# Patient Record
Sex: FEMALE | Race: WHITE | Employment: OTHER | URBAN - METROPOLITAN AREA
[De-identification: names, ages, dates, MRNs, and addresses within clinical notes are randomized per-mention and may not be internally consistent; named-entity substitution may affect disease eponyms.]

---

## 2017-02-26 ENCOUNTER — HOSPITAL ENCOUNTER (EMERGENCY)
Age: 67
Discharge: HOME | End: 2017-02-26
Attending: EMERGENCY MEDICINE
Payer: MEDICARE

## 2017-02-26 VITALS
SYSTOLIC BLOOD PRESSURE: 123 MMHG | HEIGHT: 63 IN | RESPIRATION RATE: 18 BRPM | DIASTOLIC BLOOD PRESSURE: 58 MMHG | BODY MASS INDEX: 33.31 KG/M2 | TEMPERATURE: 98 F | WEIGHT: 188 LBS | HEART RATE: 63 BPM | OXYGEN SATURATION: 94 %

## 2017-02-26 DIAGNOSIS — E11.649 HYPOGLYCEMIA DUE TO TYPE 2 DIABETES MELLITUS (HCC): Primary | ICD-10-CM

## 2017-02-26 LAB
ALBUMIN SERPL BCP-MCNC: 3.6 G/DL (ref 3.2–4.6)
ALBUMIN/GLOB SERPL: 1.1 {RATIO} (ref 1.2–3.5)
ALP SERPL-CCNC: 83 U/L (ref 50–136)
ALT SERPL-CCNC: 15 U/L (ref 12–65)
ANION GAP BLD CALC-SCNC: 13 MMOL/L (ref 7–16)
AST SERPL W P-5'-P-CCNC: 8 U/L (ref 15–37)
BASOPHILS # BLD AUTO: 0.1 K/UL (ref 0–0.2)
BASOPHILS # BLD: 0 % (ref 0–2)
BILIRUB SERPL-MCNC: 0.2 MG/DL (ref 0.2–1.1)
BUN SERPL-MCNC: 18 MG/DL (ref 8–23)
CALCIUM SERPL-MCNC: 8.1 MG/DL (ref 8.3–10.4)
CHLORIDE SERPL-SCNC: 105 MMOL/L (ref 98–107)
CO2 SERPL-SCNC: 26 MMOL/L (ref 21–32)
CREAT SERPL-MCNC: 1.13 MG/DL (ref 0.6–1)
DIFFERENTIAL METHOD BLD: ABNORMAL
EOSINOPHIL # BLD: 0.6 K/UL (ref 0–0.8)
EOSINOPHIL NFR BLD: 5 % (ref 0.5–7.8)
ERYTHROCYTE [DISTWIDTH] IN BLOOD BY AUTOMATED COUNT: 14.7 % (ref 11.9–14.6)
GLOBULIN SER CALC-MCNC: 3.4 G/DL (ref 2.3–3.5)
GLUCOSE SERPL-MCNC: 88 MG/DL (ref 65–100)
HCT VFR BLD AUTO: 36.2 % (ref 35.8–46.3)
HGB BLD-MCNC: 11 G/DL (ref 11.7–15.4)
IMM GRANULOCYTES # BLD: 0 K/UL (ref 0–0.5)
IMM GRANULOCYTES NFR BLD AUTO: 0.3 % (ref 0–5)
LYMPHOCYTES # BLD AUTO: 17 % (ref 13–44)
LYMPHOCYTES # BLD: 2.4 K/UL (ref 0.5–4.6)
MCH RBC QN AUTO: 26.3 PG (ref 26.1–32.9)
MCHC RBC AUTO-ENTMCNC: 30.4 G/DL (ref 31.4–35)
MCV RBC AUTO: 86.6 FL (ref 79.6–97.8)
MONOCYTES # BLD: 0.7 K/UL (ref 0.1–1.3)
MONOCYTES NFR BLD AUTO: 5 % (ref 4–12)
NEUTS SEG # BLD: 10.1 K/UL (ref 1.7–8.2)
NEUTS SEG NFR BLD AUTO: 73 % (ref 43–78)
PLATELET # BLD AUTO: 281 K/UL (ref 150–450)
PMV BLD AUTO: 9.9 FL (ref 10.8–14.1)
POTASSIUM SERPL-SCNC: 3.6 MMOL/L (ref 3.5–5.1)
PROT SERPL-MCNC: 7 G/DL (ref 6.3–8.2)
RBC # BLD AUTO: 4.18 M/UL (ref 4.05–5.25)
SODIUM SERPL-SCNC: 144 MMOL/L (ref 136–145)
WBC # BLD AUTO: 14 K/UL (ref 4.3–11.1)

## 2017-02-26 PROCEDURE — 85025 COMPLETE CBC W/AUTO DIFF WBC: CPT | Performed by: EMERGENCY MEDICINE

## 2017-02-26 PROCEDURE — 99284 EMERGENCY DEPT VISIT MOD MDM: CPT | Performed by: EMERGENCY MEDICINE

## 2017-02-26 PROCEDURE — 80053 COMPREHEN METABOLIC PANEL: CPT | Performed by: EMERGENCY MEDICINE

## 2017-02-26 PROCEDURE — 81003 URINALYSIS AUTO W/O SCOPE: CPT | Performed by: EMERGENCY MEDICINE

## 2017-02-26 NOTE — ED TRIAGE NOTES
Per ems called out for hypoglycemia patient found to have bgl 68 gave 15g oral glucose, repeat bgl not repeated, will repeat in triage.  Patient also has additional complaints concentrated urine and lower back pain \"I feel like I have an UTI\"

## 2017-02-26 NOTE — ED NOTES
I have reviewed medications, follow up provider options, and discharge instructions with the patient. The patient verbalized understanding. Copy of discharge information given to patient upon discharge. Patient discharged in no distress. Patient ambulatory to waiting area with cane.  No questions at this time

## 2017-02-26 NOTE — ED PROVIDER NOTES
Patient is a 75 y.o. female presenting with hypoglycemia. The history is provided by the patient and the spouse. Low Blood Sugar    This is a recurrent problem. The current episode started more than 1 week ago. The problem has been gradually worsening. Associated symptoms include confusion and weakness. Pertinent negatives include no unresponsiveness, no agitation, no self-injury and no violence. Mental status baseline is normal.  Her past medical history is significant for diabetes and hypertension. Her past medical history does not include seizures, CVA or dementia.         Past Medical History:   Diagnosis Date    Allergic rhinitis 12/10/2013    Anxiety and depression     Arthritis     Asthma     Atony of bladder     Chronic kidney disease     STONES    Chronic pain     HX FIBROMYALGIA    Diabetes ([facility] [address]     BORDRLINE DIET CONTROLLED    GERD (gastroesophageal reflux disease)     Heart disease     Hypercholesteremia     Hypertension     Hypothyroidism 11/14/2012    Hypoxemia     IBS (irritable bowel syndrome) 4/8/2013    Ill-defined condition     MRSA    Incomplete bladder emptying     Liver disease     elevated liver enzymes    Morbid obesity (HCC)     Myalgia and myositis, unspecified     Neurogenic bladder, NOS 4/3/2014    Organic hypersomnia, unspecified     JESSICA (obstructive sleep apnea)     Other chronic cystitis     Peripheral neuropathy ([facility] [address] 4/8/2013    Symptomatic menopausal or female climacteric states     Thyroid disease     Unspecified sleep apnea     Unspecified urinary incontinence     Vitamin D deficiency 4/8/2013       Past Surgical History:   Procedure Laterality Date    COLONOSCOPY      HX BUNIONECTOMY      HX CHOLECYSTECTOMY      HX HYSTERECTOMY      HX ORTHOPAEDIC      right 5th digit x 2    HX OTHER SURGICAL      bladder lift x 2; rectocele    HX OTHER SURGICAL      pelvis floor    HX OTHER SURGICAL      SCS-Dr. Yazmin Sage    HX THYROIDECTOMY      HX TONSILLECTOMY      HX UROLOGICAL      Bladder sx; rectocele         Family History:   Problem Relation Age of Onset    Heart Failure Mother 54     CHF    Breast Cancer Mother 76    Heart Disease Mother     Hypertension Mother     Asthma Mother     Cancer Mother     Diabetes Mother     Thyroid Disease Mother     Psychiatric Disorder Mother      Depression    Heart Disease Father     Heart Attack Father 47     mi    Hypertension Father     Cancer Father     Stroke Father     Hypertension Brother     Diabetes Brother     Depression Sister     Anxiety Sister     OCD Sister     Hypertension Brother     Diabetes Brother     Depression Brother     No Known Problems Brother     Kidney Disease Other      gen fam hx       Social History     Social History    Marital status:      Spouse name: N/A    Number of children: N/A    Years of education: N/A     Occupational History    Not on file. Social History Main Topics    Smoking status: Never Smoker    Smokeless tobacco: Never Used    Alcohol use No      Comment: 1 DRINK EVERY 2 MONTHS    Drug use: No    Sexual activity: Not on file     Other Topics Concern    Not on file     Social History Narrative         ALLERGIES: Ciprofloxacin; Codeine; Melon flavor; Morphine; Nut flavor; and Other medication    Review of Systems   Constitutional: Negative for chills and fever. HENT: Negative for congestion, ear pain and rhinorrhea. Eyes: Negative for photophobia and discharge. Respiratory: Negative for cough and shortness of breath. Cardiovascular: Negative for chest pain and palpitations. Gastrointestinal: Negative for abdominal pain, constipation, diarrhea and vomiting. Endocrine: Negative for cold intolerance and heat intolerance. Genitourinary: Negative for dysuria and flank pain. Musculoskeletal: Negative for arthralgias, myalgias and neck pain. Skin: Negative for rash and wound.    Allergic/Immunologic: Negative for environmental allergies and food allergies. Neurological: Positive for weakness. Negative for syncope and headaches. Hematological: Negative for adenopathy. Does not bruise/bleed easily. Psychiatric/Behavioral: Positive for confusion. Negative for agitation, dysphoric mood and self-injury. The patient is not nervous/anxious. All other systems reviewed and are negative. Vitals:    02/26/17 1702 02/26/17 1840 02/26/17 1841 02/26/17 1841   BP: 96/41 123/58     Pulse: 72      Resp: 18      Temp: 97.7 °F (36.5 °C)      SpO2: 94%  92% 94%   Weight: 85.3 kg (188 lb)      Height: 5' 3\" (1.6 m)               Physical Exam   Constitutional: She is oriented to person, place, and time. She appears well-developed and well-nourished. She appears distressed. HENT:   Head: Normocephalic and atraumatic. Mouth/Throat: Oropharynx is clear and moist.   Eyes: Conjunctivae and EOM are normal. Pupils are equal, round, and reactive to light. Right eye exhibits no discharge. Left eye exhibits no discharge. No scleral icterus. Neck: Normal range of motion. Neck supple. No thyromegaly present. Cardiovascular: Normal rate, regular rhythm, normal heart sounds and intact distal pulses. Exam reveals no gallop and no friction rub. No murmur heard. Pulmonary/Chest: Effort normal and breath sounds normal. No respiratory distress. She has no wheezes. She exhibits no tenderness. Abdominal: Soft. Bowel sounds are normal. She exhibits no distension and no mass. There is no hepatosplenomegaly. There is no tenderness. There is no rebound and no guarding. No hernia. Musculoskeletal: Normal range of motion. She exhibits no edema or tenderness. Neurological: She is alert and oriented to person, place, and time. No cranial nerve deficit. She exhibits normal muscle tone. Skin: Skin is warm and dry. No rash noted. She is not diaphoretic. No erythema. Psychiatric: She has a normal mood and affect.  Her behavior is normal. Judgment and thought content normal.   Nursing note and vitals reviewed. MDM  Number of Diagnoses or Management Options  Hypoglycemia due to type 2 diabetes mellitus [facility]): new and requires workup  Diagnosis management comments: workup today reveals a mild leukocytosis without other source of infection. Patient has no upper respiratory symptoms. Lungs are clear. Urinalysis was negative. Remainder of the blood work was unremarkable as well. Blood sugar 88. Patient is awake, alert and oriented with out acute symptoms at this time. Discussed management of hypoglycemic event. I also emphasized need for the patient to follow up with her primary care doctor in the next 1-2 days for reevaluation. Amount and/or Complexity of Data Reviewed  Clinical lab tests: ordered and reviewed  Decide to obtain previous medical records or to obtain history from someone other than the patient: yes  Obtain history from someone other than the patient: yes  Review and summarize past medical records: yes    Risk of Complications, Morbidity, and/or Mortality  Presenting problems: moderate  Diagnostic procedures: moderate  Management options: low  General comments: Elements of this note have been dictated via voice recognition software. Text and phrases may be limited by the accuracy of the software. The chart has been reviewed, but errors may still be present.       Patient Progress  Patient progress: improved    ED Course       Procedures

## 2017-02-26 NOTE — DISCHARGE INSTRUCTIONS
Diabetes Blood Sugar Emergencies: Your Action Plan  How can you prevent a blood sugar emergency? An important part of living with diabetes is keeping your blood sugar in your target range. You'll need to know what to do if it's too high or too low. Managing your blood sugar levels helps you avoid emergencies. This care sheet will teach you about the signs of high and low blood sugar. It will help you make an action plan with your doctor for when these signs occur. Low blood sugar is more likely to happen if you take certain medicines for diabetes. It can also happen if you skip a meal, drink alcohol, or exercise more than usual.  You may get high blood sugar if you eat differently than you normally do. One example is eating more carbohydrate than usual. Having a cold, the flu, or other sudden illness can also cause high blood sugar levels. Levels can also rise if you miss a dose of medicine. Any change in how you take your medicine may affect your blood sugar level. So it's important to work with your doctor before you make any changes. Check your blood sugar  Work with your doctor to fill in the blank spaces below that apply to you. Track your levels, know your target range, and write down ways you can get your blood sugar back in your target range. A log book can help you track your levels. Take the book to all of your medical appointments. · Check your blood sugar _____ times a day, at these times:________________________________________________. (For example: Before meals, at bedtime, before exercise, during exercise, other.)  · Your blood sugar target range before a meal is ___________________. Your blood sugar target range after a meal is _______________________. · Do this--___________________________________________________--to get your blood sugar back within your safe range if your blood sugar results are _________________________________________.  (For example: Less than 70 or above 250 mg/dL.)  Call your doctor when your blood sugar results are ___________________________________. (For example: Less than 70 or above 250 mg/dL.)  What are the symptoms of low and high blood sugar? Common symptoms of low blood sugar are sweating and feeling shaky, weak, hungry, or confused. Symptoms can start quickly. Common symptoms of high blood sugar are feeling very thirsty or very hungry. You may also pass urine more often than usual. You may have blurry vision and may lose weight without trying. But some people may have high or low blood sugar without having any symptoms. That's a good reason to check your blood sugar on a regular schedule. What should you do if you have symptoms? Work with your doctor to fill in the blank spaces below that apply to you. Low blood sugar  If you have symptoms of low blood sugar, check your blood sugar. If it's below _____ (for example, below 70), eat or drink a quick-sugar food that has about 15 grams of carbohydrate. Your goal is to get your level back to your safe range. Check your blood sugar again 15 minutes later. If it's still not in your target range, take another 15 grams of carbohydrate and check your blood sugar again in 15 minutes. Repeat this until you reach your target. Then go back to your regular testing schedule. When you have low blood sugar, it's best to stop or reduce any physical activity until your blood sugar is back in your target range and is stable. If you must stay active, eat or drink 30 grams of carbohydrate. Then check your blood sugar again in 15 minutes. If it's not in your target range, take another 30 grams of carbohydrates. Check your blood sugar again in 15 minutes. Keep doing this until you reach your target. You can then go back to your regular testing schedule. If your symptoms or blood sugar levels are getting worse or have not improved after 15 minutes, seek medical care right away.   Here are some examples of quick-sugar foods with 15 grams of carbohydrate:  · 3 or 4 glucose tablets  · 1 tube of glucose gel  · Hard candy (such as 3 Jolly Ranchers or 5 to 7 Life Savers)  · ½ cup to ¾ cup (4 to 6 ounces) of fruit juice or regular (not diet) soda  High blood sugar  If you have symptoms of high blood sugar, check your blood sugar. Your goal is to get your level back to your target range. If it's above ______ (for example, above 250), follow these steps:  · If you missed a dose of your diabetes medicine, take it now. Take only the amount of medicine that you have been prescribed. Do not take more or less medicine. · Give yourself insulin if your doctor has prescribed it for high blood sugar. · Test for ketones, if the doctor told you to do so. If the results of the ketone test show a moderate-to-large amount of ketones, call the doctor for advice. · Wait 30 minutes after you take the extra insulin or the missed medicine. Check your blood sugar again. If your symptoms or blood sugar levels are getting worse or have not improved after taking these steps, seek medical care right away. Follow-up care is a key part of your treatment and safety. Be sure to make and go to all appointments, and call your doctor if you are having problems. It's also a good idea to know your test results and keep a list of the medicines you take. Where can you learn more? Go to http://susana-theo.info/. Enter L458 in the search box to learn more about \"Diabetes Blood Sugar Emergencies: Your Action Plan. \"  Current as of: May 23, 2016  Content Version: 11.1  © 8639-6835 [employer]. Care instructions adapted under license by [employer] (which disclaims liability or warranty for this information). If you have questions about a medical condition or this instruction, always ask your healthcare professional. [address] any warranty or liability for your use of this information.

## 2017-02-27 ENCOUNTER — PATIENT OUTREACH (OUTPATIENT)
Dept: CASE MANAGEMENT | Age: 67
End: 2017-02-27

## 2017-02-27 NOTE — PROGRESS NOTES
Date/Time of Call:   02/27/2017 2:36 PM   What was the patient seen in the ED for? Patient was seen in ED for diagnosis of: Chest pain   Does the patient understand his/her diagnosis and/or treatment and what happened during the ED visit? Spoke with patient who stated understanding of treatment and diagnosis. Did the patient receive discharge instructions from the ED? Yes discharge instructions received from the ED per patient. Review any discharge instructions (see notes in [facility]). Ask patient if they understand these. Do they have any questions? Patient and Care Coordinator reviewed DC instructions. Patient stated understanding and no questions asked. Were home services ordered (nursing, PT, OT, ST, etc.)? No HH services ordered at d/c. If so, has the first visit occurred? If not, why? (Assist with coordination of services if necessary.) N/A. Was any DME ordered? No DME ordered at d/c. Patient states uses cane as needed. If so, has it been received? If not, why?  (Assist with coordination of arranging DME orders if necessary.) N/A   Complete a review of all medications (new, continued and discontinued meds per the D/C instructions and medication tab in [address] [place] [address]). Review of medications was completed. Were all new prescriptions filled? If not, why?  (Assist with obtainment of medications if necessary.) N/A   Does the patient understand the purpose and dosing instructions for all medications? (If patient has questions, provide explanation and education.) Patient stated understanding and purpose of all medications. Does the patient have any problems in performing ADLs? (If patient is unable to perform ADLs  what is the limiting factor(s)? Do they have a support system that can assist? If no support system is present, discuss possible assistance that they may be able to obtain.) No.  Patient states she is independent with all ADLs.    Does the patient have all follow-up appointments scheduled? Has transportation been arranged? [facility] follow-up should be within 7 days of discharge; all others should have PCP follow-up within 7 days of discharge; follow-ups with other specialists as appropriate or ordered.) Patient states she will call and schedule f/u appointment according to when her  is off of work. Any other questions or concerns expressed by the patient? No further questions asked or needs identified at this time. Patient stated gratitude for care and call. IZZY Call Completed By: Coco Gale LPN   Care Coordinator  [facility]        I                          This note will not be viewable in [address]

## 2017-03-21 ENCOUNTER — HOSPITAL ENCOUNTER (OUTPATIENT)
Dept: MRI IMAGING | Age: 67
Discharge: HOME | End: 2017-03-21
Attending: INTERNAL MEDICINE
Payer: MEDICARE

## 2017-03-21 DIAGNOSIS — R27.0 ATAXIA: ICD-10-CM

## 2017-03-21 PROCEDURE — A9577 INJ MULTIHANCE: HCPCS

## 2017-03-21 PROCEDURE — 74011250636 HC RX REV CODE- 250/636

## 2017-03-21 PROCEDURE — 70553 MRI BRAIN STEM W/O & W/DYE: CPT

## 2017-03-21 RX ORDER — SODIUM CHLORIDE 0.9 % (FLUSH) 0.9 %
10 SYRINGE (ML) INJECTION
Status: COMPLETED | OUTPATIENT
Start: 2017-03-21 | End: 2017-03-21

## 2017-03-21 RX ADMIN — GADOBENATE DIMEGLUMINE 19 ML: 529 INJECTION, SOLUTION INTRAVENOUS at 10:25

## 2017-03-21 RX ADMIN — Medication 10 ML: at 10:25

## 2017-04-17 ENCOUNTER — HOSPITAL ENCOUNTER (OUTPATIENT)
Dept: MAMMOGRAPHY | Age: 67
Discharge: HOME | End: 2017-04-17
Attending: INTERNAL MEDICINE
Payer: MEDICARE

## 2017-04-17 DIAGNOSIS — Z12.31 VISIT FOR SCREENING MAMMOGRAM: ICD-10-CM

## 2017-04-17 PROCEDURE — 77067 SCR MAMMO BI INCL CAD: CPT

## 2017-04-20 ENCOUNTER — HOSPITAL ENCOUNTER (EMERGENCY)
Age: 67
Discharge: HOME | End: 2017-04-20
Attending: EMERGENCY MEDICINE
Payer: MEDICARE

## 2017-04-20 ENCOUNTER — APPOINTMENT (OUTPATIENT)
Dept: GENERAL RADIOLOGY | Age: 67
End: 2017-04-20
Attending: EMERGENCY MEDICINE
Payer: MEDICARE

## 2017-04-20 VITALS
WEIGHT: 190 LBS | RESPIRATION RATE: 15 BRPM | TEMPERATURE: 98 F | DIASTOLIC BLOOD PRESSURE: 57 MMHG | SYSTOLIC BLOOD PRESSURE: 121 MMHG | BODY MASS INDEX: 33.66 KG/M2 | HEART RATE: 60 BPM | HEIGHT: 63 IN | OXYGEN SATURATION: 96 %

## 2017-04-20 DIAGNOSIS — S39.012A LUMBAR STRAIN, INITIAL ENCOUNTER: ICD-10-CM
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
## 2017-04-20 DIAGNOSIS — S29.019A THORACIC MYOFASCIAL STRAIN, INITIAL ENCOUNTER: ICD-10-CM

## 2017-04-20 DIAGNOSIS — S16.1XXA CERVICAL STRAIN, INITIAL ENCOUNTER: Primary | ICD-10-CM

## 2017-04-20 PROCEDURE — 72070 X-RAY EXAM THORAC SPINE 2VWS: CPT

## 2017-04-20 PROCEDURE — 74011250637 HC RX REV CODE- 250/637: Performed by: EMERGENCY MEDICINE

## 2017-04-20 PROCEDURE — 99284 EMERGENCY DEPT VISIT MOD MDM: CPT | Performed by: EMERGENCY MEDICINE

## 2017-04-20 PROCEDURE — 72100 X-RAY EXAM L-S SPINE 2/3 VWS: CPT

## 2017-04-20 PROCEDURE — 72040 X-RAY EXAM NECK SPINE 2-3 VW: CPT

## 2017-04-20 RX ORDER — METAXALONE 800 MG/1
800 TABLET ORAL ONCE
Status: COMPLETED | OUTPATIENT
Start: 2017-04-20 | End: 2017-04-20

## 2017-04-20 RX ORDER — HYDROCODONE BITARTRATE AND ACETAMINOPHEN 10; 325 MG/1; MG/1
1 TABLET ORAL
Qty: 11 TAB | Refills: 0 | Status: SHIPPED | OUTPATIENT
Start: 2017-04-20 | End: 2017-09-23

## 2017-04-20 RX ORDER — METAXALONE 800 MG/1
800 TABLET ORAL 4 TIMES DAILY
Qty: 40 TAB | Refills: 0 | Status: SHIPPED | OUTPATIENT
Start: 2017-04-20 | End: 2017-04-30

## 2017-04-20 RX ORDER — HYDROCODONE BITARTRATE AND ACETAMINOPHEN 10; 325 MG/1; MG/1
1 TABLET ORAL
Status: COMPLETED | OUTPATIENT
Start: 2017-04-20 | End: 2017-04-20

## 2017-04-20 RX ADMIN — HYDROCODONE BITARTRATE AND ACETAMINOPHEN 1 TABLET: 10; 325 TABLET ORAL at 19:42

## 2017-04-20 RX ADMIN — METAXALONE 800 MG: 800 TABLET ORAL at 19:42
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
## 2017-04-21 ENCOUNTER — PATIENT OUTREACH (OUTPATIENT)
Dept: CASE MANAGEMENT | Age: 67
End: 2017-04-21

## 2017-04-21 NOTE — PROGRESS NOTES
Transition of Care Discharge Follow-up Questionnaire   Date/Time of Call:   4/21/17 3:08p   What was the patient hospitalized for? Does the patient understand his/her diagnosis and/or treatment and what happened during the hospitalization? Patient understands her diagnosis and treatment during hospitalization. Did the patient receive discharge instructions? Yes   Review any discharge instructions (see notes in [facility]). Ask patient if they understand these. Do they have any questions? Patient does not have any questions in regards to instructions. Were home services ordered (nursing, PT, OT, ST, etc.)? No    If so, has the first visit occurred? If not, why? (Assist with coordination of services if necessary.) No   Was any DME ordered? No   If so, has it been received? If not, why?  (Assist with coordination of arranging DME orders if necessary.) n/a   Complete a review of all medications (new, continued and discontinued meds per the D/C instructions and medication tab in [facility]). Patient is compliant with current medications. Were all new prescriptions filled? If not, why?  (Assist with obtainment of medications if necessary.) Yes   Does the patient understand the purpose and dosing instructions for all medications? (If patient has questions, provide explanation and education.) Yes   Does the patient have any problems in performing ADLs? (If patient is unable to perform ADLs  what is the limiting factor(s)? Do they have a support system that can assist? If no support system is present, discuss possible assistance that they may be able to obtain.) Patient is independent with ADLs. Patient is currently taking care of her spouse who is recovering from surgery. Patient does not use assistive devices and has resumed driving. Patient verbalizes understanding of instructions and will not take pain medication prior to operating heavy machinery or driving.     Does the patient have all follow-up appointments scheduled? Has transportation been arranged? [facility] follow-up should be within 7 days of discharge; all others should have PCP follow-up within 7 days of discharge; follow-ups with other specialists as appropriate or ordered.) Patient does not have a 7-day follow-up appointment scheduled with PCP. Patient states she will follow-up in the next week with her physician. Care coordinator educated patient on the importance of follow-up calls and the continuity of care. Patient declined care coordinator scheduling appointment and will schedule her own appointment. Any other questions or concerns expressed by the patient? No other questions or concerns were expressed by patient to care coordinator. She was appreciative for call. IZZY Call Completed By: Julia Viveros LPN  [facility] [address]  Coordinator          This note will not be viewable in [address]

## 2017-04-21 NOTE — ED NOTES
I have reviewed discharge instructions with the patient. The patient verbalized understanding regarding discharge instructions and prescription education x2. The patient exited the facility via a wheelchair with discharge instructions in hand. Patient was in no acute distress upon exiting this facility.

## 2017-04-21 NOTE — DISCHARGE INSTRUCTIONS

## 2017-05-19 ENCOUNTER — PATIENT OUTREACH (OUTPATIENT)
Dept: CASE MANAGEMENT | Age: 67
End: 2017-05-19

## 2017-05-19 NOTE — PROGRESS NOTES
IZZY f/u call:    Patient is \"doing well. \" She has PT twice/week. Patient continues to use cane for ambulation due to \"wobbliness. \" Patient does not verbalize any needs or concerns. She was thankful for call. This note will not be viewable in [address]

## 2017-09-11 ENCOUNTER — APPOINTMENT (OUTPATIENT)
Dept: CT IMAGING | Age: 67
DRG: 330 | End: 2017-09-11
Attending: EMERGENCY MEDICINE
Payer: MEDICARE

## 2017-09-11 ENCOUNTER — HOSPITAL ENCOUNTER (INPATIENT)
Age: 67
LOS: 11 days | Discharge: HOME | DRG: 330 | End: 2017-09-23
Attending: EMERGENCY MEDICINE | Admitting: SURGERY
Payer: MEDICARE

## 2017-09-11 DIAGNOSIS — K58.9 IRRITABLE BOWEL SYNDROME WITHOUT DIARRHEA: ICD-10-CM

## 2017-09-11 DIAGNOSIS — I10 ESSENTIAL HYPERTENSION WITH GOAL BLOOD PRESSURE LESS THAN 140/90: ICD-10-CM

## 2017-09-11 DIAGNOSIS — E55.9 VITAMIN D DEFICIENCY: ICD-10-CM

## 2017-09-11 DIAGNOSIS — K56.609 SMALL BOWEL OBSTRUCTION (HCC): Primary | ICD-10-CM

## 2017-09-11 DIAGNOSIS — J45.20 MILD INTERMITTENT ASTHMA WITHOUT COMPLICATION: ICD-10-CM

## 2017-09-11 DIAGNOSIS — G47.33 OSA (OBSTRUCTIVE SLEEP APNEA): ICD-10-CM

## 2017-09-11 LAB
ALBUMIN SERPL-MCNC: 3.6 G/DL (ref 3.2–4.6)
ALBUMIN/GLOB SERPL: 0.8 {RATIO} (ref 1.2–3.5)
ALP SERPL-CCNC: 99 U/L (ref 50–136)
ALT SERPL-CCNC: 26 U/L (ref 12–65)
ANION GAP SERPL CALC-SCNC: 8 MMOL/L (ref 7–16)
AST SERPL-CCNC: 52 U/L (ref 15–37)
BACTERIA URNS QL MICRO: 0 /HPF
BASOPHILS # BLD: 0 K/UL (ref 0–0.2)
BASOPHILS NFR BLD: 0 % (ref 0–2)
BILIRUB SERPL-MCNC: 0.4 MG/DL (ref 0.2–1.1)
BUN SERPL-MCNC: 15 MG/DL (ref 8–23)
CALCIUM SERPL-MCNC: 8.9 MG/DL (ref 8.3–10.4)
CASTS URNS QL MICRO: NORMAL /LPF
CHLORIDE SERPL-SCNC: 106 MMOL/L (ref 98–107)
CO2 SERPL-SCNC: 27 MMOL/L (ref 21–32)
CREAT SERPL-MCNC: 0.96 MG/DL (ref 0.6–1)
DIFFERENTIAL METHOD BLD: ABNORMAL
EOSINOPHIL # BLD: 0.1 K/UL (ref 0–0.8)
EOSINOPHIL NFR BLD: 1 % (ref 0.5–7.8)
EPI CELLS #/AREA URNS HPF: NORMAL /HPF
ERYTHROCYTE [DISTWIDTH] IN BLOOD BY AUTOMATED COUNT: 15.4 % (ref 11.9–14.6)
GLOBULIN SER CALC-MCNC: 4.5 G/DL (ref 2.3–3.5)
GLUCOSE SERPL-MCNC: 143 MG/DL (ref 65–100)
HCT VFR BLD AUTO: 38.7 % (ref 35.8–46.3)
HGB BLD-MCNC: 12.3 G/DL (ref 11.7–15.4)
IMM GRANULOCYTES # BLD: 0.1 K/UL (ref 0–0.5)
IMM GRANULOCYTES NFR BLD: 0.5 % (ref 0–5)
LIPASE SERPL-CCNC: 42 U/L (ref 73–393)
LYMPHOCYTES # BLD: 2.7 K/UL (ref 0.5–4.6)
LYMPHOCYTES NFR BLD: 25 % (ref 13–44)
MCH RBC QN AUTO: 26.1 PG (ref 26.1–32.9)
MCHC RBC AUTO-ENTMCNC: 31.8 G/DL (ref 31.4–35)
MCV RBC AUTO: 82 FL (ref 79.6–97.8)
MONOCYTES # BLD: 0.6 K/UL (ref 0.1–1.3)
MONOCYTES NFR BLD: 6 % (ref 4–12)
NEUTS SEG # BLD: 7.6 K/UL (ref 1.7–8.2)
NEUTS SEG NFR BLD: 68 % (ref 43–78)
PLATELET # BLD AUTO: 325 K/UL (ref 150–450)
PMV BLD AUTO: 9.5 FL (ref 10.8–14.1)
POTASSIUM SERPL-SCNC: 5.9 MMOL/L (ref 3.5–5.1)
PROT SERPL-MCNC: 8.1 G/DL (ref 6.3–8.2)
RBC # BLD AUTO: 4.72 M/UL (ref 4.05–5.25)
RBC #/AREA URNS HPF: NORMAL /HPF
SODIUM SERPL-SCNC: 141 MMOL/L (ref 136–145)
WBC # BLD AUTO: 11.1 K/UL (ref 4.3–11.1)
WBC URNS QL MICRO: NORMAL /HPF

## 2017-09-11 PROCEDURE — 77030011943

## 2017-09-11 PROCEDURE — 81003 URINALYSIS AUTO W/O SCOPE: CPT | Performed by: EMERGENCY MEDICINE

## 2017-09-11 PROCEDURE — 99285 EMERGENCY DEPT VISIT HI MDM: CPT | Performed by: EMERGENCY MEDICINE

## 2017-09-11 PROCEDURE — 96374 THER/PROPH/DIAG INJ IV PUSH: CPT | Performed by: EMERGENCY MEDICINE

## 2017-09-11 PROCEDURE — 96375 TX/PRO/DX INJ NEW DRUG ADDON: CPT | Performed by: EMERGENCY MEDICINE

## 2017-09-11 PROCEDURE — 85025 COMPLETE CBC W/AUTO DIFF WBC: CPT | Performed by: EMERGENCY MEDICINE

## 2017-09-11 PROCEDURE — 80053 COMPREHEN METABOLIC PANEL: CPT | Performed by: EMERGENCY MEDICINE

## 2017-09-11 PROCEDURE — 74177 CT ABD & PELVIS W/CONTRAST: CPT

## 2017-09-11 PROCEDURE — 83690 ASSAY OF LIPASE: CPT | Performed by: EMERGENCY MEDICINE

## 2017-09-11 PROCEDURE — 74011250636 HC RX REV CODE- 250/636: Performed by: EMERGENCY MEDICINE

## 2017-09-11 PROCEDURE — 74011636320 HC RX REV CODE- 636/320: Performed by: EMERGENCY MEDICINE

## 2017-09-11 PROCEDURE — 51701 INSERT BLADDER CATHETER: CPT | Performed by: EMERGENCY MEDICINE

## 2017-09-11 PROCEDURE — 81015 MICROSCOPIC EXAM OF URINE: CPT | Performed by: EMERGENCY MEDICINE

## 2017-09-11 PROCEDURE — 74011000258 HC RX REV CODE- 258: Performed by: EMERGENCY MEDICINE

## 2017-09-11 PROCEDURE — 96361 HYDRATE IV INFUSION ADD-ON: CPT | Performed by: EMERGENCY MEDICINE

## 2017-09-11 RX ORDER — HYDROMORPHONE HYDROCHLORIDE 1 MG/ML
0.5 INJECTION, SOLUTION INTRAMUSCULAR; INTRAVENOUS; SUBCUTANEOUS
Status: COMPLETED | OUTPATIENT
Start: 2017-09-11 | End: 2017-09-11

## 2017-09-11 RX ORDER — ONDANSETRON 2 MG/ML
4 INJECTION INTRAMUSCULAR; INTRAVENOUS
Status: COMPLETED | OUTPATIENT
Start: 2017-09-11 | End: 2017-09-11

## 2017-09-11 RX ORDER — SODIUM CHLORIDE 0.9 % (FLUSH) 0.9 %
10 SYRINGE (ML) INJECTION
Status: COMPLETED | OUTPATIENT
Start: 2017-09-11 | End: 2017-09-11

## 2017-09-11 RX ADMIN — HYDROMORPHONE HYDROCHLORIDE 0.5 MG: 1 INJECTION, SOLUTION INTRAMUSCULAR; INTRAVENOUS; SUBCUTANEOUS at 22:44

## 2017-09-11 RX ADMIN — Medication 10 ML: at 23:24

## 2017-09-11 RX ADMIN — ONDANSETRON 4 MG: 2 INJECTION INTRAMUSCULAR; INTRAVENOUS at 22:38

## 2017-09-11 RX ADMIN — SODIUM CHLORIDE 1000 ML: 900 INJECTION, SOLUTION INTRAVENOUS at 22:37

## 2017-09-11 RX ADMIN — IOPAMIDOL 100 ML: 755 INJECTION, SOLUTION INTRAVENOUS at 23:24

## 2017-09-11 RX ADMIN — SODIUM CHLORIDE 100 ML: 900 INJECTION, SOLUTION INTRAVENOUS at 23:24

## 2017-09-11 NOTE — IP AVS SNAPSHOT
[address] 
[phone] Patient: Bharti Mesa MRN: [id number] Current Discharge Medication List  
  
START taking these medications Dose & Instructions Dispensing Information Comments Morning Noon Evening Bedtime  
 oxyCODONE-acetaminophen 5-325 mg per tablet Commonly known as:  PERCOCET Your last dose was: Your next dose is:    
   
   
 Dose:  2 Tab Take 2 Tabs by mouth every four (4) hours as needed. Max Daily Amount: 12 Tabs. Quantity:  40 Tab Refills:  0 CONTINUE these medications which have NOT CHANGED Dose & Instructions Dispensing Information Comments Morning Noon Evening Bedtime  
 albuterol 90 mcg/actuation inhaler Commonly known as:  PROVENTIL HFA, VENTOLIN HFA, PROAIR HFA Your last dose was: Your next dose is:    
   
   
 Dose:  1 Puff Take 1 Puff by inhalation every six (6) hours as needed for Wheezing. Quantity:  1 Inhaler Refills:  11 Ventolin ALIGN PO Your last dose was: Your next dose is: Take  by mouth. Refills:  0  
     
   
   
   
  
 amLODIPine 10 mg tablet Commonly known as:  Arlyss Ionia Your last dose was: Your next dose is: TAKE ONE TABLET BY MOUTH ONCE DAILY Quantity:  90 Tab Refills:  1  
     
   
   
   
  
 atorvastatin 20 mg tablet Commonly known as:  LIPITOR Your last dose was: Your next dose is: TAKE ONE TABLET BY MOUTH ONCE DAILY Quantity:  90 Tab Refills:  1 Azelastine 0.15 % (205.5 mcg) nasal spray Commonly known as:  ASTEPRO Your last dose was: Your next dose is:    
   
   
 Dose:  2 Spray 2 Sprays by Both Nostrils route two (2) times a day. Quantity:  1 Bottle Refills:  11  
     
   
   
   
  
 baclofen 10 mg tablet Commonly known as:  LIORESAL Your last dose was: Your next dose is:    
   
   
 Dose:  1 Tab Take 1 Tab by mouth three (3) times daily. Refills:  0 BARD CLEAN-CATH Misc Generic drug:  Catheter Your last dose was: Your next dose is:    
   
   
 by Does Not Apply route. Refills:  0  
     
   
   
   
  
 BENADRYL 25 mg capsule Generic drug:  diphenhydrAMINE Your last dose was: Your next dose is:    
   
   
 Dose:  25 mg Take 25 mg by mouth every six (6) hours as needed. Refills:  0  
     
   
   
   
  
 budesonide-formoterol 160-4.5 mcg/actuation HFA inhaler Commonly known as:  SYMBICORT Your last dose was: Your next dose is:    
   
   
 Dose:  2 Puff Take 2 Puffs by inhalation two (2) times a day. Quantity:  3 Inhaler Refills:  3  
     
   
   
   
  
 busPIRone 7.5 mg tablet Commonly known as:  BUSPAR Your last dose was: Your next dose is:    
   
   
 Dose:  7.5 mg Take 1 Tab by mouth two (2) times a day. Quantity:  60 Tab Refills:  0  
     
   
   
   
  
 cephALEXin 250 mg capsule Commonly known as:  Elsytiny Mendosamer Your last dose was: Your next dose is:    
   
   
 Dose:  250 mg Take 1 Cap by mouth daily. Quantity:  90 Cap Refills:  3  
     
   
   
   
  
 citalopram 40 mg tablet Commonly known as:  Raleigh Dame Your last dose was: Your next dose is:    
   
   
 Dose:  40 mg Take 1 Tab by mouth daily. Quantity:  90 Tab Refills:  1  
     
   
   
   
  
 clonazePAM 0.5 mg tablet Commonly known as:  Elta Halsted Your last dose was: Your next dose is: Take 1/2 PO QDay and 1 at bed time. Quantity:  45 Tab Refills:  2  
     
   
   
   
  
 cpap machine kit Your last dose was: Your next dose is:    
   
   
 by Does Not Apply route. Refills:  0 Diabetic Shoes Your last dose was: Your next dose is:    
   
   
 by Does Not Apply route. Refills:  0  
     
   
   
   
  
 dicyclomine 20 mg tablet Commonly known as:  BENTYL Your last dose was: Your next dose is: TAKE 1 TABLET BY MOUTH BEFORE EACH MEAL AND 1 TABLET BEFORE BEDTIME Quantity:  120 Tab Refills:  3 EPINEPHRINE IN Your last dose was: Your next dose is: Take  by inhalation. Refills:  0  
     
   
   
   
  
 estradiol 0.01 % (0.1 mg/gram) vaginal cream  
Commonly known as:  ESTRACE Your last dose was: Your next dose is:    
   
   
 1 gram vaginally 1-2x/week Quantity:  42.5 g Refills:  6  
     
   
   
   
  
 fentaNYL 25 mcg/hr PATCH Commonly known as:  Adrien Arm Your last dose was: Your next dose is:    
   
   
 Dose:  1 Patch 1 Patch by TransDERmal route every seventy-two (72) hours. Refills:  0  
     
   
   
   
  
 fluticasone 50 mcg/actuation nasal spray Commonly known as:  Lella Solum Your last dose was: Your next dose is:    
   
   
 Dose:  2 Spray 2 Sprays by Both Nostrils route daily. Refills:  0  
     
   
   
   
  
 gabapentin 400 mg capsule Commonly known as:  NEURONTIN Your last dose was: Your next dose is:    
   
   
 Dose:  1 Cap Take 1 Cap by mouth three (3) times daily. Refills:  0  
     
   
   
   
  
 glucose blood VI test strips strip Commonly known as:  ASCENSIA AUTODISC VI, ONE TOUCH ULTRA TEST VI Your last dose was: Your next dose is:    
   
   
 Test 4 times daily Quantity:  100 Strip Refills:  11 Dx: 250.00 non insulin based  
    
   
   
   
  
 levothyroxine 137 mcg tablet Commonly known as:  SYNTHROID Your last dose was: Your next dose is:    
   
   
 Dose:  137 mcg Take 137 mcg by mouth Daily (before breakfast). Quantity:  90 Tab Refills:  0 D/C Levothyroxine 150 mcg  
    
   
   
   
  
 losartan-hydroCHLOROthiazide 100-12.5 mg per tablet Commonly known as:  HYZAAR Your last dose was: Your next dose is:    
   
   
 Dose:  1 Tab Take 1 Tab by mouth daily. Quantity:  90 Tab Refills:  1  
     
   
   
   
  
 metFORMIN 1,000 mg tablet Commonly known as:  GLUCOPHAGE Your last dose was: Your next dose is:    
   
   
 Dose:  1000 mg Take 1 Tab by mouth two (2) times daily (with meals). Quantity:  60 Tab Refills:  5 METRONIDAZOLE EX Your last dose was: Your next dose is:    
   
   
 by Apply Externally route. Refills:  0  
     
   
   
   
  
 montelukast 10 mg tablet Commonly known as:  SINGULAIR Your last dose was: Your next dose is: TAKE ONE TABLET BY MOUTH ONCE DAILY Quantity:  90 Tab Refills:  0  
     
   
   
   
  
 omeprazole 40 mg capsule Commonly known as:  PRILOSEC Your last dose was: Your next dose is:    
   
   
 Dose:  40 mg Take 40 mg by mouth two (2) times a day. Refills:  0  
     
   
   
   
  
 oxybutynin chloride XL 10 mg CR tablet Commonly known as:  DITROPAN XL Your last dose was: Your next dose is:    
   
   
 Dose:  10 mg Take 1 Tab by mouth daily. Quantity:  90 Tab Refills:  3  
     
   
   
   
  
 promethazine 25 mg tablet Commonly known as:  PHENERGAN Your last dose was: Your next dose is:    
   
   
 Dose:  25 mg Take 25 mg by mouth every six (6) hours as needed for Nausea. Refills:  0  
     
   
   
   
  
 RESTASIS 0.05 % ophthalmic emulsion Generic drug:  cycloSPORINE Your last dose was: Your next dose is:    
   
   
 Dose:  1 Drop Administer 1 Drop to both eyes every twelve (12) hours. Refills:  0 SITagliptin 50 mg tablet Commonly known as:  Rosa Maria Tato Your last dose was: Your next dose is:    
   
   
 Dose:  50 mg Take 1 Tab by mouth daily. Quantity:  90 Tab Refills:  1  
     
   
   
   
  
 traZODone 100 mg tablet Commonly known as:  Viral Garza Your last dose was: Your next dose is: TAKE TWO TO THREE TABLETS BY MOUTH AT BEDTIME Quantity:  270 Tab Refills:  1 ZyrTEC 10 mg tablet Generic drug:  cetirizine Your last dose was: Your next dose is: Take  by mouth. Refills:  0 STOP taking these medications   
 fluconazole 150 mg tablet Commonly known as:  DIFLUCAN  
   
  
 HYDROcodone-acetaminophen  mg tablet Commonly known as:  NORCO  
   
  
 hydrocortisone 1 % topical cream  
Commonly known as:  CORTAID Where to Get Your Medications Information on where to get these meds will be given to you by the nurse or doctor. ! Ask your nurse or doctor about these medications  
  oxyCODONE-acetaminophen 5-325 mg per tablet

## 2017-09-11 NOTE — IP AVS SNAPSHOT
Ashley Mcdaniel 
 
 
 [address] 
[phone] Patient: Esdras Turcios MRN: [id number] ADW:[id number] You are allergic to the following Allergen Reactions Ciprofloxacin Unknown (comments) Codeine Other (comments) Unknown (comments) Pt reports stomach pain Melon Flavor Itching MELON CAUSE MOUTH ITCHING Morphine Anaphylaxis Shortness of Breath Itching Nut Flavor Other (comments) GAS/ BLOATING Other Medication Other (comments) BEANS CAUSES GAS/BLOATING AND ACHES/PAINS ALL OVER Immunizations Administered for This Admission Name Date Influenza Vaccine (Quad) PF 9/23/2017 Recent Documentation Height Weight Breastfeeding? BMI OB Status Smoking Status 1.6 m 100.3 kg No 39.17 kg/m2 Hysterectomy Never Smoker Emergency Contacts Name Discharge Info Relation Home Work Mobile Nikolas Carmona  Spouse [3] [phone] SamJose Angel carmichael  Child [2] [phone] Elia Phelps  Daughter [21] [phone] About your hospitalization You were admitted on:  September 12, 2017 You last received care in the:  [facility] 8 MED SURG You were discharged on:  September 23, 2017 Unit phone number:  [phone] Why you were hospitalized Your primary diagnosis was:  Small Bowel Obstruction (Hcc) Your diagnoses also included:  Anxiety And Depression, Chronic Pain, Diabetes Type 2, Uncontrolled (Hcc), Essential Hypertension With Goal Blood Pressure Less Than 140/90, Gastroesophageal Reflux Disease Without Esophagitis, Hypertension, Hypothyroidism, Oab (Overactive Bladder), Matthew (Obstructive Sleep Apnea), Irritable Bowel Syndrome Without Diarrhea, 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) Providers Seen During Your Hospitalizations Provider Role Specialty Primary office phone Deyanira Weinstein MD Attending Provider Emergency Medicine [phone] Clorinda Rinne, MD Attending Provider General Surgery [phone] Your Primary Care Physician (PCP) Primary Care Physician Office Phone Office Fax Angela Yancey [phone] Follow-up Information Follow up With Details Comments Contact Info Davida Ahumada, MD Call call on Monday for follow upappointment post hospital admission [address] [id number] [phone] Clorinda Rinne, MD In 1 week call on Monday for follow up appopintment in 7-10 days [address] [facility] [address] 
[phone] Your Appointments Thursday October 12, 2017  8:30 AM EDT  
(Arrive by 8:15 AM) Office Visit with Davida Ahumada, MD  
[address]) [address] [address] [phone] Current Discharge Medication List  
  
START taking these medications Dose & Instructions Dispensing Information Comments Morning Noon Evening Bedtime  
 oxyCODONE-acetaminophen 5-325 mg per tablet Commonly known as:  PERCOCET Your last dose was: Your next dose is:    
   
   
 Dose:  2 Tab Take 2 Tabs by mouth every four (4) hours as needed. Max Daily Amount: 12 Tabs. Quantity:  40 Tab Refills:  0 CONTINUE these medications which have NOT CHANGED Dose & Instructions Dispensing Information Comments Morning Noon Evening Bedtime  
 albuterol 90 mcg/actuation inhaler Commonly known as:  PROVENTIL HFA, VENTOLIN HFA, PROAIR HFA Your last dose was: Your next dose is:    
   
   
 Dose:  1 Puff Take 1 Puff by inhalation every six (6) hours as needed for Wheezing. Quantity:  1 Inhaler Refills:  11 Ventolin ALIGN PO Your last dose was: Your next dose is: Take  by mouth. Refills:  0 amLODIPine 10 mg tablet Commonly known as:  Eduardo Parra Your last dose was: Your next dose is: TAKE ONE TABLET BY MOUTH ONCE DAILY Quantity:  90 Tab Refills:  1  
     
   
   
   
  
 atorvastatin 20 mg tablet Commonly known as:  LIPITOR Your last dose was: Your next dose is: TAKE ONE TABLET BY MOUTH ONCE DAILY Quantity:  90 Tab Refills:  1 Azelastine 0.15 % (205.5 mcg) nasal spray Commonly known as:  ASTEPRO Your last dose was: Your next dose is:    
   
   
 Dose:  2 Spray 2 Sprays by Both Nostrils route two (2) times a day. Quantity:  1 Bottle Refills:  11  
     
   
   
   
  
 baclofen 10 mg tablet Commonly known as:  LIORESAL Your last dose was: Your next dose is:    
   
   
 Dose:  1 Tab Take 1 Tab by mouth three (3) times daily. Refills:  0 BARD CLEAN-CATH Misc Generic drug:  Catheter Your last dose was: Your next dose is:    
   
   
 by Does Not Apply route. Refills:  0  
     
   
   
   
  
 BENADRYL 25 mg capsule Generic drug:  diphenhydrAMINE Your last dose was: Your next dose is:    
   
   
 Dose:  25 mg Take 25 mg by mouth every six (6) hours as needed. Refills:  0  
     
   
   
   
  
 budesonide-formoterol 160-4.5 mcg/actuation HFA inhaler Commonly known as:  SYMBICORT Your last dose was: Your next dose is:    
   
   
 Dose:  2 Puff Take 2 Puffs by inhalation two (2) times a day. Quantity:  3 Inhaler Refills:  3  
     
   
   
   
  
 busPIRone 7.5 mg tablet Commonly known as:  BUSPAR Your last dose was: Your next dose is:    
   
   
 Dose:  7.5 mg Take 1 Tab by mouth two (2) times a day. Quantity:  60 Tab Refills:  0  
     
   
   
   
  
 cephALEXin 250 mg capsule Commonly known as:  Rod Lewis Your last dose was: Your next dose is:    
   
   
 Dose:  250 mg Take 1 Cap by mouth daily. Quantity:  90 Cap Refills:  3  
     
   
   
   
  
 citalopram 40 mg tablet Commonly known as:  Awa Honey Your last dose was: Your next dose is:    
   
   
 Dose:  40 mg Take 1 Tab by mouth daily. Quantity:  90 Tab Refills:  1  
     
   
   
   
  
 clonazePAM 0.5 mg tablet Commonly known as:  Vanna Brooks Your last dose was: Your next dose is: Take 1/2 PO QDay and 1 at bed time. Quantity:  45 Tab Refills:  2  
     
   
   
   
  
 cpap machine kit Your last dose was: Your next dose is:    
   
   
 by Does Not Apply route. Refills:  0 Diabetic Shoes Your last dose was: Your next dose is:    
   
   
 by Does Not Apply route. Refills:  0  
     
   
   
   
  
 dicyclomine 20 mg tablet Commonly known as:  BENTYL Your last dose was: Your next dose is: TAKE 1 TABLET BY MOUTH BEFORE EACH MEAL AND 1 TABLET BEFORE BEDTIME Quantity:  120 Tab Refills:  3 EPINEPHRINE IN Your last dose was: Your next dose is: Take  by inhalation. Refills:  0  
     
   
   
   
  
 estradiol 0.01 % (0.1 mg/gram) vaginal cream  
Commonly known as:  ESTRACE Your last dose was: Your next dose is:    
   
   
 1 gram vaginally 1-2x/week Quantity:  42.5 g Refills:  6  
     
   
   
   
  
 fentaNYL 25 mcg/hr PATCH Commonly known as:  Romario Quesada Your last dose was: Your next dose is:    
   
   
 Dose:  1 Patch 1 Patch by TransDERmal route every seventy-two (72) hours. Refills:  0  
     
   
   
   
  
 fluticasone 50 mcg/actuation nasal spray Commonly known as:  Mark Han Your last dose was: Your next dose is:    
   
   
 Dose:  2 Spray 2 Sprays by Both Nostrils route daily. Refills:  0 gabapentin 400 mg capsule Commonly known as:  NEURONTIN Your last dose was: Your next dose is:    
   
   
 Dose:  1 Cap Take 1 Cap by mouth three (3) times daily. Refills:  0  
     
   
   
   
  
 glucose blood VI test strips strip Commonly known as:  ASCENSIA AUTODISC VI, ONE TOUCH ULTRA TEST VI Your last dose was: Your next dose is:    
   
   
 Test 4 times daily Quantity:  100 Strip Refills:  11 Dx: 250.00 non insulin based  
    
   
   
   
  
 levothyroxine 137 mcg tablet Commonly known as:  SYNTHROID Your last dose was: Your next dose is:    
   
   
 Dose:  137 mcg Take 137 mcg by mouth Daily (before breakfast). Quantity:  90 Tab Refills:  0  
 D/C Levothyroxine 150 mcg  
    
   
   
   
  
 losartan-hydroCHLOROthiazide 100-12.5 mg per tablet Commonly known as:  HYZAAR Your last dose was: Your next dose is:    
   
   
 Dose:  1 Tab Take 1 Tab by mouth daily. Quantity:  90 Tab Refills:  1  
     
   
   
   
  
 metFORMIN 1,000 mg tablet Commonly known as:  GLUCOPHAGE Your last dose was: Your next dose is:    
   
   
 Dose:  1000 mg Take 1 Tab by mouth two (2) times daily (with meals). Quantity:  60 Tab Refills:  5 METRONIDAZOLE EX Your last dose was: Your next dose is:    
   
   
 by Apply Externally route. Refills:  0  
     
   
   
   
  
 montelukast 10 mg tablet Commonly known as:  SINGULAIR Your last dose was: Your next dose is: TAKE ONE TABLET BY MOUTH ONCE DAILY Quantity:  90 Tab Refills:  0  
     
   
   
   
  
 omeprazole 40 mg capsule Commonly known as:  PRILOSEC Your last dose was: Your next dose is:    
   
   
 Dose:  40 mg Take 40 mg by mouth two (2) times a day. Refills:  0  
     
   
   
   
  
 oxybutynin chloride XL 10 mg CR tablet Commonly known as:  DITROPAN XL  
   
 Your last dose was: Your next dose is:    
   
   
 Dose:  10 mg Take 1 Tab by mouth daily. Quantity:  90 Tab Refills:  3  
     
   
   
   
  
 promethazine 25 mg tablet Commonly known as:  PHENERGAN Your last dose was: Your next dose is:    
   
   
 Dose:  25 mg Take 25 mg by mouth every six (6) hours as needed for Nausea. Refills:  0  
     
   
   
   
  
 RESTASIS 0.05 % ophthalmic emulsion Generic drug:  cycloSPORINE Your last dose was: Your next dose is:    
   
   
 Dose:  1 Drop Administer 1 Drop to both eyes every twelve (12) hours. Refills:  0 SITagliptin 50 mg tablet Commonly known as:  Syl Zepedasen Your last dose was: Your next dose is:    
   
   
 Dose:  50 mg Take 1 Tab by mouth daily. Quantity:  90 Tab Refills:  1  
     
   
   
   
  
 traZODone 100 mg tablet Commonly known as:  Loly Daniel Your last dose was: Your next dose is: TAKE TWO TO THREE TABLETS BY MOUTH AT BEDTIME Quantity:  270 Tab Refills:  1 ZyrTEC 10 mg tablet Generic drug:  cetirizine Your last dose was: Your next dose is: Take  by mouth. Refills:  0 STOP taking these medications   
 fluconazole 150 mg tablet Commonly known as:  DIFLUCAN  
   
  
 HYDROcodone-acetaminophen  mg tablet Commonly known as:  NORCO  
   
  
 hydrocortisone 1 % topical cream  
Commonly known as:  CORTAID Where to Get Your Medications Information on where to get these meds will be given to you by the nurse or doctor. ! Ask your nurse or doctor about these medications  
  oxyCODONE-acetaminophen 5-325 mg per tablet Discharge Instructions DISCHARGE SUMMARY from Nurse The following personal items are in your possession at time of discharge: 
 
Dental Appliances: None Visual Aid: Glasses Home Medications: None Jewelry: None Clothing: None Other Valuables: [place] PATIENT INSTRUCTIONS: 
 
 
F-face looks uneven A-arms unable to move or move unevenly S-speech slurred or non-existent T-time-call 911 as soon as signs and symptoms begin-DO NOT go Back to bed or wait to see if you get better-TIME IS BRAIN. Warning Signs of HEART ATTACK Call 911 if you have these symptoms: 
? Chest discomfort. Most heart attacks involve discomfort in the center of the chest that lasts more than a few minutes, or that goes away and comes back. It can feel like uncomfortable pressure, squeezing, fullness, or pain. ? Discomfort in other areas of the upper body. Symptoms can include pain or discomfort in one or both arms, the back, neck, jaw, or stomach. ? Shortness of breath with or without chest discomfort. ? Other signs may include breaking out in a cold sweat, nausea, or lightheadedness. Don't wait more than five minutes to call [address]! Fast action can save your life. Calling 911 is almost always the fastest way to get lifesaving treatment. Emergency Medical Services staff can begin treatment when they arrive  up to an hour sooner than if someone gets to the hospital by car. The discharge information has been reviewed with the patient and spouse. The patient and spouse verbalized understanding. Discharge medications reviewed with the patient and spouse and appropriate educational materials and side effects teaching were provided. Discharge Orders None ACO Transitions of Care Introducing [employer] [address] Pantoja offers a voluntary care coordination program to provide high quality service and care to [facility] fee-for-service beneficiaries. Odette Talavera was designed to help you enhance your health and well-being through the following services: ? Transitions of Care  support for individuals who are transitioning from one care setting to another (example: Hospital to home). ? Chronic and Complex Care Coordination  support for individuals and caregivers of those with serious or chronic illnesses or with more than one chronic (ongoing) condition and those who take a number of different medications. If you meet specific medical criteria, a [address] [place] [address] Rd may call you directly to coordinate your care with your primary care physician and your other care providers. For questions about the [facility] programs, please, contact your physicians office. For general questions or additional information about Accountable Care Organizations: 
Please visit www.medicare.gov/acos. html or call 1-800-MEDICARE (1-754.185.7400) TTY users should call 7-707.429.5280. Introducing [facility]! Dear Olivia Cabrera: Thank you for requesting a [employer] account. Our records indicate that you already have an active [employer] account. You can access your account anytime at [email or web address] Did you know that you can access your hospital and ER discharge instructions at any time in [employer]? You can also review all of your test results from your hospital stay or ER visit. Additional Information If you have questions, please visit the Frequently Asked Questions section of the [employer] website at [email or web address] Remember, [employer] is NOT to be used for urgent needs. For medical emergencies, dial 911. Now available from your iPhone and Android! General Information Please provide this summary of care documentation to your next provider. Patient Signature:  ____________________________________________________________ Date:  ____________________________________________________________  
  
Janyth Mins Provider Signature:  ____________________________________________________________ Date:  ____________________________________________________________

## 2017-09-12 PROBLEM — K56.609 SMALL BOWEL OBSTRUCTION (HCC): Status: ACTIVE | Noted: 2017-09-12

## 2017-09-12 LAB
ANION GAP SERPL CALC-SCNC: 10 MMOL/L (ref 7–16)
BUN SERPL-MCNC: 14 MG/DL (ref 8–23)
CALCIUM SERPL-MCNC: 7.9 MG/DL (ref 8.3–10.4)
CHLORIDE SERPL-SCNC: 110 MMOL/L (ref 98–107)
CO2 SERPL-SCNC: 26 MMOL/L (ref 21–32)
CREAT SERPL-MCNC: 0.86 MG/DL (ref 0.6–1)
GLUCOSE BLD STRIP.AUTO-MCNC: 126 MG/DL (ref 65–100)
GLUCOSE BLD STRIP.AUTO-MCNC: 140 MG/DL (ref 65–100)
GLUCOSE BLD STRIP.AUTO-MCNC: 146 MG/DL (ref 65–100)
GLUCOSE BLD STRIP.AUTO-MCNC: 162 MG/DL (ref 65–100)
GLUCOSE SERPL-MCNC: 98 MG/DL (ref 65–100)
POTASSIUM SERPL-SCNC: 3.4 MMOL/L (ref 3.5–5.1)
SODIUM SERPL-SCNC: 146 MMOL/L (ref 136–145)

## 2017-09-12 PROCEDURE — 74011636637 HC RX REV CODE- 636/637: Performed by: SURGERY

## 2017-09-12 PROCEDURE — 80048 BASIC METABOLIC PNL TOTAL CA: CPT | Performed by: SURGERY

## 2017-09-12 PROCEDURE — 65270000029 HC RM PRIVATE

## 2017-09-12 PROCEDURE — 94640 AIRWAY INHALATION TREATMENT: CPT

## 2017-09-12 PROCEDURE — 94760 N-INVAS EAR/PLS OXIMETRY 1: CPT

## 2017-09-12 PROCEDURE — 36415 COLL VENOUS BLD VENIPUNCTURE: CPT | Performed by: SURGERY

## 2017-09-12 PROCEDURE — C9113 INJ PANTOPRAZOLE SODIUM, VIA: HCPCS | Performed by: SURGERY

## 2017-09-12 PROCEDURE — 82962 GLUCOSE BLOOD TEST: CPT

## 2017-09-12 PROCEDURE — 74011250636 HC RX REV CODE- 250/636: Performed by: SURGERY

## 2017-09-12 PROCEDURE — 77030011943

## 2017-09-12 PROCEDURE — 77030018846 HC SOL IRR STRL H20 ICUM -A

## 2017-09-12 PROCEDURE — 74011250637 HC RX REV CODE- 250/637: Performed by: SURGERY

## 2017-09-12 PROCEDURE — 74011000250 HC RX REV CODE- 250: Performed by: SURGERY

## 2017-09-12 RX ORDER — HYDROMORPHONE HYDROCHLORIDE 1 MG/ML
.5-2 INJECTION, SOLUTION INTRAMUSCULAR; INTRAVENOUS; SUBCUTANEOUS
Status: DISCONTINUED | OUTPATIENT
Start: 2017-09-12 | End: 2017-09-14

## 2017-09-12 RX ORDER — CYCLOSPORINE 0.5 MG/ML
1 EMULSION OPHTHALMIC 2 TIMES DAILY
Status: DISCONTINUED | OUTPATIENT
Start: 2017-09-12 | End: 2017-09-23 | Stop reason: HOSPADM

## 2017-09-12 RX ORDER — ENOXAPARIN SODIUM 100 MG/ML
40 INJECTION SUBCUTANEOUS EVERY 24 HOURS
Status: DISCONTINUED | OUTPATIENT
Start: 2017-09-12 | End: 2017-09-23 | Stop reason: HOSPADM

## 2017-09-12 RX ORDER — FENTANYL 25 UG/1
1 PATCH TRANSDERMAL
Status: DISCONTINUED | OUTPATIENT
Start: 2017-09-12 | End: 2017-09-23 | Stop reason: HOSPADM

## 2017-09-12 RX ORDER — ALBUTEROL SULFATE 90 UG/1
1 AEROSOL, METERED RESPIRATORY (INHALATION)
Status: DISCONTINUED | OUTPATIENT
Start: 2017-09-12 | End: 2017-09-23 | Stop reason: HOSPADM

## 2017-09-12 RX ORDER — SODIUM CHLORIDE 0.9 % (FLUSH) 0.9 %
5-10 SYRINGE (ML) INJECTION EVERY 8 HOURS
Status: DISCONTINUED | OUTPATIENT
Start: 2017-09-12 | End: 2017-09-23 | Stop reason: HOSPADM

## 2017-09-12 RX ORDER — TRAZODONE HYDROCHLORIDE 50 MG/1
200 TABLET ORAL
Status: DISCONTINUED | OUTPATIENT
Start: 2017-09-12 | End: 2017-09-23 | Stop reason: HOSPADM

## 2017-09-12 RX ORDER — PANTOPRAZOLE SODIUM 40 MG/10ML
40 INJECTION, POWDER, LYOPHILIZED, FOR SOLUTION INTRAVENOUS EVERY 12 HOURS
Status: DISCONTINUED | OUTPATIENT
Start: 2017-09-12 | End: 2017-09-15 | Stop reason: SDUPTHER

## 2017-09-12 RX ORDER — ONDANSETRON 2 MG/ML
4 INJECTION INTRAMUSCULAR; INTRAVENOUS
Status: DISCONTINUED | OUTPATIENT
Start: 2017-09-12 | End: 2017-09-22

## 2017-09-12 RX ORDER — CLONAZEPAM 0.5 MG/1
0.5 TABLET ORAL 3 TIMES DAILY
Status: DISCONTINUED | OUTPATIENT
Start: 2017-09-12 | End: 2017-09-23 | Stop reason: HOSPADM

## 2017-09-12 RX ORDER — BUSPIRONE HYDROCHLORIDE 5 MG/1
7.5 TABLET ORAL 2 TIMES DAILY
Status: DISCONTINUED | OUTPATIENT
Start: 2017-09-12 | End: 2017-09-23 | Stop reason: HOSPADM

## 2017-09-12 RX ORDER — CITALOPRAM 20 MG/1
40 TABLET, FILM COATED ORAL DAILY
Status: DISCONTINUED | OUTPATIENT
Start: 2017-09-12 | End: 2017-09-23 | Stop reason: HOSPADM

## 2017-09-12 RX ORDER — HYOSCYAMINE SULFATE 0.12 MG/1
0.12 TABLET SUBLINGUAL
Status: DISCONTINUED | OUTPATIENT
Start: 2017-09-12 | End: 2017-09-23 | Stop reason: HOSPADM

## 2017-09-12 RX ORDER — SODIUM CHLORIDE 9 MG/ML
125 INJECTION, SOLUTION INTRAVENOUS CONTINUOUS
Status: DISCONTINUED | OUTPATIENT
Start: 2017-09-12 | End: 2017-09-12

## 2017-09-12 RX ORDER — AZELASTINE 1 MG/ML
2 SPRAY, METERED NASAL 2 TIMES DAILY
Status: DISCONTINUED | OUTPATIENT
Start: 2017-09-12 | End: 2017-09-23 | Stop reason: HOSPADM

## 2017-09-12 RX ORDER — BUDESONIDE 0.5 MG/2ML
500 INHALANT ORAL
Status: DISCONTINUED | OUTPATIENT
Start: 2017-09-12 | End: 2017-09-23 | Stop reason: HOSPADM

## 2017-09-12 RX ORDER — DIPHENHYDRAMINE HYDROCHLORIDE 50 MG/ML
12.5 INJECTION, SOLUTION INTRAMUSCULAR; INTRAVENOUS
Status: DISCONTINUED | OUTPATIENT
Start: 2017-09-12 | End: 2017-09-23 | Stop reason: HOSPADM

## 2017-09-12 RX ORDER — SODIUM CHLORIDE 0.9 % (FLUSH) 0.9 %
5-10 SYRINGE (ML) INJECTION AS NEEDED
Status: DISCONTINUED | OUTPATIENT
Start: 2017-09-12 | End: 2017-09-23 | Stop reason: HOSPADM

## 2017-09-12 RX ORDER — PANTOPRAZOLE SODIUM 40 MG/10ML
40 INJECTION, POWDER, LYOPHILIZED, FOR SOLUTION INTRAVENOUS
Status: DISCONTINUED | OUTPATIENT
Start: 2017-09-12 | End: 2017-09-12

## 2017-09-12 RX ORDER — ALBUTEROL SULFATE 2.5 MG/.5ML
2.5 SOLUTION RESPIRATORY (INHALATION)
Status: DISCONTINUED | OUTPATIENT
Start: 2017-09-12 | End: 2017-09-23 | Stop reason: HOSPADM

## 2017-09-12 RX ORDER — FLUTICASONE PROPIONATE 50 MCG
2 SPRAY, SUSPENSION (ML) NASAL DAILY
Status: DISCONTINUED | OUTPATIENT
Start: 2017-09-12 | End: 2017-09-23 | Stop reason: HOSPADM

## 2017-09-12 RX ADMIN — CLONAZEPAM 0.5 MG: 0.5 TABLET ORAL at 22:35

## 2017-09-12 RX ADMIN — HYDROMORPHONE HYDROCHLORIDE 2 MG: 1 INJECTION, SOLUTION INTRAMUSCULAR; INTRAVENOUS; SUBCUTANEOUS at 18:17

## 2017-09-12 RX ADMIN — GABAPENTIN 400 MG: 300 CAPSULE ORAL at 08:38

## 2017-09-12 RX ADMIN — HYDROMORPHONE HYDROCHLORIDE 1 MG: 1 INJECTION, SOLUTION INTRAMUSCULAR; INTRAVENOUS; SUBCUTANEOUS at 08:38

## 2017-09-12 RX ADMIN — CITALOPRAM HYDROBROMIDE 40 MG: 20 TABLET ORAL at 08:38

## 2017-09-12 RX ADMIN — HYDROMORPHONE HYDROCHLORIDE 2 MG: 1 INJECTION, SOLUTION INTRAMUSCULAR; INTRAVENOUS; SUBCUTANEOUS at 21:35

## 2017-09-12 RX ADMIN — FLUTICASONE PROPIONATE 2 SPRAY: 50 SPRAY, METERED NASAL at 08:36

## 2017-09-12 RX ADMIN — BUSPIRONE HYDROCHLORIDE 7.5 MG: 5 TABLET ORAL at 08:37

## 2017-09-12 RX ADMIN — ALBUTEROL SULFATE 2.5 MG: 2.5 SOLUTION RESPIRATORY (INHALATION) at 03:01

## 2017-09-12 RX ADMIN — CLONAZEPAM 0.5 MG: 0.5 TABLET ORAL at 18:02

## 2017-09-12 RX ADMIN — Medication 5 ML: at 14:46

## 2017-09-12 RX ADMIN — ONDANSETRON 4 MG: 2 INJECTION INTRAMUSCULAR; INTRAVENOUS at 08:37

## 2017-09-12 RX ADMIN — BUDESONIDE 500 MCG: 0.5 INHALANT RESPIRATORY (INHALATION) at 21:18

## 2017-09-12 RX ADMIN — ENOXAPARIN SODIUM 40 MG: 40 INJECTION SUBCUTANEOUS at 02:35

## 2017-09-12 RX ADMIN — GABAPENTIN 400 MG: 300 CAPSULE ORAL at 21:32

## 2017-09-12 RX ADMIN — ONDANSETRON 4 MG: 2 INJECTION INTRAMUSCULAR; INTRAVENOUS at 15:07

## 2017-09-12 RX ADMIN — Medication 10 ML: at 21:33

## 2017-09-12 RX ADMIN — ONDANSETRON 4 MG: 2 INJECTION INTRAMUSCULAR; INTRAVENOUS at 20:58

## 2017-09-12 RX ADMIN — CLONAZEPAM 0.5 MG: 0.5 TABLET ORAL at 08:38

## 2017-09-12 RX ADMIN — Medication 5 ML: at 06:25

## 2017-09-12 RX ADMIN — ALBUTEROL SULFATE 2.5 MG: 2.5 SOLUTION RESPIRATORY (INHALATION) at 14:29

## 2017-09-12 RX ADMIN — PANTOPRAZOLE SODIUM 40 MG: 40 INJECTION, POWDER, FOR SOLUTION INTRAVENOUS at 08:37

## 2017-09-12 RX ADMIN — ALBUTEROL SULFATE 2.5 MG: 2.5 SOLUTION RESPIRATORY (INHALATION) at 21:18

## 2017-09-12 RX ADMIN — HYDROMORPHONE HYDROCHLORIDE 1 MG: 1 INJECTION, SOLUTION INTRAMUSCULAR; INTRAVENOUS; SUBCUTANEOUS at 15:06

## 2017-09-12 RX ADMIN — BUDESONIDE 500 MCG: 0.5 INHALANT RESPIRATORY (INHALATION) at 08:28

## 2017-09-12 RX ADMIN — POTASSIUM CHLORIDE: 2 INJECTION, SOLUTION, CONCENTRATE INTRAVENOUS at 14:46

## 2017-09-12 RX ADMIN — BUSPIRONE HYDROCHLORIDE 7.5 MG: 5 TABLET ORAL at 18:02

## 2017-09-12 RX ADMIN — PANTOPRAZOLE SODIUM 40 MG: 40 INJECTION, POWDER, FOR SOLUTION INTRAVENOUS at 02:36

## 2017-09-12 RX ADMIN — INSULIN HUMAN 2 UNITS: 100 INJECTION, SOLUTION PARENTERAL at 06:25

## 2017-09-12 RX ADMIN — GABAPENTIN 400 MG: 300 CAPSULE ORAL at 18:02

## 2017-09-12 RX ADMIN — PANTOPRAZOLE SODIUM 40 MG: 40 INJECTION, POWDER, FOR SOLUTION INTRAVENOUS at 20:58

## 2017-09-12 RX ADMIN — ALBUTEROL SULFATE 2.5 MG: 2.5 SOLUTION RESPIRATORY (INHALATION) at 08:28

## 2017-09-12 RX ADMIN — SODIUM CHLORIDE 125 ML/HR: 900 INJECTION, SOLUTION INTRAVENOUS at 02:35

## 2017-09-12 RX ADMIN — HYOSCYAMINE SULFATE 0.12 MG: 0.12 TABLET ORAL at 15:06

## 2017-09-12 RX ADMIN — HYDROMORPHONE HYDROCHLORIDE 1 MG: 1 INJECTION, SOLUTION INTRAMUSCULAR; INTRAVENOUS; SUBCUTANEOUS at 02:36

## 2017-09-12 NOTE — ED PROVIDER NOTES
HPI Comments: Patient presents to the ER complaining of abdominal pain for the past 3 days. Patient states she began experiencing some vomiting and diarrhea after eating a sandwich 3 days ago. States since then diarrhea has resolved however vomiting and abdominal pain of continued. Describes abdominal pain is diffuse in crampy. She denies any fevers, hematemesis or melena. Denies any chest pain associated with symptoms    Patient is a 75 y.o. female presenting with abdominal pain. The history is provided by the patient. Abdominal Pain    This is a new problem. The current episode started more than 2 days ago. The problem occurs constantly. The problem has not changed since onset. The pain is located in the generalized abdominal region. The quality of the pain is aching and cramping. The pain is at a severity of 5/10. The pain is moderate. Associated symptoms include nausea and vomiting. Pertinent negatives include no fever, no melena, no constipation, no headaches and no back pain. Nothing worsens the pain. The pain is relieved by nothing. The patient's surgical history includes cholecystectomy.        Past Medical History:   Diagnosis Date    Acute lumbar radiculopathy     Allergic rhinitis 12/10/2013    Anxiety and depression     Arthritis     Asthma     Atony of bladder     Chronic kidney disease     STONES    Chronic pain     HX FIBROMYALGIA    Claustrophobia     Diabetes ([address]     BORDRLINE DIET CONTROLLED    GERD (gastroesophageal reflux disease)     Heart disease     Hypercholesteremia     Hypertension     Hypothyroidism 11/14/2012    Hypoxemia     IBS (irritable bowel syndrome) 4/8/2013    Ill-defined condition     MRSA    Incomplete bladder emptying     Liver disease     elevated liver enzymes    Migraine headache     Morbid obesity (HCC)     Myalgia and myositis, unspecified     Neurogenic bladder, NOS 4/3/2014    Organic hypersomnia, unspecified     JESSICA (obstructive sleep apnea)     Other chronic cystitis     Peripheral neuropathy ([facility] [address] 4/8/2013    Sciatica     Symptomatic menopausal or female climacteric states     Thyroid disease     Unspecified sleep apnea     CPAP machine    Unspecified urinary incontinence     Vitamin D deficiency 4/8/2013       Past Surgical History:   Procedure Laterality Date    COLONOSCOPY      HX BUNIONECTOMY      HX CATARACT REMOVAL      HX CHOLECYSTECTOMY      HX HYSTERECTOMY      HX ORTHOPAEDIC      right 5th digit x 2    HX OTHER SURGICAL      bladder lift x 2; rectocele    HX OTHER SURGICAL      pelvis floor    HX OTHER SURGICAL  09/2016    Aki Webb    HX PACEMAKER      HX THYROIDECTOMY      HX TONSILLECTOMY      HX UROLOGICAL      Bladder sx; rectocele         Family History:   Problem Relation Age of Onset    Heart Failure Mother 54     CHF    Breast Cancer Mother 76    Heart Disease Mother     Hypertension Mother    Vishal Barak Asthma Mother     Cancer Mother     Diabetes Mother     Thyroid Disease Mother     Psychiatric Disorder Mother      Depression    Heart Disease Father     Heart Attack Father 47     mi    Hypertension Father     Cancer Father     Stroke Father     Hypertension Brother     Diabetes Brother     Depression Sister     Anxiety Sister     OCD Sister     Hypertension Brother     Diabetes Brother     Depression Brother     No Known Problems Brother     Kidney Disease Other      gen fam hx    Osteoporosis Other     Arthritis-osteo Other     Lung Disease Other        Social History     Social History    Marital status:      Spouse name: N/A    Number of children: N/A    Years of education: N/A     Occupational History    Not on file.      Social History Main Topics    Smoking status: Never Smoker    Smokeless tobacco: Never Used    Alcohol use No      Comment: rarely    Drug use: No    Sexual activity: Not on file     Other Topics Concern    Not on file     Social History Narrative         ALLERGIES: Ciprofloxacin; Codeine; Melon flavor; Morphine; Nut flavor; and Other medication    Review of Systems   Constitutional: Negative for fatigue and fever. HENT: Negative for congestion and dental problem. Eyes: Negative for photophobia and redness. Respiratory: Negative for chest tightness and shortness of breath. Cardiovascular: Negative for leg swelling. Gastrointestinal: Positive for abdominal pain, nausea and vomiting. Negative for constipation and melena. Endocrine: Negative for polydipsia, polyphagia and polyuria. Genitourinary: Negative for flank pain and urgency. Musculoskeletal: Negative for back pain and joint swelling. Skin: Negative for pallor and rash. Allergic/Immunologic: Negative for food allergies and immunocompromised state. Neurological: Negative for seizures, numbness and headaches. Psychiatric/Behavioral: Negative for behavioral problems and confusion. All other systems reviewed and are negative. Vitals:    09/11/17 2120 09/11/17 2142   BP: 160/49 (!) 202/75   Pulse: 66 (!) 50   Resp: 17 16   Temp: 98.3 °F (36.8 °C)    SpO2: 94% 93%   Weight: 89.8 kg (198 lb)    Height: 5' 3\" (1.6 m)             Physical Exam   Constitutional: She is oriented to person, place, and time. She appears well-developed and well-nourished. HENT:   Head: Normocephalic and atraumatic. Mouth/Throat: Oropharynx is clear and moist.   Eyes: Conjunctivae and EOM are normal. Pupils are equal, round, and reactive to light. No scleral icterus. Neck: Normal range of motion. Neck supple. No thyromegaly present. Cardiovascular: Normal rate, regular rhythm and normal heart sounds. Exam reveals no friction rub. No murmur heard. Pulmonary/Chest: Effort normal and breath sounds normal. No respiratory distress. She has no wheezes. Abdominal: Soft. Bowel sounds are normal. There is generalized tenderness. There is no rebound.    Musculoskeletal: Normal range of motion. She exhibits no edema. Neurological: She is alert and oriented to person, place, and time. She has normal reflexes. No cranial nerve deficit. Nursing note and vitals reviewed.        MDM  Number of Diagnoses or Management Options  Diagnosis management comments: Differential diagnoses includes gastroenteritis, obstruction, colitis  Will obtain basic labs as well as urinalysis    12:12 AM  Normal labs other than mildly elevated potassium of 5.9  CT scan of abdomen and pelvis is consistent with small bowel obstruction  Will discuss case with general surgery       Amount and/or Complexity of Data Reviewed  Clinical lab tests: ordered and reviewed  Tests in the radiology section of CPT®: ordered and reviewed    Risk of Complications, Morbidity, and/or Mortality  Presenting problems: moderate  Diagnostic procedures: moderate  Management options: moderate    Patient Progress  Patient progress: stable    ED Course       Procedures

## 2017-09-12 NOTE — PROGRESS NOTES
Per patient request for 6/10 abdominal pain, Dilaudid 1 mg IV slow push given and Zofran 4 mg IV push given. Has 2L N/C on. Rails up x2 and instructed to call us before getting up.

## 2017-09-12 NOTE — ED TRIAGE NOTES
PT arrived to ED c/o abdominal pain for the past 3 days after eating breakfast sandwiches she bought at [employer]. PT describes pain as cramping.

## 2017-09-12 NOTE — PROGRESS NOTES
2 mg Dilaudid, IV slow push given for 10/10 abdominal pain. Cautioned her about not getting up by herself. Bed posey turned on and explained for her safety.

## 2017-09-12 NOTE — PROGRESS NOTES
Patient given dilaudid 1 mg IV at [phone] for c/o abdominal pain. Patient has voiced no other c/o during the night except not sleeping well. VS stale.  Respirations currently even and unlabored

## 2017-09-12 NOTE — PROGRESS NOTES
Patient transferred to room 832 from ER via wheelchair. Patient alert and oriented x 4 walking around room. Admission assessment completed. Respirations even and unlabored. No acute distress noted. Bed low, locked, call bell within reach. Side rails up x 2.

## 2017-09-12 NOTE — PROGRESS NOTES
Dual skin assessment completed with Mala Bunn RN. Scar along center of spine from past surgery. Scar on right buttocks from implantable spinal stimulator. No breakdown or redness noted on sacrum, lower back or heels.

## 2017-09-12 NOTE — ED NOTES
TRANSFER - OUT REPORT:    Verbal report given to Shahab Posadas RN (name) on Blaire Pereira  being transferred to surgical (unit) for routine progression of care       Report consisted of patients Situation, Background, Assessment and   Recommendations(SBAR). Information from the following report(s) SBAR, Kardex, ED Summary and MAR was reviewed with the receiving nurse. Lines:   Peripheral IV 09/11/17 Right Wrist (Active)   Site Assessment Clean, dry, & intact 9/12/2017 12:32 AM   Phlebitis Assessment 0 9/12/2017 12:32 AM   Infiltration Assessment 0 9/12/2017 12:32 AM   Dressing Status Clean, dry, & intact 9/12/2017 12:32 AM   Dressing Type Tape;Transparent 9/12/2017 12:32 AM   Hub Color/Line Status Pink 9/12/2017 12:32 AM        Opportunity for questions and clarification was provided.       Patient transported with:   sliceX

## 2017-09-12 NOTE — H&P
History and Physical-Abdominal Pain     9/12/2017    Patient: Elnor Blizzard 75 y.o. female     Primary Physician: Domenica Luke MD  Referring Physician:  No ref. provider found    Chief Complaint: SBO    History of Present Illness: Elnor Blizzard is a 75 y.o. female who was admitted through the ER overnight with SBO. She reports being in her baseline state of health until 2 days ago when she developed rather rapid onset nausea, mid-abdominal distention, and severe \"scrathing\" colicky abdominal pain. Pain has been ongoing ever since. She was unable to have a BM, pass gas, or throw up. She has not had fever, chills. She does recall two episodes of diarrhea in the past several days, however she ascribes both of these to eating a breakfast sandwich she purchased from [facility] on both occasions. She does carry a diagnosis of IBS which she reports is occasionally manifest as diarrhea but usually as constipation.         History:    Past Medical History:   Diagnosis Date    Acute lumbar radiculopathy     Allergic rhinitis 12/10/2013    Anxiety and depression     Arthritis     Asthma     Atony of bladder     Chronic kidney disease     STONES    Chronic pain     HX FIBROMYALGIA    Claustrophobia     Diabetes ([address]     BORDRLINE DIET CONTROLLED    GERD (gastroesophageal reflux disease)     Heart disease     Hypercholesteremia     Hypertension     Hypothyroidism 11/14/2012    Hypoxemia     IBS (irritable bowel syndrome) 4/8/2013    Ill-defined condition     MRSA    Incomplete bladder emptying     Liver disease     elevated liver enzymes    Migraine headache     Morbid obesity (HCC)     Myalgia and myositis, unspecified     Neurogenic bladder, NOS 4/3/2014    Organic hypersomnia, unspecified     JESSICA (obstructive sleep apnea)     Other chronic cystitis     Peripheral neuropathy ([address] 4/8/2013    Sciatica     Symptomatic menopausal or female climacteric states     Thyroid disease     Unspecified sleep apnea     CPAP machine    Unspecified urinary incontinence     Vitamin D deficiency 4/8/2013     Past Surgical History:   Procedure Laterality Date    COLONOSCOPY      HX BUNIONECTOMY      HX CATARACT REMOVAL      HX CHOLECYSTECTOMY      HX HYSTERECTOMY      HX ORTHOPAEDIC      right 5th digit x 2    HX OTHER SURGICAL      bladder lift x 2; rectocele    HX OTHER SURGICAL      pelvis floor    HX OTHER SURGICAL  09/2016    [facility]-Dr. Shelly Rollins    HX PACEMAKER      HX THYROIDECTOMY      HX TONSILLECTOMY      HX UROLOGICAL      Bladder sx; rectocele     Family History   Problem Relation Age of Onset    Heart Failure Mother 54     CHF    Breast Cancer Mother 76    Heart Disease Mother     Hypertension Mother    [facility] Asthma Mother     Cancer Mother     Diabetes Mother     Thyroid Disease Mother     Psychiatric Disorder Mother      Depression    Heart Disease Father     Heart Attack Father 47     mi    Hypertension Father     Cancer Father     Stroke Father     Hypertension Brother     Diabetes Brother     Depression Sister     Anxiety Sister     OCD Sister     Hypertension Brother     Diabetes Brother     Depression Brother     No Known Problems Brother     Kidney Disease Other      gen fam hx    Osteoporosis Other     Arthritis-osteo Other     Lung Disease Other      Social History     Social History    Marital status:      Spouse name: N/A    Number of children: N/A    Years of education: N/A     Occupational History    Not on file.      Social History Main Topics    Smoking status: Never Smoker    Smokeless tobacco: Never Used    Alcohol use No      Comment: rarely    Drug use: No    Sexual activity: Not on file     Other Topics Concern    Not on file     Social History Narrative       Review of Systems:  A comprehensive review of systems was negative except for: Constitutional: positive for malaise  Respiratory: positive for asthma or JESSICA  Gastrointestinal: positive for reflux symptoms, nausea and abdominal pain  Genitourinary: positive for urinary incontinence and does intermittent self-cath 4 times daily; no UTI x 3 years on this regimen  Musculoskeletal: positive for myalgias  Behvioral/Psych: positive for anxiety  Allergic/Immunologic: positive for allergies    Allergies: Allergies   Allergen Reactions    Ciprofloxacin Unknown (comments)    Codeine Other (comments) and Unknown (comments)     Pt reports stomach pain    Melon Flavor Itching     MELON CAUSE MOUTH ITCHING      Morphine Anaphylaxis, Shortness of Breath and Itching    Nut Flavor Other (comments)     GAS/ BLOATING    Other Medication Other (comments)     BEANS CAUSES GAS/BLOATING AND ACHES/PAINS ALL OVER               Prior to Admission Medications:    Prior to Admission Medications   Prescriptions Last Dose Informant Patient Reported? Taking? Azelastine (ASTEPRO) 0.15 % (205.5 mcg) nasal spray 2017 at Unknown time  No Yes   Si Sprays by Both Nostrils route two (2) times a day. BIFIDOBACTERIUM INFANTIS (ALIGN PO) Unknown at Unknown time  Yes No   Sig: Take  by mouth. Catheter (BARD CLEAN-CATH) misc 2017 at Unknown time  Yes Yes   Sig: by Does Not Apply route. Diabetic Shoes XX Unknown at Unknown time  Yes No   Sig: by Does Not Apply route. EPINEPHRINE IN Unknown at Unknown time  Yes No   Sig: Take  by inhalation. HYDROcodone-acetaminophen (NORCO)  mg tablet 2017 at Unknown time  No Yes   Sig: Take 1 Tab by mouth every six (6) hours as needed for Pain. Max Daily Amount: 4 Tabs. METRONIDAZOLE EX Unknown at Unknown time  Yes No   Sig: by Apply Externally route. SITagliptin (JANUVIA) 50 mg tablet 2017 at Unknown time  No Yes   Sig: Take 1 Tab by mouth daily.    albuterol (PROVENTIL HFA, VENTOLIN HFA, PROAIR HFA) 90 mcg/actuation inhaler 2017 at Unknown time  No Yes   Sig: Take 1 Puff by inhalation every six (6) hours as needed for Wheezing. amLODIPine (NORVASC) 10 mg tablet 2017 at Unknown time  No Yes   Sig: TAKE ONE TABLET BY MOUTH ONCE DAILY   atorvastatin (LIPITOR) 20 mg tablet 2017 at Unknown time  No Yes   Sig: TAKE ONE TABLET BY MOUTH ONCE DAILY   baclofen (LIORESAL) 10 mg tablet 2017 at ssssssssssssssssssssssssssssssssssssssss  Yes Yes   Sig: Take 1 Tab by mouth three (3) times daily. budesonide-formoterol (SYMBICORT) 160-4.5 mcg/actuation HFA inhaler 2017 at Unknown time  No Yes   Sig: Take 2 Puffs by inhalation two (2) times a day. busPIRone (BUSPAR) 7.5 mg tablet 2017 at Unknown time  No Yes   Sig: Take 1 Tab by mouth two (2) times a day. cephALEXin (KEFLEX) 250 mg capsule 2017 at Unknown time  No Yes   Sig: Take 1 Cap by mouth daily. cetirizine (ZYRTEC) 10 mg tablet Unknown at Unknown time  Yes No   Sig: Take  by mouth.   citalopram (CELEXA) 40 mg tablet 2017 at Unknown time  No Yes   Sig: Take 1 Tab by mouth daily. clonazePAM (KLONOPIN) 0.5 mg tablet 2017 at Unknown time  No Yes   Sig: Take 1/2 PO QDay and 1 at bed time. cpap machine kit 2017 at Unknown time  Yes Yes   Sig: by Does Not Apply route. cycloSPORINE (RESTASIS) 0.05 % ophthalmic emulsion 2017 at Unknown time  Yes Yes   Sig: Administer 1 Drop to both eyes every twelve (12) hours. dicyclomine (BENTYL) 20 mg tablet 2017 at Unknown time  No Yes   Sig: TAKE 1 TABLET BY MOUTH BEFORE EACH MEAL AND 1 TABLET BEFORE BEDTIME   diphenhydrAMINE (BENADRYL) 25 mg capsule 2017 at Unknown time  Yes Yes   Sig: Take 25 mg by mouth every six (6) hours as needed. estradiol (ESTRACE) 0.01 % (0.1 mg/gram) vaginal cream 2017 at Unknown time  No Yes   Si gram vaginally 1-2x/week   fentaNYL (DURAGESIC) 25 mcg/hr PATCH 2017 at Unknown time  Yes Yes   Si Patch by TransDERmal route every seventy-two (72) hours.    fluconazole (DIFLUCAN) 150 mg tablet 2017 at Unknown time  No Yes   Sig: TAKE ONE TABLET BY MOUTH ONCE DAILY FOR 7 DAYS   fluticasone (FLONASE) 50 mcg/actuation nasal spray 2017 at Unknown time  Yes Yes   Si Sprays by Both Nostrils route daily. gabapentin (NEURONTIN) 400 mg capsule 2017 at Unknown time  Yes Yes   Sig: Take 1 Cap by mouth three (3) times daily. glucose blood VI test strips (ASCENSIA AUTODISC VI, ONE TOUCH ULTRA TEST VI) strip 2017 at Unknown time  No Yes   Sig: Test 4 times daily   hydrocortisone (CORTAID) 1 % topical cream 2017 at Unknown time  No Yes   Sig: Apply  to affected area two (2) times a day. use thin layer   levothyroxine (SYNTHROID) 137 mcg tablet 2017 at Unknown time  No Yes   Sig: Take 137 mcg by mouth Daily (before breakfast). losartan-hydroCHLOROthiazide (HYZAAR) 100-12.5 mg per tablet 2017 at Unknown time  No Yes   Sig: Take 1 Tab by mouth daily. metFORMIN (GLUCOPHAGE) 1,000 mg tablet 2017 at Unknown time  No Yes   Sig: Take 1 Tab by mouth two (2) times daily (with meals). montelukast (SINGULAIR) 10 mg tablet 2017 at Unknown time  No Yes   Sig: TAKE ONE TABLET BY MOUTH ONCE DAILY   omeprazole (PRILOSEC) 40 mg capsule 2017 at Unknown time  Yes Yes   Sig: Take 40 mg by mouth two (2) times a day. oxybutynin chloride XL (DITROPAN XL) 10 mg CR tablet 2017 at Unknown time  No Yes   Sig: Take 1 Tab by mouth daily. promethazine (PHENERGAN) 25 mg tablet 2017 at Unknown time  Yes Yes   Sig: Take 25 mg by mouth every six (6) hours as needed for Nausea. traZODone (DESYREL) 100 mg tablet 2017 at Unknown time  No Yes   Sig: TAKE TWO TO THREE TABLETS BY MOUTH AT BEDTIME      Facility-Administered Medications: None       Physical Exam:    Blood pressure 104/50, pulse 60, temperature 98.1 °F (36.7 °C), resp. rate 18, height 5' 3\" (1.6 m), weight 198 lb (89.8 kg), SpO2 98 %, not currently breastfeeding.     Physical Exam:  GENERAL: alert, cooperative, no distress, appears stated age, EYE: negative findings: anicteric sclera, THROAT & NECK: normal and no erythema or exudates noted. , LUNG: clear to auscultation bilaterally, HEART: regular rate and rhythm, S1, S2 normal, no murmur, click, rub or gallop, ABDOMEN: soft, obese. Bowel sounds are present, but obstructive in nature. There is tenderness in the R>L lower abdomen with fullness. No rebound. No masses,  no organomegaly, EXTREMITIES:  extremities normal, atraumatic, no cyanosis or edema, SKIN: no rash or abnormalities, NEUROLOGIC: NITO x 4, PSYCHIATRIC: non focal    Labs:   Recent Results (from the past 24 hour(s))   CBC WITH AUTOMATED DIFF    Collection Time: 09/11/17  9:29 PM   Result Value Ref Range    WBC 11.1 4.3 - 11.1 K/uL    RBC 4.72 4.05 - 5.25 M/uL    HGB 12.3 11.7 - 15.4 g/dL    HCT 38.7 35.8 - 46.3 %    MCV 82.0 79.6 - 97.8 FL    MCH 26.1 26.1 - 32.9 PG    MCHC 31.8 31.4 - 35.0 g/dL    RDW 15.4 (H) 11.9 - 14.6 %    PLATELET 040 220 - 318 K/uL    MPV 9.5 (L) 10.8 - 14.1 FL    DF AUTOMATED      NEUTROPHILS 68 43 - 78 %    LYMPHOCYTES 25 13 - 44 %    MONOCYTES 6 4.0 - 12.0 %    EOSINOPHILS 1 0.5 - 7.8 %    BASOPHILS 0 0.0 - 2.0 %    IMMATURE GRANULOCYTES 0.5 0.0 - 5.0 %    ABS. NEUTROPHILS 7.6 1.7 - 8.2 K/UL    ABS. LYMPHOCYTES 2.7 0.5 - 4.6 K/UL    ABS. MONOCYTES 0.6 0.1 - 1.3 K/UL    ABS. EOSINOPHILS 0.1 0.0 - 0.8 K/UL    ABS. BASOPHILS 0.0 0.0 - 0.2 K/UL    ABS. IMM. GRANS. 0.1 0.0 - 0.5 K/UL   METABOLIC PANEL, COMPREHENSIVE    Collection Time: 09/11/17  9:29 PM   Result Value Ref Range    Sodium 141 136 - 145 mmol/L    Potassium 5.9 (H) 3.5 - 5.1 mmol/L    Chloride 106 98 - 107 mmol/L    CO2 27 21 - 32 mmol/L    Anion gap 8 7 - 16 mmol/L    Glucose 143 (H) 65 - 100 mg/dL    BUN 15 8 - 23 MG/DL    Creatinine 0.96 0.6 - 1.0 MG/DL    GFR est AA >60 >60 ml/min/1.73m2    GFR est non-AA >60 >60 ml/min/1.73m2    Calcium 8.9 8.3 - 10.4 MG/DL    Bilirubin, total 0.4 0.2 - 1.1 MG/DL    ALT (SGPT) 26 12 - 65 U/L    AST (SGOT) 52 (H) 15 - 37 U/L    Alk.  phosphatase 99 50 - 136 U/L    Protein, total 8.1 6.3 - 8.2 g/dL    Albumin 3.6 3.2 - 4.6 g/dL    Globulin 4.5 (H) 2.3 - 3.5 g/dL    A-G Ratio 0.8 (L) 1.2 - 3.5     LIPASE    Collection Time: 09/11/17  9:29 PM   Result Value Ref Range    Lipase 42 (L) 73 - 393 U/L   URINE MICROSCOPIC    Collection Time: 09/11/17  9:46 PM   Result Value Ref Range    WBC 0-3 0 /hpf    RBC 0-3 0 /hpf    Epithelial cells 0-3 0 /hpf    Bacteria 0 0 /hpf    Casts 0-3 0 /lpf   GLUCOSE, POC    Collection Time: 09/12/17  5:32 AM   Result Value Ref Range    Glucose (POC) 162 (H) 65 - 997 mg/dL   METABOLIC PANEL, BASIC    Collection Time: 09/12/17  8:21 AM   Result Value Ref Range    Sodium 146 (H) 136 - 145 mmol/L    Potassium 3.4 (L) 3.5 - 5.1 mmol/L    Chloride 110 (H) 98 - 107 mmol/L    CO2 26 21 - 32 mmol/L    Anion gap 10 7 - 16 mmol/L    Glucose 98 65 - 100 mg/dL    BUN 14 8 - 23 MG/DL    Creatinine 0.86 0.6 - 1.0 MG/DL    GFR est AA >60 >60 ml/min/1.73m2    GFR est non-AA >60 >60 ml/min/1.73m2    Calcium 7.9 (L) 8.3 - 10.4 MG/DL   GLUCOSE, POC    Collection Time: 09/12/17 11:20 AM   Result Value Ref Range    Glucose (POC) 146 (H) 65 - 100 mg/dL       Data Review reviewed  CT- shows SBO in mid small bowel. in addition, there is fecalization of several SB loops consistent with chronic, low-grade SBO.   posterior pelvic mesh implant also noted    Assessment:     Principal Problem:    Small bowel obstruction ([facility] [address] (9/12/2017)    Active Problems:    Diabetes type 2, uncontrolled ([facility] [address] (9/20/2016)      Essential hypertension with goal blood pressure less than 140/90 (9/20/2016)      JESSICA (obstructive sleep apnea) (9/20/2016)      Gastroesophageal reflux disease without esophagitis (9/20/2016)      Irritable bowel syndrome without diarrhea (9/20/2016)      OAB (overactive bladder) (9/20/2016)      Hypothyroidism (11/14/2012)      Hypertension ()      Chronic pain ()      Overview: HX FIBROMYALGIA      Anxiety and depression ()        Plan/Recommendations/Medical Decision Making:     Standard treatment of SBO reviewed- NPO, IVF, analgesia and she expresses understanding. OR for clinical decompensation, pending ischemia or perforation, or failure to progress within 48-72 hours    In addition, some of her chronic GI symptoms may well be from low-grade pSBO suggested by small bowel fecalization. This could be addressed with elective adhesiolysis in the future.     She will continue her QID self-cath, and will allow staff to document urinary volume    Will use SSI and prn antihypertensives for control of DM and HTN      Kimmie Alaniz MD  9/12/2017

## 2017-09-13 ENCOUNTER — ANESTHESIA EVENT (OUTPATIENT)
Dept: SURGERY | Age: 67
DRG: 330 | End: 2017-09-13
Payer: MEDICARE

## 2017-09-13 LAB
GLUCOSE BLD STRIP.AUTO-MCNC: 128 MG/DL (ref 65–100)
GLUCOSE BLD STRIP.AUTO-MCNC: 131 MG/DL (ref 65–100)
GLUCOSE BLD STRIP.AUTO-MCNC: 142 MG/DL (ref 65–100)
GLUCOSE BLD STRIP.AUTO-MCNC: 145 MG/DL (ref 65–100)

## 2017-09-13 PROCEDURE — 74011250636 HC RX REV CODE- 250/636: Performed by: SURGERY

## 2017-09-13 PROCEDURE — C9113 INJ PANTOPRAZOLE SODIUM, VIA: HCPCS | Performed by: SURGERY

## 2017-09-13 PROCEDURE — 82962 GLUCOSE BLOOD TEST: CPT

## 2017-09-13 PROCEDURE — 77030018846 HC SOL IRR STRL H20 ICUM -A

## 2017-09-13 PROCEDURE — 65270000029 HC RM PRIVATE

## 2017-09-13 PROCEDURE — 94640 AIRWAY INHALATION TREATMENT: CPT

## 2017-09-13 PROCEDURE — 77010033678 HC OXYGEN DAILY

## 2017-09-13 PROCEDURE — 74011250637 HC RX REV CODE- 250/637: Performed by: SURGERY

## 2017-09-13 PROCEDURE — 94760 N-INVAS EAR/PLS OXIMETRY 1: CPT

## 2017-09-13 PROCEDURE — 74011000250 HC RX REV CODE- 250: Performed by: SURGERY

## 2017-09-13 RX ORDER — CEFOXITIN 2 G/1
2 INJECTION, POWDER, FOR SOLUTION INTRAVENOUS
Status: DISCONTINUED | OUTPATIENT
Start: 2017-09-13 | End: 2017-09-13 | Stop reason: SDUPTHER

## 2017-09-13 RX ORDER — LORAZEPAM 2 MG/ML
1 INJECTION INTRAMUSCULAR
Status: DISCONTINUED | OUTPATIENT
Start: 2017-09-13 | End: 2017-09-22

## 2017-09-13 RX ADMIN — Medication 10 ML: at 06:38

## 2017-09-13 RX ADMIN — GABAPENTIN 400 MG: 300 CAPSULE ORAL at 07:52

## 2017-09-13 RX ADMIN — ENOXAPARIN SODIUM 40 MG: 40 INJECTION SUBCUTANEOUS at 07:51

## 2017-09-13 RX ADMIN — GABAPENTIN 400 MG: 300 CAPSULE ORAL at 17:15

## 2017-09-13 RX ADMIN — PANTOPRAZOLE SODIUM 40 MG: 40 INJECTION, POWDER, FOR SOLUTION INTRAVENOUS at 07:53

## 2017-09-13 RX ADMIN — HYDROMORPHONE HYDROCHLORIDE 1 MG: 1 INJECTION, SOLUTION INTRAMUSCULAR; INTRAVENOUS; SUBCUTANEOUS at 09:51

## 2017-09-13 RX ADMIN — POTASSIUM CHLORIDE: 2 INJECTION, SOLUTION, CONCENTRATE INTRAVENOUS at 04:31

## 2017-09-13 RX ADMIN — CLONAZEPAM 0.5 MG: 0.5 TABLET ORAL at 17:15

## 2017-09-13 RX ADMIN — HYDROMORPHONE HYDROCHLORIDE 1 MG: 1 INJECTION, SOLUTION INTRAMUSCULAR; INTRAVENOUS; SUBCUTANEOUS at 03:48

## 2017-09-13 RX ADMIN — CITALOPRAM HYDROBROMIDE 40 MG: 20 TABLET ORAL at 07:48

## 2017-09-13 RX ADMIN — FLUTICASONE PROPIONATE 2 SPRAY: 50 SPRAY, METERED NASAL at 07:52

## 2017-09-13 RX ADMIN — LORAZEPAM 1 MG: 2 INJECTION INTRAMUSCULAR at 17:15

## 2017-09-13 RX ADMIN — BUDESONIDE 500 MCG: 0.5 INHALANT RESPIRATORY (INHALATION) at 07:33

## 2017-09-13 RX ADMIN — CLONAZEPAM 0.5 MG: 0.5 TABLET ORAL at 07:49

## 2017-09-13 RX ADMIN — CLONAZEPAM 0.5 MG: 0.5 TABLET ORAL at 22:05

## 2017-09-13 RX ADMIN — HYDROMORPHONE HYDROCHLORIDE 0.5 MG: 1 INJECTION, SOLUTION INTRAMUSCULAR; INTRAVENOUS; SUBCUTANEOUS at 22:07

## 2017-09-13 RX ADMIN — BUDESONIDE 500 MCG: 0.5 INHALANT RESPIRATORY (INHALATION) at 21:31

## 2017-09-13 RX ADMIN — ONDANSETRON 4 MG: 2 INJECTION INTRAMUSCULAR; INTRAVENOUS at 22:08

## 2017-09-13 RX ADMIN — ALBUTEROL SULFATE 2.5 MG: 2.5 SOLUTION RESPIRATORY (INHALATION) at 07:33

## 2017-09-13 RX ADMIN — HYDROMORPHONE HYDROCHLORIDE 2 MG: 1 INJECTION, SOLUTION INTRAMUSCULAR; INTRAVENOUS; SUBCUTANEOUS at 14:55

## 2017-09-13 RX ADMIN — ALBUTEROL SULFATE 2.5 MG: 2.5 SOLUTION RESPIRATORY (INHALATION) at 21:31

## 2017-09-13 RX ADMIN — BUSPIRONE HYDROCHLORIDE 7.5 MG: 5 TABLET ORAL at 17:16

## 2017-09-13 RX ADMIN — CYCLOSPORINE 1 DROP: 0.5 EMULSION OPHTHALMIC at 22:09

## 2017-09-13 RX ADMIN — ALBUTEROL SULFATE 2.5 MG: 2.5 SOLUTION RESPIRATORY (INHALATION) at 14:30

## 2017-09-13 RX ADMIN — ONDANSETRON 4 MG: 2 INJECTION INTRAMUSCULAR; INTRAVENOUS at 14:11

## 2017-09-13 RX ADMIN — PANTOPRAZOLE SODIUM 40 MG: 40 INJECTION, POWDER, FOR SOLUTION INTRAVENOUS at 22:07

## 2017-09-13 RX ADMIN — POTASSIUM CHLORIDE: 2 INJECTION, SOLUTION, CONCENTRATE INTRAVENOUS at 14:11

## 2017-09-13 RX ADMIN — GABAPENTIN 400 MG: 300 CAPSULE ORAL at 22:06

## 2017-09-13 RX ADMIN — ONDANSETRON 4 MG: 2 INJECTION INTRAMUSCULAR; INTRAVENOUS at 03:48

## 2017-09-13 RX ADMIN — Medication 10 ML: at 22:07

## 2017-09-13 RX ADMIN — AZELASTINE 2 SPRAY: 1 SPRAY, METERED NASAL at 21:00

## 2017-09-13 RX ADMIN — ALBUTEROL SULFATE 2.5 MG: 2.5 SOLUTION RESPIRATORY (INHALATION) at 02:52

## 2017-09-13 RX ADMIN — BUSPIRONE HYDROCHLORIDE 7.5 MG: 5 TABLET ORAL at 07:47

## 2017-09-13 NOTE — PROGRESS NOTES
Pt up in room alert and oriented. rr are even and unlabored. Pt has some scattered wheezing. Pt is on 2L Nc. No c/o sob no distress noted. Pt has no issues with skin. Pt is able to ambulate. Pt abd is semisoft bowel sounds are hypoactive. Pt states she is not passing any gas this am. No bm. Will continue to monitor. Safety measures in place call light in reach.

## 2017-09-13 NOTE — PROGRESS NOTES
Shift assessment completed. Patient alert and oriented x 4 resting in bed quietly. Lactated ringers with potassium 20mEq infusing at 100 cc/hr. Pt. On 3 L NC. Respirations even and unlabored. No acute distress noted. Bed low, locked, call bell within reach. Side rails up x 2. Posey alarm turned on as pt. Is groggy from pain medication.

## 2017-09-13 NOTE — PROGRESS NOTES
Patient only slept the first half of the night. pateint had to get up to self cath 3-4 times during the night. She stated that she had the urge to go pee, but only went  cc at a time. Urine ledy in color. She c/o pain and nausea twice during the night in which dilaudid and zofran were given. patinet had to be encouraged to keep CPAP on during the nght or at least nasal cannula. Oxygen sat dropped into mid 80's on RAAna Maria Hartman Pt. Currently on 3 L NC in bed resting quietly.  Respirations even and unlabored

## 2017-09-13 NOTE — PROGRESS NOTES
Patient's home fentanyl patch removed from left arm. New patch fentanyl 25 mcg patch applied to right arm.  Tegaderm covering patch

## 2017-09-13 NOTE — PROGRESS NOTES
Subjective:     Patient has complaints of pain. She feels worse than prior to admission. No n/v, no flatus nor BM    Objective:     Visit Vitals    /63    Pulse 70    Temp 98.3 °F (36.8 °C)    Resp 17    Ht 5' 3\" (1.6 m)    Wt 198 lb (89.8 kg)    SpO2 90%    Breastfeeding No    BMI 35.07 kg/m2   ; Temp (24hrs), Av.3 °F (36.8 °C), Min:97.6 °F (36.4 °C), Max:98.9 °F (37.2 °C)    I/O reviewed in graphics    Physical Exam:    General-in mild distress. Lungs-CTA bilaterally without rales, rhonchi, or wheezes. Bases are not diminished. Respiratory effort isNormal    Heart- Regular rate and rhythm    Abdomen-distention increased  from previous exam.  Bowel sounds are still present, slightly less active, but remain obstructive in nature . There is moderate tenderness in the periumbilical and RLQ regions       Labs:   Recent Results (from the past 24 hour(s))   GLUCOSE, POC    Collection Time: 17  7:54 PM   Result Value Ref Range    Glucose (POC) 126 (H) 65 - 100 mg/dL   GLUCOSE, POC    Collection Time: 17  5:57 AM   Result Value Ref Range    Glucose (POC) 131 (H) 65 - 100 mg/dL   GLUCOSE, POC    Collection Time: 17 11:31 AM   Result Value Ref Range    Glucose (POC) 128 (H) 65 - 100 mg/dL   GLUCOSE, POC    Collection Time: 17  3:53 PM   Result Value Ref Range    Glucose (POC) 145 (H) 65 - 100 mg/dL       Data Review -    Assessment:     Principal Problem:    Small bowel obstruction ([facility] [address] (2017)    Active Problems:    Diabetes type 2, uncontrolled ([facility] [address] (2016)      Essential hypertension with goal blood pressure less than 140/90 (2016)      JESSICA (obstructive sleep apnea) (2016)      Gastroesophageal reflux disease without esophagitis (2016)      Irritable bowel syndrome without diarrhea (2016)      OAB (overactive bladder) (2016)      Hypothyroidism (2012)      Hypertension ()      Chronic pain ()      Overview: HX FIBROMYALGIA Anxiety and depression ()          Plan/Recommendations/Medical Decision Making:     Continue present treatment   She feels worse and looks a little worse today  Will presumptively post for OR tomorrow for laparotomy, lysis of adhesions and will proceed unless she shows dramatic improvement overnight. Technical details of laparotomy and adhesiolysis reviewed, including potential for small bowel resection. Risks reviewed include anesthetic risks, bleeding, infection, intestinal or other visceral injury, recurrent obstruction from future adhesions, hernia formation. Questions answered.

## 2017-09-13 NOTE — PROGRESS NOTES
Co severe abd pain 10/10 pt states that she and her  have just had a \"big argument\", dilaudid 2mgs IVP at this time, will monitor closely, pt states that is passing gas, BS+, loud RUQ, states no lstools at this time, states she had a \"bad case of diarrhea\" about 5 dalys ago, afebrile

## 2017-09-13 NOTE — PROGRESS NOTES
Patient in bed resting quietly with eyes closed. Pt on veronika CPAP. Respirations even and unlabored.  No distress noted

## 2017-09-14 ENCOUNTER — ANESTHESIA (OUTPATIENT)
Dept: SURGERY | Age: 67
DRG: 330 | End: 2017-09-14
Payer: MEDICARE

## 2017-09-14 LAB
ANION GAP SERPL CALC-SCNC: 7 MMOL/L (ref 7–16)
BUN SERPL-MCNC: 11 MG/DL (ref 8–23)
CALCIUM SERPL-MCNC: 8.2 MG/DL (ref 8.3–10.4)
CHLORIDE SERPL-SCNC: 106 MMOL/L (ref 98–107)
CO2 SERPL-SCNC: 30 MMOL/L (ref 21–32)
CREAT SERPL-MCNC: 0.68 MG/DL (ref 0.6–1)
ERYTHROCYTE [DISTWIDTH] IN BLOOD BY AUTOMATED COUNT: 15.4 % (ref 11.9–14.6)
GLUCOSE BLD STRIP.AUTO-MCNC: 121 MG/DL (ref 65–100)
GLUCOSE BLD STRIP.AUTO-MCNC: 133 MG/DL (ref 65–100)
GLUCOSE BLD STRIP.AUTO-MCNC: 138 MG/DL (ref 65–100)
GLUCOSE BLD STRIP.AUTO-MCNC: 142 MG/DL (ref 65–100)
GLUCOSE BLD STRIP.AUTO-MCNC: 181 MG/DL (ref 65–100)
GLUCOSE BLD STRIP.AUTO-MCNC: 193 MG/DL (ref 65–100)
GLUCOSE SERPL-MCNC: 123 MG/DL (ref 65–100)
HCT VFR BLD AUTO: 30.6 % (ref 35.8–46.3)
HGB BLD-MCNC: 9.4 G/DL (ref 11.7–15.4)
MCH RBC QN AUTO: 25.8 PG (ref 26.1–32.9)
MCHC RBC AUTO-ENTMCNC: 30.7 G/DL (ref 31.4–35)
MCV RBC AUTO: 84.1 FL (ref 79.6–97.8)
PLATELET # BLD AUTO: 219 K/UL (ref 150–450)
PMV BLD AUTO: 9.3 FL (ref 10.8–14.1)
POTASSIUM SERPL-SCNC: 3.7 MMOL/L (ref 3.5–5.1)
RBC # BLD AUTO: 3.64 M/UL (ref 4.05–5.25)
SODIUM SERPL-SCNC: 143 MMOL/L (ref 136–145)
WBC # BLD AUTO: 7.8 K/UL (ref 4.3–11.1)

## 2017-09-14 PROCEDURE — 0JNC0ZZ RELEASE PELVIC REGION SUBCUTANEOUS TISSUE AND FASCIA, OPEN APPROACH: ICD-10-PCS | Performed by: SURGERY

## 2017-09-14 PROCEDURE — 0DNN0ZZ RELEASE SIGMOID COLON, OPEN APPROACH: ICD-10-PCS | Performed by: SURGERY

## 2017-09-14 PROCEDURE — 88305 TISSUE EXAM BY PATHOLOGIST: CPT | Performed by: SURGERY

## 2017-09-14 PROCEDURE — 74011000250 HC RX REV CODE- 250: Performed by: SURGERY

## 2017-09-14 PROCEDURE — 76210000017 HC OR PH I REC 1.5 TO 2 HR: Performed by: SURGERY

## 2017-09-14 PROCEDURE — 77030011264 HC ELECTRD BLD EXT COVD -A: Performed by: SURGERY

## 2017-09-14 PROCEDURE — 85027 COMPLETE CBC AUTOMATED: CPT | Performed by: SURGERY

## 2017-09-14 PROCEDURE — 74011250636 HC RX REV CODE- 250/636

## 2017-09-14 PROCEDURE — 77030031139 HC SUT VCRL2 J&J -A: Performed by: SURGERY

## 2017-09-14 PROCEDURE — 76060000035 HC ANESTHESIA 2 TO 2.5 HR: Performed by: SURGERY

## 2017-09-14 PROCEDURE — 74011250636 HC RX REV CODE- 250/636: Performed by: SURGERY

## 2017-09-14 PROCEDURE — 94760 N-INVAS EAR/PLS OXIMETRY 1: CPT

## 2017-09-14 PROCEDURE — 74011000258 HC RX REV CODE- 258: Performed by: SURGERY

## 2017-09-14 PROCEDURE — 77030002966 HC SUT PDS J&J -A: Performed by: SURGERY

## 2017-09-14 PROCEDURE — C1765 ADHESION BARRIER: HCPCS | Performed by: SURGERY

## 2017-09-14 PROCEDURE — 74011000250 HC RX REV CODE- 250

## 2017-09-14 PROCEDURE — 94640 AIRWAY INHALATION TREATMENT: CPT

## 2017-09-14 PROCEDURE — 80048 BASIC METABOLIC PNL TOTAL CA: CPT | Performed by: SURGERY

## 2017-09-14 PROCEDURE — C9113 INJ PANTOPRAZOLE SODIUM, VIA: HCPCS | Performed by: SURGERY

## 2017-09-14 PROCEDURE — 77030032490 HC SLV COMPR SCD KNE COVD -B: Performed by: SURGERY

## 2017-09-14 PROCEDURE — 77030002996 HC SUT SLK J&J -A: Performed by: SURGERY

## 2017-09-14 PROCEDURE — 77030034850: Performed by: SURGERY

## 2017-09-14 PROCEDURE — 77030008477 HC STYL SATN SLP COVD -A: Performed by: ANESTHESIOLOGY

## 2017-09-14 PROCEDURE — 77010033678 HC OXYGEN DAILY

## 2017-09-14 PROCEDURE — 77030008703 HC TU ET UNCUF COVD -A: Performed by: ANESTHESIOLOGY

## 2017-09-14 PROCEDURE — 36415 COLL VENOUS BLD VENIPUNCTURE: CPT | Performed by: SURGERY

## 2017-09-14 PROCEDURE — 88342 IMHCHEM/IMCYTCHM 1ST ANTB: CPT

## 2017-09-14 PROCEDURE — 0DB80ZZ EXCISION OF SMALL INTESTINE, OPEN APPROACH: ICD-10-PCS | Performed by: SURGERY

## 2017-09-14 PROCEDURE — 88341 IMHCHEM/IMCYTCHM EA ADD ANTB: CPT

## 2017-09-14 PROCEDURE — 77030019940 HC BLNKT HYPOTHRM STRY -B: Performed by: ANESTHESIOLOGY

## 2017-09-14 PROCEDURE — 76010000131 HC OR TIME 2 TO 2.5 HR: Performed by: SURGERY

## 2017-09-14 PROCEDURE — 77030008467 HC STPLR SKN COVD -B: Performed by: SURGERY

## 2017-09-14 PROCEDURE — 74011250636 HC RX REV CODE- 250/636: Performed by: ANESTHESIOLOGY

## 2017-09-14 PROCEDURE — 82962 GLUCOSE BLOOD TEST: CPT

## 2017-09-14 PROCEDURE — 65270000029 HC RM PRIVATE

## 2017-09-14 PROCEDURE — 77030018836 HC SOL IRR NACL ICUM -A: Performed by: SURGERY

## 2017-09-14 PROCEDURE — 74011250637 HC RX REV CODE- 250/637: Performed by: SURGERY

## 2017-09-14 PROCEDURE — 77030019908 HC STETH ESOPH SIMS -A: Performed by: ANESTHESIOLOGY

## 2017-09-14 PROCEDURE — 77030008771 HC TU NG SALEM SUMP -A: Performed by: ANESTHESIOLOGY

## 2017-09-14 PROCEDURE — 77030008771 HC TU NG SALEM SUMP -A: Performed by: SURGERY

## 2017-09-14 RX ORDER — ROCURONIUM BROMIDE 10 MG/ML
INJECTION, SOLUTION INTRAVENOUS AS NEEDED
Status: DISCONTINUED | OUTPATIENT
Start: 2017-09-14 | End: 2017-09-14 | Stop reason: HOSPADM

## 2017-09-14 RX ORDER — NEOSTIGMINE METHYLSULFATE 1 MG/ML
INJECTION INTRAVENOUS AS NEEDED
Status: DISCONTINUED | OUTPATIENT
Start: 2017-09-14 | End: 2017-09-14 | Stop reason: HOSPADM

## 2017-09-14 RX ORDER — PROPOFOL 10 MG/ML
INJECTION, EMULSION INTRAVENOUS AS NEEDED
Status: DISCONTINUED | OUTPATIENT
Start: 2017-09-14 | End: 2017-09-14 | Stop reason: HOSPADM

## 2017-09-14 RX ORDER — HYDROMORPHONE HYDROCHLORIDE 2 MG/ML
0.5 INJECTION, SOLUTION INTRAMUSCULAR; INTRAVENOUS; SUBCUTANEOUS
Status: COMPLETED | OUTPATIENT
Start: 2017-09-14 | End: 2017-09-14

## 2017-09-14 RX ORDER — LIDOCAINE HYDROCHLORIDE 20 MG/ML
INJECTION, SOLUTION EPIDURAL; INFILTRATION; INTRACAUDAL; PERINEURAL AS NEEDED
Status: DISCONTINUED | OUTPATIENT
Start: 2017-09-14 | End: 2017-09-14 | Stop reason: HOSPADM

## 2017-09-14 RX ORDER — MIDAZOLAM HYDROCHLORIDE 1 MG/ML
2 INJECTION, SOLUTION INTRAMUSCULAR; INTRAVENOUS
Status: DISCONTINUED | OUTPATIENT
Start: 2017-09-14 | End: 2017-09-14 | Stop reason: HOSPADM

## 2017-09-14 RX ORDER — OXYCODONE HYDROCHLORIDE 5 MG/1
5 TABLET ORAL
Status: DISCONTINUED | OUTPATIENT
Start: 2017-09-14 | End: 2017-09-14 | Stop reason: HOSPADM

## 2017-09-14 RX ORDER — HYDRALAZINE HYDROCHLORIDE 20 MG/ML
INJECTION INTRAMUSCULAR; INTRAVENOUS AS NEEDED
Status: DISCONTINUED | OUTPATIENT
Start: 2017-09-14 | End: 2017-09-14 | Stop reason: HOSPADM

## 2017-09-14 RX ORDER — SUCCINYLCHOLINE CHLORIDE 20 MG/ML
INJECTION INTRAMUSCULAR; INTRAVENOUS AS NEEDED
Status: DISCONTINUED | OUTPATIENT
Start: 2017-09-14 | End: 2017-09-14 | Stop reason: HOSPADM

## 2017-09-14 RX ORDER — SODIUM CHLORIDE, SODIUM LACTATE, POTASSIUM CHLORIDE, CALCIUM CHLORIDE 600; 310; 30; 20 MG/100ML; MG/100ML; MG/100ML; MG/100ML
75 INJECTION, SOLUTION INTRAVENOUS CONTINUOUS
Status: DISCONTINUED | OUTPATIENT
Start: 2017-09-14 | End: 2017-09-14 | Stop reason: HOSPADM

## 2017-09-14 RX ORDER — OXYCODONE HYDROCHLORIDE 5 MG/1
10 TABLET ORAL
Status: DISCONTINUED | OUTPATIENT
Start: 2017-09-14 | End: 2017-09-14 | Stop reason: HOSPADM

## 2017-09-14 RX ORDER — DEXAMETHASONE SODIUM PHOSPHATE 4 MG/ML
INJECTION, SOLUTION INTRA-ARTICULAR; INTRALESIONAL; INTRAMUSCULAR; INTRAVENOUS; SOFT TISSUE AS NEEDED
Status: DISCONTINUED | OUTPATIENT
Start: 2017-09-14 | End: 2017-09-14 | Stop reason: HOSPADM

## 2017-09-14 RX ORDER — INSULIN LISPRO 100 [IU]/ML
4 INJECTION, SOLUTION INTRAVENOUS; SUBCUTANEOUS ONCE
Status: DISCONTINUED | OUTPATIENT
Start: 2017-09-14 | End: 2017-09-14

## 2017-09-14 RX ORDER — ONDANSETRON 2 MG/ML
INJECTION INTRAMUSCULAR; INTRAVENOUS AS NEEDED
Status: DISCONTINUED | OUTPATIENT
Start: 2017-09-14 | End: 2017-09-14 | Stop reason: HOSPADM

## 2017-09-14 RX ORDER — HYDROMORPHONE HYDROCHLORIDE 1 MG/ML
.5-2 INJECTION, SOLUTION INTRAMUSCULAR; INTRAVENOUS; SUBCUTANEOUS
Status: DISCONTINUED | OUTPATIENT
Start: 2017-09-15 | End: 2017-09-22

## 2017-09-14 RX ORDER — FENTANYL CITRATE 50 UG/ML
INJECTION, SOLUTION INTRAMUSCULAR; INTRAVENOUS AS NEEDED
Status: DISCONTINUED | OUTPATIENT
Start: 2017-09-14 | End: 2017-09-14 | Stop reason: HOSPADM

## 2017-09-14 RX ORDER — MIDAZOLAM HYDROCHLORIDE 1 MG/ML
INJECTION, SOLUTION INTRAMUSCULAR; INTRAVENOUS AS NEEDED
Status: DISCONTINUED | OUTPATIENT
Start: 2017-09-14 | End: 2017-09-14 | Stop reason: HOSPADM

## 2017-09-14 RX ORDER — GLYCOPYRROLATE 0.2 MG/ML
INJECTION INTRAMUSCULAR; INTRAVENOUS AS NEEDED
Status: DISCONTINUED | OUTPATIENT
Start: 2017-09-14 | End: 2017-09-14 | Stop reason: HOSPADM

## 2017-09-14 RX ORDER — NALOXONE HYDROCHLORIDE 0.4 MG/ML
0.1 INJECTION, SOLUTION INTRAMUSCULAR; INTRAVENOUS; SUBCUTANEOUS
Status: DISCONTINUED | OUTPATIENT
Start: 2017-09-14 | End: 2017-09-14 | Stop reason: HOSPADM

## 2017-09-14 RX ORDER — LIDOCAINE HYDROCHLORIDE 10 MG/ML
0.1 INJECTION INFILTRATION; PERINEURAL AS NEEDED
Status: DISCONTINUED | OUTPATIENT
Start: 2017-09-14 | End: 2017-09-14 | Stop reason: HOSPADM

## 2017-09-14 RX ORDER — FLUMAZENIL 0.1 MG/ML
0.2 INJECTION INTRAVENOUS AS NEEDED
Status: DISCONTINUED | OUTPATIENT
Start: 2017-09-14 | End: 2017-09-14 | Stop reason: HOSPADM

## 2017-09-14 RX ADMIN — CYCLOSPORINE 1 DROP: 0.5 EMULSION OPHTHALMIC at 21:30

## 2017-09-14 RX ADMIN — Medication 5 ML: at 05:14

## 2017-09-14 RX ADMIN — ONDANSETRON 4 MG: 2 INJECTION INTRAMUSCULAR; INTRAVENOUS at 02:19

## 2017-09-14 RX ADMIN — NEOSTIGMINE METHYLSULFATE 3 MG: 1 INJECTION INTRAVENOUS at 17:02

## 2017-09-14 RX ADMIN — ALBUTEROL SULFATE 2.5 MG: 2.5 SOLUTION RESPIRATORY (INHALATION) at 07:51

## 2017-09-14 RX ADMIN — PANTOPRAZOLE SODIUM 40 MG: 40 INJECTION, POWDER, FOR SOLUTION INTRAVENOUS at 08:45

## 2017-09-14 RX ADMIN — POTASSIUM CHLORIDE: 2 INJECTION, SOLUTION, CONCENTRATE INTRAVENOUS at 03:57

## 2017-09-14 RX ADMIN — ROCURONIUM BROMIDE 10 MG: 10 INJECTION, SOLUTION INTRAVENOUS at 15:33

## 2017-09-14 RX ADMIN — HYDROMORPHONE HYDROCHLORIDE 0.5 MG: 2 INJECTION, SOLUTION INTRAMUSCULAR; INTRAVENOUS; SUBCUTANEOUS at 17:33

## 2017-09-14 RX ADMIN — GLYCOPYRROLATE 0.4 MG: 0.2 INJECTION INTRAMUSCULAR; INTRAVENOUS at 17:02

## 2017-09-14 RX ADMIN — GLYCOPYRROLATE 0.2 MG: 0.2 INJECTION INTRAMUSCULAR; INTRAVENOUS at 15:27

## 2017-09-14 RX ADMIN — ONDANSETRON 4 MG: 2 INJECTION INTRAMUSCULAR; INTRAVENOUS at 10:37

## 2017-09-14 RX ADMIN — ROCURONIUM BROMIDE 10 MG: 10 INJECTION, SOLUTION INTRAVENOUS at 16:15

## 2017-09-14 RX ADMIN — BUDESONIDE 500 MCG: 0.5 INHALANT RESPIRATORY (INHALATION) at 07:51

## 2017-09-14 RX ADMIN — SODIUM CHLORIDE, SODIUM LACTATE, POTASSIUM CHLORIDE, AND CALCIUM CHLORIDE: 600; 310; 30; 20 INJECTION, SOLUTION INTRAVENOUS at 16:13

## 2017-09-14 RX ADMIN — ONDANSETRON 4 MG: 2 INJECTION INTRAMUSCULAR; INTRAVENOUS at 16:52

## 2017-09-14 RX ADMIN — FLUTICASONE PROPIONATE 2 SPRAY: 50 SPRAY, METERED NASAL at 08:50

## 2017-09-14 RX ADMIN — ROCURONIUM BROMIDE 10 MG: 10 INJECTION, SOLUTION INTRAVENOUS at 15:45

## 2017-09-14 RX ADMIN — DEXAMETHASONE SODIUM PHOSPHATE 4 MG: 4 INJECTION, SOLUTION INTRA-ARTICULAR; INTRALESIONAL; INTRAMUSCULAR; INTRAVENOUS; SOFT TISSUE at 15:53

## 2017-09-14 RX ADMIN — HYDROMORPHONE HYDROCHLORIDE 0.5 MG: 2 INJECTION, SOLUTION INTRAMUSCULAR; INTRAVENOUS; SUBCUTANEOUS at 17:38

## 2017-09-14 RX ADMIN — FENTANYL CITRATE 50 MCG: 50 INJECTION, SOLUTION INTRAMUSCULAR; INTRAVENOUS at 15:17

## 2017-09-14 RX ADMIN — CYCLOSPORINE 1 DROP: 0.5 EMULSION OPHTHALMIC at 08:00

## 2017-09-14 RX ADMIN — HYDROMORPHONE HYDROCHLORIDE 0.5 MG: 2 INJECTION, SOLUTION INTRAMUSCULAR; INTRAVENOUS; SUBCUTANEOUS at 17:18

## 2017-09-14 RX ADMIN — MIDAZOLAM HYDROCHLORIDE 2 MG: 1 INJECTION, SOLUTION INTRAMUSCULAR; INTRAVENOUS at 15:16

## 2017-09-14 RX ADMIN — HYDROMORPHONE HYDROCHLORIDE 0.5 MG: 1 INJECTION, SOLUTION INTRAMUSCULAR; INTRAVENOUS; SUBCUTANEOUS at 02:18

## 2017-09-14 RX ADMIN — FENTANYL CITRATE 50 MCG: 50 INJECTION, SOLUTION INTRAMUSCULAR; INTRAVENOUS at 15:53

## 2017-09-14 RX ADMIN — ROCURONIUM BROMIDE 5 MG: 10 INJECTION, SOLUTION INTRAVENOUS at 15:20

## 2017-09-14 RX ADMIN — ROCURONIUM BROMIDE 15 MG: 10 INJECTION, SOLUTION INTRAVENOUS at 15:40

## 2017-09-14 RX ADMIN — GLYCOPYRROLATE 0.2 MG: 0.2 INJECTION INTRAMUSCULAR; INTRAVENOUS at 15:25

## 2017-09-14 RX ADMIN — ONDANSETRON 4 MG: 2 INJECTION INTRAMUSCULAR; INTRAVENOUS at 13:57

## 2017-09-14 RX ADMIN — SODIUM CHLORIDE, SODIUM LACTATE, POTASSIUM CHLORIDE, AND CALCIUM CHLORIDE: 600; 310; 30; 20 INJECTION, SOLUTION INTRAVENOUS at 15:12

## 2017-09-14 RX ADMIN — CLONAZEPAM 0.5 MG: 0.5 TABLET ORAL at 21:27

## 2017-09-14 RX ADMIN — SUCCINYLCHOLINE CHLORIDE 140 MG: 20 INJECTION INTRAMUSCULAR; INTRAVENOUS at 15:20

## 2017-09-14 RX ADMIN — FENTANYL CITRATE 50 MCG: 50 INJECTION, SOLUTION INTRAMUSCULAR; INTRAVENOUS at 15:35

## 2017-09-14 RX ADMIN — GABAPENTIN 400 MG: 300 CAPSULE ORAL at 21:27

## 2017-09-14 RX ADMIN — ONDANSETRON 4 MG: 2 INJECTION INTRAMUSCULAR; INTRAVENOUS at 06:29

## 2017-09-14 RX ADMIN — HYDROMORPHONE HYDROCHLORIDE 0.5 MG: 2 INJECTION, SOLUTION INTRAMUSCULAR; INTRAVENOUS; SUBCUTANEOUS at 17:23

## 2017-09-14 RX ADMIN — HYDROMORPHONE HYDROCHLORIDE 0.5 MG: 1 INJECTION, SOLUTION INTRAMUSCULAR; INTRAVENOUS; SUBCUTANEOUS at 10:04

## 2017-09-14 RX ADMIN — CEFOXITIN 2 G: 2 INJECTION, POWDER, FOR SOLUTION INTRAVENOUS at 15:30

## 2017-09-14 RX ADMIN — Medication 10 ML: at 21:28

## 2017-09-14 RX ADMIN — HYDRALAZINE HYDROCHLORIDE 5 MG: 20 INJECTION INTRAMUSCULAR; INTRAVENOUS at 16:04

## 2017-09-14 RX ADMIN — PROPOFOL 180 MG: 10 INJECTION, EMULSION INTRAVENOUS at 15:20

## 2017-09-14 RX ADMIN — HYDRALAZINE HYDROCHLORIDE 5 MG: 20 INJECTION INTRAMUSCULAR; INTRAVENOUS at 15:49

## 2017-09-14 RX ADMIN — HYDROMORPHONE HYDROCHLORIDE 0.5 MG: 1 INJECTION, SOLUTION INTRAMUSCULAR; INTRAVENOUS; SUBCUTANEOUS at 06:30

## 2017-09-14 RX ADMIN — FENTANYL CITRATE 50 MCG: 50 INJECTION, SOLUTION INTRAMUSCULAR; INTRAVENOUS at 15:20

## 2017-09-14 RX ADMIN — PANTOPRAZOLE SODIUM 40 MG: 40 INJECTION, POWDER, FOR SOLUTION INTRAVENOUS at 21:28

## 2017-09-14 RX ADMIN — LIDOCAINE HYDROCHLORIDE 60 MG: 20 INJECTION, SOLUTION EPIDURAL; INFILTRATION; INTRACAUDAL; PERINEURAL at 15:20

## 2017-09-14 RX ADMIN — LEVOTHYROXINE SODIUM ANHYDROUS 68.5 MCG: 100 INJECTION, POWDER, LYOPHILIZED, FOR SOLUTION INTRAVENOUS at 08:48

## 2017-09-14 NOTE — PROGRESS NOTES
Pt report nausea is \"a little better\" at this time. No vomiting noted at this time. Pt remains NPO. Will monitor.

## 2017-09-14 NOTE — PROGRESS NOTES
TRANSFER - IN REPORT:    Verbal report received from [facility] (name) on Mahesh Albert  being received from [employer]) for routine post - op      Report consisted of patients Situation, Background, Assessment and   Recommendations(SBAR). Information from the following report(s) SBAR was reviewed with the receiving nurse. Opportunity for questions and clarification was provided. Assessment completed upon patients arrival to unit and care assumed.

## 2017-09-14 NOTE — PROGRESS NOTES
Patient arrived back from PACU, post op, to room 832, patient alert and oriented X3, on 4L n/c, patient IV fluids infusing, NG to low intermediate suction with 30cc of gastric output @ this time, dressing intact to abd, no c/o pain or discomfort @ this time, call light within reach, report given to oncoming nurse Sourav Ochoa.

## 2017-09-14 NOTE — PROGRESS NOTES
Patient up and ambulatory in room, c/o pain to abd and back, 7/10 with some nausea without emesis, prn dilaudid . 5mg IV slow push given upon request, with prn zofran, consent obtained for surgery @ this time, no needs voiced @ this time, call light within reach.

## 2017-09-14 NOTE — PROGRESS NOTES
Patient sitting up in bed in room, IV fluids infusing, no c/o pain or discomfort, no n/v @ this time, call light within reach.

## 2017-09-14 NOTE — OP NOTES
Operative Report    Patient: Christiano Black MRN: [id number]  SSN: [id number]    YOB: 1950  Age: 75 y.o. Sex: female       Date of Surgery: 9/14/2017     Preoperative Diagnosis: SMALL BOWEL OBSTRUCTION     Postoperative Diagnosis: 1. SMALL BOWEL OBSTRUCTION   2. Small bowel tumor    NAME OF PROCEDURE:  Procedure(s):  EXPLORATORY LAPAROTOMY WITH LYSIS OF ADHESIONS AND EXISION OF SMALL BOWEL TUMOR. SURGEON: Idania Sanchez MD    Anesthesia: General     Complications: none      Procedure Details       Informed consent was obtained and the patient was brought to the operating room and placed supine. After induction of a general anesthetic, a [place] catheter was inserted and the abdomen was prepped and draped in standard fashion. An infraumbilical midline incision was made and cautery was used to dissect  into the peritoneal cavity. Exploration revealed mild, clear ascites and a matted area of distal small bowel was readily identified, itself free from the surrounding viscera and abdominal walls. Additional findings included sigmoid adhesions to the left lateral sidewall, and perirectal adhesions with a previously placed bladder suspension mesh sling in place. Mobilization of the extremely dense, essentially plaque-like, interloop ileal adhesions was done with mostly sharp, scissor dissection and some cautery. No enterotomies were created. None of the bowel was significantly distended, however the proximal half of this region revealed significant edema of the mesentery and some wall thickening suggesting acute on chronic obstruction. This segment, after mobilization, extended approximately 40cm, and ended approximately 20cm from the valve. Additional exploration revealed all the small bowel proximal to this area, up to the duodenal ligament, to be free of adhesions.   At approximately 80cm from the valve was a roughly 1cm subserosal nodule on the antimesenteric border of the proximal ileum, suggestive of a leiomyoma. This was amputated with scissors and removed. The resultant defect was closed in a typical 2-layer fashion with interrupted 3-0 silk. Next, all sigmoid and rectal adhesions in the LLQ and pelvis were freed. This included skeletonizing the previous sacrocolpopexy of sigmoid mesentery. The mesh cord was noted to be intact, being anchored to the bladder and to the retroperitoneum and thus it was not disrupted. The right adnexa was adherent to the anterior surface of the rectum and actually looped across the the right lateral wall and was adherent to the mesh on the right side of the rectum. This was completely mobilized. The abdomen was irrigated copiously and confirmed to be hemostatic in dissected areas. An NG placed by anesthesia was confirmed in antral position. Seprafilm was placed over the bowel. The incision was closed with #1 vicryl in the posterior layer and #1 looped PDS in the anterior layer. The skin was closed with staples and a gauze dressing applied. The patient tolerated the procedure well with no apparent complications and was awakened from anesthesia and extubated in the operating room and taken to the recovery room in satisfactory condition. Sponge and needle counts were correct times two as reported to me.     Estimated Blood Loss: per anesthesia           Specimens:   ID Type Source Tests Collected by Time Destination   1 : SUBSEROSAL MASS Preservative Mass  Jose Lipscomb MD 9/14/2017 1615 Pathology           Signed By:  Jose Lipscomb MD     September 14, 2017

## 2017-09-14 NOTE — ANESTHESIA PREPROCEDURE EVALUATION
Anesthetic History   No history of anesthetic complications            Review of Systems / Medical History  Patient summary reviewed and pertinent labs reviewed    Pulmonary        Sleep apnea: CPAP    Asthma : well controlled       Neuro/Psych             Comments: Chronic pain - spinal cord stimulator Cardiovascular    Hypertension: well controlled              Exercise tolerance: >4 METS     GI/Hepatic/Renal     GERD: well controlled          Comments: [facility] Endo/Other    Diabetes: well controlled, type 2  Hypothyroidism: well controlled  Obesity and arthritis     Other Findings              Physical Exam    Airway  Mallampati: II  TM Distance: 4 - 6 cm  Neck ROM: normal range of motion   Mouth opening: Normal     Cardiovascular    Rhythm: regular  Rate: normal         Dental  No notable dental hx       Pulmonary  Breath sounds clear to auscultation               Abdominal         Other Findings            Anesthetic Plan    ASA: 3  Anesthesia type: general            Anesthetic plan and risks discussed with: Patient and Spouse

## 2017-09-14 NOTE — ANESTHESIA POSTPROCEDURE EVALUATION
Post-Anesthesia Evaluation and Assessment    Patient: Anayeli Okeefe MRN: [id number]  SSN: [id number]    YOB: 1950  Age: 75 y.o. Sex: female       Cardiovascular Function/Vital Signs  Visit Vitals    /74    Pulse 72    Temp 36.9 °C (98.4 °F)    Resp 17    Ht 5' 3\" (1.6 m)    Wt 89.8 kg (198 lb)    SpO2 91%    Breastfeeding No    BMI 35.07 kg/m2       Patient is status post general anesthesia for Procedure(s):  EXPLORATORY LAPAROTOMY WITH LYSIS OF ADHESIONS AND EXISION OF SMALL BOWEL TUMOR. Nausea/Vomiting: None    Postoperative hydration reviewed and adequate. Pain:  Pain Scale 1: Numeric (0 - 10) (09/14/17 1738)  Pain Intensity 1: 10 (09/14/17 1738)   Managed    Neurological Status:   Neuro  Neurologic State: Alert (09/13/17 1940)  Orientation Level: Oriented X4 (09/13/17 1940)   At baseline    Mental Status and Level of Consciousness: Arousable    Pulmonary Status:   O2 Device: Nasal cannula (09/14/17 1733)   Adequate oxygenation and airway patent    Complications related to anesthesia: None    Post-anesthesia assessment completed.  No concerns    Signed By: Tristan Smith MD     September 14, 2017

## 2017-09-14 NOTE — PROGRESS NOTES
TRANSFER - OUT REPORT:    Verbal report given to Mary Choi on Augustine Nagel  being transferred to pre-op for routine progression of care       Report consisted of patients Situation, Background, Assessment and   Recommendations(SBAR). Information from the following report(s) SBAR and Recent Results was reviewed with the receiving nurse. Lines:   Peripheral IV 09/14/17 Left Arm (Active)   Site Assessment Clean, dry, & intact 9/14/2017  8:04 AM   Phlebitis Assessment 0 9/14/2017  8:04 AM   Infiltration Assessment 0 9/14/2017  8:04 AM   Dressing Status Clean, dry, & intact 9/14/2017  8:04 AM   Dressing Type Transparent 9/14/2017  8:04 AM   Hub Color/Line Status Infusing 9/14/2017  8:04 AM        Opportunity for questions and clarification was provided.       Patient transported with:   [employer]

## 2017-09-14 NOTE — PERIOP NOTES
TRANSFER - OUT REPORT:    Verbal report given to Gloria MACHADO(name) on Blaire Pereira  being transferred to room 832(unit) for routine post - op       Report consisted of patients Situation, Background, Assessment and   Recommendations(SBAR). Information from the following report(s) SBAR, Kardex, OR Summary, Intake/Output and MAR was reviewed with the receiving nurse. Lines:   Peripheral IV 09/14/17 Left Arm (Active)   Site Assessment Clean, dry, & intact 9/14/2017  8:04 AM   Phlebitis Assessment 0 9/14/2017  8:04 AM   Infiltration Assessment 0 9/14/2017  8:04 AM   Dressing Status Clean, dry, & intact 9/14/2017  8:04 AM   Dressing Type Transparent 9/14/2017  8:04 AM   Hub Color/Line Status Infusing 9/14/2017  8:04 AM        Opportunity for questions and clarification was provided. Patient [id number]:   O2 @ 4 liters    VTE prophylaxis orders have been written for Blaire Pereira. Patient and family given floor number and nurses name. Family updated re: pt status after security code verified.

## 2017-09-14 NOTE — PROGRESS NOTES
Pt sitting up in bed. Pt alert oriented times 3 at this time. Pt complains of abd pain 5/10 at this time. Pt complains of nausea no vomiting noted at this time. Bowel sounds active. Pt report passing some gas but no BM. Pt NPO. Pt aware of surgery today. Pt encouraged to call for assistance if needed call light in reach, door open will monitor.

## 2017-09-14 NOTE — PROGRESS NOTES
Patient alert and oriented X3, sitting up in bed in room, patient NPO after midnight for procedure tomorrow, patient denies n/v, no c/o pain or discomfort, no needs voiced @ this time, call light within reach.

## 2017-09-15 LAB
GLUCOSE BLD STRIP.AUTO-MCNC: 119 MG/DL (ref 65–100)
GLUCOSE BLD STRIP.AUTO-MCNC: 133 MG/DL (ref 65–100)
GLUCOSE BLD STRIP.AUTO-MCNC: 140 MG/DL (ref 65–100)
GLUCOSE BLD STRIP.AUTO-MCNC: 158 MG/DL (ref 65–100)

## 2017-09-15 PROCEDURE — 82962 GLUCOSE BLOOD TEST: CPT

## 2017-09-15 PROCEDURE — 74011250636 HC RX REV CODE- 250/636: Performed by: SURGERY

## 2017-09-15 PROCEDURE — 94760 N-INVAS EAR/PLS OXIMETRY 1: CPT

## 2017-09-15 PROCEDURE — 74011250637 HC RX REV CODE- 250/637: Performed by: SURGERY

## 2017-09-15 PROCEDURE — C9113 INJ PANTOPRAZOLE SODIUM, VIA: HCPCS | Performed by: SURGERY

## 2017-09-15 PROCEDURE — 65270000029 HC RM PRIVATE

## 2017-09-15 PROCEDURE — 74011000250 HC RX REV CODE- 250: Performed by: SURGERY

## 2017-09-15 PROCEDURE — 74011000258 HC RX REV CODE- 258: Performed by: SURGERY

## 2017-09-15 PROCEDURE — 74011636637 HC RX REV CODE- 636/637: Performed by: SURGERY

## 2017-09-15 PROCEDURE — 77010033678 HC OXYGEN DAILY

## 2017-09-15 PROCEDURE — 94640 AIRWAY INHALATION TREATMENT: CPT

## 2017-09-15 RX ORDER — METOPROLOL TARTRATE 5 MG/5ML
2.5 INJECTION INTRAVENOUS
Status: DISCONTINUED | OUTPATIENT
Start: 2017-09-15 | End: 2017-09-23 | Stop reason: HOSPADM

## 2017-09-15 RX ORDER — KETOROLAC TROMETHAMINE 15 MG/ML
15 INJECTION, SOLUTION INTRAMUSCULAR; INTRAVENOUS EVERY 6 HOURS
Status: DISPENSED | OUTPATIENT
Start: 2017-09-15 | End: 2017-09-20

## 2017-09-15 RX ADMIN — CITALOPRAM HYDROBROMIDE 40 MG: 20 TABLET ORAL at 08:05

## 2017-09-15 RX ADMIN — CEFOXITIN 2 G: 2 INJECTION, POWDER, FOR SOLUTION INTRAVENOUS at 20:54

## 2017-09-15 RX ADMIN — CLONAZEPAM 0.5 MG: 0.5 TABLET ORAL at 08:05

## 2017-09-15 RX ADMIN — CYCLOSPORINE 1 DROP: 0.5 EMULSION OPHTHALMIC at 22:01

## 2017-09-15 RX ADMIN — ENOXAPARIN SODIUM 40 MG: 40 INJECTION SUBCUTANEOUS at 08:06

## 2017-09-15 RX ADMIN — LEVOTHYROXINE SODIUM ANHYDROUS 68.5 MCG: 100 INJECTION, POWDER, LYOPHILIZED, FOR SOLUTION INTRAVENOUS at 08:27

## 2017-09-15 RX ADMIN — ALBUTEROL SULFATE 2.5 MG: 2.5 SOLUTION RESPIRATORY (INHALATION) at 08:05

## 2017-09-15 RX ADMIN — PANTOPRAZOLE SODIUM 40 MG: 40 INJECTION, POWDER, FOR SOLUTION INTRAVENOUS at 08:01

## 2017-09-15 RX ADMIN — HYDROMORPHONE HYDROCHLORIDE 1 MG: 1 INJECTION, SOLUTION INTRAMUSCULAR; INTRAVENOUS; SUBCUTANEOUS at 07:58

## 2017-09-15 RX ADMIN — BUSPIRONE HYDROCHLORIDE 7.5 MG: 5 TABLET ORAL at 08:05

## 2017-09-15 RX ADMIN — ONDANSETRON 4 MG: 2 INJECTION INTRAMUSCULAR; INTRAVENOUS at 17:34

## 2017-09-15 RX ADMIN — CLONAZEPAM 0.5 MG: 0.5 TABLET ORAL at 20:44

## 2017-09-15 RX ADMIN — HYDROMORPHONE HYDROCHLORIDE 1 MG: 1 INJECTION, SOLUTION INTRAMUSCULAR; INTRAVENOUS; SUBCUTANEOUS at 23:14

## 2017-09-15 RX ADMIN — ONDANSETRON 4 MG: 2 INJECTION INTRAMUSCULAR; INTRAVENOUS at 23:20

## 2017-09-15 RX ADMIN — Medication 10 ML: at 20:45

## 2017-09-15 RX ADMIN — Medication 10 ML: at 05:41

## 2017-09-15 RX ADMIN — GABAPENTIN 400 MG: 300 CAPSULE ORAL at 08:05

## 2017-09-15 RX ADMIN — HYDROMORPHONE HYDROCHLORIDE 1 MG: 1 INJECTION, SOLUTION INTRAMUSCULAR; INTRAVENOUS; SUBCUTANEOUS at 17:34

## 2017-09-15 RX ADMIN — CLONAZEPAM 0.5 MG: 0.5 TABLET ORAL at 15:46

## 2017-09-15 RX ADMIN — SODIUM CHLORIDE, SODIUM LACTATE, POTASSIUM CHLORIDE, AND CALCIUM CHLORIDE 500 ML: 600; 310; 30; 20 INJECTION, SOLUTION INTRAVENOUS at 13:41

## 2017-09-15 RX ADMIN — GABAPENTIN 400 MG: 300 CAPSULE ORAL at 20:44

## 2017-09-15 RX ADMIN — ONDANSETRON 4 MG: 2 INJECTION INTRAMUSCULAR; INTRAVENOUS at 13:37

## 2017-09-15 RX ADMIN — POTASSIUM CHLORIDE: 2 INJECTION, SOLUTION, CONCENTRATE INTRAVENOUS at 12:41

## 2017-09-15 RX ADMIN — HYDROMORPHONE HYDROCHLORIDE 1 MG: 1 INJECTION, SOLUTION INTRAMUSCULAR; INTRAVENOUS; SUBCUTANEOUS at 20:45

## 2017-09-15 RX ADMIN — BUSPIRONE HYDROCHLORIDE 7.5 MG: 5 TABLET ORAL at 17:34

## 2017-09-15 RX ADMIN — CYCLOSPORINE 1 DROP: 0.5 EMULSION OPHTHALMIC at 08:10

## 2017-09-15 RX ADMIN — ONDANSETRON 4 MG: 2 INJECTION INTRAMUSCULAR; INTRAVENOUS at 09:26

## 2017-09-15 RX ADMIN — KETOROLAC TROMETHAMINE 15 MG: 15 INJECTION, SOLUTION INTRAMUSCULAR; INTRAVENOUS at 15:33

## 2017-09-15 RX ADMIN — GABAPENTIN 400 MG: 300 CAPSULE ORAL at 15:51

## 2017-09-15 RX ADMIN — BUDESONIDE 500 MCG: 0.5 INHALANT RESPIRATORY (INHALATION) at 21:03

## 2017-09-15 RX ADMIN — ONDANSETRON 4 MG: 2 INJECTION INTRAMUSCULAR; INTRAVENOUS at 05:37

## 2017-09-15 RX ADMIN — PANTOPRAZOLE SODIUM 40 MG: 40 INJECTION, POWDER, FOR SOLUTION INTRAVENOUS at 20:45

## 2017-09-15 RX ADMIN — BUDESONIDE 500 MCG: 0.5 INHALANT RESPIRATORY (INHALATION) at 08:05

## 2017-09-15 RX ADMIN — Medication 5 ML: at 23:41

## 2017-09-15 RX ADMIN — HYDROMORPHONE HYDROCHLORIDE 1 MG: 1 INJECTION, SOLUTION INTRAMUSCULAR; INTRAVENOUS; SUBCUTANEOUS at 03:48

## 2017-09-15 RX ADMIN — KETOROLAC TROMETHAMINE 15 MG: 15 INJECTION, SOLUTION INTRAMUSCULAR; INTRAVENOUS at 23:39

## 2017-09-15 RX ADMIN — ONDANSETRON 4 MG: 2 INJECTION INTRAMUSCULAR; INTRAVENOUS at 00:53

## 2017-09-15 RX ADMIN — POTASSIUM CHLORIDE: 2 INJECTION, SOLUTION, CONCENTRATE INTRAVENOUS at 01:47

## 2017-09-15 RX ADMIN — TRAZODONE HYDROCHLORIDE 200 MG: 50 TABLET ORAL at 23:39

## 2017-09-15 RX ADMIN — INSULIN HUMAN 2 UNITS: 100 INJECTION, SOLUTION PARENTERAL at 17:34

## 2017-09-15 RX ADMIN — HYDROMORPHONE HYDROCHLORIDE 1 MG: 1 INJECTION, SOLUTION INTRAMUSCULAR; INTRAVENOUS; SUBCUTANEOUS at 15:33

## 2017-09-15 RX ADMIN — HYDROMORPHONE HYDROCHLORIDE 1 MG: 1 INJECTION, SOLUTION INTRAMUSCULAR; INTRAVENOUS; SUBCUTANEOUS at 12:36

## 2017-09-15 RX ADMIN — ALBUTEROL SULFATE 2.5 MG: 2.5 SOLUTION RESPIRATORY (INHALATION) at 15:05

## 2017-09-15 RX ADMIN — ALBUTEROL SULFATE 2.5 MG: 2.5 SOLUTION RESPIRATORY (INHALATION) at 21:03

## 2017-09-15 RX ADMIN — Medication 5 ML: at 12:37

## 2017-09-15 RX ADMIN — HYDROMORPHONE HYDROCHLORIDE 1 MG: 1 INJECTION, SOLUTION INTRAMUSCULAR; INTRAVENOUS; SUBCUTANEOUS at 10:30

## 2017-09-15 NOTE — PROGRESS NOTES
Care Management Interventions  PCP Verified by CM: Yes  Transition of Care Consult (CM Consult): [address]: Yes  Physical Therapy Consult: No  Occupational Therapy Consult: No  Current Support Network: Lives with Spouse  Confirm Follow Up Transport: Family  Plan discussed with Pt/Family/Caregiver: Yes  Freedom of Choice Offered: Yes  Discharge Location  Discharge Placement: Home with home health  Pt is alert and oriented in all spheres. Lives with her spouse. Uses a cane occasionally to ambulate. Independent with ADLs. Drives self occasionally. No history of HH. Pt may benefit from [facility] following.

## 2017-09-15 NOTE — PROGRESS NOTES
Admit Date: 2017    POD 1 Day Post-Op    Procedure:  Procedure(s):  EXPLORATORY LAPAROTOMY WITH LYSIS OF ADHESIONS AND EXISION OF SMALL BOWEL TUMOR    Subjective:     Patient has complaints of pain. Has not been OOB yet. Objective:     Visit Vitals    /80 (BP 1 Location: Right arm, BP Patient Position: At rest)    Pulse 82    Temp 98.4 °F (36.9 °C)    Resp 18    Ht 5' 3\" (1.6 m)    Wt 198 lb (89.8 kg)    SpO2 93%    Breastfeeding No    BMI 35.07 kg/m2       Temp (24hrs), Av.3 °F (36.8 °C), Min:97.7 °F (36.5 °C), Max:98.9 °F (37.2 °C)  . I&O reviewed as documented. Urine somewhat dark       Physical Exam:    General-in no distress. Lungs-CTA bilaterally without rales, rhonchi, or wheezes. Bases are diminished. Respiratory effort is Normal    Heart- Regular rate and rhythm    Abdomen- Incision is/(trocar sites are) clean, dry and pt has moderate appropriately distributed  loly-incisional tenderness. Bowel sounds are present but hypoactive.       Labs:   Recent Results (from the past 24 hour(s))   GLUCOSE, POC    Collection Time: 17  2:04 PM   Result Value Ref Range    Glucose (POC) 121 (H) 65 - 100 mg/dL   GLUCOSE, POC    Collection Time: 17  5:37 PM   Result Value Ref Range    Glucose (POC) 193 (H) 65 - 100 mg/dL   GLUCOSE, POC    Collection Time: 17  7:36 PM   Result Value Ref Range    Glucose (POC) 181 (H) 65 - 100 mg/dL   GLUCOSE, POC    Collection Time: 17 10:40 PM   Result Value Ref Range    Glucose (POC) 138 (H) 65 - 100 mg/dL   GLUCOSE, POC    Collection Time: 09/15/17  5:38 AM   Result Value Ref Range    Glucose (POC) 140 (H) 65 - 100 mg/dL   GLUCOSE, POC    Collection Time: 09/15/17 11:07 AM   Result Value Ref Range    Glucose (POC) 133 (H) 65 - 100 mg/dL       Data Review -    Assessment:     Principal Problem:    Small bowel obstruction ([facility] (2017)    Active Problems:    Diabetes type 2, uncontrolled ([facility] (2016)      Essential hypertension with goal blood pressure less than 140/90 (9/20/2016)      JESSICA (obstructive sleep apnea) (9/20/2016)      Gastroesophageal reflux disease without esophagitis (9/20/2016)      Irritable bowel syndrome without diarrhea (9/20/2016)      OAB (overactive bladder) (9/20/2016)      Hypothyroidism (11/14/2012)      Hypertension ()      Chronic pain ()      Overview: HX FIBROMYALGIA      Anxiety and depression ()          Plan/Recommendations/Medical Decision Making:     clamp NGT today, continue sips of clears. may remove tomorrow if tolerates   Needs to mobilize  Remove jimenez- she will resume QID/prn self-catheterizations  Check labs tomorrow.  Small fluid bolus this afternoon based on urine character

## 2017-09-15 NOTE — PROGRESS NOTES
Telephone orders received form Md Mccormack to change this pt medication pain medication frequency from 3  Hrs to 2 hrs

## 2017-09-15 NOTE — PROGRESS NOTES
telephone orders received from [address] [place] [address]  To change this pt pain medication frequency from 3 hrs to 2 hrs.

## 2017-09-15 NOTE — PROGRESS NOTES
Problem: Nutrition Deficit  Goal: *Optimize nutritional status  Nutrition  Reason for assessment: NPO x3 days  Assessment:   Diet order(s): NPO  Food/Nutrition Patient History:  Patient presents with one acute nutrition risk factor identified by malnutrition screening tool upon admission for poor appetite. The patient is noted to have a h/o diabetes, GERD, IBS, HTN, CKD and anxiety/depression. The patient reports that she is having some nausea but is being given nausea meds when she asks for them. Denies any flatus or BM since 9/10. The patient is s/o ex lap with lysis of adhesions and excision of small bowel tumor yesterday. NGT output yesterday was noted at 210 ml. The patient denies any weight loss prior to admission. If patient is to remain NPO for greater than 7-10 days recommend starting TPN for nutrition. Anthropometrics:Height: 5' 3\" (160 cm),  Weight: 89.8 kg (198 lb), Weight Source: Patient stated, Body mass index is 35.07 kg/(m^2). BMI class of overweight for age >71. Macronutrient needs:  EER:  6386-8366 kcal /day (15-20 kcal/kg actual BW)  EPR:  52-68 grams protein/day (1-1.3 grams/kg IBW)  Intake/Comparative Standards: Current NPO status does not meet estimated needs at this time. Nutrition Diagnosis: Inadequate oral intake related to altered gi function as evidenced by patient s/p ex lap with lysis of adhesions and excision of small bowel tumor, currently NPO meeting 0% of estimated needs. Intervention:  Meals and snacks: Advance diet as medically appropriate. TPN: recommend starting TPN if patient is to remain NPO for >7-10 days  Nutrition Supplement Therapy: None at this time   Nutrition Discharge Plan: Too soon to be determined     [place].  Paul Srivastava Romel [address], [address],  [phone]

## 2017-09-15 NOTE — PROGRESS NOTES
Dilaudid 1 mg slow IV given for pain. Zofran 4 mg slow IV given for nausea. No emesis noted. Respirations even and unlabored. No s/sx distress. No further needs at present.

## 2017-09-15 NOTE — PROGRESS NOTES
Physical assessment completed, pt oriented, on 4 liters of oxygen via nasal cannula, resting in bed, Ng in low intermediate suction with 35 cc of output, dressing on abdomen intact clean and dry, jimenez cath in place and draining ledy color urine on collection bag, denies pain or distress, call light within reach.

## 2017-09-16 LAB
ANION GAP SERPL CALC-SCNC: 12 MMOL/L (ref 7–16)
BUN SERPL-MCNC: 14 MG/DL (ref 8–23)
CALCIUM SERPL-MCNC: 7.8 MG/DL (ref 8.3–10.4)
CHLORIDE SERPL-SCNC: 102 MMOL/L (ref 98–107)
CO2 SERPL-SCNC: 26 MMOL/L (ref 21–32)
CREAT SERPL-MCNC: 0.76 MG/DL (ref 0.6–1)
ERYTHROCYTE [DISTWIDTH] IN BLOOD BY AUTOMATED COUNT: 16.2 % (ref 11.9–14.6)
GLUCOSE BLD STRIP.AUTO-MCNC: 128 MG/DL (ref 65–100)
GLUCOSE BLD STRIP.AUTO-MCNC: 141 MG/DL (ref 65–100)
GLUCOSE BLD STRIP.AUTO-MCNC: 141 MG/DL (ref 65–100)
GLUCOSE BLD STRIP.AUTO-MCNC: 143 MG/DL (ref 65–100)
GLUCOSE SERPL-MCNC: 128 MG/DL (ref 65–100)
HCT VFR BLD AUTO: 32.8 % (ref 35.8–46.3)
HGB BLD-MCNC: 10.4 G/DL (ref 11.7–15.4)
MCH RBC QN AUTO: 26.1 PG (ref 26.1–32.9)
MCHC RBC AUTO-ENTMCNC: 31.7 G/DL (ref 31.4–35)
MCV RBC AUTO: 82.2 FL (ref 79.6–97.8)
PLATELET # BLD AUTO: 264 K/UL (ref 150–450)
PMV BLD AUTO: 9.2 FL (ref 10.8–14.1)
POTASSIUM SERPL-SCNC: 3.5 MMOL/L (ref 3.5–5.1)
RBC # BLD AUTO: 3.99 M/UL (ref 4.05–5.25)
SODIUM SERPL-SCNC: 140 MMOL/L (ref 136–145)
WBC # BLD AUTO: 14.4 K/UL (ref 4.3–11.1)

## 2017-09-16 PROCEDURE — 94760 N-INVAS EAR/PLS OXIMETRY 1: CPT

## 2017-09-16 PROCEDURE — 74011250636 HC RX REV CODE- 250/636: Performed by: SURGERY

## 2017-09-16 PROCEDURE — 74011000250 HC RX REV CODE- 250: Performed by: SURGERY

## 2017-09-16 PROCEDURE — 36415 COLL VENOUS BLD VENIPUNCTURE: CPT | Performed by: SURGERY

## 2017-09-16 PROCEDURE — 74011000258 HC RX REV CODE- 258: Performed by: SURGERY

## 2017-09-16 PROCEDURE — 77010033678 HC OXYGEN DAILY

## 2017-09-16 PROCEDURE — 82962 GLUCOSE BLOOD TEST: CPT

## 2017-09-16 PROCEDURE — 80048 BASIC METABOLIC PNL TOTAL CA: CPT | Performed by: SURGERY

## 2017-09-16 PROCEDURE — 94640 AIRWAY INHALATION TREATMENT: CPT

## 2017-09-16 PROCEDURE — 65270000029 HC RM PRIVATE

## 2017-09-16 PROCEDURE — 85027 COMPLETE CBC AUTOMATED: CPT | Performed by: SURGERY

## 2017-09-16 PROCEDURE — C9113 INJ PANTOPRAZOLE SODIUM, VIA: HCPCS | Performed by: SURGERY

## 2017-09-16 PROCEDURE — 74011250637 HC RX REV CODE- 250/637: Performed by: SURGERY

## 2017-09-16 RX ADMIN — ALBUTEROL SULFATE 2.5 MG: 2.5 SOLUTION RESPIRATORY (INHALATION) at 09:16

## 2017-09-16 RX ADMIN — ALBUTEROL SULFATE 2.5 MG: 2.5 SOLUTION RESPIRATORY (INHALATION) at 20:27

## 2017-09-16 RX ADMIN — CYCLOSPORINE 1 DROP: 0.5 EMULSION OPHTHALMIC at 21:30

## 2017-09-16 RX ADMIN — BUDESONIDE 500 MCG: 0.5 INHALANT RESPIRATORY (INHALATION) at 09:16

## 2017-09-16 RX ADMIN — ONDANSETRON 4 MG: 2 INJECTION INTRAMUSCULAR; INTRAVENOUS at 21:41

## 2017-09-16 RX ADMIN — FLUTICASONE PROPIONATE 2 SPRAY: 50 SPRAY, METERED NASAL at 10:57

## 2017-09-16 RX ADMIN — CEFOXITIN 2 G: 2 INJECTION, POWDER, FOR SOLUTION INTRAVENOUS at 16:02

## 2017-09-16 RX ADMIN — GABAPENTIN 400 MG: 300 CAPSULE ORAL at 17:22

## 2017-09-16 RX ADMIN — BUSPIRONE HYDROCHLORIDE 7.5 MG: 5 TABLET ORAL at 17:22

## 2017-09-16 RX ADMIN — POTASSIUM CHLORIDE: 2 INJECTION, SOLUTION, CONCENTRATE INTRAVENOUS at 04:23

## 2017-09-16 RX ADMIN — Medication 5 ML: at 02:48

## 2017-09-16 RX ADMIN — KETOROLAC TROMETHAMINE 15 MG: 15 INJECTION, SOLUTION INTRAMUSCULAR; INTRAVENOUS at 17:23

## 2017-09-16 RX ADMIN — CITALOPRAM HYDROBROMIDE 40 MG: 20 TABLET ORAL at 10:56

## 2017-09-16 RX ADMIN — LEVOTHYROXINE SODIUM ANHYDROUS 68.5 MCG: 100 INJECTION, POWDER, LYOPHILIZED, FOR SOLUTION INTRAVENOUS at 10:58

## 2017-09-16 RX ADMIN — ENOXAPARIN SODIUM 40 MG: 40 INJECTION SUBCUTANEOUS at 10:56

## 2017-09-16 RX ADMIN — KETOROLAC TROMETHAMINE 15 MG: 15 INJECTION, SOLUTION INTRAMUSCULAR; INTRAVENOUS at 06:00

## 2017-09-16 RX ADMIN — GABAPENTIN 400 MG: 300 CAPSULE ORAL at 10:56

## 2017-09-16 RX ADMIN — BUSPIRONE HYDROCHLORIDE 7.5 MG: 5 TABLET ORAL at 10:56

## 2017-09-16 RX ADMIN — AZELASTINE 2 SPRAY: 1 SPRAY, METERED NASAL at 21:00

## 2017-09-16 RX ADMIN — Medication 10 ML: at 12:37

## 2017-09-16 RX ADMIN — HYDROMORPHONE HYDROCHLORIDE 0.5 MG: 1 INJECTION, SOLUTION INTRAMUSCULAR; INTRAVENOUS; SUBCUTANEOUS at 21:41

## 2017-09-16 RX ADMIN — GABAPENTIN 400 MG: 300 CAPSULE ORAL at 21:28

## 2017-09-16 RX ADMIN — CLONAZEPAM 0.5 MG: 0.5 TABLET ORAL at 21:28

## 2017-09-16 RX ADMIN — POTASSIUM CHLORIDE: 2 INJECTION, SOLUTION, CONCENTRATE INTRAVENOUS at 15:59

## 2017-09-16 RX ADMIN — CEFOXITIN 2 G: 2 INJECTION, POWDER, FOR SOLUTION INTRAVENOUS at 02:48

## 2017-09-16 RX ADMIN — Medication 10 ML: at 21:26

## 2017-09-16 RX ADMIN — BUDESONIDE 500 MCG: 0.5 INHALANT RESPIRATORY (INHALATION) at 20:27

## 2017-09-16 RX ADMIN — Medication 10 ML: at 05:59

## 2017-09-16 RX ADMIN — CEFOXITIN 2 G: 2 INJECTION, POWDER, FOR SOLUTION INTRAVENOUS at 10:57

## 2017-09-16 RX ADMIN — ALBUTEROL SULFATE 2.5 MG: 2.5 SOLUTION RESPIRATORY (INHALATION) at 15:54

## 2017-09-16 RX ADMIN — SODIUM CHLORIDE 40 MG: 9 INJECTION INTRAMUSCULAR; INTRAVENOUS; SUBCUTANEOUS at 11:01

## 2017-09-16 RX ADMIN — SODIUM CHLORIDE 40 MG: 9 INJECTION INTRAMUSCULAR; INTRAVENOUS; SUBCUTANEOUS at 21:29

## 2017-09-16 RX ADMIN — ONDANSETRON 4 MG: 2 INJECTION INTRAMUSCULAR; INTRAVENOUS at 17:23

## 2017-09-16 RX ADMIN — CLONAZEPAM 0.5 MG: 0.5 TABLET ORAL at 10:56

## 2017-09-16 NOTE — PROGRESS NOTES
Received shift report from [facility], [facility]. Patient resting in bed, spouse at bedside. Respirations even and unlabored, no acute distress noted. Will continue to monitor.

## 2017-09-16 NOTE — PROGRESS NOTES
Patient complaint of abdominal pain 7/10. Dilaudid given slow IV push. No acute distress noted. Will continue to monitor.

## 2017-09-16 NOTE — PROGRESS NOTES
Bedside report received from night nurse Nick Gibson. Assessment done as noted  Respiration even and unlabored 20/min; denies pain or nausea at present. NGT to LIS draining green drainage. NPO per order. Mid abdomen dressing CDI with shadowing drainage noted on the dressing. Encouraged to call with needs.

## 2017-09-16 NOTE — PROGRESS NOTES
PLAN:  Clamp NGT, Sips of clears. May remove tomorrow if tolerates   Needs to mobilize  IV Abx - Mefoxin  Pain/ Nausea Control    ASSESSMENT:  Admit Date: 9/11/2017   2 Days Post-Op  Procedure(s):  EXPLORATORY LAPAROTOMY WITH LYSIS OF ADHESIONS AND EXISION OF SMALL BOWEL TUMOR    Principal Problem:    Small bowel obstruction ([facility] [address] (9/12/2017)    Active Problems:    Diabetes type 2, uncontrolled ([facility] (9/20/2016)      Essential hypertension with goal blood pressure less than 140/90 (9/20/2016)      JESSICA (obstructive sleep apnea) (9/20/2016)      Gastroesophageal reflux disease without esophagitis (9/20/2016)      Irritable bowel syndrome without diarrhea (9/20/2016)      OAB (overactive bladder) (9/20/2016)      Hypothyroidism (11/14/2012)      Hypertension ()      Chronic pain ()      Overview: HX FIBROMYALGIA      Anxiety and depression ()       SUBJECTIVE:  Pt resting in bed. NG to LIS. States was clamped yesterday and is not sure why it was put back to suction. Denies any nausea or vomiting while clamped.  at bedside. AF, VSS. OBJECTIVE:  Constitutional: Alert, cooperative, no acute distress; appears stated age   Visit Vitals    /53 (BP 1 Location: Right arm, BP Patient Position: At rest)    Pulse 80    Temp 98.5 °F (36.9 °C)    Resp 16    Ht 5' 3\" (1.6 m)    Wt 198 lb (89.8 kg)    SpO2 90%    Breastfeeding No    BMI 35.07 kg/m2     Eyes:Sclera are clear. ENMT: no external lesions gross hearing normal; no obvious neck masses, no ear or lip lesions, NG to LIS  CV: RRR. Normal perfusion  Resp: No JVD. Breathing is  non-labored; no audible wheezing. GI: soft, appropriately tender, non-distended, BS hypoactive. Dressing removed. [place] c/d/i    Musculoskeletal: unremarkable with normal function. No embolic signs or cyanosis.    Neuro:  Oriented; moves all 4; no focal deficits  Psychiatric: normal affect and mood      Patient Vitals for the past 24 hrs:   BP Temp Pulse Resp SpO2 09/16/17 1149 117/53 98.5 °F (36.9 °C) 80 16 90 %   09/16/17 0826 - - 76 16 91 %   09/16/17 0814 143/73 98.8 °F (37.1 °C) 84 16 (!) 82 %   09/16/17 0342 137/59 99 °F (37.2 °C) 75 20 96 %   09/15/17 2341 143/60 98.5 °F (36.9 °C) 91 19 90 %   09/15/17 2103 - - - - (!) 89 %   09/15/17 1934 146/70 99 °F (37.2 °C) 86 19 91 %     Labs:  Recent Labs      09/16/17   0810   WBC  14.4*   HGB  10.4*   PLT  264   NA  140   K  3.5   CL  102   CO2  26   BUN  14   CREA  0.76   GLU  128*       Eduardo Kirby, FNP-BC    The patient was seen in conjunction with Dr. Narciso Palomares who independently evaluated the patient, reviewed the chart and agreed with the assessment and plan. I have seen and examined the patient with the nurse practitioner and agree with the above assessment and plan.     Gianfranco Stubbs MD

## 2017-09-16 NOTE — PROGRESS NOTES
Jimenez removal orders noted, but patient in chair. Will remove jimenez when patient assisted back to bed.

## 2017-09-16 NOTE — PROGRESS NOTES
NG tube to low suction at 2349. Patient received scheduled toradol, dilaudid and zofran. Patient resting in bed, spouse at bedside.

## 2017-09-16 NOTE — PROGRESS NOTES
Patient resting in bed, call light within reach. Respirations even and unlabored on 2l.min via nc. NG tube to low continuous suction with green drainage noted. No acute distress noted. Will continue to monitor.

## 2017-09-16 NOTE — PROGRESS NOTES
PM assessment done. No changes noted. Respiration even and unlabored 20/min at rest. No s/s of pain noted at present. Left arm IV infiltrated. Site healthy. New IV #22 X1 attempt started on right arm. IVF and antibiotic resumed. Taking sips of ice. To monitor. Encouraged to call with needs.

## 2017-09-16 NOTE — PROGRESS NOTES
Bedside report  received from [address]. Assessment completed. Bed is in low and locked position with floor free of objects. Patient AxOx3, and resting quietly in bed. No needs noted at present. Respirations even and non labored. Verbalized understanding to call for needs. Call light within reach. Will continue to monitor pt.

## 2017-09-17 LAB
GLUCOSE BLD STRIP.AUTO-MCNC: 128 MG/DL (ref 65–100)
GLUCOSE BLD STRIP.AUTO-MCNC: 142 MG/DL (ref 65–100)
GLUCOSE BLD STRIP.AUTO-MCNC: 178 MG/DL (ref 65–100)
GLUCOSE BLD STRIP.AUTO-MCNC: 183 MG/DL (ref 65–100)

## 2017-09-17 PROCEDURE — 74011250637 HC RX REV CODE- 250/637: Performed by: SURGERY

## 2017-09-17 PROCEDURE — 77010033678 HC OXYGEN DAILY

## 2017-09-17 PROCEDURE — 82962 GLUCOSE BLOOD TEST: CPT

## 2017-09-17 PROCEDURE — 74011000250 HC RX REV CODE- 250: Performed by: SURGERY

## 2017-09-17 PROCEDURE — 94760 N-INVAS EAR/PLS OXIMETRY 1: CPT

## 2017-09-17 PROCEDURE — 74011636637 HC RX REV CODE- 636/637: Performed by: SURGERY

## 2017-09-17 PROCEDURE — C9113 INJ PANTOPRAZOLE SODIUM, VIA: HCPCS | Performed by: SURGERY

## 2017-09-17 PROCEDURE — 65270000029 HC RM PRIVATE

## 2017-09-17 PROCEDURE — 74011250636 HC RX REV CODE- 250/636: Performed by: SURGERY

## 2017-09-17 PROCEDURE — 94640 AIRWAY INHALATION TREATMENT: CPT

## 2017-09-17 RX ADMIN — KETOROLAC TROMETHAMINE 15 MG: 15 INJECTION, SOLUTION INTRAMUSCULAR; INTRAVENOUS at 00:07

## 2017-09-17 RX ADMIN — ONDANSETRON 4 MG: 2 INJECTION INTRAMUSCULAR; INTRAVENOUS at 06:23

## 2017-09-17 RX ADMIN — GABAPENTIN 400 MG: 300 CAPSULE ORAL at 16:28

## 2017-09-17 RX ADMIN — BUSPIRONE HYDROCHLORIDE 7.5 MG: 5 TABLET ORAL at 08:36

## 2017-09-17 RX ADMIN — CITALOPRAM HYDROBROMIDE 40 MG: 20 TABLET ORAL at 08:36

## 2017-09-17 RX ADMIN — ALBUTEROL SULFATE 2.5 MG: 2.5 SOLUTION RESPIRATORY (INHALATION) at 15:31

## 2017-09-17 RX ADMIN — GABAPENTIN 400 MG: 300 CAPSULE ORAL at 08:36

## 2017-09-17 RX ADMIN — Medication 10 ML: at 06:07

## 2017-09-17 RX ADMIN — SODIUM CHLORIDE 40 MG: 9 INJECTION INTRAMUSCULAR; INTRAVENOUS; SUBCUTANEOUS at 08:37

## 2017-09-17 RX ADMIN — KETOROLAC TROMETHAMINE 15 MG: 15 INJECTION, SOLUTION INTRAMUSCULAR; INTRAVENOUS at 11:00

## 2017-09-17 RX ADMIN — INSULIN HUMAN 2 UNITS: 100 INJECTION, SOLUTION PARENTERAL at 21:27

## 2017-09-17 RX ADMIN — CYCLOSPORINE 1 DROP: 0.5 EMULSION OPHTHALMIC at 21:30

## 2017-09-17 RX ADMIN — GABAPENTIN 400 MG: 300 CAPSULE ORAL at 21:29

## 2017-09-17 RX ADMIN — ALBUTEROL SULFATE 2.5 MG: 2.5 SOLUTION RESPIRATORY (INHALATION) at 09:00

## 2017-09-17 RX ADMIN — INSULIN HUMAN 2 UNITS: 100 INJECTION, SOLUTION PARENTERAL at 11:30

## 2017-09-17 RX ADMIN — HYDROMORPHONE HYDROCHLORIDE 0.5 MG: 1 INJECTION, SOLUTION INTRAMUSCULAR; INTRAVENOUS; SUBCUTANEOUS at 18:59

## 2017-09-17 RX ADMIN — KETOROLAC TROMETHAMINE 15 MG: 15 INJECTION, SOLUTION INTRAMUSCULAR; INTRAVENOUS at 06:07

## 2017-09-17 RX ADMIN — ALBUTEROL SULFATE 2.5 MG: 2.5 SOLUTION RESPIRATORY (INHALATION) at 20:09

## 2017-09-17 RX ADMIN — POTASSIUM CHLORIDE: 2 INJECTION, SOLUTION, CONCENTRATE INTRAVENOUS at 20:14

## 2017-09-17 RX ADMIN — CYCLOSPORINE 1 DROP: 0.5 EMULSION OPHTHALMIC at 08:37

## 2017-09-17 RX ADMIN — KETOROLAC TROMETHAMINE 15 MG: 15 INJECTION, SOLUTION INTRAMUSCULAR; INTRAVENOUS at 16:29

## 2017-09-17 RX ADMIN — FLUTICASONE PROPIONATE 2 SPRAY: 50 SPRAY, METERED NASAL at 08:37

## 2017-09-17 RX ADMIN — BUDESONIDE 500 MCG: 0.5 INHALANT RESPIRATORY (INHALATION) at 20:09

## 2017-09-17 RX ADMIN — Medication 10 ML: at 21:29

## 2017-09-17 RX ADMIN — SODIUM CHLORIDE 40 MG: 9 INJECTION INTRAMUSCULAR; INTRAVENOUS; SUBCUTANEOUS at 21:29

## 2017-09-17 RX ADMIN — METOPROLOL TARTRATE 2.5 MG: 5 INJECTION INTRAVENOUS at 06:23

## 2017-09-17 RX ADMIN — Medication 5 ML: at 14:00

## 2017-09-17 RX ADMIN — AZELASTINE 2 SPRAY: 1 SPRAY, METERED NASAL at 21:00

## 2017-09-17 RX ADMIN — AZELASTINE 2 SPRAY: 1 SPRAY, METERED NASAL at 08:00

## 2017-09-17 RX ADMIN — CLONAZEPAM 0.5 MG: 0.5 TABLET ORAL at 21:29

## 2017-09-17 RX ADMIN — ONDANSETRON 4 MG: 2 INJECTION INTRAMUSCULAR; INTRAVENOUS at 19:01

## 2017-09-17 RX ADMIN — BUDESONIDE 500 MCG: 0.5 INHALANT RESPIRATORY (INHALATION) at 09:00

## 2017-09-17 RX ADMIN — ONDANSETRON 4 MG: 2 INJECTION INTRAMUSCULAR; INTRAVENOUS at 11:34

## 2017-09-17 RX ADMIN — BUSPIRONE HYDROCHLORIDE 7.5 MG: 5 TABLET ORAL at 16:28

## 2017-09-17 RX ADMIN — HYDROMORPHONE HYDROCHLORIDE 1 MG: 1 INJECTION, SOLUTION INTRAMUSCULAR; INTRAVENOUS; SUBCUTANEOUS at 08:42

## 2017-09-17 RX ADMIN — POTASSIUM CHLORIDE: 2 INJECTION, SOLUTION, CONCENTRATE INTRAVENOUS at 02:23

## 2017-09-17 RX ADMIN — KETOROLAC TROMETHAMINE 15 MG: 15 INJECTION, SOLUTION INTRAMUSCULAR; INTRAVENOUS at 23:43

## 2017-09-17 RX ADMIN — ENOXAPARIN SODIUM 40 MG: 40 INJECTION SUBCUTANEOUS at 08:37

## 2017-09-17 RX ADMIN — CLONAZEPAM 0.5 MG: 0.5 TABLET ORAL at 08:36

## 2017-09-17 NOTE — PROGRESS NOTES
Patient voices no concerns at this time. Patient resting in bed with  at bedside for support. Call light within reach and patient instructed to call if assistance is needed. Will continue to monitor.

## 2017-09-17 NOTE — PROGRESS NOTES
Bedside report given to oncoming nurse, opportunity for questions given. Pt. Alert and Oriented respirations are even and unlabored with no signs of distress, pt has no complaints of pain at this time.

## 2017-09-17 NOTE — PROGRESS NOTES
Tolerating clear liquid diet w/o nausea or vomiting. NGT dc'd per order per Shamika Pyle NP. Verbal bedside report given to oncoming nurse Nayeli Albarado. Patient's situation, background, assessment and recommendations provided. Opportunity for questions provided. Pt reports self-cath twice today. Pt reports no BM today but is \"passing gas\". No s/s of pain noted. No distress noted. Oncoming RN assumed care of patient.

## 2017-09-17 NOTE — PROGRESS NOTES
Patient in bed resting with no complaints at this time. Patient is alert and orientated with no distress noted. IV intact and patent with no s/s of infection noted. Respirations even and unlabored with heart rate regular. Patient unable to ambulate independently without assistance; needs x1 r/t abd surgery. Family at bedside for support. Patient is a self cath. Bed in low locked position with call light within reach. Patient instructed to call if assistance is needed. Will continue to monitor.

## 2017-09-17 NOTE — PROGRESS NOTES
PLAN:  Advance to Full Liquids  OOB  IV Abx - Mefoxin  Pain/ Nausea Control    ASSESSMENT:  Admit Date: 9/11/2017   3 Days Post-Op  Procedure(s):  EXPLORATORY LAPAROTOMY WITH LYSIS OF ADHESIONS AND EXISION OF SMALL BOWEL TUMOR    Principal Problem:    Small bowel obstruction ([facility] [address] (9/12/2017)    Active Problems:    Diabetes type 2, uncontrolled ([facility] [address] (9/20/2016)      Essential hypertension with goal blood pressure less than 140/90 (9/20/2016)      JESSICA (obstructive sleep apnea) (9/20/2016)      Gastroesophageal reflux disease without esophagitis (9/20/2016)      Irritable bowel syndrome without diarrhea (9/20/2016)      OAB (overactive bladder) (9/20/2016)      Hypothyroidism (11/14/2012)      Hypertension ()      Chronic pain ()      Overview: HX FIBROMYALGIA      Anxiety and depression ()         SUBJECTIVE:  Pt awake and sitting in recliner. Tolerating CLD. +flatus, -BM. Voiding without difficulty.  at bedside. AF, VSS. OBJECTIVE:  Constitutional: Alert, cooperative, no acute distress; appears stated age   Visit Vitals    /72 (BP 1 Location: Left arm, BP Patient Position: At rest)    Pulse (!) 59    Temp 98.7 °F (37.1 °C)    Resp 20    Ht 5' 3\" (1.6 m)    Wt 198 lb (89.8 kg)    SpO2 96%    Breastfeeding No    BMI 35.07 kg/m2     Eyes:Sclera are clear. ENMT: no external lesions gross hearing normal; no obvious neck masses, no ear or lip lesions  CV: RRR. Normal perfusion  Resp: No JVD. Breathing is  non-labored; no audible wheezing. GI: soft, appropriately tender, non-distended, BS active. [employer] c/d/i    Musculoskeletal: unremarkable with normal function. No embolic signs or cyanosis.    Neuro:  Oriented; moves all 4; no focal deficits  Psychiatric: normal affect and mood    Patient Vitals for the past 24 hrs:   BP Temp Pulse Resp SpO2   09/17/17 1531 - - - - 96 %   09/17/17 1209 136/72 98.7 °F (37.1 °C) (!) 59 - 90 %   09/17/17 0901 - - - - 94 %   09/17/17 0805 147/77 99 °F (37.2 °C) 61 20 94 %   09/17/17 0337 146/80 99.5 °F (37.5 °C) 62 18 99 %   09/16/17 2255 152/72 98.6 °F (37 °C) 72 20 96 %   09/16/17 2027 - - - - 96 %   09/16/17 1938 139/58 98.6 °F (37 °C) 64 19 96 %   09/16/17 1558 - - - - 94 %     Labs:  Recent Labs      09/16/17   0810   WBC  14.4*   HGB  10.4*   PLT  264   NA  140   K  3.5   CL  102   CO2  26   BUN  14   CREA  0.76   GLU  128*       Mateusz Mendiola, FNP-BC    The patient was seen in conjunction with Dr. Tracy Petersen who independently evaluated the patient, reviewed the chart and agreed with the assessment and plan.

## 2017-09-17 NOTE — PROGRESS NOTES
Patient stable with no complaints at this time. Patient sitting in chair watching TV. Call light within reach and patient instructed to call if assistance is needed.   Report to be given to oncoming RN 7p-7a

## 2017-09-18 LAB
GLUCOSE BLD STRIP.AUTO-MCNC: 117 MG/DL (ref 65–100)
GLUCOSE BLD STRIP.AUTO-MCNC: 141 MG/DL (ref 65–100)
GLUCOSE BLD STRIP.AUTO-MCNC: 142 MG/DL (ref 65–100)
GLUCOSE BLD STRIP.AUTO-MCNC: 180 MG/DL (ref 65–100)

## 2017-09-18 PROCEDURE — 94760 N-INVAS EAR/PLS OXIMETRY 1: CPT

## 2017-09-18 PROCEDURE — 74011000250 HC RX REV CODE- 250: Performed by: SURGERY

## 2017-09-18 PROCEDURE — 94640 AIRWAY INHALATION TREATMENT: CPT

## 2017-09-18 PROCEDURE — 82962 GLUCOSE BLOOD TEST: CPT

## 2017-09-18 PROCEDURE — 74011250636 HC RX REV CODE- 250/636: Performed by: SURGERY

## 2017-09-18 PROCEDURE — 74011250637 HC RX REV CODE- 250/637: Performed by: SURGERY

## 2017-09-18 PROCEDURE — 65270000029 HC RM PRIVATE

## 2017-09-18 PROCEDURE — C9113 INJ PANTOPRAZOLE SODIUM, VIA: HCPCS | Performed by: SURGERY

## 2017-09-18 RX ORDER — PANTOPRAZOLE SODIUM 40 MG/1
40 TABLET, DELAYED RELEASE ORAL
Status: DISCONTINUED | OUTPATIENT
Start: 2017-09-18 | End: 2017-09-23 | Stop reason: HOSPADM

## 2017-09-18 RX ADMIN — CYCLOSPORINE 1 DROP: 0.5 EMULSION OPHTHALMIC at 21:00

## 2017-09-18 RX ADMIN — SODIUM CHLORIDE 40 MG: 9 INJECTION INTRAMUSCULAR; INTRAVENOUS; SUBCUTANEOUS at 09:00

## 2017-09-18 RX ADMIN — METOPROLOL TARTRATE 2.5 MG: 5 INJECTION INTRAVENOUS at 04:44

## 2017-09-18 RX ADMIN — KETOROLAC TROMETHAMINE 15 MG: 15 INJECTION, SOLUTION INTRAMUSCULAR; INTRAVENOUS at 16:23

## 2017-09-18 RX ADMIN — PANTOPRAZOLE SODIUM 40 MG: 40 TABLET, DELAYED RELEASE ORAL at 16:23

## 2017-09-18 RX ADMIN — ONDANSETRON 4 MG: 2 INJECTION INTRAMUSCULAR; INTRAVENOUS at 10:45

## 2017-09-18 RX ADMIN — ONDANSETRON 4 MG: 2 INJECTION INTRAMUSCULAR; INTRAVENOUS at 16:23

## 2017-09-18 RX ADMIN — ONDANSETRON 4 MG: 2 INJECTION INTRAMUSCULAR; INTRAVENOUS at 04:45

## 2017-09-18 RX ADMIN — CYCLOSPORINE 1 DROP: 0.5 EMULSION OPHTHALMIC at 08:00

## 2017-09-18 RX ADMIN — ONDANSETRON 4 MG: 2 INJECTION INTRAMUSCULAR; INTRAVENOUS at 20:43

## 2017-09-18 RX ADMIN — BUDESONIDE 500 MCG: 0.5 INHALANT RESPIRATORY (INHALATION) at 07:32

## 2017-09-18 RX ADMIN — ENOXAPARIN SODIUM 40 MG: 40 INJECTION SUBCUTANEOUS at 09:08

## 2017-09-18 RX ADMIN — HYDROMORPHONE HYDROCHLORIDE 0.5 MG: 1 INJECTION, SOLUTION INTRAMUSCULAR; INTRAVENOUS; SUBCUTANEOUS at 04:54

## 2017-09-18 RX ADMIN — LEVOTHYROXINE SODIUM ANHYDROUS 68.5 MCG: 100 INJECTION, POWDER, LYOPHILIZED, FOR SOLUTION INTRAVENOUS at 09:00

## 2017-09-18 RX ADMIN — GABAPENTIN 400 MG: 300 CAPSULE ORAL at 04:00

## 2017-09-18 RX ADMIN — AZELASTINE 2 SPRAY: 1 SPRAY, METERED NASAL at 08:00

## 2017-09-18 RX ADMIN — AZELASTINE 2 SPRAY: 1 SPRAY, METERED NASAL at 21:00

## 2017-09-18 RX ADMIN — GABAPENTIN 400 MG: 300 CAPSULE ORAL at 21:54

## 2017-09-18 RX ADMIN — CLONAZEPAM 0.5 MG: 0.5 TABLET ORAL at 09:08

## 2017-09-18 RX ADMIN — BUDESONIDE 500 MCG: 0.5 INHALANT RESPIRATORY (INHALATION) at 20:52

## 2017-09-18 RX ADMIN — HYDROMORPHONE HYDROCHLORIDE 1 MG: 1 INJECTION, SOLUTION INTRAMUSCULAR; INTRAVENOUS; SUBCUTANEOUS at 10:44

## 2017-09-18 RX ADMIN — POTASSIUM CHLORIDE: 2 INJECTION, SOLUTION, CONCENTRATE INTRAVENOUS at 09:58

## 2017-09-18 RX ADMIN — CITALOPRAM HYDROBROMIDE 40 MG: 20 TABLET ORAL at 09:06

## 2017-09-18 RX ADMIN — FLUTICASONE PROPIONATE 2 SPRAY: 50 SPRAY, METERED NASAL at 09:00

## 2017-09-18 RX ADMIN — GABAPENTIN 400 MG: 300 CAPSULE ORAL at 16:23

## 2017-09-18 RX ADMIN — ALBUTEROL SULFATE 2.5 MG: 2.5 SOLUTION RESPIRATORY (INHALATION) at 20:52

## 2017-09-18 RX ADMIN — HYDROMORPHONE HYDROCHLORIDE 1 MG: 1 INJECTION, SOLUTION INTRAMUSCULAR; INTRAVENOUS; SUBCUTANEOUS at 19:20

## 2017-09-18 RX ADMIN — ALBUTEROL SULFATE 2.5 MG: 2.5 SOLUTION RESPIRATORY (INHALATION) at 07:32

## 2017-09-18 RX ADMIN — KETOROLAC TROMETHAMINE 15 MG: 15 INJECTION, SOLUTION INTRAMUSCULAR; INTRAVENOUS at 10:15

## 2017-09-18 RX ADMIN — BUSPIRONE HYDROCHLORIDE 7.5 MG: 5 TABLET ORAL at 09:04

## 2017-09-18 RX ADMIN — Medication 5 ML: at 14:00

## 2017-09-18 RX ADMIN — BUSPIRONE HYDROCHLORIDE 7.5 MG: 5 TABLET ORAL at 16:23

## 2017-09-18 RX ADMIN — KETOROLAC TROMETHAMINE 15 MG: 15 INJECTION, SOLUTION INTRAMUSCULAR; INTRAVENOUS at 05:59

## 2017-09-18 RX ADMIN — CLONAZEPAM 0.5 MG: 0.5 TABLET ORAL at 21:54

## 2017-09-18 RX ADMIN — Medication 10 ML: at 06:00

## 2017-09-18 RX ADMIN — HYDROMORPHONE HYDROCHLORIDE 1 MG: 1 INJECTION, SOLUTION INTRAMUSCULAR; INTRAVENOUS; SUBCUTANEOUS at 16:23

## 2017-09-18 RX ADMIN — ALBUTEROL SULFATE 2.5 MG: 2.5 SOLUTION RESPIRATORY (INHALATION) at 14:11

## 2017-09-18 NOTE — PROGRESS NOTES
Patient in bed resting with no complaints at time. Patient is alert and orientated with no distress noted. IV intact and patent with no s/s of infection noted. Respirations even and unlabored with heart rate regular. Patient unable to ambulate independently without assistance; needs x1 r/t recent abd surgery. Bed in low locked position with call light within reach. Patient instructed to call if assistance is needed. Will continue to monitor.

## 2017-09-18 NOTE — PROGRESS NOTES
Patient stable with no complaints at this time.  at bedside for support. Patient with no s/s of discomfort. Call light within reach and patient instructed to call if assistance is needed.   Report to be given to oncoming RN 7p-7a

## 2017-09-18 NOTE — PROGRESS NOTES
Patient voices no concerns at this time. Patient relaxing in bed watching TV. Call light within reach and patient instructed to call if assistance is needed. Will continue to monitor.

## 2017-09-18 NOTE — PROGRESS NOTES
No c/o. tollerating clears. (+) flatus, no BM  Visit Vitals    /83    Pulse (!) 56    Temp 97.8 °F (36.6 °C)    Resp 18    Ht 5' 3\" (1.6 m)    Wt 198 lb (89.8 kg)    SpO2 96%    Breastfeeding No    BMI 35.07 kg/m2      Lungs-clear  CV-RRR  abd-soft.  (+) normal character BS. Mild incisional tenderness.   Incision noncellulitic    Plan-  Full liquids  Anticipate discharge in nexrt 24-48hrs

## 2017-09-18 NOTE — PROGRESS NOTES
Bedside report  received from [place]. Assessment completed. Bed is in low and locked position with floor free of objects. Patient AxOx3, and resting quietly in bed. No needs noted at present. Respirations even and non labored. Verbalized understanding to call for needs. Call light within reach. Will continue to monitor pt.

## 2017-09-19 LAB
GLUCOSE BLD STRIP.AUTO-MCNC: 121 MG/DL (ref 65–100)
GLUCOSE BLD STRIP.AUTO-MCNC: 168 MG/DL (ref 65–100)
GLUCOSE BLD STRIP.AUTO-MCNC: 182 MG/DL (ref 65–100)
GLUCOSE BLD STRIP.AUTO-MCNC: 214 MG/DL (ref 65–100)

## 2017-09-19 PROCEDURE — 82962 GLUCOSE BLOOD TEST: CPT

## 2017-09-19 PROCEDURE — 94760 N-INVAS EAR/PLS OXIMETRY 1: CPT

## 2017-09-19 PROCEDURE — 65270000029 HC RM PRIVATE

## 2017-09-19 PROCEDURE — 74011250637 HC RX REV CODE- 250/637: Performed by: SURGERY

## 2017-09-19 PROCEDURE — 74011000250 HC RX REV CODE- 250: Performed by: SURGERY

## 2017-09-19 PROCEDURE — 74011636637 HC RX REV CODE- 636/637: Performed by: SURGERY

## 2017-09-19 PROCEDURE — 77030018846 HC SOL IRR STRL H20 ICUM -A

## 2017-09-19 PROCEDURE — 94640 AIRWAY INHALATION TREATMENT: CPT

## 2017-09-19 PROCEDURE — 74011250636 HC RX REV CODE- 250/636: Performed by: SURGERY

## 2017-09-19 PROCEDURE — 77010033678 HC OXYGEN DAILY

## 2017-09-19 RX ORDER — OXYCODONE AND ACETAMINOPHEN 5; 325 MG/1; MG/1
2 TABLET ORAL
Status: DISCONTINUED | OUTPATIENT
Start: 2017-09-19 | End: 2017-09-23 | Stop reason: HOSPADM

## 2017-09-19 RX ORDER — FACIAL-BODY WIPES
10 EACH TOPICAL
Status: COMPLETED | OUTPATIENT
Start: 2017-09-19 | End: 2017-09-19

## 2017-09-19 RX ADMIN — BUDESONIDE 500 MCG: 0.5 INHALANT RESPIRATORY (INHALATION) at 20:57

## 2017-09-19 RX ADMIN — HYDROMORPHONE HYDROCHLORIDE 1 MG: 1 INJECTION, SOLUTION INTRAMUSCULAR; INTRAVENOUS; SUBCUTANEOUS at 08:15

## 2017-09-19 RX ADMIN — HYDROMORPHONE HYDROCHLORIDE 1 MG: 1 INJECTION, SOLUTION INTRAMUSCULAR; INTRAVENOUS; SUBCUTANEOUS at 16:13

## 2017-09-19 RX ADMIN — GABAPENTIN 400 MG: 300 CAPSULE ORAL at 16:13

## 2017-09-19 RX ADMIN — KETOROLAC TROMETHAMINE 15 MG: 15 INJECTION, SOLUTION INTRAMUSCULAR; INTRAVENOUS at 16:13

## 2017-09-19 RX ADMIN — INSULIN HUMAN 2 UNITS: 100 INJECTION, SOLUTION PARENTERAL at 16:10

## 2017-09-19 RX ADMIN — HYDROMORPHONE HYDROCHLORIDE 1 MG: 1 INJECTION, SOLUTION INTRAMUSCULAR; INTRAVENOUS; SUBCUTANEOUS at 12:23

## 2017-09-19 RX ADMIN — KETOROLAC TROMETHAMINE 15 MG: 15 INJECTION, SOLUTION INTRAMUSCULAR; INTRAVENOUS at 11:01

## 2017-09-19 RX ADMIN — CYCLOSPORINE 1 DROP: 0.5 EMULSION OPHTHALMIC at 08:00

## 2017-09-19 RX ADMIN — BUDESONIDE 500 MCG: 0.5 INHALANT RESPIRATORY (INHALATION) at 07:39

## 2017-09-19 RX ADMIN — CITALOPRAM HYDROBROMIDE 40 MG: 20 TABLET ORAL at 08:15

## 2017-09-19 RX ADMIN — GABAPENTIN 400 MG: 300 CAPSULE ORAL at 22:07

## 2017-09-19 RX ADMIN — Medication 5 ML: at 14:00

## 2017-09-19 RX ADMIN — KETOROLAC TROMETHAMINE 15 MG: 15 INJECTION, SOLUTION INTRAMUSCULAR; INTRAVENOUS at 00:41

## 2017-09-19 RX ADMIN — HYDROMORPHONE HYDROCHLORIDE 1 MG: 1 INJECTION, SOLUTION INTRAMUSCULAR; INTRAVENOUS; SUBCUTANEOUS at 22:06

## 2017-09-19 RX ADMIN — CYCLOSPORINE 1 DROP: 0.5 EMULSION OPHTHALMIC at 22:07

## 2017-09-19 RX ADMIN — LORAZEPAM 1 MG: 2 INJECTION INTRAMUSCULAR at 02:48

## 2017-09-19 RX ADMIN — ONDANSETRON 4 MG: 2 INJECTION INTRAMUSCULAR; INTRAVENOUS at 01:58

## 2017-09-19 RX ADMIN — PANTOPRAZOLE SODIUM 40 MG: 40 TABLET, DELAYED RELEASE ORAL at 16:13

## 2017-09-19 RX ADMIN — INSULIN HUMAN 2 UNITS: 100 INJECTION, SOLUTION PARENTERAL at 22:15

## 2017-09-19 RX ADMIN — HYDROMORPHONE HYDROCHLORIDE 1 MG: 1 INJECTION, SOLUTION INTRAMUSCULAR; INTRAVENOUS; SUBCUTANEOUS at 01:58

## 2017-09-19 RX ADMIN — ONDANSETRON 4 MG: 2 INJECTION INTRAMUSCULAR; INTRAVENOUS at 16:13

## 2017-09-19 RX ADMIN — POTASSIUM CHLORIDE: 2 INJECTION, SOLUTION, CONCENTRATE INTRAVENOUS at 02:47

## 2017-09-19 RX ADMIN — ALBUTEROL SULFATE 2.5 MG: 2.5 SOLUTION RESPIRATORY (INHALATION) at 20:57

## 2017-09-19 RX ADMIN — ALBUTEROL SULFATE 2.5 MG: 2.5 SOLUTION RESPIRATORY (INHALATION) at 07:39

## 2017-09-19 RX ADMIN — KETOROLAC TROMETHAMINE 15 MG: 15 INJECTION, SOLUTION INTRAMUSCULAR; INTRAVENOUS at 05:41

## 2017-09-19 RX ADMIN — CLONAZEPAM 0.5 MG: 0.5 TABLET ORAL at 22:07

## 2017-09-19 RX ADMIN — ALBUTEROL SULFATE 2.5 MG: 2.5 SOLUTION RESPIRATORY (INHALATION) at 14:12

## 2017-09-19 RX ADMIN — ONDANSETRON 4 MG: 2 INJECTION INTRAMUSCULAR; INTRAVENOUS at 22:15

## 2017-09-19 RX ADMIN — ONDANSETRON 4 MG: 2 INJECTION INTRAMUSCULAR; INTRAVENOUS at 12:23

## 2017-09-19 RX ADMIN — GABAPENTIN 400 MG: 300 CAPSULE ORAL at 08:15

## 2017-09-19 RX ADMIN — BUSPIRONE HYDROCHLORIDE 7.5 MG: 5 TABLET ORAL at 16:13

## 2017-09-19 RX ADMIN — INSULIN HUMAN 4 UNITS: 100 INJECTION, SOLUTION PARENTERAL at 05:41

## 2017-09-19 RX ADMIN — FLUTICASONE PROPIONATE 2 SPRAY: 50 SPRAY, METERED NASAL at 08:14

## 2017-09-19 RX ADMIN — PANTOPRAZOLE SODIUM 40 MG: 40 TABLET, DELAYED RELEASE ORAL at 05:42

## 2017-09-19 RX ADMIN — ONDANSETRON 4 MG: 2 INJECTION INTRAMUSCULAR; INTRAVENOUS at 08:15

## 2017-09-19 RX ADMIN — ENOXAPARIN SODIUM 40 MG: 40 INJECTION SUBCUTANEOUS at 08:15

## 2017-09-19 RX ADMIN — LEVOTHYROXINE SODIUM 137 MCG: 112 TABLET ORAL at 05:41

## 2017-09-19 RX ADMIN — AZELASTINE 2 SPRAY: 1 SPRAY, METERED NASAL at 08:00

## 2017-09-19 RX ADMIN — HYDROMORPHONE HYDROCHLORIDE 0.5 MG: 1 INJECTION, SOLUTION INTRAMUSCULAR; INTRAVENOUS; SUBCUTANEOUS at 18:23

## 2017-09-19 RX ADMIN — BISACODYL 10 MG: 10 SUPPOSITORY RECTAL at 10:53

## 2017-09-19 RX ADMIN — Medication 10 ML: at 22:07

## 2017-09-19 RX ADMIN — BUSPIRONE HYDROCHLORIDE 7.5 MG: 5 TABLET ORAL at 08:15

## 2017-09-19 NOTE — PROGRESS NOTES
Problem: Falls - Risk of  Goal: *Absence of Falls  Document Sosa Fall Risk and appropriate interventions in the flowsheet.    Outcome: Progressing Towards Goal  Fall Risk Interventions:  Mobility Interventions: Assess mobility with egress test           Medication Interventions: Evaluate medications/consider consulting pharmacy     Elimination Interventions: Call light in reach     History of Falls Interventions: Bed/chair exit alarm

## 2017-09-19 NOTE — PROGRESS NOTES
Patient voices no concerns at this time. Patient is relaxing in bed watching TV with  at bedside for support. Call light within reach and patient instructed to call if assistance is needed. Will continue to monitor.

## 2017-09-19 NOTE — PROGRESS NOTES
Sitting in chair eating supper, c/o abd pain scale 6/10  Dilaudid 0.5mg given slow iv push, tolerated well. Aware to call for asst, extended call button w/i reach.

## 2017-09-19 NOTE — PROGRESS NOTES
[facility], [address], [address]      PLAN:Ween O2  Soft diet  DC Dilaudid  Percocet 5/325 Q 4 PRN  IS/SCDs/Ambulate        ASSESSMENT:  Admit Date: 9/11/2017   5 Days Post-Op  Procedure(s):  EXPLORATORY LAPAROTOMY WITH LYSIS OF ADHESIONS AND EXISION OF SMALL BOWEL TUMOR    Principal Problem:    Small bowel obstruction ([facility] [address] (9/12/2017)    Active Problems:    Diabetes type 2, uncontrolled ([facility] [address] (9/20/2016)      Essential hypertension with goal blood pressure less than 140/90 (9/20/2016)      JESSICA (obstructive sleep apnea) (9/20/2016)      Gastroesophageal reflux disease without esophagitis (9/20/2016)      Irritable bowel syndrome without diarrhea (9/20/2016)      OAB (overactive bladder) (9/20/2016)      Hypothyroidism (11/14/2012)      Hypertension ()      Chronic pain ()      Overview: HX FIBROMYALGIA      Anxiety and depression ()         SUBJECTIVE:Doing well. Not ready for home. Tolerated full liquid diet and still hungry. +flatus, no BM. No F/N/V. Ambulating. Mild pain. OBJECTIVE:  Constitutional: Alert oriented cooperative patient in no acute distress; appears stated age   Visit Vitals    /83    Pulse 67    Temp 97.9 °F (36.6 °C)    Resp 17    Ht 5' 3\" (1.6 m)    Wt 198 lb (89.8 kg)    SpO2 95%    Breastfeeding No    BMI 35.07 kg/m2     Eyes:Sclera are clear. ENMT: no external lesions gross hearing normal; no obvious neck masses, no ear or lip lesions  CV: RRR. Normal perfusion  Resp: No JVD. Breathing is  non-labored; no audible wheezing. GI: pos BS. Incision C/D/I. Musculoskeletal: unremarkable with normal function. No embolic signs or cyanosis.    Neuro:  Oriented; moves all 4; no focal deficits  Psychiatric: normal affect and mood, no memory impairment      Patient Vitals for the past 24 hrs:   BP Temp Pulse Resp SpO2   09/19/17 1526 159/83 97.9 °F (36.6 °C) 67 17 95 %   09/19/17 1415 - - - - (!) 83 % 09/19/17 1137 154/77 97.7 °F (36.5 °C) 79 17 93 %   09/19/17 0742 - - - - 90 %   09/19/17 0737 150/84 98.1 °F (36.7 °C) (!) 58 17 (!) 88 %   09/19/17 0307 169/90 97.9 °F (36.6 °C) 73 18 90 %   09/18/17 2241 161/75 97.5 °F (36.4 °C) 72 17 (!) 88 %   09/18/17 2052 - - - - 93 %   09/18/17 1933 155/79 98.1 °F (36.7 °C) 80 17 94 %     Labs:  No results for input(s): WBC, HGB, PLT, NA, K, CL, CO2, BUN, CREA, GLU, PTP, INR, APTT, TBIL, TBILI, CBIL, SGOT, GPT, ALT, AP, AML, LPSE, LCAD, NH4, TROPT, TROIQ, HGBEXT, PLTEXT in the last 72 hours.     No lab exists for component: INREXT      Virgin Border Vance, DO

## 2017-09-20 PROBLEM — C49.A3 GASTROINTESTINAL STROMAL TUMOR (GIST) OF SMALL INTESTINE (HCC): Status: ACTIVE | Noted: 2017-09-20

## 2017-09-20 LAB
GLUCOSE BLD STRIP.AUTO-MCNC: 110 MG/DL (ref 65–100)
GLUCOSE BLD STRIP.AUTO-MCNC: 136 MG/DL (ref 65–100)
GLUCOSE BLD STRIP.AUTO-MCNC: 153 MG/DL (ref 65–100)
GLUCOSE BLD STRIP.AUTO-MCNC: 185 MG/DL (ref 65–100)

## 2017-09-20 PROCEDURE — 74011250637 HC RX REV CODE- 250/637: Performed by: SURGERY

## 2017-09-20 PROCEDURE — 74011250636 HC RX REV CODE- 250/636: Performed by: SURGERY

## 2017-09-20 PROCEDURE — 94760 N-INVAS EAR/PLS OXIMETRY 1: CPT

## 2017-09-20 PROCEDURE — 74011636637 HC RX REV CODE- 636/637: Performed by: SURGERY

## 2017-09-20 PROCEDURE — 94640 AIRWAY INHALATION TREATMENT: CPT

## 2017-09-20 PROCEDURE — 74011000250 HC RX REV CODE- 250: Performed by: SURGERY

## 2017-09-20 PROCEDURE — 82962 GLUCOSE BLOOD TEST: CPT

## 2017-09-20 PROCEDURE — 65270000029 HC RM PRIVATE

## 2017-09-20 RX ORDER — FACIAL-BODY WIPES
10 EACH TOPICAL
Status: COMPLETED | OUTPATIENT
Start: 2017-09-20 | End: 2017-09-20

## 2017-09-20 RX ADMIN — BUDESONIDE 500 MCG: 0.5 INHALANT RESPIRATORY (INHALATION) at 21:18

## 2017-09-20 RX ADMIN — INSULIN HUMAN 2 UNITS: 100 INJECTION, SOLUTION PARENTERAL at 17:45

## 2017-09-20 RX ADMIN — FLUTICASONE PROPIONATE 2 SPRAY: 50 SPRAY, METERED NASAL at 09:23

## 2017-09-20 RX ADMIN — GABAPENTIN 400 MG: 300 CAPSULE ORAL at 09:22

## 2017-09-20 RX ADMIN — Medication 5 ML: at 21:53

## 2017-09-20 RX ADMIN — GABAPENTIN 400 MG: 300 CAPSULE ORAL at 17:40

## 2017-09-20 RX ADMIN — ENOXAPARIN SODIUM 40 MG: 40 INJECTION SUBCUTANEOUS at 09:24

## 2017-09-20 RX ADMIN — Medication 10 ML: at 06:16

## 2017-09-20 RX ADMIN — ONDANSETRON 4 MG: 2 INJECTION INTRAMUSCULAR; INTRAVENOUS at 10:56

## 2017-09-20 RX ADMIN — PANTOPRAZOLE SODIUM 40 MG: 40 TABLET, DELAYED RELEASE ORAL at 17:40

## 2017-09-20 RX ADMIN — ONDANSETRON 4 MG: 2 INJECTION INTRAMUSCULAR; INTRAVENOUS at 21:48

## 2017-09-20 RX ADMIN — LEVOTHYROXINE SODIUM 137 MCG: 112 TABLET ORAL at 06:17

## 2017-09-20 RX ADMIN — KETOROLAC TROMETHAMINE 15 MG: 15 INJECTION, SOLUTION INTRAMUSCULAR; INTRAVENOUS at 00:20

## 2017-09-20 RX ADMIN — ALBUTEROL SULFATE 2.5 MG: 2.5 SOLUTION RESPIRATORY (INHALATION) at 21:18

## 2017-09-20 RX ADMIN — KETOROLAC TROMETHAMINE 15 MG: 15 INJECTION, SOLUTION INTRAMUSCULAR; INTRAVENOUS at 06:16

## 2017-09-20 RX ADMIN — ONDANSETRON 4 MG: 2 INJECTION INTRAMUSCULAR; INTRAVENOUS at 16:17

## 2017-09-20 RX ADMIN — OXYCODONE HYDROCHLORIDE AND ACETAMINOPHEN 2 TABLET: 5; 325 TABLET ORAL at 21:48

## 2017-09-20 RX ADMIN — BUSPIRONE HYDROCHLORIDE 7.5 MG: 5 TABLET ORAL at 17:40

## 2017-09-20 RX ADMIN — CYCLOSPORINE 1 DROP: 0.5 EMULSION OPHTHALMIC at 21:00

## 2017-09-20 RX ADMIN — ALBUTEROL SULFATE 2.5 MG: 2.5 SOLUTION RESPIRATORY (INHALATION) at 09:27

## 2017-09-20 RX ADMIN — OXYCODONE HYDROCHLORIDE AND ACETAMINOPHEN 2 TABLET: 5; 325 TABLET ORAL at 13:11

## 2017-09-20 RX ADMIN — OXYCODONE HYDROCHLORIDE AND ACETAMINOPHEN 2 TABLET: 5; 325 TABLET ORAL at 17:40

## 2017-09-20 RX ADMIN — BUDESONIDE 500 MCG: 0.5 INHALANT RESPIRATORY (INHALATION) at 09:27

## 2017-09-20 RX ADMIN — CITALOPRAM HYDROBROMIDE 40 MG: 20 TABLET ORAL at 09:23

## 2017-09-20 RX ADMIN — CLONAZEPAM 0.5 MG: 0.5 TABLET ORAL at 17:40

## 2017-09-20 RX ADMIN — HYDROMORPHONE HYDROCHLORIDE 1 MG: 1 INJECTION, SOLUTION INTRAMUSCULAR; INTRAVENOUS; SUBCUTANEOUS at 09:30

## 2017-09-20 RX ADMIN — GABAPENTIN 400 MG: 300 CAPSULE ORAL at 21:48

## 2017-09-20 RX ADMIN — CYCLOSPORINE 1 DROP: 0.5 EMULSION OPHTHALMIC at 08:00

## 2017-09-20 RX ADMIN — Medication 5 ML: at 13:08

## 2017-09-20 RX ADMIN — BISACODYL 10 MG: 10 SUPPOSITORY RECTAL at 12:17

## 2017-09-20 RX ADMIN — INSULIN HUMAN 2 UNITS: 100 INJECTION, SOLUTION PARENTERAL at 12:17

## 2017-09-20 RX ADMIN — ALBUTEROL SULFATE 2.5 MG: 2.5 SOLUTION RESPIRATORY (INHALATION) at 14:06

## 2017-09-20 RX ADMIN — BUSPIRONE HYDROCHLORIDE 7.5 MG: 5 TABLET ORAL at 09:22

## 2017-09-20 RX ADMIN — PANTOPRAZOLE SODIUM 40 MG: 40 TABLET, DELAYED RELEASE ORAL at 06:17

## 2017-09-20 RX ADMIN — LACTULOSE 20 G: 20 SOLUTION ORAL at 17:40

## 2017-09-20 NOTE — PROGRESS NOTES
Shift assessment completed. Pt. Alert and oriented x4. Room air. Trying to have a bm. Active bowel sounds noted. Pt. states she had a small bm yesterday. Abdominal incision c/d/i. Staples present. No overt distress at this time. Call light in reach. Instructed to call for assistance.

## 2017-09-20 NOTE — PROGRESS NOTES
Assessment completed. Pt has midline incision with staples that are clean and well approximated without drainage.

## 2017-09-20 NOTE — PROGRESS NOTES
Pt. sitting up in bed eating supper. Room air.  at bedside. No bm today. Abdomen distended. Pt. passing flatus. Call light in reach. Instructed to call for assistance.

## 2017-09-20 NOTE — PROGRESS NOTES
Patient sitting up in bed in room, alert and oriented x4, up and ambulatory, mid low abd incision intact, no c/o pain or discomfort, no n/v, denies BM, no needs voiced @ this time, call light within reach.

## 2017-09-20 NOTE — PROGRESS NOTES
Problem: Nutrition Deficit  Goal: *Optimize nutritional status  Nutrition F/U:  Assessment:   Diet order(s): Mechanical Soft, CCHO  Food/Nutrition Patient History:  Patient reports that her appetite is slowly returning. States that she is passing flatus but still has not had a good bowel movement. Reports she had a small one yesterday and that she has requested a suppository today. Denies any po difficulties at this time. The patient declines oral nutrition supplements at this time due to not wanting to overload her GI tract. Anthropometrics:Height: 5' 3\" (160 cm),  Weight: 89.8 kg (198 lb), Weight Source: Patient stated  Macronutrient needs:  EER:  1082-0828 kcal /day (15-20 kcal/kg actual BW)  EPR:  52-68 grams protein/day (1-1.3 grams/kg IBW)  Intake/Comparative Standards: Average intake based on the past 8 recorded meal intakes is ~66%. This potentially meets ~83% of estimated kcal needs and ~100% of estimated kcal needs. Intervention:  Meals and snacks: Continue to advance diet as medically appropriate. Nutrition Supplement Therapy: None at this time-pt declines   Nutrition Discharge Plan: Too soon to be determined-discussed use of oral nutrition supplements after discharge if appetite is slow to  at home. Stefano Severance Monika Salines, [address], [address],  [phone]

## 2017-09-20 NOTE — PROGRESS NOTES
Scheduled Klonopin and gabapentin given. Bedtime snack given. SQBS  168.  2 units Regular insulin given per protocol.

## 2017-09-20 NOTE — PROGRESS NOTES
Low grade nausea all day  (+)flatus, but no significant BM-jsut some mucus  abd soft, midly distended. Normal, active BS  Incision looks good    No result from suppository  Will try lactulose tonight, if no result then a miralax colo prep tomorrow to induce BM. Suspect that nausea is due to distention.

## 2017-09-21 LAB
GLUCOSE BLD STRIP.AUTO-MCNC: 130 MG/DL (ref 65–100)
GLUCOSE BLD STRIP.AUTO-MCNC: 148 MG/DL (ref 65–100)
GLUCOSE BLD STRIP.AUTO-MCNC: 165 MG/DL (ref 65–100)
GLUCOSE BLD STRIP.AUTO-MCNC: 183 MG/DL (ref 65–100)

## 2017-09-21 PROCEDURE — 74011636637 HC RX REV CODE- 636/637: Performed by: SURGERY

## 2017-09-21 PROCEDURE — 82962 GLUCOSE BLOOD TEST: CPT

## 2017-09-21 PROCEDURE — 94760 N-INVAS EAR/PLS OXIMETRY 1: CPT

## 2017-09-21 PROCEDURE — 77010033678 HC OXYGEN DAILY

## 2017-09-21 PROCEDURE — 74011000250 HC RX REV CODE- 250: Performed by: SURGERY

## 2017-09-21 PROCEDURE — 74011250637 HC RX REV CODE- 250/637: Performed by: SURGERY

## 2017-09-21 PROCEDURE — 65270000029 HC RM PRIVATE

## 2017-09-21 PROCEDURE — 74011250636 HC RX REV CODE- 250/636: Performed by: SURGERY

## 2017-09-21 PROCEDURE — 94640 AIRWAY INHALATION TREATMENT: CPT

## 2017-09-21 RX ORDER — POLYETHYLENE GLYCOL 3350 17 G/17G
255 POWDER, FOR SOLUTION ORAL ONCE
Status: COMPLETED | OUTPATIENT
Start: 2017-09-21 | End: 2017-09-21

## 2017-09-21 RX ADMIN — ONDANSETRON 4 MG: 2 INJECTION INTRAMUSCULAR; INTRAVENOUS at 02:03

## 2017-09-21 RX ADMIN — INSULIN HUMAN 2 UNITS: 100 INJECTION, SOLUTION PARENTERAL at 13:02

## 2017-09-21 RX ADMIN — CYCLOSPORINE 1 DROP: 0.5 EMULSION OPHTHALMIC at 08:35

## 2017-09-21 RX ADMIN — OXYCODONE HYDROCHLORIDE AND ACETAMINOPHEN 2 TABLET: 5; 325 TABLET ORAL at 02:03

## 2017-09-21 RX ADMIN — GABAPENTIN 400 MG: 300 CAPSULE ORAL at 16:58

## 2017-09-21 RX ADMIN — PANTOPRAZOLE SODIUM 40 MG: 40 TABLET, DELAYED RELEASE ORAL at 16:58

## 2017-09-21 RX ADMIN — Medication 10 ML: at 22:08

## 2017-09-21 RX ADMIN — ALBUTEROL SULFATE 2.5 MG: 2.5 SOLUTION RESPIRATORY (INHALATION) at 07:20

## 2017-09-21 RX ADMIN — OXYCODONE HYDROCHLORIDE AND ACETAMINOPHEN 2 TABLET: 5; 325 TABLET ORAL at 20:09

## 2017-09-21 RX ADMIN — BUSPIRONE HYDROCHLORIDE 7.5 MG: 5 TABLET ORAL at 08:33

## 2017-09-21 RX ADMIN — LACTULOSE 20 G: 20 SOLUTION ORAL at 16:58

## 2017-09-21 RX ADMIN — POLYETHYLENE GLYCOL 3350 255 G: 17 POWDER, FOR SOLUTION ORAL at 16:49

## 2017-09-21 RX ADMIN — FLUTICASONE PROPIONATE 2 SPRAY: 50 SPRAY, METERED NASAL at 08:36

## 2017-09-21 RX ADMIN — CITALOPRAM HYDROBROMIDE 40 MG: 20 TABLET ORAL at 08:33

## 2017-09-21 RX ADMIN — OXYCODONE HYDROCHLORIDE AND ACETAMINOPHEN 2 TABLET: 5; 325 TABLET ORAL at 15:47

## 2017-09-21 RX ADMIN — PANTOPRAZOLE SODIUM 40 MG: 40 TABLET, DELAYED RELEASE ORAL at 06:29

## 2017-09-21 RX ADMIN — HYOSCYAMINE SULFATE 0.12 MG: 0.12 TABLET ORAL at 02:03

## 2017-09-21 RX ADMIN — ALBUTEROL SULFATE 2.5 MG: 2.5 SOLUTION RESPIRATORY (INHALATION) at 13:42

## 2017-09-21 RX ADMIN — CLONAZEPAM 0.5 MG: 0.5 TABLET ORAL at 16:58

## 2017-09-21 RX ADMIN — ENOXAPARIN SODIUM 40 MG: 40 INJECTION SUBCUTANEOUS at 08:33

## 2017-09-21 RX ADMIN — ONDANSETRON 4 MG: 2 INJECTION INTRAMUSCULAR; INTRAVENOUS at 20:09

## 2017-09-21 RX ADMIN — LORAZEPAM 1 MG: 2 INJECTION INTRAMUSCULAR at 03:52

## 2017-09-21 RX ADMIN — BUDESONIDE 500 MCG: 0.5 INHALANT RESPIRATORY (INHALATION) at 20:39

## 2017-09-21 RX ADMIN — CLONAZEPAM 0.5 MG: 0.5 TABLET ORAL at 22:02

## 2017-09-21 RX ADMIN — CYCLOSPORINE 1 DROP: 0.5 EMULSION OPHTHALMIC at 22:06

## 2017-09-21 RX ADMIN — GABAPENTIN 400 MG: 300 CAPSULE ORAL at 22:02

## 2017-09-21 RX ADMIN — ONDANSETRON 4 MG: 2 INJECTION INTRAMUSCULAR; INTRAVENOUS at 10:41

## 2017-09-21 RX ADMIN — ALBUTEROL SULFATE 2.5 MG: 2.5 SOLUTION RESPIRATORY (INHALATION) at 20:40

## 2017-09-21 RX ADMIN — GABAPENTIN 400 MG: 300 CAPSULE ORAL at 08:33

## 2017-09-21 RX ADMIN — OXYCODONE HYDROCHLORIDE AND ACETAMINOPHEN 2 TABLET: 5; 325 TABLET ORAL at 10:41

## 2017-09-21 RX ADMIN — LEVOTHYROXINE SODIUM 137 MCG: 112 TABLET ORAL at 06:29

## 2017-09-21 RX ADMIN — INSULIN HUMAN 2 UNITS: 100 INJECTION, SOLUTION PARENTERAL at 22:03

## 2017-09-21 RX ADMIN — Medication 5 ML: at 06:00

## 2017-09-21 RX ADMIN — BUDESONIDE 500 MCG: 0.5 INHALANT RESPIRATORY (INHALATION) at 07:20

## 2017-09-21 RX ADMIN — ONDANSETRON 4 MG: 2 INJECTION INTRAMUSCULAR; INTRAVENOUS at 15:47

## 2017-09-21 RX ADMIN — Medication 5 ML: at 17:04

## 2017-09-21 RX ADMIN — BUSPIRONE HYDROCHLORIDE 7.5 MG: 5 TABLET ORAL at 16:56

## 2017-09-21 NOTE — PROGRESS NOTES
Patient given 2 tablets of percocet 5 mg for c/o mid abdominal pain 6/10. Zofran 4 mg given via slow IV push for pt complaints of nausea.  Will monitor

## 2017-09-21 NOTE — PROGRESS NOTES
Shift assessment completed. Patient alert and oriented x 4, recently returned to room after walking in the [place]. Pt. On RA. Respirations even and unlabored. No acute distress noted. Bed low, locked, call bell within reach. Side rails up x 2. Pt. Instructed to call for assistance if needed.

## 2017-09-21 NOTE — PROGRESS NOTES
Ativan 1 mg given via slow IV push diluted in normal eugene for pt. c/o inability to sleep.  HR back within normal limits ar 66, oxygen sat 95%, /73, resp rate 18

## 2017-09-21 NOTE — PROGRESS NOTES
Patient requested pain medication Q4H during the night. Nightly Klonopin held for a low HR. Patient requested pain mediation this morning and HR was low again. Patient informed that pain medication could not be given with a low HR and pt. Voiced understanding.  Patient slept very little during the night despite pain medication and a dose of Ativan, respirations are currently even and unlabored

## 2017-09-21 NOTE — PROGRESS NOTES
Problem: Falls - Risk of  Goal: *Absence of Falls  Document Sosa Fall Risk and appropriate interventions in the flowsheet.    Outcome: Progressing Towards Goal  Fall Risk Interventions:  Mobility Interventions: Assess mobility with egress test           Medication Interventions: Teach patient to arise slowly     Elimination Interventions: Call light in reach     History of Falls Interventions: Bed/chair exit alarm

## 2017-09-21 NOTE — PROGRESS NOTES
Patient requesting pain medication but HR is 50. BP normal at 151/75, oxygen sat 95%, patient informed that that heart rate is too low for more medication at this time.  Pt voices understanding

## 2017-09-21 NOTE — PROGRESS NOTES
Zofran 4 mg IVP given at this time for c/o nausea without vomiting. Percocet 10 mg PO also given at this time for abdominal pain. Alert at this time; oriented in all spheres. Sitting up in bed talking on telephone to family. Without further needs.  Call light in lap and door closed per request.

## 2017-09-21 NOTE — PROGRESS NOTES
Zofran 4 mg IVP given for c/o nausea without vomiting. Percocet given at this time as well. Without further needs. Family at bedside and call light in lap.

## 2017-09-21 NOTE — PROGRESS NOTES
Mrs. Sahu Miranda in bed with eyes closed. Appears drowsy. Opens eyes to voice but closes them shortly after. Oriented in all spheres. States she has been passing flatulance but still without bowel movement. No pain or needs at this time. Call light in lap and door closed.

## 2017-09-21 NOTE — PROGRESS NOTES
Patient given 2 tablets of percocet 5 mg for c/o mid abdominal pain 7/10. Zofran 4 mg given via slow IV push for pt complaints of nausea.  Will monitor

## 2017-09-21 NOTE — PROGRESS NOTES
Mrs. Charis Frankel in chair has just finished dinner. States uncomfortable flatulence this afternoon. Started miralax bowel prep at [phone]. Also had ONE TIME dose Lactulose per Dr. Leavitt Comings verbal request. Still without bowel movement. Fair appetite; eating 50% of meals. Without  further needs at this time. Call light in lap and door closed.

## 2017-09-21 NOTE — PROGRESS NOTES
Shift assessment completed. Patient alert and oriented x 4 resting in bed resting quietly. Bowel prep at bedside, patient has been drinking on and off. Pt. On 2 L NC. Respirations even and unlabored. No acute distress noted. Bed low, locked, call bell within reach. Side rails up x 2. Pt. Instructed to call for assistance if needed.

## 2017-09-22 ENCOUNTER — APPOINTMENT (OUTPATIENT)
Dept: GENERAL RADIOLOGY | Age: 67
DRG: 330 | End: 2017-09-22
Attending: SURGERY
Payer: MEDICARE

## 2017-09-22 LAB
GLUCOSE BLD STRIP.AUTO-MCNC: 124 MG/DL (ref 65–100)
GLUCOSE BLD STRIP.AUTO-MCNC: 135 MG/DL (ref 65–100)
GLUCOSE BLD STRIP.AUTO-MCNC: 147 MG/DL (ref 65–100)
GLUCOSE BLD STRIP.AUTO-MCNC: 201 MG/DL (ref 65–100)

## 2017-09-22 PROCEDURE — 77010033678 HC OXYGEN DAILY

## 2017-09-22 PROCEDURE — 74011000250 HC RX REV CODE- 250: Performed by: SURGERY

## 2017-09-22 PROCEDURE — 74011250637 HC RX REV CODE- 250/637: Performed by: SURGERY

## 2017-09-22 PROCEDURE — 94760 N-INVAS EAR/PLS OXIMETRY 1: CPT

## 2017-09-22 PROCEDURE — 74011636637 HC RX REV CODE- 636/637: Performed by: SURGERY

## 2017-09-22 PROCEDURE — 82962 GLUCOSE BLOOD TEST: CPT

## 2017-09-22 PROCEDURE — 74011250636 HC RX REV CODE- 250/636: Performed by: SURGERY

## 2017-09-22 PROCEDURE — 65270000029 HC RM PRIVATE

## 2017-09-22 PROCEDURE — 94640 AIRWAY INHALATION TREATMENT: CPT

## 2017-09-22 PROCEDURE — 74020 XR ABD (AP AND ERECT OR DECUB): CPT

## 2017-09-22 RX ORDER — MONTELUKAST SODIUM 10 MG/1
10 TABLET ORAL
Status: DISCONTINUED | OUTPATIENT
Start: 2017-09-22 | End: 2017-09-23 | Stop reason: HOSPADM

## 2017-09-22 RX ORDER — AMLODIPINE BESYLATE 10 MG/1
10 TABLET ORAL DAILY
Status: DISCONTINUED | OUTPATIENT
Start: 2017-09-23 | End: 2017-09-23 | Stop reason: HOSPADM

## 2017-09-22 RX ORDER — ONDANSETRON 4 MG/1
8 TABLET, ORALLY DISINTEGRATING ORAL
Status: DISCONTINUED | OUTPATIENT
Start: 2017-09-22 | End: 2017-09-23 | Stop reason: HOSPADM

## 2017-09-22 RX ADMIN — OXYCODONE HYDROCHLORIDE AND ACETAMINOPHEN 2 TABLET: 5; 325 TABLET ORAL at 22:00

## 2017-09-22 RX ADMIN — Medication 5 ML: at 04:43

## 2017-09-22 RX ADMIN — ONDANSETRON 4 MG: 2 INJECTION INTRAMUSCULAR; INTRAVENOUS at 00:54

## 2017-09-22 RX ADMIN — PANTOPRAZOLE SODIUM 40 MG: 40 TABLET, DELAYED RELEASE ORAL at 04:38

## 2017-09-22 RX ADMIN — BUSPIRONE HYDROCHLORIDE 7.5 MG: 5 TABLET ORAL at 16:49

## 2017-09-22 RX ADMIN — FLUTICASONE PROPIONATE 2 SPRAY: 50 SPRAY, METERED NASAL at 08:18

## 2017-09-22 RX ADMIN — ONDANSETRON 4 MG: 2 INJECTION INTRAMUSCULAR; INTRAVENOUS at 16:55

## 2017-09-22 RX ADMIN — GABAPENTIN 400 MG: 300 CAPSULE ORAL at 22:00

## 2017-09-22 RX ADMIN — BUSPIRONE HYDROCHLORIDE 7.5 MG: 5 TABLET ORAL at 08:17

## 2017-09-22 RX ADMIN — ONDANSETRON 4 MG: 2 INJECTION INTRAMUSCULAR; INTRAVENOUS at 10:42

## 2017-09-22 RX ADMIN — OXYCODONE HYDROCHLORIDE AND ACETAMINOPHEN 2 TABLET: 5; 325 TABLET ORAL at 08:22

## 2017-09-22 RX ADMIN — BUDESONIDE 500 MCG: 0.5 INHALANT RESPIRATORY (INHALATION) at 08:25

## 2017-09-22 RX ADMIN — GABAPENTIN 400 MG: 300 CAPSULE ORAL at 08:17

## 2017-09-22 RX ADMIN — OXYCODONE HYDROCHLORIDE AND ACETAMINOPHEN 2 TABLET: 5; 325 TABLET ORAL at 04:38

## 2017-09-22 RX ADMIN — CLONAZEPAM 0.5 MG: 0.5 TABLET ORAL at 16:50

## 2017-09-22 RX ADMIN — INSULIN HUMAN 4 UNITS: 100 INJECTION, SOLUTION PARENTERAL at 16:49

## 2017-09-22 RX ADMIN — CLONAZEPAM 0.5 MG: 0.5 TABLET ORAL at 21:59

## 2017-09-22 RX ADMIN — PANTOPRAZOLE SODIUM 40 MG: 40 TABLET, DELAYED RELEASE ORAL at 16:49

## 2017-09-22 RX ADMIN — LEVOTHYROXINE SODIUM 137 MCG: 112 TABLET ORAL at 04:39

## 2017-09-22 RX ADMIN — ENOXAPARIN SODIUM 40 MG: 40 INJECTION SUBCUTANEOUS at 08:17

## 2017-09-22 RX ADMIN — ALBUTEROL SULFATE 2.5 MG: 2.5 SOLUTION RESPIRATORY (INHALATION) at 08:25

## 2017-09-22 RX ADMIN — CYCLOSPORINE 1 DROP: 0.5 EMULSION OPHTHALMIC at 21:00

## 2017-09-22 RX ADMIN — CYCLOSPORINE 1 DROP: 0.5 EMULSION OPHTHALMIC at 08:00

## 2017-09-22 RX ADMIN — OXYCODONE HYDROCHLORIDE AND ACETAMINOPHEN 2 TABLET: 5; 325 TABLET ORAL at 16:55

## 2017-09-22 RX ADMIN — Medication 10 ML: at 22:04

## 2017-09-22 RX ADMIN — ALBUTEROL SULFATE 2.5 MG: 2.5 SOLUTION RESPIRATORY (INHALATION) at 21:01

## 2017-09-22 RX ADMIN — MONTELUKAST SODIUM 10 MG: 10 TABLET, FILM COATED ORAL at 21:59

## 2017-09-22 RX ADMIN — ONDANSETRON 4 MG: 2 INJECTION INTRAMUSCULAR; INTRAVENOUS at 04:38

## 2017-09-22 RX ADMIN — OXYCODONE HYDROCHLORIDE AND ACETAMINOPHEN 2 TABLET: 5; 325 TABLET ORAL at 00:54

## 2017-09-22 RX ADMIN — CITALOPRAM HYDROBROMIDE 40 MG: 20 TABLET ORAL at 08:17

## 2017-09-22 RX ADMIN — TRAZODONE HYDROCHLORIDE 200 MG: 50 TABLET ORAL at 22:54

## 2017-09-22 RX ADMIN — Medication 5 ML: at 11:45

## 2017-09-22 RX ADMIN — ONDANSETRON 8 MG: 4 TABLET, ORALLY DISINTEGRATING ORAL at 21:58

## 2017-09-22 RX ADMIN — GABAPENTIN 400 MG: 300 CAPSULE ORAL at 16:49

## 2017-09-22 RX ADMIN — BUDESONIDE 500 MCG: 0.5 INHALANT RESPIRATORY (INHALATION) at 21:01

## 2017-09-22 RX ADMIN — ALBUTEROL SULFATE 2.5 MG: 2.5 SOLUTION RESPIRATORY (INHALATION) at 15:00

## 2017-09-22 NOTE — PROGRESS NOTES
Pt sitting up in bed eating dinner. No s/sx of distress noted at this time. Pt reports 1 large BM this afternoon.  at beside. Pt encouraged to call for assistance if needed call light in reach, door open will monitor.

## 2017-09-22 NOTE — PROGRESS NOTES
patient given 2 tablets of percocet 5 mg PO for c/o mid abdominal pain 6/10.  Zofran 4 mg given via slow IV push for pt complaints of nausea

## 2017-09-22 NOTE — PROGRESS NOTES
patient given 2 tablets of percocet 5 mg PO for c/o mid abdominal pain 7/10.  Zofran 4 mg given via slow IV push for pt complaints of nausea

## 2017-09-22 NOTE — PROGRESS NOTES
Patient in bed resting quietly with eyes closed. Pt. wearing bipap. Pain medication appeared to be be effective. Respirations even and unlabored.  No distress noted

## 2017-09-22 NOTE — PROGRESS NOTES
Pt resting in bed. Pt alert oriented times 3 at this time. Pt complaints of abd pain 5/10 at this time. Pt has lower abd incision with staples in place, incision clean dry intact at this time. Pt report light brown \"semi-soft\" BM this am. Pt denies nausea or vomiting at this time. Pt reports self cathing and reports all supplies needed at bedside. Pt encouraged to call for assistance if needed call light in reach, door open will monitor.

## 2017-09-22 NOTE — PROGRESS NOTES
Patient has sat on the toilet for a good while this afternoon, but states that she has only had 2 BM.  Pt. States the she has had to void a lot and has had more gas but no more BMs

## 2017-09-22 NOTE — PROGRESS NOTES
Pt resting in bed with eyes closed. No s/sx of distress, pain or nausea noted at this time.  a bedside and encouraged to call for assistance if needed call light in reach, door open will monitor.

## 2017-09-22 NOTE — PROGRESS NOTES
Just had large BM 20-30min ago, and continuing to pass flatus. Feels bwetter  Visit Vitals    /80    Pulse (!) 55    Temp 97.9 °F (36.6 °C)    Resp 17    Ht 5' 3\" (1.6 m)    Wt 221 lb 1.6 oz (100.3 kg)    SpO2 97%    Breastfeeding No    BMI 39.17 kg/m2      abd-much softer, less distended. Incision minimally tender. Noncellulitic    KUB=mild SB dilation; air noted in distal colon    Plan-  Probably home tomorrow  Recommended she avoid fruits and veggies for several days to confirm good small bowel transit.   Staples out prior to discharge

## 2017-09-22 NOTE — PROGRESS NOTES
Zofran 4 mg IV given for complaints of nausea and Percoet 5 mg 2 tabs po given for complaints of abd pain 5/10. will monitor.

## 2017-09-22 NOTE — PROGRESS NOTES
Patient had two medium sized formed BMs at beginning of shift. pateint tried to have another BM during the night but was un able to. Pt. Requested pain medication and zofran every 4 hours during the night. Pt currently resting in bed quietly.  Respirations even and unlabored

## 2017-09-22 NOTE — PROGRESS NOTES
Pt returns to room from [address]. Pt complaints of abd pain 6/10 at this time. Pt complaints of nausea and given Zofran 4 mg IV for complaints of nausea. Will monitor.

## 2017-09-23 VITALS
HEART RATE: 56 BPM | TEMPERATURE: 98.8 F | OXYGEN SATURATION: 90 % | RESPIRATION RATE: 18 BRPM | BODY MASS INDEX: 39.18 KG/M2 | DIASTOLIC BLOOD PRESSURE: 78 MMHG | SYSTOLIC BLOOD PRESSURE: 147 MMHG | WEIGHT: 221.1 LBS | HEIGHT: 63 IN

## 2017-09-23 LAB
GLUCOSE BLD STRIP.AUTO-MCNC: 125 MG/DL (ref 65–100)
GLUCOSE BLD STRIP.AUTO-MCNC: 130 MG/DL (ref 65–100)
GLUCOSE BLD STRIP.AUTO-MCNC: 194 MG/DL (ref 65–100)

## 2017-09-23 PROCEDURE — 82962 GLUCOSE BLOOD TEST: CPT

## 2017-09-23 PROCEDURE — 74011250637 HC RX REV CODE- 250/637: Performed by: SURGERY

## 2017-09-23 PROCEDURE — 94640 AIRWAY INHALATION TREATMENT: CPT

## 2017-09-23 PROCEDURE — 74011250636 HC RX REV CODE- 250/636: Performed by: SURGERY

## 2017-09-23 PROCEDURE — 74011636637 HC RX REV CODE- 636/637: Performed by: SURGERY

## 2017-09-23 PROCEDURE — 77030008031

## 2017-09-23 PROCEDURE — 74011000250 HC RX REV CODE- 250: Performed by: SURGERY

## 2017-09-23 PROCEDURE — 90471 IMMUNIZATION ADMIN: CPT

## 2017-09-23 PROCEDURE — 94760 N-INVAS EAR/PLS OXIMETRY 1: CPT

## 2017-09-23 PROCEDURE — 90686 IIV4 VACC NO PRSV 0.5 ML IM: CPT | Performed by: SURGERY

## 2017-09-23 RX ORDER — OXYCODONE AND ACETAMINOPHEN 5; 325 MG/1; MG/1
2 TABLET ORAL
Qty: 40 TAB | Refills: 0 | Status: SHIPPED
Start: 2017-09-23 | End: 2017-09-26 | Stop reason: SDUPTHER

## 2017-09-23 RX ADMIN — CITALOPRAM HYDROBROMIDE 40 MG: 20 TABLET ORAL at 10:13

## 2017-09-23 RX ADMIN — INSULIN HUMAN 2 UNITS: 100 INJECTION, SOLUTION PARENTERAL at 12:21

## 2017-09-23 RX ADMIN — CYCLOSPORINE 1 DROP: 0.5 EMULSION OPHTHALMIC at 10:18

## 2017-09-23 RX ADMIN — OXYCODONE HYDROCHLORIDE AND ACETAMINOPHEN 2 TABLET: 5; 325 TABLET ORAL at 05:53

## 2017-09-23 RX ADMIN — PANTOPRAZOLE SODIUM 40 MG: 40 TABLET, DELAYED RELEASE ORAL at 05:46

## 2017-09-23 RX ADMIN — OXYCODONE HYDROCHLORIDE AND ACETAMINOPHEN 2 TABLET: 5; 325 TABLET ORAL at 15:27

## 2017-09-23 RX ADMIN — Medication 5 ML: at 15:29

## 2017-09-23 RX ADMIN — FLUTICASONE PROPIONATE 2 SPRAY: 50 SPRAY, METERED NASAL at 10:15

## 2017-09-23 RX ADMIN — BUSPIRONE HYDROCHLORIDE 7.5 MG: 5 TABLET ORAL at 17:29

## 2017-09-23 RX ADMIN — GABAPENTIN 400 MG: 300 CAPSULE ORAL at 10:13

## 2017-09-23 RX ADMIN — ENOXAPARIN SODIUM 40 MG: 40 INJECTION SUBCUTANEOUS at 10:14

## 2017-09-23 RX ADMIN — OXYCODONE HYDROCHLORIDE AND ACETAMINOPHEN 2 TABLET: 5; 325 TABLET ORAL at 10:24

## 2017-09-23 RX ADMIN — INFLUENZA VIRUS VACCINE 0.5 ML: 15; 15; 15; 15 SUSPENSION INTRAMUSCULAR at 17:25

## 2017-09-23 RX ADMIN — GABAPENTIN 400 MG: 300 CAPSULE ORAL at 15:27

## 2017-09-23 RX ADMIN — LEVOTHYROXINE SODIUM 137 MCG: 50 TABLET ORAL at 06:30

## 2017-09-23 RX ADMIN — ALBUTEROL SULFATE 2.5 MG: 2.5 SOLUTION RESPIRATORY (INHALATION) at 08:00

## 2017-09-23 RX ADMIN — BUSPIRONE HYDROCHLORIDE 7.5 MG: 5 TABLET ORAL at 10:13

## 2017-09-23 RX ADMIN — CLONAZEPAM 0.5 MG: 0.5 TABLET ORAL at 15:27

## 2017-09-23 RX ADMIN — PANTOPRAZOLE SODIUM 40 MG: 40 TABLET, DELAYED RELEASE ORAL at 17:29

## 2017-09-23 RX ADMIN — Medication 10 ML: at 05:37

## 2017-09-23 RX ADMIN — HYDROCHLOROTHIAZIDE: 12.5 CAPSULE ORAL at 10:13

## 2017-09-23 RX ADMIN — BUDESONIDE 500 MCG: 0.5 INHALANT RESPIRATORY (INHALATION) at 08:00

## 2017-09-23 RX ADMIN — ALBUTEROL SULFATE 2.5 MG: 2.5 SOLUTION RESPIRATORY (INHALATION) at 02:34

## 2017-09-23 RX ADMIN — AMLODIPINE BESYLATE 10 MG: 10 TABLET ORAL at 10:13

## 2017-09-23 NOTE — PROGRESS NOTES
Respirations even and unlabored. No c/o pain or SOB. Encouraged to call for needs. Assessment completed. Midline abdominal incision dry and intact with staples.

## 2017-09-23 NOTE — DISCHARGE INSTRUCTIONS
DISCHARGE SUMMARY from Nurse    The following personal items are in your possession at time of discharge:    Dental Appliances: None  Visual Aid: Glasses     Home Medications: None  Jewelry: None  Clothing: None  Other Valuables: Eyeglasses             PATIENT INSTRUCTIONS:    After general anesthesia or intravenous sedation, for 24 hours or while taking prescription Narcotics:  · Limit your activities  · Do not drive and operate hazardous machinery  · Do not make important personal or business decisions  · Do  not drink alcoholic beverages  · If you have not urinated within 8 hours after discharge, please contact your surgeon on call. Report the following to your surgeon:  · Excessive pain, swelling, redness or odor of or around the surgical area  · Temperature over 100.5  · Nausea and vomiting lasting longer than 4 hours or if unable to take medications  · Any signs of decreased circulation or nerve impairment to extremity: change in color, persistent  numbness, tingling, coldness or increase pain  · Any questions        What to do at Home:  Recommended activity: Activity as tolerated, mechanical soft diet: avoid fruit and vegetables for 1 week to allow small bowel to normalize. If you experience any of the following symptoms fever above 100.5, swelling, redness or drainage from abdominal incision site, please follow up with  at [phone]. *  Please give a list of your current medications to your Primary Care Provider. *  Please update this list whenever your medications are discontinued, doses are      changed, or new medications (including over-the-counter products) are added. *  Please carry medication information at all times in case of emergency situations.           These are general instructions for a healthy lifestyle:    No smoking/ No tobacco products/ Avoid exposure to second hand smoke    Surgeon General's Warning:  Quitting smoking now greatly reduces serious risk to your health. Obesity, smoking, and sedentary lifestyle greatly increases your risk for illness    A healthy diet, regular physical exercise & weight monitoring are important for maintaining a healthy lifestyle    You may be retaining fluid if you have a history of heart failure or if you experience any of the following symptoms:  Weight gain of 3 pounds or more overnight or 5 pounds in a week, increased swelling in our hands or feet or shortness of breath while lying flat in bed. Please call your doctor as soon as you notice any of these symptoms; do not wait until your next office visit. Recognize signs and symptoms of STROKE:    F-face looks uneven    A-arms unable to move or move unevenly    S-speech slurred or non-existent    T-time-call 911 as soon as signs and symptoms begin-DO NOT go       Back to bed or wait to see if you get better-TIME IS BRAIN. Warning Signs of HEART ATTACK     Call 911 if you have these symptoms:   Chest discomfort. Most heart attacks involve discomfort in the center of the chest that lasts more than a few minutes, or that goes away and comes back. It can feel like uncomfortable pressure, squeezing, fullness, or pain.  Discomfort in other areas of the upper body. Symptoms can include pain or discomfort in one or both arms, the back, neck, jaw, or stomach.  Shortness of breath with or without chest discomfort.  Other signs may include breaking out in a cold sweat, nausea, or lightheadedness. Don't wait more than five minutes to call 911 - MINUTES MATTER! Fast action can save your life. Calling 911 is almost always the fastest way to get lifesaving treatment. Emergency Medical Services staff can begin treatment when they arrive -- up to an hour sooner than if someone gets to the hospital by car. The discharge information has been reviewed with the patient and spouse. The patient and spouse verbalized understanding.     Discharge medications reviewed with the patient and spouse and appropriate educational materials and side effects teaching were provided.

## 2017-09-23 NOTE — PROGRESS NOTES
Discharge instructions given per MD orders. Abdominal incision intact with steri-strips, covered with Tegaderm.

## 2017-09-23 NOTE — PROGRESS NOTES
Patient resting in bed with HOB elevated, resp even and unlabored. Speech clear. Voices that her bowels have been moving today and \"bowel medication effective\". O2 at 1L via NC. No distress noted.

## 2017-09-23 NOTE — PROGRESS NOTES
Patient resting in bed peacefully with eyes closed, resp even and unlabored. No c/o discomfort noted.

## 2017-09-23 NOTE — PROGRESS NOTES
PLAN:  DC abdominal staples  DC Home today  Mechanical Soft Diet - avoid fruits and veggies for several days to confirm good small bowel transit  Call office on Monday to schedule a follow up appointment with Dr. Nicci Weber in 7-10 days. [phone]    ASSESSMENT:  Admit Date: 9/11/2017   9 Days Post-Op  Procedure(s):  EXPLORATORY LAPAROTOMY WITH LYSIS OF ADHESIONS AND EXISION OF SMALL BOWEL TUMOR    Principal Problem:    Small bowel obstruction ([facility] [address] (9/12/2017)    Active Problems:    Diabetes type 2, uncontrolled ([facility] [address] (9/20/2016)      Essential hypertension with goal blood pressure less than 140/90 (9/20/2016)      JESSICA (obstructive sleep apnea) (9/20/2016)      Gastroesophageal reflux disease without esophagitis (9/20/2016)      Irritable bowel syndrome without diarrhea (9/20/2016)      OAB (overactive bladder) (9/20/2016)      Hypothyroidism (11/14/2012)      Hypertension ()      Chronic pain ()      Overview: HX FIBROMYALGIA      Anxiety and depression ()      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 No complaints. States feels well and ready to go home. Pain controlled. Tolerating Soft diet; denies nausea or vomiting. +BM.  at bedside. AF, VSS. OBJECTIVE:  Constitutional: Alert, cooperative, no acute distress; appears stated age   Visit Vitals    /67    Pulse (!) 53    Temp 98.7 °F (37.1 °C)    Resp 18    Ht 5' 3\" (1.6 m)    Wt 221 lb 1.6 oz (100.3 kg)    SpO2 95%    Breastfeeding No    BMI 39.17 kg/m2     Eyes:Sclera are clear. ENMT: no external lesions gross hearing normal; no obvious neck masses, no ear or lip lesions  CV: RRR. Normal perfusion  Resp: No JVD. Breathing is  non-labored; no audible wheezing. GI: soft, mildly appropriately tender, non-distended, staples c/d/i     Musculoskeletal: unremarkable with normal function. No embolic signs or cyanosis.    Neuro:  Oriented; moves all 4; no focal deficits  Psychiatric: normal affect and mood, no memory impairment      Patient Vitals for the past 24 hrs:   BP Temp Pulse Resp SpO2   09/23/17 1124 154/67 98.7 °F (37.1 °C) (!) 53 18 95 %   09/23/17 0727 154/78 99 °F (37.2 °C) (!) 52 18 92 %   09/23/17 0340 154/77 97.8 °F (36.6 °C) 60 18 97 %   09/23/17 0234 - - - - 98 %   09/22/17 2351 132/72 98.9 °F (37.2 °C) 72 18 99 %   09/22/17 2101 - - - - 90 %   09/22/17 1928 143/62 98.1 °F (36.7 °C) 66 18 90 %   09/22/17 1549 160/80 97.9 °F (36.6 °C) (!) 55 17 97 %   09/22/17 1500 - - - - 98 %         CHRISTOPHER Barrow-BC    The patient was seen in conjunction with the attending physician who independently evaluated the patient, reviewed the chart and agreed with the assessment and plan. I have seen and examined the patient with the Nurse Practitioner and agree with the above assessment and plan. Joao James.  Cass Baca MD

## 2017-09-23 NOTE — PROGRESS NOTES
Patient is discharging home today with her . No discharge planning needs identified. Case Management will remain available to assist as needed. Care Management Interventions  PCP Verified by CM:  Yes  Transition of Care Consult (CM Consult): Discharge [facility]: Yes  Discharge Durable Medical Equipment: No  Physical Therapy Consult: No  Occupational Therapy Consult: No  Speech Therapy Consult: No  Current Support Network: Own Home, Lives with Spouse  Confirm Follow Up Transport: Family  Plan discussed with Pt/Family/Caregiver: Yes  Freedom of Choice Offered: Yes  Discharge Location  Discharge Placement: Home

## 2017-09-24 ENCOUNTER — HOSPITAL ENCOUNTER (EMERGENCY)
Age: 67
Discharge: HOME | End: 2017-09-24
Attending: EMERGENCY MEDICINE
Payer: MEDICARE

## 2017-09-24 VITALS
WEIGHT: 217 LBS | HEART RATE: 72 BPM | RESPIRATION RATE: 17 BRPM | TEMPERATURE: 97.9 F | HEIGHT: 63 IN | DIASTOLIC BLOOD PRESSURE: 77 MMHG | BODY MASS INDEX: 38.45 KG/M2 | OXYGEN SATURATION: 94 % | SYSTOLIC BLOOD PRESSURE: 179 MMHG

## 2017-09-24 DIAGNOSIS — Z48.89 ENCOUNTER FOR POST SURGICAL WOUND CHECK: Primary | ICD-10-CM

## 2017-09-24 PROCEDURE — 74011250636 HC RX REV CODE- 250/636: Performed by: EMERGENCY MEDICINE

## 2017-09-24 PROCEDURE — 99283 EMERGENCY DEPT VISIT LOW MDM: CPT | Performed by: EMERGENCY MEDICINE

## 2017-09-24 PROCEDURE — 74011250637 HC RX REV CODE- 250/637: Performed by: EMERGENCY MEDICINE

## 2017-09-24 PROCEDURE — 96372 THER/PROPH/DIAG INJ SC/IM: CPT | Performed by: EMERGENCY MEDICINE

## 2017-09-24 RX ORDER — OXYCODONE AND ACETAMINOPHEN 5; 325 MG/1; MG/1
1 TABLET ORAL
Status: COMPLETED | OUTPATIENT
Start: 2017-09-24 | End: 2017-09-24

## 2017-09-24 RX ORDER — PROMETHAZINE HYDROCHLORIDE 25 MG/ML
25 INJECTION, SOLUTION INTRAMUSCULAR; INTRAVENOUS
Status: COMPLETED | OUTPATIENT
Start: 2017-09-24 | End: 2017-09-24

## 2017-09-24 RX ADMIN — OXYCODONE HYDROCHLORIDE AND ACETAMINOPHEN 1 TABLET: 5; 325 TABLET ORAL at 12:46

## 2017-09-24 RX ADMIN — PROMETHAZINE HYDROCHLORIDE 25 MG: 25 INJECTION INTRAMUSCULAR; INTRAVENOUS at 12:46

## 2017-09-24 NOTE — ED NOTES
I have reviewed discharge instructions with the patient and spouse. The patient and spouse verbalized understanding. Patient left ED via Discharge Method: ambulatory to Home with . Opportunity for questions and clarification provided. Patient given 0 scripts.

## 2017-09-24 NOTE — DISCHARGE INSTRUCTIONS
Wound Care: After Your Visit  Your Care Instructions  Taking good care of your wound at home will help it heal quickly and reduce your chance of infection. The doctor has checked you carefully, but problems can develop later. If you notice any problems or new symptoms, get medical treatment right away. Follow-up care is a key part of your treatment and safety. Be sure to make and go to all appointments, and call your doctor if you are having problems. It's also a good idea to know your test results and keep a list of the medicines you take. How can you care for yourself at home? · Clean the area with soap and water 2 times a day unless your doctor gives you different instructions. Don't use hydrogen peroxide or alcohol, which can slow healing. ¨ You may cover the wound with a thin layer of antibiotic ointment, such as bacitracin, and a nonstick bandage. ¨ Apply more ointment and replace the bandage as needed. · Take pain medicines exactly as directed. Some pain is normal with a wound, but do not ignore pain that is getting worse instead of better. You could have an infection. ¨ If the doctor gave you a prescription medicine for pain, take it as prescribed. ¨ If you are not taking a prescription pain medicine, ask your doctor if you can take an over-the-counter medicine. · Your doctor may have closed your wound with stitches (sutures), staples, or skin glue. ¨ If you have stitches, your doctor may remove them after several days to 2 weeks. Or you may have stitches that dissolve on their own. ¨ If you have staples, your doctor may remove them after 7 to 10 days. ¨ If your wound was closed with skin glue, the glue will wear off in a few days to 2 weeks. When should you call for help? Call your doctor now or seek immediate medical care if:  · You have signs of infection, such as:  ¨ Increased pain, swelling, warmth, or redness near the wound. ¨ Red streaks leading from the wound.   ¨ Pus draining from the wound. ¨ A fever. · You bleed so much from your incision that you soak one or more bandages over 2 to 4 hours. Watch closely for changes in your health, and be sure to contact your doctor if:  · The wound is not getting better each day. Where can you learn more? Go to Room.be  Enter M973 in the search box to learn more about \"Wound Care: After Your Visit. \"   © 6989-1351 [employer]. Care instructions adapted under license by [facility] (which disclaims liability or warranty for this information). This care instruction is for use with your licensed healthcare professional. If you have questions about a medical condition or this instruction, always ask your healthcare professional. [address] any warranty or liability for your use of this information. Content Version: 96.9.246984;  Last Revised: April 23, 2012

## 2017-09-24 NOTE — ED NOTES
Patient comes to ED with c/o wound dehiscence, patient was d/shwetha from hospital yesterday post-op staples were removed and steri strips placed to abd wound.  Patient reports last night all of the steri strips came loose and the wound in now open, comes to ED for eval.

## 2017-09-24 NOTE — ED PROVIDER NOTES
HPI Comments: Patient presents to the ER complaining of abdominal pain secondary to surgical wound. Patient had a recent hospitalization for small bowel instruction which required exploratory laparotomy and lysis of adhesions. Patient states she has sutures removed yesterday and was discharged. States she got home opened up. States she's had some minimal palpable discomfort. She states she's been unable to get her prescriptions filled since leaving the hospital.  She denies any fevers or vomiting. Does report a loose bowel movement yesterday. Patient is a 75 y.o. female presenting with bleeding. The history is provided by the patient. Post-Op Problem   This is a new problem. The current episode started 6 to 12 hours ago. The problem occurs constantly. The problem has not changed since onset. Associated symptoms include abdominal pain. Pertinent negatives include no headaches and no shortness of breath.         Past Medical History:   Diagnosis Date    Acute lumbar radiculopathy     Allergic rhinitis 12/10/2013    Anxiety and depression     Arthritis     Asthma     Atony of bladder     Chronic kidney disease     STONES    Chronic pain     HX FIBROMYALGIA    Claustrophobia     Diabetes ([address]     BORDRLINE DIET CONTROLLED    GERD (gastroesophageal reflux disease)     Heart disease     Hypercholesteremia     Hypertension     Hypothyroidism 11/14/2012    Hypoxemia     IBS (irritable bowel syndrome) 4/8/2013    Ill-defined condition     MRSA    Incomplete bladder emptying     Liver disease     elevated liver enzymes    Migraine headache     Morbid obesity (HCC)     Myalgia and myositis, unspecified     Neurogenic bladder, NOS 4/3/2014    Organic hypersomnia, unspecified     JESSICA (obstructive sleep apnea)     Other chronic cystitis     Peripheral neuropathy ([address] 4/8/2013    Sciatica     Symptomatic menopausal or female climacteric states     Thyroid disease     Unspecified sleep apnea     CPAP machine    Unspecified urinary incontinence     Vitamin D deficiency 4/8/2013       Past Surgical History:   Procedure Laterality Date    COLONOSCOPY      HX BUNIONECTOMY      HX CATARACT REMOVAL      HX CHOLECYSTECTOMY      HX HYSTERECTOMY      HX ORTHOPAEDIC      right 5th digit x 2    HX OTHER SURGICAL      bladder lift x 2; rectocele    HX OTHER SURGICAL      pelvis floor    HX OTHER SURGICAL  09/2016    [facility]    HX PACEMAKER      HX THYROIDECTOMY      HX TONSILLECTOMY      HX UROLOGICAL      Bladder sx; rectocele         Family History:   Problem Relation Age of Onset    Heart Failure Mother 54     CHF    Breast Cancer Mother 76    Heart Disease Mother    [facility] Hypertension Mother    [facility] Asthma Mother     Cancer Mother     Diabetes Mother     Thyroid Disease Mother     Psychiatric Disorder Mother      Depression    Heart Disease Father     Heart Attack Father 47     mi    Hypertension Father     Cancer Father     Stroke Father     Hypertension Brother     Diabetes Brother     Depression Sister     Anxiety Sister     OCD Sister     Hypertension Brother     Diabetes Brother     Depression Brother     No Known Problems Brother     Kidney Disease Other      gen fam hx    Osteoporosis Other     Arthritis-osteo Other     Lung Disease Other        Social History     Social History    Marital status:      Spouse name: N/A    Number of children: N/A    Years of education: N/A     Occupational History    Not on file. Social History Main Topics    Smoking status: Never Smoker    Smokeless tobacco: Never Used    Alcohol use No      Comment: rarely    Drug use: No    Sexual activity: Not on file     Other Topics Concern    Not on file     Social History Narrative         ALLERGIES: Ciprofloxacin; Codeine; Melon flavor; Morphine; Nut flavor; and Other medication    Review of Systems   Constitutional: Negative for fatigue and fever.    HENT: Negative for congestion, dental problem, trouble swallowing and voice change. Eyes: Negative for photophobia, redness and visual disturbance. Respiratory: Negative for cough, chest tightness and shortness of breath. Cardiovascular: Negative for palpitations and leg swelling. Gastrointestinal: Positive for abdominal pain. Endocrine: Negative for polydipsia and polyphagia. Genitourinary: Negative for dysuria and urgency. Musculoskeletal: Negative for back pain. Skin: Negative for pallor and rash. Allergic/Immunologic: Negative for food allergies and immunocompromised state. Neurological: Negative for light-headedness and headaches. Hematological: Negative for adenopathy. Does not bruise/bleed easily. Psychiatric/Behavioral: Negative for behavioral problems and confusion. All other systems reviewed and are negative. Vitals:    09/24/17 1011   BP: 165/74   Pulse: 74   Resp: 26   Temp: 98 °F (36.7 °C)   SpO2: 90%   Weight: 98.4 kg (217 lb)   Height: 5' 3\" (1.6 m)            Physical Exam   Constitutional: She is oriented to person, place, and time. She appears well-developed and well-nourished. HENT:   Head: Normocephalic and atraumatic. Eyes: Conjunctivae and EOM are normal. Pupils are equal, round, and reactive to light. Cardiovascular: Normal rate and regular rhythm. No murmur heard. Pulmonary/Chest: Effort normal and breath sounds normal. No respiratory distress. She has no wheezes. Abdominal: Soft. Bowel sounds are normal.       Ecchymosis noted on abdomen   Neurological: She is alert and oriented to person, place, and time. No cranial nerve deficit. Nursing note and vitals reviewed.        MDM  Number of Diagnoses or Management Options  Diagnosis management comments: Have discussed case with general surgery, they will look at wound  We'll treat symptomatically with pain and nausea medication    1:39 PM  Patient seen and evaluated by general surgery  They did not feel opening of wound involved the fascia.  Wound was packed at bedside, dressing was placed  We'll discharge patient, general surgery follow-up as previously discussed    Risk of Complications, Morbidity, and/or Mortality  Presenting problems: moderate  Diagnostic procedures: low  Management options: low    Patient Progress  Patient progress: stable    ED Course       Procedures

## 2017-09-24 NOTE — ED TRIAGE NOTES
Pt had abdominal surgery and was just discharged yesterday. Pt states she was discharged to late to get her pain meds filled and that the incision site is open. Pt states they removed the staples in the hospital and used steri strips and Tegaderm to cover the wound.

## 2017-09-25 ENCOUNTER — PATIENT OUTREACH (OUTPATIENT)
Dept: CASE MANAGEMENT | Age: 67
End: 2017-09-25

## 2017-09-25 NOTE — PROGRESS NOTES
This note will not be viewable in [address] Transition of Care Discharge Follow-up Questionnaire     Date/Time of Call:     09/25/2017   1:49 pm     What was the patient seen in the hospital for? Post op Wound Check     Does the patient understand his/her diagnosis and/or treatment and what happened during the hospitalization? Patient voiced understanding of diagnosis and /or treatment. Did the patient receive discharge instructions? Patient voiced understanding of diagnosis and /or treatment. Review any discharge instructions (see notes in [facility]). Ask patient if they have any questions? Care Coordinator and patient reviewed discharge instructions per [facility]. Opportunity for questions and clarification provided. Patient verbalized understanding of instructions. Were home services ordered (nursing, PT, OT, ST, etc.)? No     If so, has the first visit occurred? If not, why? (Assist with coordination of services if necessary.)   n/a   Was any DME ordered? No     If so, has it been received? If not, why?  (Assist with coordination of arranging DME orders if necessary.)   n/a     Complete a review of all medications (new, continued and discontinued meds per the D/C instructions and medication tab in [facility]). Medications reviewed and current per [facility] and as indicated by patient. Were all new prescriptions filled? If not, why?  (Assist with obtainment of medications if necessary.)   No new medications were prescribed. Does the patient understand the purpose and dosing instructions for all medications? (If patient has questions, provide explanation and education.)   Yes   Does the patient have any problems in performing ADLs? (If patient is unable to perform ADLs  what is the limiting factor(s)?   Do they have a support system that can assist? If no support system is present, discuss possible assistance that they may be able to obtain.)   Patient reports being independent with ADLs and having spouse available to help. Does the patient have all follow-up appointments scheduled? 7 day f/up with PCP?    7-14 day f/up with specialist?    If f/up has not been made  what actions has the care coordinator made to accomplish this? Has transportation been arranged? [facility] follow-up should be within 7 days of discharge; all others should have PCP follow-up within 7 days of discharge; follow-ups with other specialists should be within 7-14 days of discharge.)   Patient declines 3 way call with Care Coordinator to schedule hospital follow up appointment with PCP. This Care Coordinator also offered to obtain an appointment on behalf of the patient with a return call with appointment date and time. Patient declines. Patient reports being scheduled for follow up with Dr. Siddhartha Parra on 9/26/2017 @10:45am.    Patient states she will contact PCP, Dr. Yohan Roy office and schedule follow up. Patient has transportation available to/from appointments. Any other questions or concerns expressed by the patient? Patient voiced no other questions or concerns. No other needs identified. Schedule next appointment with JORDAN NAZARIO Coordinator or refer to RN Case Manager/  per the workflow guidelines. When is care coordinators next follow-up call scheduled? If referred for CCM  what RN care manager was the referral assigned? This Care Coordinator will schedule follow up call within 30 days per protocol.      IZZY Call Completed By:   JUAN C Watts  Care Coordination and Quality

## 2017-09-29 NOTE — DISCHARGE SUMMARY
Physician Discharge Summary     Patient ID:  Clarice Brothers  [id number]  1 y.o.  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 ICD-10-CM: C49. A3  ICD-9-CM: 238.1  9/20/2017 - Present Yes        * (Principal)Small bowel obstruction ([facility] ICD-10-CM: K56.69  ICD-9-CM: 560.9  9/12/2017 - Present Yes        Hypertension ICD-10-CM: I10  ICD-9-CM: 401.9  Unknown - Present Yes        Diabetes ([facility] ICD-10-CM: E11.9  ICD-9-CM: 250.00  Unknown - Present     Overview Signed 4/12/2017  9:40 AM by Yo Hopkins DIET CONTROLLED             Chronic pain ICD-10-CM: G89.29  ICD-9-CM: 338.29  Unknown - Present Yes    Overview Signed 4/12/2017  9:40 AM by Cowleytereso Snowden     HX FIBROMYALGIA             Anxiety and depression ICD-10-CM: F41.9, F32.9  ICD-9-CM: 300.00, 311  Unknown - Present Yes        Diabetes type 2, uncontrolled ([facility] ICD-10-CM: E11.65  ICD-9-CM: 250.02  9/20/2016 - Present Yes        Essential hypertension with goal blood pressure less than 140/90 ICD-10-CM: I10  ICD-9-CM: 401.9  9/20/2016 - Present Yes        JESSICA (obstructive sleep apnea) ICD-10-CM: G47.33  ICD-9-CM: 327.23  9/20/2016 - Present Yes        Gastroesophageal reflux disease without esophagitis ICD-10-CM: K21.9  ICD-9-CM: 530.81  9/20/2016 - Present Yes        Irritable bowel syndrome without diarrhea ICD-10-CM: K58.9  ICD-9-CM: 564.1  9/20/2016 - Present Yes        OAB (overactive bladder) ICD-10-CM: N32.81  ICD-9-CM: 596.51  9/20/2016 - Present Yes        Hypothyroidism ICD-10-CM: E03.9  ICD-9-CM: 244.9  11/14/2012 - Present Yes               Admission Condition: Fair    * Discharge Condition: good and improved    * Procedures: Procedure(s):  EXPLORATORY LAPAROTOMY WITH LYSIS OF ADHESIONS AND EXISION OF SMALL BOWEL TUMOR    * Hospital Course: Failed conservative management of presumed adhesive SBO. Also with h/o chronic abd pain labelled as IBS. Underwent laparotomy with lysis of intense RLQ intestinal adhesions. Post op course notable for delayed return of GI function. Consults: None    Significant Diagnostic Studies: CT    * Disposition: Home    Discharge Medications:   Discharge Medication List as of 9/23/2017  6:48 PM      START taking these medications    Details   oxyCODONE-acetaminophen (PERCOCET) 5-325 mg per tablet Take 2 Tabs by mouth every four (4) hours as needed. Max Daily Amount: 12 Tabs., No Print, Disp-40 Tab, R-0         CONTINUE these medications which have NOT CHANGED    Details   levothyroxine (SYNTHROID) 137 mcg tablet Take 137 mcg by mouth Daily (before breakfast). , NormalD/C Levothyroxine 150 mcgDisp-90 Tab, R-0      busPIRone (BUSPAR) 7.5 mg tablet Take 1 Tab by mouth two (2) times a day., Normal, Disp-60 Tab, R-0      atorvastatin (LIPITOR) 20 mg tablet TAKE ONE TABLET BY MOUTH ONCE DAILY, Normal, Disp-90 Tab, R-1      amLODIPine (NORVASC) 10 mg tablet TAKE ONE TABLET BY MOUTH ONCE DAILY, Normal, Disp-90 Tab, R-1      citalopram (CELEXA) 40 mg tablet Take 1 Tab by mouth daily. , Normal, Disp-90 Tab, R-1      losartan-hydroCHLOROthiazide (HYZAAR) 100-12.5 mg per tablet Take 1 Tab by mouth daily. , Normal, Disp-90 Tab, R-1      montelukast (SINGULAIR) 10 mg tablet TAKE ONE TABLET BY MOUTH ONCE DAILY, Normal, Disp-90 Tab, R-0      metFORMIN (GLUCOPHAGE) 1,000 mg tablet Take 1 Tab by mouth two (2) times daily (with meals). , Normal, Disp-60 Tab, R-5      traZODone (DESYREL) 100 mg tablet TAKE TWO TO THREE TABLETS BY MOUTH AT BEDTIME, Normal, Disp-270 Tab, R-1      clonazePAM (KLONOPIN) 0.5 mg tablet Take 1/2 PO QDay and 1 at bed time. , Phone In, Disp-45 Tab, R-2      BIFIDOBACTERIUM INFANTIS (ALIGN PO) Take  by mouth., Historical Med diphenhydrAMINE (BENADRYL) 25 mg capsule Take 25 mg by mouth every six (6) hours as needed., Historical Med      cpap machine kit by Does Not Apply route., Historical Med      Diabetic Shoes XX by Does Not Apply route., Historical Med      EPINEPHRINE IN Take  by inhalation. , Historical Med      fluticasone (FLONASE) 50 mcg/actuation nasal spray 2 Sprays by Both Nostrils route daily. , Historical Med      METRONIDAZOLE EX by Apply Externally route., Historical Med      promethazine (PHENERGAN) 25 mg tablet Take 25 mg by mouth every six (6) hours as needed for Nausea., Historical Med      cetirizine (ZYRTEC) 10 mg tablet Take  by mouth., Historical Med      gabapentin (NEURONTIN) 400 mg capsule Take 1 Cap by mouth three (3) times daily. , Historical Med      albuterol (PROVENTIL HFA, VENTOLIN HFA, PROAIR HFA) 90 mcg/actuation inhaler Take 1 Puff by inhalation every six (6) hours as needed for Wheezing., PrintVentolinDisp-1 Inhaler, R-11      SITagliptin (JANUVIA) 50 mg tablet Take 1 Tab by mouth daily. , Print, Disp-90 Tab, R-1      dicyclomine (BENTYL) 20 mg tablet TAKE 1 TABLET BY MOUTH BEFORE EACH MEAL AND 1 TABLET BEFORE BEDTIME, Normal, Disp-120 Tab, R-3      Azelastine (ASTEPRO) 0.15 % (205.5 mcg) nasal spray 2 Sprays by Both Nostrils route two (2) times a day., Normal, Disp-1 Bottle, R-11      cephALEXin (KEFLEX) 250 mg capsule Take 1 Cap by mouth daily. , Normal, Disp-90 Cap, R-3      oxybutynin chloride XL (DITROPAN XL) 10 mg CR tablet Take 1 Tab by mouth daily. , Normal, Disp-90 Tab, R-3      fentaNYL (DURAGESIC) 25 mcg/hr PATCH 1 Patch by TransDERmal route every seventy-two (72) hours. , Historical Med      budesonide-formoterol (SYMBICORT) 160-4.5 mcg/actuation HFA inhaler Take 2 Puffs by inhalation two (2) times a day., Normal, Disp-3 Inhaler, R-3      baclofen (LIORESAL) 10 mg tablet Take 1 Tab by mouth three (3) times daily. , Historical Med      Catheter (BARD CLEAN-CATH) misc by Does Not Apply route. , Historical Med      estradiol (ESTRACE) 0.01 % (0.1 mg/gram) vaginal cream 1 gram vaginally 1-2x/week, Disp-42.5 g, R-6, Normal      omeprazole (PRILOSEC) 40 mg capsule Take 40 mg by mouth two (2) times a day., Historical Med      cycloSPORINE (RESTASIS) 0.05 % ophthalmic emulsion Administer 1 Drop to both eyes every twelve (12) hours. , Historical Med      glucose blood VI test strips (ASCENSIA AUTODISC VI, ONE TOUCH ULTRA TEST VI) strip Test 4 times daily, Normal, Disp-100 Strip, R-11         STOP taking these medications       hydrocortisone (CORTAID) 1 % topical cream Comments:   Reason for Stopping:         fluconazole (DIFLUCAN) 150 mg tablet Comments:   Reason for Stopping:         HYDROcodone-acetaminophen (NORCO)  mg tablet Comments:   Reason for Stopping:               * Follow-up Care/Patient Instructions:   Activity: Activity as tolerated  Diet: Regular Diet  Wound Care: None needed    Follow-up Information     Follow up With Details Comments Contact Info    Laura Gupta MD Call call on Monday for follow upappointment post hospital admission [facility]      Lela Bundy MD In 1 week call on Monday for follow up appopintment in 7-10 days [address] [place] [address] [address]  [phone]          Follow-up tests/labs -n/a    Signed:  Lela Bundy MD  9/29/2017  3:21 PM

## 2017-10-02 ENCOUNTER — PATIENT OUTREACH (OUTPATIENT)
Dept: CASE MANAGEMENT | Age: 67
End: 2017-10-02

## 2017-10-02 NOTE — PROGRESS NOTES
This note will not be viewable in [address] Follow up call to patient made. Patient reports following up with PCP. No other needs identified. Will close case.

## 2018-01-12 PROBLEM — E11.40 TYPE 2 DIABETES MELLITUS WITH DIABETIC NEUROPATHY (HCC): Status: ACTIVE | Noted: 2018-01-12

## 2018-02-13 ENCOUNTER — HOSPITAL ENCOUNTER (OUTPATIENT)
Dept: GENERAL RADIOLOGY | Age: 68
Discharge: HOME | End: 2018-02-13
Payer: MEDICARE

## 2018-02-13 DIAGNOSIS — K59.00 CONSTIPATION: ICD-10-CM

## 2018-02-13 DIAGNOSIS — M54.50 LOW BACK PAIN: ICD-10-CM

## 2018-02-13 DIAGNOSIS — K64.9 HEMORRHOIDS: ICD-10-CM

## 2018-02-13 DIAGNOSIS — M25.551 RIGHT HIP PAIN: ICD-10-CM

## 2018-02-13 PROCEDURE — 73502 X-RAY EXAM HIP UNI 2-3 VIEWS: CPT

## 2018-02-13 PROCEDURE — 74022 RADEX COMPL AQT ABD SERIES: CPT

## 2018-02-13 PROCEDURE — 72110 X-RAY EXAM L-2 SPINE 4/>VWS: CPT

## 2018-08-03 ENCOUNTER — HOSPITAL ENCOUNTER (OUTPATIENT)
Dept: LAB | Age: 68
Discharge: HOME | End: 2018-08-03

## 2018-08-03 PROCEDURE — 88305 TISSUE EXAM BY PATHOLOGIST: CPT

## 2018-08-03 PROCEDURE — 88312 SPECIAL STAINS GROUP 1: CPT

## 2018-09-27 PROBLEM — E66.01 SEVERE OBESITY (BMI 35.0-39.9): Status: ACTIVE | Noted: 2018-09-27

## 2018-10-11 ENCOUNTER — HOSPITAL ENCOUNTER (OUTPATIENT)
Dept: GENERAL RADIOLOGY | Age: 68
Discharge: HOME | End: 2018-10-11
Payer: MEDICARE

## 2018-10-11 DIAGNOSIS — R05.9 COUGH: ICD-10-CM

## 2018-10-11 PROCEDURE — 71046 X-RAY EXAM CHEST 2 VIEWS: CPT

## 2019-01-13 ENCOUNTER — HOSPITAL ENCOUNTER (OUTPATIENT)
Dept: SLEEP MEDICINE | Age: 69
Discharge: HOME | End: 2019-01-13
Payer: MEDICARE

## 2019-01-13 PROCEDURE — 95811 POLYSOM 6/>YRS CPAP 4/> PARM: CPT

## 2019-02-13 ENCOUNTER — HOSPITAL ENCOUNTER (OUTPATIENT)
Dept: MAMMOGRAPHY | Age: 69
Discharge: HOME | End: 2019-02-13
Attending: INTERNAL MEDICINE
Payer: MEDICARE

## 2019-02-13 DIAGNOSIS — Z12.39 BREAST CANCER SCREENING: ICD-10-CM

## 2019-02-13 PROCEDURE — 77067 SCR MAMMO BI INCL CAD: CPT

## 2019-04-15 PROBLEM — K56.609 SMALL BOWEL OBSTRUCTION (HCC): Status: RESOLVED | Noted: 2017-09-12 | Resolved: 2019-04-15

## 2019-10-24 PROBLEM — C49.A3 GASTROINTESTINAL STROMAL TUMOR (GIST) OF SMALL INTESTINE (HCC): Status: RESOLVED | Noted: 2017-09-20 | Resolved: 2019-10-24

## 2019-12-09 ENCOUNTER — HOSPITAL ENCOUNTER (OUTPATIENT)
Dept: GENERAL RADIOLOGY | Age: 69
Discharge: HOME | End: 2019-12-09
Attending: INTERNAL MEDICINE
Payer: MEDICARE

## 2019-12-09 DIAGNOSIS — R13.19 OTHER DYSPHAGIA: ICD-10-CM

## 2019-12-09 DIAGNOSIS — R10.13 ABDOMINAL PAIN, EPIGASTRIC: ICD-10-CM

## 2019-12-09 PROCEDURE — 74011000255 HC RX REV CODE- 255: Performed by: INTERNAL MEDICINE

## 2019-12-09 PROCEDURE — 74011000250 HC RX REV CODE- 250: Performed by: INTERNAL MEDICINE

## 2019-12-09 PROCEDURE — 74220 X-RAY XM ESOPHAGUS 1CNTRST: CPT

## 2019-12-09 RX ADMIN — BARIUM SULFATE 200 ML: 0.6 SUSPENSION ORAL at 11:43

## 2019-12-09 RX ADMIN — BARIUM SULFATE 135 ML: 980 POWDER, FOR SUSPENSION ORAL at 11:43

## 2019-12-09 RX ADMIN — ANTACID/ANTIFLATULENT 4 G: 380; 550; 10; 10 GRANULE, EFFERVESCENT ORAL at 11:43

## 2019-12-09 RX ADMIN — BARIUM SULFATE 700 MG: 700 TABLET ORAL at 11:44

## 2020-01-20 ENCOUNTER — HOSPITAL ENCOUNTER (OUTPATIENT)
Dept: LAB | Age: 70
Discharge: HOME | End: 2020-01-20

## 2020-01-20 PROCEDURE — 88305 TISSUE EXAM BY PATHOLOGIST: CPT

## 2020-03-02 PROBLEM — K64.9 HEMORRHOIDS: Status: ACTIVE | Noted: 2020-03-02

## 2020-03-02 PROBLEM — E66.01 SEVERE OBESITY WITH BODY MASS INDEX (BMI) OF 35.0 TO 39.9 WITH SERIOUS COMORBIDITY (HCC): Status: RESOLVED | Noted: 2018-09-27 | Resolved: 2020-03-02

## 2020-03-02 PROBLEM — Z86.010 PERSONAL HISTORY OF COLONIC POLYPS: Status: ACTIVE | Noted: 2020-03-02

## 2020-03-02 PROBLEM — D13.1 BENIGN NEOPLASM OF STOMACH: Status: ACTIVE | Noted: 2020-03-02

## 2020-03-02 PROBLEM — K59.04 CHRONIC IDIOPATHIC CONSTIPATION: Status: ACTIVE | Noted: 2020-03-02

## 2020-03-02 PROBLEM — K44.9 DIAPHRAGMATIC HERNIA WITHOUT OBSTRUCTION OR GANGRENE: Status: ACTIVE | Noted: 2020-03-02

## 2020-03-02 PROBLEM — K29.60 BILE REFLUX GASTRITIS: Status: ACTIVE | Noted: 2020-03-02

## 2020-03-05 ENCOUNTER — HOSPITAL ENCOUNTER (OUTPATIENT)
Dept: MAMMOGRAPHY | Age: 70
Discharge: HOME | End: 2020-03-05
Attending: INTERNAL MEDICINE
Payer: MEDICARE

## 2020-03-05 DIAGNOSIS — Z12.31 VISIT FOR SCREENING MAMMOGRAM: ICD-10-CM

## 2020-03-05 PROCEDURE — 77067 SCR MAMMO BI INCL CAD: CPT

## 2020-04-30 PROBLEM — K22.4 ESOPHAGEAL SPASM: Status: ACTIVE | Noted: 2020-04-30

## 2020-05-11 ENCOUNTER — APPOINTMENT (OUTPATIENT)
Dept: CT IMAGING | Age: 70
End: 2020-05-11
Attending: EMERGENCY MEDICINE
Payer: MEDICARE

## 2020-05-11 ENCOUNTER — HOSPITAL ENCOUNTER (EMERGENCY)
Age: 70
Discharge: HOME | End: 2020-05-11
Attending: EMERGENCY MEDICINE
Payer: MEDICARE

## 2020-05-11 VITALS
DIASTOLIC BLOOD PRESSURE: 90 MMHG | OXYGEN SATURATION: 97 % | RESPIRATION RATE: 20 BRPM | HEART RATE: 82 BPM | TEMPERATURE: 98.4 F | SYSTOLIC BLOOD PRESSURE: 121 MMHG

## 2020-05-11 DIAGNOSIS — R40.4 TRANSIENT ALTERATION OF AWARENESS: Primary | ICD-10-CM

## 2020-05-11 LAB
ALBUMIN SERPL-MCNC: 3.5 G/DL (ref 3.2–4.6)
ALBUMIN/GLOB SERPL: 0.9 {RATIO} (ref 1.2–3.5)
ALP SERPL-CCNC: 85 U/L (ref 50–136)
ALT SERPL-CCNC: 13 U/L (ref 12–65)
ANION GAP SERPL CALC-SCNC: 9 MMOL/L (ref 7–16)
AST SERPL-CCNC: 17 U/L (ref 15–37)
ATRIAL RATE: 62 BPM
BASOPHILS # BLD: 0 K/UL (ref 0–0.2)
BASOPHILS NFR BLD: 0 % (ref 0–2)
BILIRUB SERPL-MCNC: 0.2 MG/DL (ref 0.2–1.1)
BUN SERPL-MCNC: 14 MG/DL (ref 8–23)
CALCIUM SERPL-MCNC: 8.9 MG/DL (ref 8.3–10.4)
CALCULATED P AXIS, ECG09: 70 DEGREES
CALCULATED R AXIS, ECG10: 53 DEGREES
CALCULATED T AXIS, ECG11: 66 DEGREES
CHLORIDE SERPL-SCNC: 104 MMOL/L (ref 98–107)
CO2 SERPL-SCNC: 25 MMOL/L (ref 21–32)
CREAT SERPL-MCNC: 0.93 MG/DL (ref 0.6–1)
DIAGNOSIS, 93000: NORMAL
DIFFERENTIAL METHOD BLD: NORMAL
EOSINOPHIL # BLD: 0.2 K/UL (ref 0–0.8)
EOSINOPHIL NFR BLD: 2 % (ref 0.5–7.8)
ERYTHROCYTE [DISTWIDTH] IN BLOOD BY AUTOMATED COUNT: 13.6 % (ref 11.9–14.6)
GLOBULIN SER CALC-MCNC: 3.8 G/DL (ref 2.3–3.5)
GLUCOSE BLD STRIP.AUTO-MCNC: 195 MG/DL (ref 65–100)
GLUCOSE SERPL-MCNC: 158 MG/DL (ref 65–100)
HCT VFR BLD AUTO: 36.3 % (ref 35.8–46.3)
HGB BLD-MCNC: 11.7 G/DL (ref 11.7–15.4)
IMM GRANULOCYTES # BLD AUTO: 0.1 K/UL (ref 0–0.5)
IMM GRANULOCYTES NFR BLD AUTO: 1 % (ref 0–5)
LYMPHOCYTES # BLD: 1.9 K/UL (ref 0.5–4.6)
LYMPHOCYTES NFR BLD: 17 % (ref 13–44)
MCH RBC QN AUTO: 28.5 PG (ref 26.1–32.9)
MCHC RBC AUTO-ENTMCNC: 32.2 G/DL (ref 31.4–35)
MCV RBC AUTO: 88.3 FL (ref 79.6–97.8)
MONOCYTES # BLD: 0.9 K/UL (ref 0.1–1.3)
MONOCYTES NFR BLD: 8 % (ref 4–12)
NEUTS SEG # BLD: 7.7 K/UL (ref 1.7–8.2)
NEUTS SEG NFR BLD: 71 % (ref 43–78)
NRBC # BLD: 0 K/UL (ref 0–0.2)
P-R INTERVAL, ECG05: 150 MS
PLATELET # BLD AUTO: 294 K/UL (ref 150–450)
PMV BLD AUTO: 9.7 FL (ref 9.4–12.3)
POTASSIUM SERPL-SCNC: 3.8 MMOL/L (ref 3.5–5.1)
PROT SERPL-MCNC: 7.3 G/DL (ref 6.3–8.2)
Q-T INTERVAL, ECG07: 420 MS
QRS DURATION, ECG06: 76 MS
QTC CALCULATION (BEZET), ECG08: 426 MS
RBC # BLD AUTO: 4.11 M/UL (ref 4.05–5.2)
SODIUM SERPL-SCNC: 138 MMOL/L (ref 136–145)
VENTRICULAR RATE, ECG03: 62 BPM
WBC # BLD AUTO: 10.8 K/UL (ref 4.3–11.1)

## 2020-05-11 PROCEDURE — 99285 EMERGENCY DEPT VISIT HI MDM: CPT

## 2020-05-11 PROCEDURE — 82962 GLUCOSE BLOOD TEST: CPT

## 2020-05-11 PROCEDURE — 70450 CT HEAD/BRAIN W/O DYE: CPT

## 2020-05-11 PROCEDURE — 99284 EMERGENCY DEPT VISIT MOD MDM: CPT

## 2020-05-11 PROCEDURE — 81003 URINALYSIS AUTO W/O SCOPE: CPT

## 2020-05-11 PROCEDURE — 93005 ELECTROCARDIOGRAM TRACING: CPT | Performed by: EMERGENCY MEDICINE

## 2020-05-11 PROCEDURE — 80053 COMPREHEN METABOLIC PANEL: CPT

## 2020-05-11 PROCEDURE — 85025 COMPLETE CBC W/AUTO DIFF WBC: CPT

## 2020-05-11 PROCEDURE — 74011250637 HC RX REV CODE- 250/637: Performed by: EMERGENCY MEDICINE

## 2020-05-11 RX ORDER — OXYCODONE HYDROCHLORIDE 15 MG/1
15 TABLET ORAL
Status: COMPLETED | OUTPATIENT
Start: 2020-05-11 | End: 2020-05-11

## 2020-05-11 RX ADMIN — OXYCODONE HYDROCHLORIDE 15 MG: 15 TABLET ORAL at 16:28

## 2020-05-11 NOTE — ED PROVIDER NOTES
Mask was worn during the entire patient examination. Franck Dawson is a 69 y.o. female who presents to the ED with a chief complaint of confusion and altered mental status. Patient's  states that she woke up this way. She has been trying to put a phone on for a shoe. And instead of flushing the toilet she turned on the sink and walked away. He states she has had some tremors as well. She has had no focal weakness. She did have a pain pump removed several days ago but they report no change in the pain medication. Patient does take large list of medications and has been states it is possible that she could have mixed some of them up. Past Medical History:  
Diagnosis Date  Acute lumbar radiculopathy  Allergic rhinitis 12/10/2013  Anxiety and depression  Arthritis  Asthma  Atony of bladder  Chronic kidney disease STONES  Chronic pain HX FIBROMYALGIA  Claustrophobia  Diabetes ([address] BORDRLINE DIET CONTROLLED  GERD (gastroesophageal reflux disease)  Heart disease  Hypercholesteremia  Hypertension  Hypothyroidism 11/14/2012  Hypoxemia  IBS (irritable bowel syndrome) 4/8/2013  Ill-defined condition MRSA  Incomplete bladder emptying  Liver disease   
 elevated liver enzymes  Migraine headache  Morbid obesity ([address]  Myalgia and myositis, unspecified  Neurogenic bladder, NOS 4/3/2014  Organic hypersomnia, unspecified  JESSICA (obstructive sleep apnea)  Other chronic cystitis  Peripheral neuropathy 4/8/2013  Sciatica  Symptomatic menopausal or female climacteric states  Thyroid disease  Unspecified sleep apnea CPAP machine  Unspecified urinary incontinence  Vitamin D deficiency 4/8/2013 Past Surgical History:  
Procedure Laterality Date  COLONOSCOPY    
 HX BUNIONECTOMY  HX CATARACT REMOVAL    
 HX CHOLECYSTECTOMY  HX GI  09/14/2017 small bowel obstruction/lysis of adhesions  HX HYSTERECTOMY  HX ORTHOPAEDIC    
 right 5th digit x 2  
 HX OTHER SURGICAL    
 bladder lift x 2; rectocele  HX OTHER SURGICAL    
 pelvis floor  HX OTHER SURGICAL  09/2016 [facility]  HX PACEMAKER    
 HX THYROIDECTOMY  HX TONSILLECTOMY  HX UROLOGICAL Bladder sx; rectocele Family History:  
Problem Relation Age of Onset  Heart Failure Mother 54 CHF Ana Apoorvany Mother 76  Heart Disease Mother  Hypertension Mother  Asthma Mother  Cancer Mother  Diabetes Mother  Thyroid Disease Mother  Psychiatric Disorder Mother Depression  Heart Disease Father  Heart Attack Father 47  
     mi  Hypertension Father  Cancer Father  Stroke Father  Hypertension Brother  Diabetes Brother  Depression Sister  Anxiety Sister  OCD Sister  Hypertension Brother  Diabetes Brother  Depression Brother  No Known Problems Brother  Kidney Disease Other   
     gen fam hx  Osteoporosis Other  Arthritis-osteo Other  Lung Disease Other Social History Socioeconomic History  Marital status:  Spouse name: Not on file  Number of children: Not on file  Years of education: Not on file  Highest education level: Not on file Occupational History  Not on file Social Needs  Financial resource strain: Not on file  Food insecurity Worry: Not on file Inability: Not on file  Transportation needs Medical: Not on file Non-medical: Not on file Tobacco Use  Smoking status: Never Smoker  Smokeless tobacco: Never Used Substance and Sexual Activity  Alcohol use: No  
  Alcohol/week: 0.0 standard drinks Comment: rarely  Drug use: No  
 Sexual activity: Not on file Lifestyle  Physical activity Days per week: Not on file Minutes per session: Not on file  Stress: Not on file Relationships  Social connections Talks on phone: Not on file Gets together: Not on file Attends [employer] service: Not on file Active member of club or organization: Not on file Attends meetings of clubs or organizations: Not on file Relationship status: Not on file  Intimate partner violence Fear of current or ex partner: Not on file Emotionally abused: Not on file Physically abused: Not on file Forced sexual activity: Not on file Other Topics Concern  Not on file Social History Narrative  Not on file ALLERGIES: Amoxicillin; Ciprofloxacin; Codeine; Melon flavor; Morphine; Nut flavor; and Other medication Review of Systems Unable to perform ROS: Mental status change Vitals:  
 05/11/20 1318 BP: 121/90 Pulse: 82 Resp: 20 Temp: 98.4 °F (36.9 °C) SpO2: 97% Physical Exam 
Vitals signs and nursing note reviewed. Constitutional:   
   General: She is not in acute distress. Appearance: She is well-developed. She is not ill-appearing, toxic-appearing or diaphoretic. HENT:  
   Head: Normocephalic and atraumatic. Eyes:  
   General: No scleral icterus. Extraocular Movements: Extraocular movements intact. Conjunctiva/sclera: Conjunctivae normal.  
   Pupils: Pupils are equal.  
Neck: Musculoskeletal: No neck rigidity. Pulmonary:  
   Effort: Pulmonary effort is normal. No respiratory distress. Breath sounds: No stridor. No wheezing, rhonchi or rales. Chest:  
   Chest wall: No tenderness. Abdominal:  
   General: There is no distension. Palpations: Abdomen is soft. Tenderness: There is no abdominal tenderness. There is no guarding. Skin: 
   Capillary Refill: Capillary refill takes less than 2 seconds. Coloration: Skin is not jaundiced or pale. Neurological:  
   Mental Status: She is alert. Mental status is at baseline. GCS: GCS eye subscore is 4. GCS verbal subscore is 5. GCS motor subscore is 6. Psychiatric:     
   Mood and Affect: Mood normal. Mood is not anxious. Behavior: Behavior normal. Behavior is not agitated. MDM Number of Diagnoses or Management Options Transient alteration of awareness:  
Diagnosis management comments: Patient has confusion that resolved during her visit. She does admit that she thinks she got her medications mixed up this morning and she is on multiple ones we had long discussion with her  who will manage the medications for her and make sure this does not occur again. Angelique Dyson MD; 5/11/2020 @4:46 PM Voice dictation software was used during the making of this note. This software is not perfect and grammatical and other typographical errors may be present. This note has not been proofread for errors. 
=================================================================== Amount and/or Complexity of Data Reviewed Clinical lab tests: ordered and reviewed (Results for orders placed or performed during the hospital encounter of 05/11/20 
-CBC WITH AUTOMATED DIFF Result                      Value             Ref Range WBC                         10.8              4.3 - 11.1 K* 
     RBC                         4.11              4.05 - 5.2 M* HGB                         11.7              11.7 - 15.4 * HCT                         36.3              35.8 - 46.3 % MCV                         88.3              79.6 - 97.8 * MCH                         28.5              26.1 - 32.9 * MCHC                        32.2              31.4 - 35.0 * RDW                         13.6              11.9 - 14.6 % PLATELET                    294               150 - 450 K/* MPV                         9.7               9.4 - 12.3 FL ABSOLUTE NRBC               0.00              0.0 - 0.2 K/* DF                          AUTOMATED NEUTROPHILS                 71                43 - 78 % LYMPHOCYTES                 17                13 - 44 % MONOCYTES                   8                 4.0 - 12.0 % EOSINOPHILS                 2                 0.5 - 7.8 % BASOPHILS                   0                 0.0 - 2.0 % IMMATURE GRANULOCYTES       1                 0.0 - 5.0 %   
     ABS. NEUTROPHILS            7.7               1.7 - 8.2 K/* ABS. LYMPHOCYTES            1.9               0.5 - 4.6 K/* ABS. MONOCYTES              0.9               0.1 - 1.3 K/* ABS. EOSINOPHILS            0.2               0.0 - 0.8 K/* ABS. BASOPHILS              0.0               0.0 - 0.2 K/* ABS. IMM. GRANS.            0.1               0.0 - 0.5 K/* 
-METABOLIC PANEL, COMPREHENSIVE Result                      Value             Ref Range Sodium                      138               136 - 145 mm* Potassium                   3.8               3.5 - 5.1 mm* Chloride                    104               98 - 107 mmo* CO2                         25                21 - 32 mmol* Anion gap                   9                 7 - 16 mmol/L Glucose                     158 (H)           65 - 100 mg/* BUN                         14                8 - 23 MG/DL Creatinine                  0.93              0.6 - 1.0 MG* 
     GFR est AA                  >60               >60 ml/min/1* GFR est non-AA              >60               >60 ml/min/1* Calcium                     8.9               8.3 - 10.4 M* Bilirubin, total            0.2               0.2 - 1.1 MG* ALT (SGPT)                  13                12 - 65 U/L   
     AST (SGOT)                  17                15 - 37 U/L Alk.  phosphatase            85                50 - 136 U/L  
 Protein, total              7.3               6.3 - 8.2 g/* Albumin                     3.5               3.2 - 4.6 g/* Globulin                    3.8 (H)           2.3 - 3.5 g/* A-G Ratio                   0.9 (L)           1.2 - 3.5     
-GLUCOSE, POC Result                      Value             Ref Range Glucose (POC)               195 (H)           65 - 100 mg/* ) Procedures

## 2020-05-11 NOTE — ED NOTES
Pt assisted to restroom. Pt with steady gait. Pt spouse stayed in RR with pt. This RN to RR to give gown and diaper and pt belongings bag as pt had accident. Pt placed one sock in the toilet. Pt states \"why do I feel wet, I feel like I wet myself. \" This RN placed brief on pt. Pt ambulated back to bed G with steady gait. Dr. Chad Reyes aware.

## 2020-05-11 NOTE — ED NOTES
I have reviewed discharge instructions with the spouse. The spouse verbalized understanding. Patient left ED via Discharge Method: wheelchair to Home with . Opportunity for questions and clarification provided. Patient given 0 scripts. To continue your aftercare when you leave the hospital, you may receive an automated call from our care team to check in on how you are doing. This is a free service and part of our promise to provide the best care and service to meet your aftercare needs.  If you have questions, or wish to unsubscribe from this service please call [phone]. Thank you for Choosing our [address] [place] [address] Emergency Department.

## 2020-05-11 NOTE — ED TRIAGE NOTES
Pt BIB  for altered mental status, reports confusion. States that she was normal before bed, now confused to place, time and age. Denies fall/trauma. No sensory deficit or weakness. Has a pain pump removed last week. Denies fever/chills. Dr. Lorena Yeung evaluated in triage. Pt masked in triage.

## 2020-05-13 ENCOUNTER — PATIENT OUTREACH (OUTPATIENT)
Dept: CASE MANAGEMENT | Age: 70
End: 2020-05-13

## 2020-05-14 ENCOUNTER — PATIENT OUTREACH (OUTPATIENT)
Dept: CASE MANAGEMENT | Age: 70
End: 2020-05-14

## 2020-05-14 NOTE — PROGRESS NOTES
Health  Outreach    Contacted patient follow-up to for ER visit. No answer. No response from patient. Will attempt again tomorrow.

## 2020-05-14 NOTE — PROGRESS NOTES
COVID-19 ED/Flu Clinic Initial Outreach Note The patient was contacted regarding recent visit for non-viral symptoms. This author contacted the bogdanetn by telephone to perform post discharge call. Verified name and  with patient as identifiers. Provided introduction to self, and reason for call due to viral symptoms of infection and/or possible exposure to COVID-19. Patient presented to emergency department/flu clinic with AMS Patient reported symptoms are improved Due to no new or worsening sypmtoms the RN CTN/ACBLANK was not notified for escalation. Discussed exposure protocols and quarantine with CDC Guidelines What To Do If You Are Sick The patient was given an opportunity for questions and concerns. Stay home except to get medical care People who are mildly ill with COVID-19 are able to isolate at home during their illness. You should restrict activities outside your home, except for getting medical care. Do not go to work, school, or public areas. Avoid using public transportation, ride-sharing, or taxis. Separate yourself from other people and animals in your home People: As much as possible, you should stay in a specific room and away from other people in your home. Also, you should use a separate bathroom, if available. Animals: You should restrict contact with pets and other animals while you are sick with COVID-19, just like you would around other people. Although there have not been reports of pets or other animals becoming sick with COVID-19, it is still recommended that people sick with COVID-19 limit contact with animals until more information is known about the virus. When possible, have another member of your household care for your animals while you are sick. If you are sick with COVID-19, avoid contact with your pet, including petting, snuggling, being kissed or licked, and sharing food.  If you must care for your pet or be around animals while you are sick, wash your hands before and after you interact with pets and wear a facemask. Call ahead before visiting your doctor If you have a medical appointment, call the healthcare provider and tell them that you have or may have COVID-19. This will help the healthcare provider's office take steps to keep other people from getting infected or exposed. Wear a facemask You should wear a facemask when you are around other people (e.g., sharing a room or vehicle) or pets and before you enter a healthcare provider's office. If you are not able to wear a facemask (for example, because it causes trouble breathing), then people who live with you should not stay in the same room with you, or they should wear a facemask if they enter your room. Cover your coughs and sneezes Cover your mouth and nose with a tissue when you cough or sneeze. Throw used tissues in a lined trash can. Immediately wash your hands with soap and water for at least 20 seconds or, if soap and water are not available, clean your hands with an alcohol-based hand  that contains at least 60% alcohol. Clean your hands often Wash your hands often with soap and water for at least 20 seconds, especially after blowing your nose, coughing, or sneezing; going to the bathroom; and before eating or preparing food. If soap and water are not readily available, use an alcohol-based hand  with at least 60% alcohol, covering all surfaces of your hands and rubbing them together until they feel dry. Soap and water are the best option if hands are visibly dirty. Avoid touching your eyes, nose, and mouth with unwashed hands. Avoid sharing personal household items You should not share dishes, drinking glasses, cups, eating utensils, towels, or bedding with other people or pets in your home. After using these items, they should be washed thoroughly with soap and water. Clean all high-touch surfaces everyday High touch surfaces include counters, tabletops, doorknobs, bathroom fixtures, toilets, phones, keyboards, tablets, and bedside tables. Also, clean any surfaces that may have blood, stool, or body fluids on them. Use a household cleaning spray or wipe, according to the label instructions. Labels contain instructions for safe and effective use of the cleaning product including precautions you should take when applying the product, such as wearing gloves and making sure you have good ventilation during use of the product. Monitor your symptoms Seek prompt medical attention if your illness is worsening (e.g., difficulty breathing). Before seeking care, call your healthcare provider and tell them that you have, or are being evaluated for, COVID-19. Put on a facemask before you enter the facility. These steps will help the healthcare provider's office to keep other people in the office or waiting room from getting infected or exposed. Ask your healthcare provider to call the local or [facility] health department. Persons who are placed under If you have a medical emergency and need to call 911, notify the dispatch personnel that you have, or are being evaluated for COVID-19. If possible, put on a facemask before emergency medical services arrive. The patiet agrees to contact the [employer] exposure line [phone], local health department [address] ([phone]) and PCP office for questions related to their healthcare. Author provided contact information for future reference. Patient/family/caregiver given information for [employer] and agrees to enroll: Yes Patient preferred e-mail: [email or web address] Based on Loop alert triggers, patient will be contacted by nurse care manager for worsening symptoms.

## 2020-06-10 ENCOUNTER — HOSPITAL ENCOUNTER (OUTPATIENT)
Dept: MAMMOGRAPHY | Age: 70
Discharge: HOME | End: 2020-06-10
Attending: INTERNAL MEDICINE
Payer: MEDICARE

## 2020-06-10 DIAGNOSIS — M94.9 DISORDER OF BONE AND CARTILAGE: ICD-10-CM

## 2020-06-10 DIAGNOSIS — M89.9 DISORDER OF BONE AND CARTILAGE: ICD-10-CM

## 2020-06-10 PROCEDURE — 77080 DXA BONE DENSITY AXIAL: CPT

## 2020-06-10 NOTE — PROGRESS NOTES
Bone density revealed osteopenia, ensure taking daily calcium and vit D supplement otc, weight bearing exercises.

## 2020-10-26 ENCOUNTER — HOSPITAL ENCOUNTER (OUTPATIENT)
Dept: LAB | Age: 70
Discharge: HOME | End: 2020-10-26

## 2020-10-26 PROCEDURE — 88305 TISSUE EXAM BY PATHOLOGIST: CPT

## 2021-01-01 ENCOUNTER — HOSPITAL ENCOUNTER (OUTPATIENT)
Dept: MAMMOGRAPHY | Age: 71
Discharge: HOME | End: 2021-07-21
Attending: INTERNAL MEDICINE
Payer: MEDICARE

## 2021-01-01 DIAGNOSIS — Z12.31 VISIT FOR SCREENING MAMMOGRAM: ICD-10-CM

## 2021-01-01 PROCEDURE — 77067 SCR MAMMO BI INCL CAD: CPT

## 2021-01-18 ENCOUNTER — TRANSCRIBE ORDER (OUTPATIENT)
Dept: SCHEDULING | Age: 71
End: 2021-01-18

## 2021-01-18 DIAGNOSIS — R13.19 OTHER DYSPHAGIA: Primary | ICD-10-CM

## 2021-01-20 ENCOUNTER — HOSPITAL ENCOUNTER (OUTPATIENT)
Dept: GENERAL RADIOLOGY | Age: 71
Discharge: HOME | End: 2021-01-20
Attending: INTERNAL MEDICINE
Payer: MEDICARE

## 2021-01-20 DIAGNOSIS — R13.19 OTHER DYSPHAGIA: ICD-10-CM

## 2021-01-20 PROCEDURE — 74011000255 HC RX REV CODE- 255: Performed by: INTERNAL MEDICINE

## 2021-01-20 PROCEDURE — 74220 X-RAY XM ESOPHAGUS 1CNTRST: CPT

## 2021-01-20 RX ADMIN — BARIUM SULFATE 700 MG: 700 TABLET ORAL at 10:24

## 2021-01-20 RX ADMIN — BARIUM SULFATE 300 ML: 0.6 SUSPENSION ORAL at 10:24

## 2021-11-11 PROBLEM — G40.89 OTHER SEIZURES (HCC): Status: ACTIVE | Noted: 2021-01-01

## 2021-11-11 PROBLEM — R56.9 UNSPECIFIED CONVULSIONS (HCC): Status: ACTIVE | Noted: 2021-01-01

## 2022-01-01 ENCOUNTER — APPOINTMENT (OUTPATIENT)
Dept: GENERAL RADIOLOGY | Age: 72
DRG: 177 | End: 2022-01-01
Attending: INTERNAL MEDICINE
Payer: MEDICARE

## 2022-01-01 ENCOUNTER — APPOINTMENT (OUTPATIENT)
Dept: GENERAL RADIOLOGY | Age: 72
DRG: 177 | End: 2022-01-01
Attending: FAMILY MEDICINE
Payer: MEDICARE

## 2022-01-01 ENCOUNTER — APPOINTMENT (OUTPATIENT)
Dept: ULTRASOUND IMAGING | Age: 72
DRG: 177 | End: 2022-01-01
Attending: INTERNAL MEDICINE
Payer: MEDICARE

## 2022-01-01 ENCOUNTER — APPOINTMENT (OUTPATIENT)
Dept: GENERAL RADIOLOGY | Age: 72
DRG: 177 | End: 2022-01-01
Payer: MEDICARE

## 2022-01-01 ENCOUNTER — APPOINTMENT (OUTPATIENT)
Dept: ULTRASOUND IMAGING | Age: 72
DRG: 177 | End: 2022-01-01
Attending: HOSPITALIST
Payer: MEDICARE

## 2022-01-01 ENCOUNTER — HOSPITAL ENCOUNTER (INPATIENT)
Age: 72
LOS: 12 days | DRG: 177 | End: 2022-01-26
Admitting: HOSPITALIST
Payer: MEDICARE

## 2022-01-01 VITALS
BODY MASS INDEX: 30.9 KG/M2 | TEMPERATURE: 97.4 F | OXYGEN SATURATION: 85 % | DIASTOLIC BLOOD PRESSURE: 110 MMHG | HEIGHT: 63 IN | HEART RATE: 102 BPM | WEIGHT: 174.4 LBS | SYSTOLIC BLOOD PRESSURE: 122 MMHG | RESPIRATION RATE: 20 BRPM

## 2022-01-01 DIAGNOSIS — R19.7 DIARRHEA, UNSPECIFIED TYPE: ICD-10-CM

## 2022-01-01 DIAGNOSIS — E03.9 ACQUIRED HYPOTHYROIDISM: Chronic | ICD-10-CM

## 2022-01-01 DIAGNOSIS — J96.01 ACUTE HYPOXEMIC RESPIRATORY FAILURE DUE TO SEVERE ACUTE RESPIRATORY SYNDROME CORONAVIRUS 2 (SARS-COV-2) DISEASE (HCC): ICD-10-CM

## 2022-01-01 DIAGNOSIS — M79.7 FIBROMYALGIA: Chronic | ICD-10-CM

## 2022-01-01 DIAGNOSIS — F41.9 ANXIETY AND DEPRESSION: Chronic | ICD-10-CM

## 2022-01-01 DIAGNOSIS — Z71.89 ACP (ADVANCE CARE PLANNING): ICD-10-CM

## 2022-01-01 DIAGNOSIS — J96.01 ACUTE HYPOXEMIC RESPIRATORY FAILURE DUE TO COVID-19 (HCC): ICD-10-CM

## 2022-01-01 DIAGNOSIS — U07.1 ACUTE HYPOXEMIC RESPIRATORY FAILURE DUE TO COVID-19 (HCC): ICD-10-CM

## 2022-01-01 DIAGNOSIS — Z51.5 END OF LIFE CARE: ICD-10-CM

## 2022-01-01 DIAGNOSIS — U07.1 ACUTE HYPOXEMIC RESPIRATORY FAILURE DUE TO SEVERE ACUTE RESPIRATORY SYNDROME CORONAVIRUS 2 (SARS-COV-2) DISEASE (HCC): ICD-10-CM

## 2022-01-01 DIAGNOSIS — E11.40 TYPE 2 DIABETES MELLITUS WITH DIABETIC NEUROPATHY, WITHOUT LONG-TERM CURRENT USE OF INSULIN (HCC): Chronic | ICD-10-CM

## 2022-01-01 DIAGNOSIS — Z51.5 ENCOUNTER FOR PALLIATIVE CARE: ICD-10-CM

## 2022-01-01 DIAGNOSIS — R41.0 OCCASIONAL CONFUSION: ICD-10-CM

## 2022-01-01 DIAGNOSIS — N17.9 AKI (ACUTE KIDNEY INJURY) (HCC): Primary | ICD-10-CM

## 2022-01-01 DIAGNOSIS — R06.02 SHORTNESS OF BREATH: ICD-10-CM

## 2022-01-01 DIAGNOSIS — E66.01 MORBID OBESITY (HCC): Chronic | ICD-10-CM

## 2022-01-01 DIAGNOSIS — F32.A ANXIETY AND DEPRESSION: Chronic | ICD-10-CM

## 2022-01-01 LAB
ALBUMIN SERPL-MCNC: 2.6 G/DL (ref 3.2–4.6)
ALBUMIN SERPL-MCNC: 3 G/DL (ref 3.2–4.6)
ALBUMIN SERPL-MCNC: 3.1 G/DL (ref 3.2–4.6)
ALBUMIN SERPL-MCNC: 3.1 G/DL (ref 3.2–4.6)
ALBUMIN SERPL-MCNC: 3.3 G/DL (ref 3.2–4.6)
ALBUMIN SERPL-MCNC: 3.4 G/DL (ref 3.2–4.6)
ALBUMIN SERPL-MCNC: 3.4 G/DL (ref 3.2–4.6)
ALBUMIN SERPL-MCNC: 3.6 G/DL (ref 3.2–4.6)
ALBUMIN/GLOB SERPL: 0.6 {RATIO} (ref 1.2–3.5)
ALBUMIN/GLOB SERPL: 0.7 {RATIO} (ref 1.2–3.5)
ALBUMIN/GLOB SERPL: 0.7 {RATIO} (ref 1.2–3.5)
ALBUMIN/GLOB SERPL: 0.8 {RATIO} (ref 1.2–3.5)
ALBUMIN/GLOB SERPL: 0.9 {RATIO} (ref 1.2–3.5)
ALBUMIN/GLOB SERPL: 0.9 {RATIO} (ref 1.2–3.5)
ALP SERPL-CCNC: 111 U/L (ref 50–136)
ALP SERPL-CCNC: 131 U/L (ref 50–136)
ALP SERPL-CCNC: 61 U/L (ref 50–136)
ALP SERPL-CCNC: 65 U/L (ref 50–136)
ALP SERPL-CCNC: 76 U/L (ref 50–136)
ALP SERPL-CCNC: 82 U/L (ref 50–136)
ALP SERPL-CCNC: 86 U/L (ref 50–136)
ALP SERPL-CCNC: 98 U/L (ref 50–136)
ALT SERPL-CCNC: 15 U/L (ref 12–65)
ALT SERPL-CCNC: 19 U/L (ref 12–65)
ALT SERPL-CCNC: 22 U/L (ref 12–65)
ALT SERPL-CCNC: 22 U/L (ref 12–65)
ALT SERPL-CCNC: 23 U/L (ref 12–65)
AMPHET UR QL SCN: NEGATIVE
ANION GAP SERPL CALC-SCNC: 10 MMOL/L (ref 7–16)
ANION GAP SERPL CALC-SCNC: 10 MMOL/L (ref 7–16)
ANION GAP SERPL CALC-SCNC: 11 MMOL/L (ref 7–16)
ANION GAP SERPL CALC-SCNC: 12 MMOL/L (ref 7–16)
ANION GAP SERPL CALC-SCNC: 13 MMOL/L (ref 7–16)
ANION GAP SERPL CALC-SCNC: 14 MMOL/L (ref 7–16)
ANION GAP SERPL CALC-SCNC: 7 MMOL/L (ref 7–16)
ANION GAP SERPL CALC-SCNC: 7 MMOL/L (ref 7–16)
ANION GAP SERPL CALC-SCNC: 8 MMOL/L (ref 7–16)
ANION GAP SERPL CALC-SCNC: 9 MMOL/L (ref 7–16)
ANION GAP SERPL CALC-SCNC: 9 MMOL/L (ref 7–16)
APPEARANCE UR: ABNORMAL
APTT PPP: 29.1 SEC (ref 24.1–35.1)
ARTERIAL PATENCY WRIST A: POSITIVE
AST SERPL-CCNC: 19 U/L (ref 15–37)
AST SERPL-CCNC: 23 U/L (ref 15–37)
AST SERPL-CCNC: 23 U/L (ref 15–37)
AST SERPL-CCNC: 27 U/L (ref 15–37)
AST SERPL-CCNC: 30 U/L (ref 15–37)
AST SERPL-CCNC: 42 U/L (ref 15–37)
AST SERPL-CCNC: 49 U/L (ref 15–37)
AST SERPL-CCNC: 56 U/L (ref 15–37)
ATRIAL RATE: 83 BPM
BACTERIA URNS QL MICRO: ABNORMAL /HPF
BARBITURATES UR QL SCN: NEGATIVE
BASE DEFICIT BLD-SCNC: 5.1 MMOL/L
BASE DEFICIT BLD-SCNC: 6.3 MMOL/L
BASE EXCESS BLD CALC-SCNC: 4.9 MMOL/L
BASE EXCESS BLDV CALC-SCNC: 2 MMOL/L
BASOPHILS # BLD: 0 K/UL (ref 0–0.2)
BASOPHILS # BLD: 0.1 K/UL (ref 0–0.2)
BASOPHILS # BLD: 0.1 K/UL (ref 0–0.2)
BASOPHILS NFR BLD: 0 % (ref 0–2)
BDY SITE: ABNORMAL
BENZODIAZ UR QL: NEGATIVE
BILIRUB SERPL-MCNC: 0.2 MG/DL (ref 0.2–1.1)
BILIRUB SERPL-MCNC: 0.3 MG/DL (ref 0.2–1.1)
BILIRUB SERPL-MCNC: 0.4 MG/DL (ref 0.2–1.1)
BILIRUB SERPL-MCNC: 0.5 MG/DL (ref 0.2–1.1)
BILIRUB SERPL-MCNC: 0.5 MG/DL (ref 0.2–1.1)
BILIRUB SERPL-MCNC: 0.6 MG/DL (ref 0.2–1.1)
BILIRUB UR QL: ABNORMAL
BNP SERPL-MCNC: ABNORMAL PG/ML (ref 5–125)
BUN SERPL-MCNC: 102 MG/DL (ref 8–23)
BUN SERPL-MCNC: 32 MG/DL (ref 8–23)
BUN SERPL-MCNC: 49 MG/DL (ref 8–23)
BUN SERPL-MCNC: 57 MG/DL (ref 8–23)
BUN SERPL-MCNC: 66 MG/DL (ref 8–23)
BUN SERPL-MCNC: 71 MG/DL (ref 8–23)
BUN SERPL-MCNC: 74 MG/DL (ref 8–23)
BUN SERPL-MCNC: 78 MG/DL (ref 8–23)
BUN SERPL-MCNC: 80 MG/DL (ref 8–23)
BUN SERPL-MCNC: 86 MG/DL (ref 8–23)
BUN SERPL-MCNC: 89 MG/DL (ref 8–23)
BUN SERPL-MCNC: 94 MG/DL (ref 8–23)
BUN SERPL-MCNC: 97 MG/DL (ref 8–23)
CALCIUM SERPL-MCNC: 7.6 MG/DL (ref 8.3–10.4)
CALCIUM SERPL-MCNC: 7.9 MG/DL (ref 8.3–10.4)
CALCIUM SERPL-MCNC: 8.1 MG/DL (ref 8.3–10.4)
CALCIUM SERPL-MCNC: 8.2 MG/DL (ref 8.3–10.4)
CALCIUM SERPL-MCNC: 8.4 MG/DL (ref 8.3–10.4)
CALCIUM SERPL-MCNC: 8.4 MG/DL (ref 8.3–10.4)
CALCIUM SERPL-MCNC: 8.5 MG/DL (ref 8.3–10.4)
CALCIUM SERPL-MCNC: 8.6 MG/DL (ref 8.3–10.4)
CALCIUM SERPL-MCNC: 8.6 MG/DL (ref 8.3–10.4)
CALCIUM SERPL-MCNC: 8.9 MG/DL (ref 8.3–10.4)
CALCIUM SERPL-MCNC: 8.9 MG/DL (ref 8.3–10.4)
CALCIUM SERPL-MCNC: 9 MG/DL (ref 8.3–10.4)
CALCIUM SERPL-MCNC: 9.1 MG/DL (ref 8.3–10.4)
CALCULATED P AXIS, ECG09: 73 DEGREES
CALCULATED R AXIS, ECG10: -21 DEGREES
CALCULATED T AXIS, ECG11: 90 DEGREES
CANNABINOIDS UR QL SCN: NEGATIVE
CHLORIDE SERPL-SCNC: 100 MMOL/L (ref 98–107)
CHLORIDE SERPL-SCNC: 102 MMOL/L (ref 98–107)
CHLORIDE SERPL-SCNC: 102 MMOL/L (ref 98–107)
CHLORIDE SERPL-SCNC: 103 MMOL/L (ref 98–107)
CHLORIDE SERPL-SCNC: 104 MMOL/L (ref 98–107)
CHLORIDE SERPL-SCNC: 104 MMOL/L (ref 98–107)
CHLORIDE SERPL-SCNC: 107 MMOL/L (ref 98–107)
CHLORIDE SERPL-SCNC: 108 MMOL/L (ref 98–107)
CHLORIDE SERPL-SCNC: 98 MMOL/L (ref 98–107)
CHLORIDE SERPL-SCNC: 99 MMOL/L (ref 98–107)
CHLORIDE SERPL-SCNC: 99 MMOL/L (ref 98–107)
CO2 SERPL-SCNC: 19 MMOL/L (ref 21–32)
CO2 SERPL-SCNC: 22 MMOL/L (ref 21–32)
CO2 SERPL-SCNC: 22 MMOL/L (ref 21–32)
CO2 SERPL-SCNC: 23 MMOL/L (ref 21–32)
CO2 SERPL-SCNC: 25 MMOL/L (ref 21–32)
CO2 SERPL-SCNC: 25 MMOL/L (ref 21–32)
CO2 SERPL-SCNC: 27 MMOL/L (ref 21–32)
CO2 SERPL-SCNC: 28 MMOL/L (ref 21–32)
CO2 SERPL-SCNC: 30 MMOL/L (ref 21–32)
CO2 SERPL-SCNC: 30 MMOL/L (ref 21–32)
CO2 SERPL-SCNC: 31 MMOL/L (ref 21–32)
CO2 SERPL-SCNC: 32 MMOL/L (ref 21–32)
CO2 SERPL-SCNC: 32 MMOL/L (ref 21–32)
COCAINE UR QL SCN: NEGATIVE
COLOR UR: YELLOW
COVID-19 RAPID TEST, COVR: DETECTED
CREAT SERPL-MCNC: 1.42 MG/DL (ref 0.6–1)
CREAT SERPL-MCNC: 1.43 MG/DL (ref 0.6–1)
CREAT SERPL-MCNC: 1.71 MG/DL (ref 0.6–1)
CREAT SERPL-MCNC: 1.72 MG/DL (ref 0.6–1)
CREAT SERPL-MCNC: 1.89 MG/DL (ref 0.6–1)
CREAT SERPL-MCNC: 2.14 MG/DL (ref 0.6–1)
CREAT SERPL-MCNC: 2.54 MG/DL (ref 0.6–1)
CREAT SERPL-MCNC: 2.87 MG/DL (ref 0.6–1)
CREAT SERPL-MCNC: 3.4 MG/DL (ref 0.6–1)
CREAT SERPL-MCNC: 3.74 MG/DL (ref 0.6–1)
CREAT SERPL-MCNC: 3.8 MG/DL (ref 0.6–1)
CREAT SERPL-MCNC: 3.92 MG/DL (ref 0.6–1)
CREAT SERPL-MCNC: 4.1 MG/DL (ref 0.6–1)
CREAT UR-MCNC: 151 MG/DL
CRP SERPL-MCNC: 16.9 MG/DL (ref 0–0.9)
CRP SERPL-MCNC: 17 MG/DL (ref 0–0.9)
CRP SERPL-MCNC: 17.7 MG/DL (ref 0–0.9)
CRP SERPL-MCNC: 2.2 MG/DL (ref 0–0.9)
CRP SERPL-MCNC: 4.3 MG/DL (ref 0–0.9)
CRP SERPL-MCNC: 8.9 MG/DL (ref 0–0.9)
D DIMER PPP FEU-MCNC: 2.14 UG/ML(FEU)
D DIMER PPP FEU-MCNC: 3.2 UG/ML(FEU)
D DIMER PPP FEU-MCNC: 3.64 UG/ML(FEU)
D DIMER PPP FEU-MCNC: 3.95 UG/ML(FEU)
DIAGNOSIS, 93000: NORMAL
DIFFERENTIAL METHOD BLD: ABNORMAL
EOSINOPHIL # BLD: 0 K/UL (ref 0–0.8)
EOSINOPHIL # BLD: 0.2 K/UL (ref 0–0.8)
EOSINOPHIL # BLD: 0.3 K/UL (ref 0–0.8)
EOSINOPHIL # BLD: 0.3 K/UL (ref 0–0.8)
EOSINOPHIL NFR BLD: 0 % (ref 0.5–7.8)
EOSINOPHIL NFR BLD: 1 % (ref 0.5–7.8)
EOSINOPHIL NFR BLD: 2 % (ref 0.5–7.8)
EPI CELLS #/AREA URNS HPF: ABNORMAL /HPF
ERYTHROCYTE [DISTWIDTH] IN BLOOD BY AUTOMATED COUNT: 14.4 % (ref 11.9–14.6)
ERYTHROCYTE [DISTWIDTH] IN BLOOD BY AUTOMATED COUNT: 14.5 % (ref 11.9–14.6)
ERYTHROCYTE [DISTWIDTH] IN BLOOD BY AUTOMATED COUNT: 14.5 % (ref 11.9–14.6)
ERYTHROCYTE [DISTWIDTH] IN BLOOD BY AUTOMATED COUNT: 14.6 % (ref 11.9–14.6)
ERYTHROCYTE [DISTWIDTH] IN BLOOD BY AUTOMATED COUNT: 14.7 % (ref 11.9–14.6)
ERYTHROCYTE [DISTWIDTH] IN BLOOD BY AUTOMATED COUNT: 14.8 % (ref 11.9–14.6)
ERYTHROCYTE [DISTWIDTH] IN BLOOD BY AUTOMATED COUNT: 14.9 % (ref 11.9–14.6)
ERYTHROCYTE [DISTWIDTH] IN BLOOD BY AUTOMATED COUNT: 15 % (ref 11.9–14.6)
EST. AVERAGE GLUCOSE BLD GHB EST-MCNC: 183 MG/DL
GAS FLOW.O2 O2 DELIVERY SYS: ABNORMAL L/MIN
GLOBULIN SER CALC-MCNC: 3.9 G/DL (ref 2.3–3.5)
GLOBULIN SER CALC-MCNC: 4 G/DL (ref 2.3–3.5)
GLOBULIN SER CALC-MCNC: 4 G/DL (ref 2.3–3.5)
GLOBULIN SER CALC-MCNC: 4.1 G/DL (ref 2.3–3.5)
GLOBULIN SER CALC-MCNC: 4.1 G/DL (ref 2.3–3.5)
GLOBULIN SER CALC-MCNC: 4.2 G/DL (ref 2.3–3.5)
GLOBULIN SER CALC-MCNC: 4.3 G/DL (ref 2.3–3.5)
GLOBULIN SER CALC-MCNC: 4.4 G/DL (ref 2.3–3.5)
GLUCOSE BLD STRIP.AUTO-MCNC: 110 MG/DL (ref 65–100)
GLUCOSE BLD STRIP.AUTO-MCNC: 114 MG/DL (ref 65–100)
GLUCOSE BLD STRIP.AUTO-MCNC: 125 MG/DL (ref 65–100)
GLUCOSE BLD STRIP.AUTO-MCNC: 132 MG/DL (ref 65–100)
GLUCOSE BLD STRIP.AUTO-MCNC: 170 MG/DL (ref 65–100)
GLUCOSE BLD STRIP.AUTO-MCNC: 171 MG/DL (ref 65–100)
GLUCOSE BLD STRIP.AUTO-MCNC: 175 MG/DL (ref 65–100)
GLUCOSE BLD STRIP.AUTO-MCNC: 177 MG/DL (ref 65–100)
GLUCOSE BLD STRIP.AUTO-MCNC: 188 MG/DL (ref 65–100)
GLUCOSE BLD STRIP.AUTO-MCNC: 193 MG/DL (ref 65–100)
GLUCOSE BLD STRIP.AUTO-MCNC: 204 MG/DL (ref 65–100)
GLUCOSE BLD STRIP.AUTO-MCNC: 206 MG/DL (ref 65–100)
GLUCOSE BLD STRIP.AUTO-MCNC: 215 MG/DL (ref 65–100)
GLUCOSE BLD STRIP.AUTO-MCNC: 216 MG/DL (ref 65–100)
GLUCOSE BLD STRIP.AUTO-MCNC: 223 MG/DL (ref 65–100)
GLUCOSE BLD STRIP.AUTO-MCNC: 225 MG/DL (ref 65–100)
GLUCOSE BLD STRIP.AUTO-MCNC: 228 MG/DL (ref 65–100)
GLUCOSE BLD STRIP.AUTO-MCNC: 233 MG/DL (ref 65–100)
GLUCOSE BLD STRIP.AUTO-MCNC: 234 MG/DL (ref 65–100)
GLUCOSE BLD STRIP.AUTO-MCNC: 234 MG/DL (ref 65–100)
GLUCOSE BLD STRIP.AUTO-MCNC: 241 MG/DL (ref 65–100)
GLUCOSE BLD STRIP.AUTO-MCNC: 242 MG/DL (ref 65–100)
GLUCOSE BLD STRIP.AUTO-MCNC: 245 MG/DL (ref 65–100)
GLUCOSE BLD STRIP.AUTO-MCNC: 247 MG/DL (ref 65–100)
GLUCOSE BLD STRIP.AUTO-MCNC: 248 MG/DL (ref 65–100)
GLUCOSE BLD STRIP.AUTO-MCNC: 250 MG/DL (ref 65–100)
GLUCOSE BLD STRIP.AUTO-MCNC: 251 MG/DL (ref 65–100)
GLUCOSE BLD STRIP.AUTO-MCNC: 257 MG/DL (ref 65–100)
GLUCOSE BLD STRIP.AUTO-MCNC: 261 MG/DL (ref 65–100)
GLUCOSE BLD STRIP.AUTO-MCNC: 270 MG/DL (ref 65–100)
GLUCOSE BLD STRIP.AUTO-MCNC: 271 MG/DL (ref 65–100)
GLUCOSE BLD STRIP.AUTO-MCNC: 281 MG/DL (ref 65–100)
GLUCOSE BLD STRIP.AUTO-MCNC: 285 MG/DL (ref 65–100)
GLUCOSE BLD STRIP.AUTO-MCNC: 286 MG/DL (ref 65–100)
GLUCOSE BLD STRIP.AUTO-MCNC: 291 MG/DL (ref 65–100)
GLUCOSE BLD STRIP.AUTO-MCNC: 305 MG/DL (ref 65–100)
GLUCOSE BLD STRIP.AUTO-MCNC: 305 MG/DL (ref 65–100)
GLUCOSE BLD STRIP.AUTO-MCNC: 307 MG/DL (ref 65–100)
GLUCOSE BLD STRIP.AUTO-MCNC: 311 MG/DL (ref 65–100)
GLUCOSE BLD STRIP.AUTO-MCNC: 317 MG/DL (ref 65–100)
GLUCOSE BLD STRIP.AUTO-MCNC: 318 MG/DL (ref 65–100)
GLUCOSE BLD STRIP.AUTO-MCNC: 319 MG/DL (ref 65–100)
GLUCOSE BLD STRIP.AUTO-MCNC: 329 MG/DL (ref 65–100)
GLUCOSE BLD STRIP.AUTO-MCNC: 329 MG/DL (ref 65–100)
GLUCOSE BLD STRIP.AUTO-MCNC: 330 MG/DL (ref 65–100)
GLUCOSE BLD STRIP.AUTO-MCNC: 348 MG/DL (ref 65–100)
GLUCOSE BLD STRIP.AUTO-MCNC: 357 MG/DL (ref 65–100)
GLUCOSE BLD STRIP.AUTO-MCNC: 370 MG/DL (ref 65–100)
GLUCOSE BLD STRIP.AUTO-MCNC: 372 MG/DL (ref 65–100)
GLUCOSE SERPL-MCNC: 100 MG/DL (ref 65–100)
GLUCOSE SERPL-MCNC: 108 MG/DL (ref 65–100)
GLUCOSE SERPL-MCNC: 125 MG/DL (ref 65–100)
GLUCOSE SERPL-MCNC: 133 MG/DL (ref 65–100)
GLUCOSE SERPL-MCNC: 156 MG/DL (ref 65–100)
GLUCOSE SERPL-MCNC: 192 MG/DL (ref 65–100)
GLUCOSE SERPL-MCNC: 204 MG/DL (ref 65–100)
GLUCOSE SERPL-MCNC: 208 MG/DL (ref 65–100)
GLUCOSE SERPL-MCNC: 242 MG/DL (ref 65–100)
GLUCOSE SERPL-MCNC: 253 MG/DL (ref 65–100)
GLUCOSE SERPL-MCNC: 268 MG/DL (ref 65–100)
GLUCOSE SERPL-MCNC: 72 MG/DL (ref 65–100)
GLUCOSE SERPL-MCNC: 78 MG/DL (ref 65–100)
GLUCOSE UR STRIP.AUTO-MCNC: 500 MG/DL
HBA1C MFR BLD: 8 % (ref 4.2–6.3)
HCO3 BLD-SCNC: 16.2 MMOL/L (ref 22–26)
HCO3 BLD-SCNC: 19.7 MMOL/L (ref 22–26)
HCO3 BLD-SCNC: 28.3 MMOL/L (ref 22–26)
HCO3 BLDV-SCNC: 26.1 MMOL/L (ref 23–28)
HCT VFR BLD AUTO: 32.4 % (ref 35.8–46.3)
HCT VFR BLD AUTO: 33.1 % (ref 35.8–46.3)
HCT VFR BLD AUTO: 34.7 % (ref 35.8–46.3)
HCT VFR BLD AUTO: 35.3 % (ref 35.8–46.3)
HCT VFR BLD AUTO: 35.3 % (ref 35.8–46.3)
HCT VFR BLD AUTO: 38.1 % (ref 35.8–46.3)
HCT VFR BLD AUTO: 38.2 % (ref 35.8–46.3)
HCT VFR BLD AUTO: 38.3 % (ref 35.8–46.3)
HCT VFR BLD AUTO: 39.1 % (ref 35.8–46.3)
HCT VFR BLD AUTO: 39.2 % (ref 35.8–46.3)
HCT VFR BLD AUTO: 39.6 % (ref 35.8–46.3)
HCT VFR BLD AUTO: 39.7 % (ref 35.8–46.3)
HCT VFR BLD AUTO: 40.2 % (ref 35.8–46.3)
HCT VFR BLD AUTO: 40.6 % (ref 35.8–46.3)
HGB BLD-MCNC: 10.1 G/DL (ref 11.7–15.4)
HGB BLD-MCNC: 10.5 G/DL (ref 11.7–15.4)
HGB BLD-MCNC: 10.7 G/DL (ref 11.7–15.4)
HGB BLD-MCNC: 10.8 G/DL (ref 11.7–15.4)
HGB BLD-MCNC: 11.4 G/DL (ref 11.7–15.4)
HGB BLD-MCNC: 11.7 G/DL (ref 11.7–15.4)
HGB BLD-MCNC: 11.8 G/DL (ref 11.7–15.4)
HGB BLD-MCNC: 12.2 G/DL (ref 11.7–15.4)
HGB BLD-MCNC: 12.3 G/DL (ref 11.7–15.4)
HGB BLD-MCNC: 12.4 G/DL (ref 11.7–15.4)
HGB BLD-MCNC: 12.4 G/DL (ref 11.7–15.4)
HGB BLD-MCNC: 9.9 G/DL (ref 11.7–15.4)
HGB UR QL STRIP: ABNORMAL
IMM GRANULOCYTES # BLD AUTO: 0.1 K/UL (ref 0–0.5)
IMM GRANULOCYTES # BLD AUTO: 0.1 K/UL (ref 0–0.5)
IMM GRANULOCYTES # BLD AUTO: 0.2 K/UL (ref 0–0.5)
IMM GRANULOCYTES NFR BLD AUTO: 1 % (ref 0–5)
KETONES UR QL STRIP.AUTO: ABNORMAL MG/DL
LACTATE SERPL-SCNC: 1.1 MMOL/L (ref 0.4–2)
LACTATE SERPL-SCNC: 2.7 MMOL/L (ref 0.4–2)
LEUKOCYTE ESTERASE UR QL STRIP.AUTO: NEGATIVE
LYMPHOCYTES # BLD: 0.5 K/UL (ref 0.5–4.6)
LYMPHOCYTES # BLD: 0.7 K/UL (ref 0.5–4.6)
LYMPHOCYTES # BLD: 1 K/UL (ref 0.5–4.6)
LYMPHOCYTES # BLD: 1 K/UL (ref 0.5–4.6)
LYMPHOCYTES # BLD: 1.5 K/UL (ref 0.5–4.6)
LYMPHOCYTES # BLD: 1.6 K/UL (ref 0.5–4.6)
LYMPHOCYTES # BLD: 1.7 K/UL (ref 0.5–4.6)
LYMPHOCYTES # BLD: 1.8 K/UL (ref 0.5–4.6)
LYMPHOCYTES NFR BLD: 10 % (ref 13–44)
LYMPHOCYTES NFR BLD: 10 % (ref 13–44)
LYMPHOCYTES NFR BLD: 17 % (ref 13–44)
LYMPHOCYTES NFR BLD: 4 % (ref 13–44)
LYMPHOCYTES NFR BLD: 4 % (ref 13–44)
LYMPHOCYTES NFR BLD: 7 % (ref 13–44)
LYMPHOCYTES NFR BLD: 8 % (ref 13–44)
LYMPHOCYTES NFR BLD: 9 % (ref 13–44)
MAGNESIUM SERPL-MCNC: 1.6 MG/DL (ref 1.8–2.4)
MAGNESIUM SERPL-MCNC: 1.7 MG/DL (ref 1.8–2.4)
MAGNESIUM SERPL-MCNC: 1.8 MG/DL (ref 1.8–2.4)
MAGNESIUM SERPL-MCNC: 2 MG/DL (ref 1.8–2.4)
MAGNESIUM SERPL-MCNC: 2 MG/DL (ref 1.8–2.4)
MAGNESIUM SERPL-MCNC: 2.1 MG/DL (ref 1.8–2.4)
MCH RBC QN AUTO: 25.2 PG (ref 26.1–32.9)
MCH RBC QN AUTO: 25.3 PG (ref 26.1–32.9)
MCH RBC QN AUTO: 25.3 PG (ref 26.1–32.9)
MCH RBC QN AUTO: 25.5 PG (ref 26.1–32.9)
MCH RBC QN AUTO: 25.5 PG (ref 26.1–32.9)
MCH RBC QN AUTO: 25.6 PG (ref 26.1–32.9)
MCH RBC QN AUTO: 25.7 PG (ref 26.1–32.9)
MCH RBC QN AUTO: 25.7 PG (ref 26.1–32.9)
MCH RBC QN AUTO: 25.8 PG (ref 26.1–32.9)
MCH RBC QN AUTO: 26.1 PG (ref 26.1–32.9)
MCH RBC QN AUTO: 26.2 PG (ref 26.1–32.9)
MCH RBC QN AUTO: 26.3 PG (ref 26.1–32.9)
MCHC RBC AUTO-ENTMCNC: 29.8 G/DL (ref 31.4–35)
MCHC RBC AUTO-ENTMCNC: 30.1 G/DL (ref 31.4–35)
MCHC RBC AUTO-ENTMCNC: 30.2 G/DL (ref 31.4–35)
MCHC RBC AUTO-ENTMCNC: 30.3 G/DL (ref 31.4–35)
MCHC RBC AUTO-ENTMCNC: 30.3 G/DL (ref 31.4–35)
MCHC RBC AUTO-ENTMCNC: 30.5 G/DL (ref 31.4–35)
MCHC RBC AUTO-ENTMCNC: 30.5 G/DL (ref 31.4–35)
MCHC RBC AUTO-ENTMCNC: 30.6 G/DL (ref 31.4–35)
MCHC RBC AUTO-ENTMCNC: 30.7 G/DL (ref 31.4–35)
MCHC RBC AUTO-ENTMCNC: 30.8 G/DL (ref 31.4–35)
MCHC RBC AUTO-ENTMCNC: 30.8 G/DL (ref 31.4–35)
MCHC RBC AUTO-ENTMCNC: 31.2 G/DL (ref 31.4–35)
MCV RBC AUTO: 82.3 FL (ref 79.6–97.8)
MCV RBC AUTO: 82.7 FL (ref 79.6–97.8)
MCV RBC AUTO: 83.6 FL (ref 79.6–97.8)
MCV RBC AUTO: 83.6 FL (ref 79.6–97.8)
MCV RBC AUTO: 84.2 FL (ref 79.6–97.8)
MCV RBC AUTO: 84.2 FL (ref 79.6–97.8)
MCV RBC AUTO: 84.3 FL (ref 79.6–97.8)
MCV RBC AUTO: 84.4 FL (ref 79.6–97.8)
MCV RBC AUTO: 84.4 FL (ref 79.6–97.8)
MCV RBC AUTO: 85 FL (ref 79.6–97.8)
MCV RBC AUTO: 85.3 FL (ref 79.6–97.8)
MCV RBC AUTO: 85.5 FL (ref 79.6–97.8)
MCV RBC AUTO: 85.6 FL (ref 79.6–97.8)
MCV RBC AUTO: 86.1 FL (ref 79.6–97.8)
METHADONE UR QL: NEGATIVE
MONOCYTES # BLD: 0.6 K/UL (ref 0.1–1.3)
MONOCYTES # BLD: 0.7 K/UL (ref 0.1–1.3)
MONOCYTES # BLD: 0.8 K/UL (ref 0.1–1.3)
MONOCYTES # BLD: 1 K/UL (ref 0.1–1.3)
MONOCYTES # BLD: 1 K/UL (ref 0.1–1.3)
MONOCYTES # BLD: 1.2 K/UL (ref 0.1–1.3)
MONOCYTES NFR BLD: 10 % (ref 4–12)
MONOCYTES NFR BLD: 11 % (ref 4–12)
MONOCYTES NFR BLD: 3 % (ref 4–12)
MONOCYTES NFR BLD: 5 % (ref 4–12)
MONOCYTES NFR BLD: 6 % (ref 4–12)
MONOCYTES NFR BLD: 7 % (ref 4–12)
NEUTS SEG # BLD: 13.5 K/UL (ref 1.7–8.2)
NEUTS SEG # BLD: 14.3 K/UL (ref 1.7–8.2)
NEUTS SEG # BLD: 14.7 K/UL (ref 1.7–8.2)
NEUTS SEG # BLD: 15.3 K/UL (ref 1.7–8.2)
NEUTS SEG # BLD: 15.5 K/UL (ref 1.7–8.2)
NEUTS SEG # BLD: 22.1 K/UL (ref 1.7–8.2)
NEUTS SEG # BLD: 3.9 K/UL (ref 1.7–8.2)
NEUTS SEG # BLD: 6.2 K/UL (ref 1.7–8.2)
NEUTS SEG NFR BLD: 71 % (ref 43–78)
NEUTS SEG NFR BLD: 81 % (ref 43–78)
NEUTS SEG NFR BLD: 82 % (ref 43–78)
NEUTS SEG NFR BLD: 83 % (ref 43–78)
NEUTS SEG NFR BLD: 83 % (ref 43–78)
NEUTS SEG NFR BLD: 84 % (ref 43–78)
NEUTS SEG NFR BLD: 90 % (ref 43–78)
NEUTS SEG NFR BLD: 91 % (ref 43–78)
NITRITE UR QL STRIP.AUTO: NEGATIVE
NRBC # BLD: 0 K/UL (ref 0–0.2)
O2/TOTAL GAS SETTING VFR VENT: 100 %
O2/TOTAL GAS SETTING VFR VENT: 100 %
O2/TOTAL GAS SETTING VFR VENT: 85 %
OPIATES UR QL: POSITIVE
OTHER OBSERVATIONS,UCOM: ABNORMAL
P-R INTERVAL, ECG05: 151 MS
PCO2 BLD: 23.4 MMHG (ref 35–45)
PCO2 BLD: 35 MMHG (ref 35–45)
PCO2 BLD: 36.2 MMHG (ref 35–45)
PCO2 BLDV: 37.9 MMHG (ref 41–51)
PCP UR QL: NEGATIVE
PH BLD: 7.36 [PH] (ref 7.35–7.45)
PH BLD: 7.45 [PH] (ref 7.35–7.45)
PH BLD: 7.5 [PH] (ref 7.35–7.45)
PH BLDV: 7.45 [PH] (ref 7.32–7.42)
PH UR STRIP: 5 [PH] (ref 5–9)
PLATELET # BLD AUTO: 248 K/UL (ref 150–450)
PLATELET # BLD AUTO: 265 K/UL (ref 150–450)
PLATELET # BLD AUTO: 266 K/UL (ref 150–450)
PLATELET # BLD AUTO: 283 K/UL (ref 150–450)
PLATELET # BLD AUTO: 315 K/UL (ref 150–450)
PLATELET # BLD AUTO: 319 K/UL (ref 150–450)
PLATELET # BLD AUTO: 325 K/UL (ref 150–450)
PLATELET # BLD AUTO: 332 K/UL (ref 150–450)
PLATELET # BLD AUTO: 341 K/UL (ref 150–450)
PLATELET # BLD AUTO: 344 K/UL (ref 150–450)
PLATELET # BLD AUTO: 351 K/UL (ref 150–450)
PLATELET # BLD AUTO: 358 K/UL (ref 150–450)
PLATELET # BLD AUTO: 367 K/UL (ref 150–450)
PLATELET # BLD AUTO: 389 K/UL (ref 150–450)
PMV BLD AUTO: 10 FL (ref 9.4–12.3)
PMV BLD AUTO: 10.4 FL (ref 9.4–12.3)
PMV BLD AUTO: 10.8 FL (ref 9.4–12.3)
PMV BLD AUTO: 10.8 FL (ref 9.4–12.3)
PMV BLD AUTO: 11 FL (ref 9.4–12.3)
PMV BLD AUTO: 11.4 FL (ref 9.4–12.3)
PMV BLD AUTO: 11.4 FL (ref 9.4–12.3)
PMV BLD AUTO: 11.5 FL (ref 9.4–12.3)
PMV BLD AUTO: 11.5 FL (ref 9.4–12.3)
PMV BLD AUTO: 9.5 FL (ref 9.4–12.3)
PMV BLD AUTO: 9.7 FL (ref 9.4–12.3)
PMV BLD AUTO: 9.8 FL (ref 9.4–12.3)
PMV BLD AUTO: 9.8 FL (ref 9.4–12.3)
PMV BLD AUTO: 9.9 FL (ref 9.4–12.3)
PO2 BLD: 46 MMHG (ref 75–100)
PO2 BLD: 54 MMHG (ref 75–100)
PO2 BLD: 63 MMHG (ref 75–100)
PO2 BLDV: 53 MMHG
POTASSIUM SERPL-SCNC: 2.9 MMOL/L (ref 3.5–5.1)
POTASSIUM SERPL-SCNC: 3.1 MMOL/L (ref 3.5–5.1)
POTASSIUM SERPL-SCNC: 3.2 MMOL/L (ref 3.5–5.1)
POTASSIUM SERPL-SCNC: 3.3 MMOL/L (ref 3.5–5.1)
POTASSIUM SERPL-SCNC: 3.5 MMOL/L (ref 3.5–5.1)
POTASSIUM SERPL-SCNC: 3.6 MMOL/L (ref 3.5–5.1)
POTASSIUM SERPL-SCNC: 3.6 MMOL/L (ref 3.5–5.1)
POTASSIUM SERPL-SCNC: 3.7 MMOL/L (ref 3.5–5.1)
POTASSIUM SERPL-SCNC: 3.7 MMOL/L (ref 3.5–5.1)
POTASSIUM SERPL-SCNC: 3.9 MMOL/L (ref 3.5–5.1)
POTASSIUM SERPL-SCNC: 4 MMOL/L (ref 3.5–5.1)
PROCALCITONIN SERPL-MCNC: 1.53 NG/ML (ref 0–0.49)
PROT SERPL-MCNC: 6.8 G/DL (ref 6.3–8.2)
PROT SERPL-MCNC: 7.1 G/DL (ref 6.3–8.2)
PROT SERPL-MCNC: 7.4 G/DL (ref 6.3–8.2)
PROT SERPL-MCNC: 7.5 G/DL (ref 6.3–8.2)
PROT SERPL-MCNC: 7.5 G/DL (ref 6.3–8.2)
PROT UR STRIP-MCNC: 100 MG/DL
Q-T INTERVAL, ECG07: 364 MS
QRS DURATION, ECG06: 105 MS
QTC CALCULATION (BEZET), ECG08: 431 MS
RBC # BLD AUTO: 3.85 M/UL (ref 4.05–5.2)
RBC # BLD AUTO: 3.93 M/UL (ref 4.05–5.2)
RBC # BLD AUTO: 4.1 M/UL (ref 4.05–5.2)
RBC # BLD AUTO: 4.11 M/UL (ref 4.05–5.2)
RBC # BLD AUTO: 4.22 M/UL (ref 4.05–5.2)
RBC # BLD AUTO: 4.49 M/UL (ref 4.05–5.2)
RBC # BLD AUTO: 4.53 M/UL (ref 4.05–5.2)
RBC # BLD AUTO: 4.58 M/UL (ref 4.05–5.2)
RBC # BLD AUTO: 4.63 M/UL (ref 4.05–5.2)
RBC # BLD AUTO: 4.63 M/UL (ref 4.05–5.2)
RBC # BLD AUTO: 4.66 M/UL (ref 4.05–5.2)
RBC # BLD AUTO: 4.75 M/UL (ref 4.05–5.2)
RBC # BLD AUTO: 4.75 M/UL (ref 4.05–5.2)
RBC # BLD AUTO: 4.86 M/UL (ref 4.05–5.2)
RBC #/AREA URNS HPF: ABNORMAL /HPF
SAO2 % BLD: 84.5 % (ref 95–98)
SAO2 % BLD: 86.8 % (ref 95–98)
SAO2 % BLD: 93.8 % (ref 95–98)
SAO2 % BLDV: 88.5 % (ref 65–88)
SERVICE CMNT-IMP: ABNORMAL
SODIUM SERPL-SCNC: 136 MMOL/L (ref 136–145)
SODIUM SERPL-SCNC: 138 MMOL/L (ref 136–145)
SODIUM SERPL-SCNC: 138 MMOL/L (ref 136–145)
SODIUM SERPL-SCNC: 139 MMOL/L (ref 136–145)
SODIUM SERPL-SCNC: 140 MMOL/L (ref 136–145)
SODIUM SERPL-SCNC: 140 MMOL/L (ref 136–145)
SODIUM SERPL-SCNC: 141 MMOL/L (ref 136–145)
SODIUM SERPL-SCNC: 142 MMOL/L (ref 136–145)
SODIUM SERPL-SCNC: 142 MMOL/L (ref 136–145)
SODIUM UR-SCNC: 16 MMOL/L
SOURCE, COVRS: ABNORMAL
SP GR UR REFRACTOMETRY: 1.03 (ref 1–1.02)
SPECIMEN TYPE: ABNORMAL
TSH SERPL DL<=0.005 MIU/L-ACNC: 0.33 UIU/ML (ref 0.36–3.74)
UFH PPP CHRO-ACNC: 0.26 IU/ML (ref 0.3–0.7)
UFH PPP CHRO-ACNC: 0.34 IU/ML (ref 0.3–0.7)
UFH PPP CHRO-ACNC: 0.46 IU/ML (ref 0.3–0.7)
UFH PPP CHRO-ACNC: 0.49 IU/ML (ref 0.3–0.7)
UFH PPP CHRO-ACNC: 0.63 IU/ML (ref 0.3–0.7)
UFH PPP CHRO-ACNC: 0.73 IU/ML (ref 0.3–0.7)
UFH PPP CHRO-ACNC: 0.78 IU/ML (ref 0.3–0.7)
UFH PPP CHRO-ACNC: 0.92 IU/ML (ref 0.3–0.7)
UFH PPP CHRO-ACNC: 1.04 IU/ML (ref 0.3–0.7)
UFH PPP CHRO-ACNC: <0.1 IU/ML (ref 0.3–0.7)
UFH PPP CHRO-ACNC: <0.1 IU/ML (ref 0.3–0.7)
UFH PPP CHRO-ACNC: >1.1 IU/ML (ref 0.3–0.7)
UFH PPP CHRO-ACNC: >1.1 IU/ML (ref 0.3–0.7)
UROBILINOGEN UR QL STRIP.AUTO: 0.2 EU/DL (ref 0.2–1)
VENTRICULAR RATE, ECG03: 84 BPM
WBC # BLD AUTO: 12 K/UL (ref 4.3–11.1)
WBC # BLD AUTO: 12 K/UL (ref 4.3–11.1)
WBC # BLD AUTO: 15.9 K/UL (ref 4.3–11.1)
WBC # BLD AUTO: 16.7 K/UL (ref 4.3–11.1)
WBC # BLD AUTO: 17.8 K/UL (ref 4.3–11.1)
WBC # BLD AUTO: 18.1 K/UL (ref 4.3–11.1)
WBC # BLD AUTO: 18.7 K/UL (ref 4.3–11.1)
WBC # BLD AUTO: 24.4 K/UL (ref 4.3–11.1)
WBC # BLD AUTO: 5.5 K/UL (ref 4.3–11.1)
WBC # BLD AUTO: 7.5 K/UL (ref 4.3–11.1)
WBC # BLD AUTO: 7.9 K/UL (ref 4.3–11.1)
WBC # BLD AUTO: 8.1 K/UL (ref 4.3–11.1)
WBC # BLD AUTO: 8.3 K/UL (ref 4.3–11.1)
WBC # BLD AUTO: 8.8 K/UL (ref 4.3–11.1)
WBC URNS QL MICRO: ABNORMAL /HPF
YEAST URNS QL MICRO: ABNORMAL

## 2022-01-01 PROCEDURE — 74011636637 HC RX REV CODE- 636/637: Performed by: HOSPITALIST

## 2022-01-01 PROCEDURE — 74011000258 HC RX REV CODE- 258: Performed by: HOSPITALIST

## 2022-01-01 PROCEDURE — 94640 AIRWAY INHALATION TREATMENT: CPT

## 2022-01-01 PROCEDURE — 74011250637 HC RX REV CODE- 250/637: Performed by: HOSPITALIST

## 2022-01-01 PROCEDURE — 97530 THERAPEUTIC ACTIVITIES: CPT

## 2022-01-01 PROCEDURE — 85025 COMPLETE CBC W/AUTO DIFF WBC: CPT

## 2022-01-01 PROCEDURE — 77010033711 HC HIGH FLOW OXYGEN

## 2022-01-01 PROCEDURE — 99497 ADVNCD CARE PLAN 30 MIN: CPT | Performed by: NURSE PRACTITIONER

## 2022-01-01 PROCEDURE — 36415 COLL VENOUS BLD VENIPUNCTURE: CPT

## 2022-01-01 PROCEDURE — 94660 CPAP INITIATION&MGMT: CPT

## 2022-01-01 PROCEDURE — 85520 HEPARIN ASSAY: CPT

## 2022-01-01 PROCEDURE — 74011636637 HC RX REV CODE- 636/637: Performed by: INTERNAL MEDICINE

## 2022-01-01 PROCEDURE — 74011250636 HC RX REV CODE- 250/636: Performed by: EMERGENCY MEDICINE

## 2022-01-01 PROCEDURE — 74011250636 HC RX REV CODE- 250/636: Performed by: STUDENT IN AN ORGANIZED HEALTH CARE EDUCATION/TRAINING PROGRAM

## 2022-01-01 PROCEDURE — 74011000250 HC RX REV CODE- 250: Performed by: HOSPITALIST

## 2022-01-01 PROCEDURE — 82962 GLUCOSE BLOOD TEST: CPT

## 2022-01-01 PROCEDURE — 82803 BLOOD GASES ANY COMBINATION: CPT

## 2022-01-01 PROCEDURE — 80048 BASIC METABOLIC PNL TOTAL CA: CPT

## 2022-01-01 PROCEDURE — 74011000258 HC RX REV CODE- 258: Performed by: NURSE PRACTITIONER

## 2022-01-01 PROCEDURE — 74011250637 HC RX REV CODE- 250/637: Performed by: NURSE PRACTITIONER

## 2022-01-01 PROCEDURE — 80053 COMPREHEN METABOLIC PANEL: CPT

## 2022-01-01 PROCEDURE — 74011636637 HC RX REV CODE- 636/637: Performed by: FAMILY MEDICINE

## 2022-01-01 PROCEDURE — 94762 N-INVAS EAR/PLS OXIMTRY CONT: CPT

## 2022-01-01 PROCEDURE — 74011250636 HC RX REV CODE- 250/636: Performed by: FAMILY MEDICINE

## 2022-01-01 PROCEDURE — P9047 ALBUMIN (HUMAN), 25%, 50ML: HCPCS | Performed by: FAMILY MEDICINE

## 2022-01-01 PROCEDURE — 65270000029 HC RM PRIVATE

## 2022-01-01 PROCEDURE — 99233 SBSQ HOSP IP/OBS HIGH 50: CPT | Performed by: INTERNAL MEDICINE

## 2022-01-01 PROCEDURE — 99232 SBSQ HOSP IP/OBS MODERATE 35: CPT | Performed by: INTERNAL MEDICINE

## 2022-01-01 PROCEDURE — 74011250636 HC RX REV CODE- 250/636: Performed by: INTERNAL MEDICINE

## 2022-01-01 PROCEDURE — 74011250636 HC RX REV CODE- 250/636: Performed by: NURSE PRACTITIONER

## 2022-01-01 PROCEDURE — 74011636637 HC RX REV CODE- 636/637: Performed by: STUDENT IN AN ORGANIZED HEALTH CARE EDUCATION/TRAINING PROGRAM

## 2022-01-01 PROCEDURE — 71045 X-RAY EXAM CHEST 1 VIEW: CPT

## 2022-01-01 PROCEDURE — 92610 EVALUATE SWALLOWING FUNCTION: CPT

## 2022-01-01 PROCEDURE — 74011000250 HC RX REV CODE- 250: Performed by: STUDENT IN AN ORGANIZED HEALTH CARE EDUCATION/TRAINING PROGRAM

## 2022-01-01 PROCEDURE — 85027 COMPLETE CBC AUTOMATED: CPT

## 2022-01-01 PROCEDURE — 36600 WITHDRAWAL OF ARTERIAL BLOOD: CPT

## 2022-01-01 PROCEDURE — 74011250636 HC RX REV CODE- 250/636: Performed by: HOSPITALIST

## 2022-01-01 PROCEDURE — 82570 ASSAY OF URINE CREATININE: CPT

## 2022-01-01 PROCEDURE — 97535 SELF CARE MNGMENT TRAINING: CPT

## 2022-01-01 PROCEDURE — 74011250637 HC RX REV CODE- 250/637: Performed by: INTERNAL MEDICINE

## 2022-01-01 PROCEDURE — 86140 C-REACTIVE PROTEIN: CPT

## 2022-01-01 PROCEDURE — 87635 SARS-COV-2 COVID-19 AMP PRB: CPT

## 2022-01-01 PROCEDURE — 74011636637 HC RX REV CODE- 636/637: Performed by: EMERGENCY MEDICINE

## 2022-01-01 PROCEDURE — 99223 1ST HOSP IP/OBS HIGH 75: CPT | Performed by: INTERNAL MEDICINE

## 2022-01-01 PROCEDURE — 80307 DRUG TEST PRSMV CHEM ANLYZR: CPT

## 2022-01-01 PROCEDURE — 83735 ASSAY OF MAGNESIUM: CPT

## 2022-01-01 PROCEDURE — 84145 PROCALCITONIN (PCT): CPT

## 2022-01-01 PROCEDURE — 85379 FIBRIN DEGRADATION QUANT: CPT

## 2022-01-01 PROCEDURE — 85730 THROMBOPLASTIN TIME PARTIAL: CPT

## 2022-01-01 PROCEDURE — 84443 ASSAY THYROID STIM HORMONE: CPT

## 2022-01-01 PROCEDURE — 97165 OT EVAL LOW COMPLEX 30 MIN: CPT

## 2022-01-01 PROCEDURE — 99285 EMERGENCY DEPT VISIT HI MDM: CPT

## 2022-01-01 PROCEDURE — 99222 1ST HOSP IP/OBS MODERATE 55: CPT | Performed by: NURSE PRACTITIONER

## 2022-01-01 PROCEDURE — 74011250637 HC RX REV CODE- 250/637: Performed by: STUDENT IN AN ORGANIZED HEALTH CARE EDUCATION/TRAINING PROGRAM

## 2022-01-01 PROCEDURE — 84300 ASSAY OF URINE SODIUM: CPT

## 2022-01-01 PROCEDURE — 74011000250 HC RX REV CODE- 250: Performed by: NURSE PRACTITIONER

## 2022-01-01 PROCEDURE — 93005 ELECTROCARDIOGRAM TRACING: CPT

## 2022-01-01 PROCEDURE — 93970 EXTREMITY STUDY: CPT

## 2022-01-01 PROCEDURE — 5A09557 ASSISTANCE WITH RESPIRATORY VENTILATION, GREATER THAN 96 CONSECUTIVE HOURS, CONTINUOUS POSITIVE AIRWAY PRESSURE: ICD-10-PCS | Performed by: STUDENT IN AN ORGANIZED HEALTH CARE EDUCATION/TRAINING PROGRAM

## 2022-01-01 PROCEDURE — 97162 PT EVAL MOD COMPLEX 30 MIN: CPT

## 2022-01-01 PROCEDURE — 83880 ASSAY OF NATRIURETIC PEPTIDE: CPT

## 2022-01-01 PROCEDURE — 97112 NEUROMUSCULAR REEDUCATION: CPT

## 2022-01-01 PROCEDURE — 76770 US EXAM ABDO BACK WALL COMP: CPT

## 2022-01-01 PROCEDURE — 81001 URINALYSIS AUTO W/SCOPE: CPT

## 2022-01-01 PROCEDURE — 83036 HEMOGLOBIN GLYCOSYLATED A1C: CPT

## 2022-01-01 PROCEDURE — 83605 ASSAY OF LACTIC ACID: CPT

## 2022-01-01 PROCEDURE — 76450000000

## 2022-01-01 RX ORDER — HYDROMORPHONE HYDROCHLORIDE 1 MG/ML
0.5 INJECTION, SOLUTION INTRAMUSCULAR; INTRAVENOUS; SUBCUTANEOUS
Status: DISCONTINUED | OUTPATIENT
Start: 2022-01-01 | End: 2022-01-01 | Stop reason: HOSPADM

## 2022-01-01 RX ORDER — INSULIN LISPRO 100 [IU]/ML
3 INJECTION, SOLUTION INTRAVENOUS; SUBCUTANEOUS
Status: DISCONTINUED | OUTPATIENT
Start: 2022-01-01 | End: 2022-01-01

## 2022-01-01 RX ORDER — HYDRALAZINE HYDROCHLORIDE 50 MG/1
50 TABLET, FILM COATED ORAL
Status: DISCONTINUED | OUTPATIENT
Start: 2022-01-01 | End: 2022-01-01 | Stop reason: HOSPADM

## 2022-01-01 RX ORDER — HYDROXYZINE 25 MG/1
25 TABLET, FILM COATED ORAL ONCE
Status: COMPLETED | OUTPATIENT
Start: 2022-01-01 | End: 2022-01-01

## 2022-01-01 RX ORDER — ACETAMINOPHEN 650 MG/1
650 SUPPOSITORY RECTAL
Status: DISCONTINUED | OUTPATIENT
Start: 2022-01-01 | End: 2022-01-01 | Stop reason: HOSPADM

## 2022-01-01 RX ORDER — AMLODIPINE BESYLATE 5 MG/1
5 TABLET ORAL DAILY
Status: DISCONTINUED | OUTPATIENT
Start: 2022-01-01 | End: 2022-01-01

## 2022-01-01 RX ORDER — OXYBUTYNIN CHLORIDE 10 MG/1
10 TABLET, EXTENDED RELEASE ORAL DAILY
Status: DISCONTINUED | OUTPATIENT
Start: 2022-01-01 | End: 2022-01-01

## 2022-01-01 RX ORDER — PANTOPRAZOLE SODIUM 40 MG/1
40 TABLET, DELAYED RELEASE ORAL
Status: DISCONTINUED | OUTPATIENT
Start: 2022-01-01 | End: 2022-01-01 | Stop reason: HOSPADM

## 2022-01-01 RX ORDER — TRAZODONE HYDROCHLORIDE 50 MG/1
50 TABLET ORAL
Status: DISCONTINUED | OUTPATIENT
Start: 2022-01-01 | End: 2022-01-01 | Stop reason: HOSPADM

## 2022-01-01 RX ORDER — TIZANIDINE 2 MG/1
4 TABLET ORAL
Status: DISCONTINUED | OUTPATIENT
Start: 2022-01-01 | End: 2022-01-01 | Stop reason: HOSPADM

## 2022-01-01 RX ORDER — INSULIN GLARGINE 100 [IU]/ML
0.3 INJECTION, SOLUTION SUBCUTANEOUS
Status: DISCONTINUED | OUTPATIENT
Start: 2022-01-01 | End: 2022-01-01

## 2022-01-01 RX ORDER — GLYCOPYRROLATE 0.2 MG/ML
0.1 INJECTION INTRAMUSCULAR; INTRAVENOUS
Status: DISCONTINUED | OUTPATIENT
Start: 2022-01-01 | End: 2022-01-01 | Stop reason: HOSPADM

## 2022-01-01 RX ORDER — PANTOPRAZOLE SODIUM 40 MG/1
40 TABLET, DELAYED RELEASE ORAL
Status: DISCONTINUED | OUTPATIENT
Start: 2022-01-01 | End: 2022-01-01

## 2022-01-01 RX ORDER — OLANZAPINE 10 MG/1
5 INJECTION, POWDER, LYOPHILIZED, FOR SOLUTION INTRAMUSCULAR
Status: DISCONTINUED | OUTPATIENT
Start: 2022-01-01 | End: 2022-01-01 | Stop reason: SDUPTHER

## 2022-01-01 RX ORDER — ONDANSETRON 4 MG/1
4 TABLET, ORALLY DISINTEGRATING ORAL
Status: DISCONTINUED | OUTPATIENT
Start: 2022-01-01 | End: 2022-01-01 | Stop reason: HOSPADM

## 2022-01-01 RX ORDER — DICYCLOMINE HYDROCHLORIDE 20 MG/1
20 TABLET ORAL
Status: DISCONTINUED | OUTPATIENT
Start: 2022-01-01 | End: 2022-01-01 | Stop reason: HOSPADM

## 2022-01-01 RX ORDER — ALBUMIN HUMAN 250 G/1000ML
25 SOLUTION INTRAVENOUS EVERY 6 HOURS
Status: COMPLETED | OUTPATIENT
Start: 2022-01-01 | End: 2022-01-01

## 2022-01-01 RX ORDER — HEPARIN SODIUM 1000 [USP'U]/ML
40 INJECTION, SOLUTION INTRAVENOUS; SUBCUTANEOUS ONCE
Status: COMPLETED | OUTPATIENT
Start: 2022-01-01 | End: 2022-01-01

## 2022-01-01 RX ORDER — FUROSEMIDE 10 MG/ML
40 INJECTION INTRAMUSCULAR; INTRAVENOUS ONCE
Status: DISCONTINUED | OUTPATIENT
Start: 2022-01-01 | End: 2022-01-01

## 2022-01-01 RX ORDER — ONDANSETRON 2 MG/ML
4 INJECTION INTRAMUSCULAR; INTRAVENOUS
Status: DISCONTINUED | OUTPATIENT
Start: 2022-01-01 | End: 2022-01-01 | Stop reason: HOSPADM

## 2022-01-01 RX ORDER — POTASSIUM CHLORIDE 20 MEQ/1
20 TABLET, EXTENDED RELEASE ORAL DAILY
Status: DISCONTINUED | OUTPATIENT
Start: 2022-01-01 | End: 2022-01-01

## 2022-01-01 RX ORDER — HEPARIN SODIUM 1000 [USP'U]/ML
20 INJECTION, SOLUTION INTRAVENOUS; SUBCUTANEOUS ONCE
Status: COMPLETED | OUTPATIENT
Start: 2022-01-01 | End: 2022-01-01

## 2022-01-01 RX ORDER — HEPARIN SODIUM 5000 [USP'U]/ML
5000 INJECTION, SOLUTION INTRAVENOUS; SUBCUTANEOUS EVERY 8 HOURS
Status: DISCONTINUED | OUTPATIENT
Start: 2022-01-01 | End: 2022-01-01

## 2022-01-01 RX ORDER — DULOXETIN HYDROCHLORIDE 20 MG/1
20 CAPSULE, DELAYED RELEASE ORAL DAILY
Status: DISCONTINUED | OUTPATIENT
Start: 2022-01-01 | End: 2022-01-01 | Stop reason: HOSPADM

## 2022-01-01 RX ORDER — POTASSIUM CHLORIDE 14.9 MG/ML
20 INJECTION INTRAVENOUS
Status: COMPLETED | OUTPATIENT
Start: 2022-01-01 | End: 2022-01-01

## 2022-01-01 RX ORDER — DEXAMETHASONE SODIUM PHOSPHATE 100 MG/10ML
10 INJECTION INTRAMUSCULAR; INTRAVENOUS EVERY 24 HOURS
Status: COMPLETED | OUTPATIENT
Start: 2022-01-01 | End: 2022-01-01

## 2022-01-01 RX ORDER — TOPIRAMATE 25 MG/1
25 TABLET ORAL
Status: DISCONTINUED | OUTPATIENT
Start: 2022-01-01 | End: 2022-01-01 | Stop reason: HOSPADM

## 2022-01-01 RX ORDER — INSULIN GLARGINE 100 [IU]/ML
10 INJECTION, SOLUTION SUBCUTANEOUS
Status: DISCONTINUED | OUTPATIENT
Start: 2022-01-01 | End: 2022-01-01

## 2022-01-01 RX ORDER — INSULIN LISPRO 100 [IU]/ML
6 INJECTION, SOLUTION INTRAVENOUS; SUBCUTANEOUS
Status: DISCONTINUED | OUTPATIENT
Start: 2022-01-01 | End: 2022-01-01

## 2022-01-01 RX ORDER — BUSPIRONE HYDROCHLORIDE 5 MG/1
5 TABLET ORAL
Status: DISCONTINUED | OUTPATIENT
Start: 2022-01-01 | End: 2022-01-01

## 2022-01-01 RX ORDER — INSULIN GLARGINE 100 [IU]/ML
18 INJECTION, SOLUTION SUBCUTANEOUS
Status: DISCONTINUED | OUTPATIENT
Start: 2022-01-01 | End: 2022-01-01 | Stop reason: HOSPADM

## 2022-01-01 RX ORDER — INSULIN GLARGINE 100 [IU]/ML
0.2 INJECTION, SOLUTION SUBCUTANEOUS
Status: DISCONTINUED | OUTPATIENT
Start: 2022-01-01 | End: 2022-01-01

## 2022-01-01 RX ORDER — HYDROMORPHONE HYDROCHLORIDE 1 MG/ML
0.5 INJECTION, SOLUTION INTRAMUSCULAR; INTRAVENOUS; SUBCUTANEOUS
Status: DISCONTINUED | OUTPATIENT
Start: 2022-01-01 | End: 2022-01-01

## 2022-01-01 RX ORDER — HEPARIN SODIUM 1000 [USP'U]/ML
80 INJECTION, SOLUTION INTRAVENOUS; SUBCUTANEOUS ONCE
Status: COMPLETED | OUTPATIENT
Start: 2022-01-01 | End: 2022-01-01

## 2022-01-01 RX ORDER — INSULIN LISPRO 100 [IU]/ML
8 INJECTION, SOLUTION INTRAVENOUS; SUBCUTANEOUS
Status: DISCONTINUED | OUTPATIENT
Start: 2022-01-01 | End: 2022-01-01 | Stop reason: HOSPADM

## 2022-01-01 RX ORDER — SODIUM CHLORIDE 9 MG/ML
75 INJECTION, SOLUTION INTRAVENOUS CONTINUOUS
Status: DISCONTINUED | OUTPATIENT
Start: 2022-01-01 | End: 2022-01-01

## 2022-01-01 RX ORDER — BUDESONIDE AND FORMOTEROL FUMARATE DIHYDRATE 160; 4.5 UG/1; UG/1
2 AEROSOL RESPIRATORY (INHALATION) 2 TIMES DAILY
Status: DISCONTINUED | OUTPATIENT
Start: 2022-01-01 | End: 2022-01-01 | Stop reason: HOSPADM

## 2022-01-01 RX ORDER — INSULIN GLARGINE 100 [IU]/ML
15 INJECTION, SOLUTION SUBCUTANEOUS
Status: DISCONTINUED | OUTPATIENT
Start: 2022-01-01 | End: 2022-01-01

## 2022-01-01 RX ORDER — HEPARIN SODIUM 5000 [USP'U]/100ML
18-36 INJECTION, SOLUTION INTRAVENOUS
Status: DISCONTINUED | OUTPATIENT
Start: 2022-01-01 | End: 2022-01-01

## 2022-01-01 RX ORDER — DEXAMETHASONE SODIUM PHOSPHATE 100 MG/10ML
6 INJECTION INTRAMUSCULAR; INTRAVENOUS
Status: DISCONTINUED | OUTPATIENT
Start: 2022-01-01 | End: 2022-01-01

## 2022-01-01 RX ORDER — DEXAMETHASONE SODIUM PHOSPHATE 100 MG/10ML
20 INJECTION INTRAMUSCULAR; INTRAVENOUS EVERY 24 HOURS
Status: DISCONTINUED | OUTPATIENT
Start: 2022-01-01 | End: 2022-01-01

## 2022-01-01 RX ORDER — ATORVASTATIN CALCIUM 10 MG/1
20 TABLET, FILM COATED ORAL
Status: DISCONTINUED | OUTPATIENT
Start: 2022-01-01 | End: 2022-01-01

## 2022-01-01 RX ORDER — DOXYCYCLINE 100 MG/1
100 CAPSULE ORAL EVERY 12 HOURS
Status: COMPLETED | OUTPATIENT
Start: 2022-01-01 | End: 2022-01-01

## 2022-01-01 RX ORDER — INSULIN LISPRO 100 [IU]/ML
INJECTION, SOLUTION INTRAVENOUS; SUBCUTANEOUS
Status: DISCONTINUED | OUTPATIENT
Start: 2022-01-01 | End: 2022-01-01

## 2022-01-01 RX ORDER — GUAIFENESIN/DEXTROMETHORPHAN 100-10MG/5
5 SYRUP ORAL
Status: DISCONTINUED | OUTPATIENT
Start: 2022-01-01 | End: 2022-01-01 | Stop reason: HOSPADM

## 2022-01-01 RX ORDER — SODIUM CHLORIDE 0.9 % (FLUSH) 0.9 %
5-40 SYRINGE (ML) INJECTION AS NEEDED
Status: DISCONTINUED | OUTPATIENT
Start: 2022-01-01 | End: 2022-01-01 | Stop reason: HOSPADM

## 2022-01-01 RX ORDER — LORAZEPAM 0.5 MG/1
0.5 TABLET ORAL
Status: DISCONTINUED | OUTPATIENT
Start: 2022-01-01 | End: 2022-01-01

## 2022-01-01 RX ORDER — CYCLOSPORINE 0.5 MG/ML
1 EMULSION OPHTHALMIC EVERY 12 HOURS
Status: DISCONTINUED | OUTPATIENT
Start: 2022-01-01 | End: 2022-01-01 | Stop reason: HOSPADM

## 2022-01-01 RX ORDER — BUTALBITAL, ACETAMINOPHEN AND CAFFEINE 50; 325; 40 MG/1; MG/1; MG/1
1 TABLET ORAL
Status: DISCONTINUED | OUTPATIENT
Start: 2022-01-01 | End: 2022-01-01 | Stop reason: HOSPADM

## 2022-01-01 RX ORDER — INSULIN GLARGINE 100 [IU]/ML
20 INJECTION, SOLUTION SUBCUTANEOUS
Status: DISCONTINUED | OUTPATIENT
Start: 2022-01-01 | End: 2022-01-01

## 2022-01-01 RX ORDER — DEXAMETHASONE SODIUM PHOSPHATE 100 MG/10ML
6 INJECTION INTRAMUSCULAR; INTRAVENOUS EVERY 24 HOURS
Status: DISCONTINUED | OUTPATIENT
Start: 2022-01-01 | End: 2022-01-01

## 2022-01-01 RX ORDER — LAMOTRIGINE 100 MG/1
150 TABLET ORAL EVERY MORNING
Status: DISCONTINUED | OUTPATIENT
Start: 2022-01-01 | End: 2022-01-01 | Stop reason: HOSPADM

## 2022-01-01 RX ORDER — POTASSIUM CHLORIDE 20 MEQ/1
40 TABLET, EXTENDED RELEASE ORAL
Status: COMPLETED | OUTPATIENT
Start: 2022-01-01 | End: 2022-01-01

## 2022-01-01 RX ORDER — LORAZEPAM 2 MG/ML
1 INJECTION INTRAMUSCULAR
Status: DISCONTINUED | OUTPATIENT
Start: 2022-01-01 | End: 2022-01-01

## 2022-01-01 RX ORDER — ACETAMINOPHEN 325 MG/1
650 TABLET ORAL
Status: DISCONTINUED | OUTPATIENT
Start: 2022-01-01 | End: 2022-01-01 | Stop reason: HOSPADM

## 2022-01-01 RX ORDER — PREGABALIN 50 MG/1
50 CAPSULE ORAL 2 TIMES DAILY
Status: DISCONTINUED | OUTPATIENT
Start: 2022-01-01 | End: 2022-01-01 | Stop reason: HOSPADM

## 2022-01-01 RX ORDER — HYDROCODONE BITARTRATE AND ACETAMINOPHEN 5; 325 MG/1; MG/1
1 TABLET ORAL ONCE
Status: COMPLETED | OUTPATIENT
Start: 2022-01-01 | End: 2022-01-01

## 2022-01-01 RX ORDER — MONTELUKAST SODIUM 10 MG/1
10 TABLET ORAL DAILY
Status: DISCONTINUED | OUTPATIENT
Start: 2022-01-01 | End: 2022-01-01 | Stop reason: HOSPADM

## 2022-01-01 RX ORDER — INSULIN GLARGINE 100 [IU]/ML
16 INJECTION, SOLUTION SUBCUTANEOUS
Status: DISCONTINUED | OUTPATIENT
Start: 2022-01-01 | End: 2022-01-01

## 2022-01-01 RX ORDER — LORAZEPAM 2 MG/ML
1 INJECTION INTRAMUSCULAR
Status: DISCONTINUED | OUTPATIENT
Start: 2022-01-01 | End: 2022-01-01 | Stop reason: HOSPADM

## 2022-01-01 RX ORDER — BUSPIRONE HYDROCHLORIDE 5 MG/1
7.5 TABLET ORAL 3 TIMES DAILY
Status: DISCONTINUED | OUTPATIENT
Start: 2022-01-01 | End: 2022-01-01 | Stop reason: HOSPADM

## 2022-01-01 RX ORDER — DEXAMETHASONE SODIUM PHOSPHATE 4 MG/ML
3 INJECTION, SOLUTION INTRA-ARTICULAR; INTRALESIONAL; INTRAMUSCULAR; INTRAVENOUS; SOFT TISSUE DAILY
Status: COMPLETED | OUTPATIENT
Start: 2022-01-01 | End: 2022-01-01

## 2022-01-01 RX ORDER — BACLOFEN 10 MG/1
2.5 TABLET ORAL 2 TIMES DAILY
Status: DISCONTINUED | OUTPATIENT
Start: 2022-01-01 | End: 2022-01-01

## 2022-01-01 RX ORDER — INSULIN LISPRO 100 [IU]/ML
0-10 INJECTION, SOLUTION INTRAVENOUS; SUBCUTANEOUS
Status: DISCONTINUED | OUTPATIENT
Start: 2022-01-01 | End: 2022-01-01 | Stop reason: HOSPADM

## 2022-01-01 RX ORDER — FUROSEMIDE 10 MG/ML
40 INJECTION INTRAMUSCULAR; INTRAVENOUS 2 TIMES DAILY
Status: DISCONTINUED | OUTPATIENT
Start: 2022-01-01 | End: 2022-01-01 | Stop reason: HOSPADM

## 2022-01-01 RX ORDER — SODIUM CHLORIDE 0.9 % (FLUSH) 0.9 %
5-40 SYRINGE (ML) INJECTION EVERY 8 HOURS
Status: DISCONTINUED | OUTPATIENT
Start: 2022-01-01 | End: 2022-01-01 | Stop reason: HOSPADM

## 2022-01-01 RX ORDER — LOPERAMIDE HYDROCHLORIDE 2 MG/1
2 CAPSULE ORAL AS NEEDED
Status: DISCONTINUED | OUTPATIENT
Start: 2022-01-01 | End: 2022-01-01 | Stop reason: HOSPADM

## 2022-01-01 RX ORDER — INSULIN GLARGINE 100 [IU]/ML
22 INJECTION, SOLUTION SUBCUTANEOUS
Status: DISCONTINUED | OUTPATIENT
Start: 2022-01-01 | End: 2022-01-01

## 2022-01-01 RX ORDER — INSULIN LISPRO 100 [IU]/ML
5 INJECTION, SOLUTION INTRAVENOUS; SUBCUTANEOUS ONCE
Status: COMPLETED | OUTPATIENT
Start: 2022-01-01 | End: 2022-01-01

## 2022-01-01 RX ORDER — POLYETHYLENE GLYCOL 3350 17 G/17G
17 POWDER, FOR SOLUTION ORAL DAILY PRN
Status: DISCONTINUED | OUTPATIENT
Start: 2022-01-01 | End: 2022-01-01 | Stop reason: HOSPADM

## 2022-01-01 RX ORDER — BUSPIRONE HYDROCHLORIDE 5 MG/1
7.5 TABLET ORAL 2 TIMES DAILY
Status: DISCONTINUED | OUTPATIENT
Start: 2022-01-01 | End: 2022-01-01

## 2022-01-01 RX ORDER — ALBUTEROL SULFATE 90 UG/1
2 AEROSOL, METERED RESPIRATORY (INHALATION)
Status: DISCONTINUED | OUTPATIENT
Start: 2022-01-01 | End: 2022-01-01 | Stop reason: HOSPADM

## 2022-01-01 RX ADMIN — SODIUM CHLORIDE, PRESERVATIVE FREE 10 ML: 5 INJECTION INTRAVENOUS at 06:30

## 2022-01-01 RX ADMIN — ALBUTEROL SULFATE 2 PUFF: 90 AEROSOL, METERED RESPIRATORY (INHALATION) at 07:47

## 2022-01-01 RX ADMIN — PREGABALIN 50 MG: 50 CAPSULE ORAL at 17:02

## 2022-01-01 RX ADMIN — HEPARIN SODIUM 5000 UNITS: 5000 INJECTION INTRAVENOUS; SUBCUTANEOUS at 21:32

## 2022-01-01 RX ADMIN — Medication 1 AMPULE: at 11:01

## 2022-01-01 RX ADMIN — SODIUM CHLORIDE, PRESERVATIVE FREE 10 ML: 5 INJECTION INTRAVENOUS at 06:12

## 2022-01-01 RX ADMIN — BUDESONIDE AND FORMOTEROL FUMARATE DIHYDRATE 2 PUFF: 160; 4.5 AEROSOL RESPIRATORY (INHALATION) at 08:00

## 2022-01-01 RX ADMIN — HEPARIN SODIUM 14 UNITS/KG/HR: 5000 INJECTION, SOLUTION INTRAVENOUS at 10:30

## 2022-01-01 RX ADMIN — DEXAMETHASONE SODIUM PHOSPHATE 20 MG: 4 INJECTION INTRA-ARTICULAR; INTRALESIONAL; INTRAMUSCULAR; INTRAVENOUS; SOFT TISSUE at 09:55

## 2022-01-01 RX ADMIN — ALBUTEROL SULFATE 2 PUFF: 90 AEROSOL, METERED RESPIRATORY (INHALATION) at 07:44

## 2022-01-01 RX ADMIN — FUROSEMIDE 40 MG: 10 INJECTION, SOLUTION INTRAMUSCULAR; INTRAVENOUS at 09:15

## 2022-01-01 RX ADMIN — TRAZODONE HYDROCHLORIDE 50 MG: 50 TABLET ORAL at 21:46

## 2022-01-01 RX ADMIN — DICYCLOMINE HYDROCHLORIDE 20 MG: 20 TABLET ORAL at 11:58

## 2022-01-01 RX ADMIN — SODIUM CHLORIDE, PRESERVATIVE FREE 10 ML: 5 INJECTION INTRAVENOUS at 05:26

## 2022-01-01 RX ADMIN — BUDESONIDE AND FORMOTEROL FUMARATE DIHYDRATE 2 PUFF: 160; 4.5 AEROSOL RESPIRATORY (INHALATION) at 07:47

## 2022-01-01 RX ADMIN — FUROSEMIDE 40 MG: 10 INJECTION, SOLUTION INTRAMUSCULAR; INTRAVENOUS at 08:45

## 2022-01-01 RX ADMIN — PANTOPRAZOLE SODIUM 40 MG: 40 TABLET, DELAYED RELEASE ORAL at 06:21

## 2022-01-01 RX ADMIN — ATORVASTATIN CALCIUM 20 MG: 10 TABLET, FILM COATED ORAL at 21:32

## 2022-01-01 RX ADMIN — GLYCOPYRROLATE 0.1 MG: 0.2 INJECTION, SOLUTION INTRAMUSCULAR; INTRAVENOUS at 11:29

## 2022-01-01 RX ADMIN — Medication 6 UNITS: at 21:30

## 2022-01-01 RX ADMIN — SODIUM CHLORIDE, PRESERVATIVE FREE 10 ML: 5 INJECTION INTRAVENOUS at 21:01

## 2022-01-01 RX ADMIN — DOXYCYCLINE HYCLATE 100 MG: 100 CAPSULE ORAL at 22:03

## 2022-01-01 RX ADMIN — FUROSEMIDE 40 MG: 10 INJECTION, SOLUTION INTRAMUSCULAR; INTRAVENOUS at 16:39

## 2022-01-01 RX ADMIN — BUDESONIDE AND FORMOTEROL FUMARATE DIHYDRATE 2 PUFF: 160; 4.5 AEROSOL RESPIRATORY (INHALATION) at 07:39

## 2022-01-01 RX ADMIN — INSULIN GLARGINE 22 UNITS: 100 INJECTION, SOLUTION SUBCUTANEOUS at 21:59

## 2022-01-01 RX ADMIN — BUDESONIDE AND FORMOTEROL FUMARATE DIHYDRATE 2 PUFF: 160; 4.5 AEROSOL RESPIRATORY (INHALATION) at 08:14

## 2022-01-01 RX ADMIN — DICYCLOMINE HYDROCHLORIDE 20 MG: 20 TABLET ORAL at 17:22

## 2022-01-01 RX ADMIN — FUROSEMIDE 40 MG: 10 INJECTION, SOLUTION INTRAMUSCULAR; INTRAVENOUS at 09:39

## 2022-01-01 RX ADMIN — PREGABALIN 50 MG: 50 CAPSULE ORAL at 11:00

## 2022-01-01 RX ADMIN — SODIUM CHLORIDE 100 ML/HR: 900 INJECTION, SOLUTION INTRAVENOUS at 11:29

## 2022-01-01 RX ADMIN — BUSPIRONE HYDROCHLORIDE 7.5 MG: 5 TABLET ORAL at 16:55

## 2022-01-01 RX ADMIN — ALBUTEROL SULFATE 2 PUFF: 90 AEROSOL, METERED RESPIRATORY (INHALATION) at 08:00

## 2022-01-01 RX ADMIN — TOPIRAMATE 25 MG: 25 TABLET, FILM COATED ORAL at 21:52

## 2022-01-01 RX ADMIN — Medication 8 UNITS: at 17:05

## 2022-01-01 RX ADMIN — HYDROMORPHONE HYDROCHLORIDE 0.5 MG: 1 INJECTION, SOLUTION INTRAMUSCULAR; INTRAVENOUS; SUBCUTANEOUS at 04:33

## 2022-01-01 RX ADMIN — CEFTRIAXONE 1 G: 1 INJECTION, POWDER, FOR SOLUTION INTRAMUSCULAR; INTRAVENOUS at 18:03

## 2022-01-01 RX ADMIN — Medication 1 AMPULE: at 09:15

## 2022-01-01 RX ADMIN — HEPARIN SODIUM 5000 UNITS: 5000 INJECTION INTRAVENOUS; SUBCUTANEOUS at 06:38

## 2022-01-01 RX ADMIN — Medication 20 MEQ: at 12:57

## 2022-01-01 RX ADMIN — Medication 10 UNITS: at 21:30

## 2022-01-01 RX ADMIN — DULOXETINE HYDROCHLORIDE 20 MG: 20 CAPSULE, DELAYED RELEASE ORAL at 08:53

## 2022-01-01 RX ADMIN — Medication 1 AMPULE: at 21:28

## 2022-01-01 RX ADMIN — BUSPIRONE HYDROCHLORIDE 7.5 MG: 5 TABLET ORAL at 08:35

## 2022-01-01 RX ADMIN — CEFTRIAXONE 1 G: 1 INJECTION, POWDER, FOR SOLUTION INTRAMUSCULAR; INTRAVENOUS at 17:20

## 2022-01-01 RX ADMIN — TOPIRAMATE 25 MG: 25 TABLET, FILM COATED ORAL at 21:46

## 2022-01-01 RX ADMIN — ALBUTEROL SULFATE 2 PUFF: 90 AEROSOL, METERED RESPIRATORY (INHALATION) at 12:00

## 2022-01-01 RX ADMIN — Medication 6 UNITS: at 12:00

## 2022-01-01 RX ADMIN — DICYCLOMINE HYDROCHLORIDE 20 MG: 20 TABLET ORAL at 21:52

## 2022-01-01 RX ADMIN — LAMOTRIGINE 150 MG: 100 TABLET ORAL at 10:59

## 2022-01-01 RX ADMIN — PANTOPRAZOLE SODIUM 40 MG: 40 TABLET, DELAYED RELEASE ORAL at 04:47

## 2022-01-01 RX ADMIN — PANTOPRAZOLE SODIUM 40 MG: 40 TABLET, DELAYED RELEASE ORAL at 06:32

## 2022-01-01 RX ADMIN — DICYCLOMINE HYDROCHLORIDE 20 MG: 20 TABLET ORAL at 12:10

## 2022-01-01 RX ADMIN — PREGABALIN 50 MG: 50 CAPSULE ORAL at 20:14

## 2022-01-01 RX ADMIN — FUROSEMIDE 40 MG: 10 INJECTION, SOLUTION INTRAMUSCULAR; INTRAVENOUS at 17:30

## 2022-01-01 RX ADMIN — HEPARIN SODIUM 5000 UNITS: 5000 INJECTION INTRAVENOUS; SUBCUTANEOUS at 15:53

## 2022-01-01 RX ADMIN — Medication 1 AMPULE: at 08:24

## 2022-01-01 RX ADMIN — DICYCLOMINE HYDROCHLORIDE 20 MG: 20 TABLET ORAL at 05:52

## 2022-01-01 RX ADMIN — DICYCLOMINE HYDROCHLORIDE 20 MG: 20 TABLET ORAL at 11:12

## 2022-01-01 RX ADMIN — MONTELUKAST 10 MG: 10 TABLET, FILM COATED ORAL at 10:01

## 2022-01-01 RX ADMIN — PANTOPRAZOLE SODIUM 40 MG: 40 TABLET, DELAYED RELEASE ORAL at 06:20

## 2022-01-01 RX ADMIN — LAMOTRIGINE 150 MG: 100 TABLET ORAL at 08:24

## 2022-01-01 RX ADMIN — SODIUM CHLORIDE, PRESERVATIVE FREE 10 ML: 5 INJECTION INTRAVENOUS at 21:45

## 2022-01-01 RX ADMIN — FUROSEMIDE 40 MG: 10 INJECTION, SOLUTION INTRAMUSCULAR; INTRAVENOUS at 17:05

## 2022-01-01 RX ADMIN — Medication 6 UNITS: at 17:05

## 2022-01-01 RX ADMIN — ATORVASTATIN CALCIUM 20 MG: 10 TABLET, FILM COATED ORAL at 21:24

## 2022-01-01 RX ADMIN — SODIUM CHLORIDE, PRESERVATIVE FREE 10 ML: 5 INJECTION INTRAVENOUS at 06:20

## 2022-01-01 RX ADMIN — TOPIRAMATE 25 MG: 25 TABLET, FILM COATED ORAL at 21:47

## 2022-01-01 RX ADMIN — ALBUTEROL SULFATE 2 PUFF: 90 AEROSOL, METERED RESPIRATORY (INHALATION) at 20:01

## 2022-01-01 RX ADMIN — LAMOTRIGINE 150 MG: 100 TABLET ORAL at 08:44

## 2022-01-01 RX ADMIN — BUTALBITAL, ACETAMINOPHEN, AND CAFFEINE 1 TABLET: 50; 325; 40 TABLET ORAL at 11:07

## 2022-01-01 RX ADMIN — LAMOTRIGINE 150 MG: 100 TABLET ORAL at 08:55

## 2022-01-01 RX ADMIN — DICYCLOMINE HYDROCHLORIDE 20 MG: 20 TABLET ORAL at 04:48

## 2022-01-01 RX ADMIN — FUROSEMIDE 40 MG: 10 INJECTION, SOLUTION INTRAMUSCULAR; INTRAVENOUS at 17:22

## 2022-01-01 RX ADMIN — CYCLOSPORINE 1 DROP: 0.5 EMULSION OPHTHALMIC at 21:00

## 2022-01-01 RX ADMIN — TOCILIZUMAB 600 MG: 20 INJECTION, SOLUTION, CONCENTRATE INTRAVENOUS at 12:36

## 2022-01-01 RX ADMIN — PANTOPRAZOLE SODIUM 40 MG: 40 TABLET, DELAYED RELEASE ORAL at 05:52

## 2022-01-01 RX ADMIN — Medication 4 UNITS: at 12:00

## 2022-01-01 RX ADMIN — BUTALBITAL, ACETAMINOPHEN, AND CAFFEINE 1 TABLET: 50; 325; 40 TABLET ORAL at 06:32

## 2022-01-01 RX ADMIN — SODIUM CHLORIDE, PRESERVATIVE FREE 10 ML: 5 INJECTION INTRAVENOUS at 14:00

## 2022-01-01 RX ADMIN — ONDANSETRON 4 MG: 2 INJECTION INTRAMUSCULAR; INTRAVENOUS at 01:54

## 2022-01-01 RX ADMIN — AMLODIPINE BESYLATE 5 MG: 5 TABLET ORAL at 08:33

## 2022-01-01 RX ADMIN — SODIUM CHLORIDE, PRESERVATIVE FREE 10 ML: 5 INJECTION INTRAVENOUS at 15:55

## 2022-01-01 RX ADMIN — PREGABALIN 50 MG: 50 CAPSULE ORAL at 08:33

## 2022-01-01 RX ADMIN — BUSPIRONE HYDROCHLORIDE 7.5 MG: 5 TABLET ORAL at 08:55

## 2022-01-01 RX ADMIN — Medication 8 UNITS: at 12:10

## 2022-01-01 RX ADMIN — PANTOPRAZOLE SODIUM 40 MG: 40 TABLET, DELAYED RELEASE ORAL at 06:00

## 2022-01-01 RX ADMIN — HEPARIN SODIUM 5000 UNITS: 5000 INJECTION INTRAVENOUS; SUBCUTANEOUS at 13:41

## 2022-01-01 RX ADMIN — PANTOPRAZOLE SODIUM 40 MG: 40 TABLET, DELAYED RELEASE ORAL at 17:05

## 2022-01-01 RX ADMIN — DICYCLOMINE HYDROCHLORIDE 20 MG: 20 TABLET ORAL at 21:47

## 2022-01-01 RX ADMIN — PREGABALIN 50 MG: 50 CAPSULE ORAL at 10:01

## 2022-01-01 RX ADMIN — INSULIN LISPRO 5 UNITS: 100 INJECTION, SOLUTION INTRAVENOUS; SUBCUTANEOUS at 22:07

## 2022-01-01 RX ADMIN — TOPIRAMATE 25 MG: 25 TABLET, FILM COATED ORAL at 21:01

## 2022-01-01 RX ADMIN — Medication 6 UNITS: at 08:43

## 2022-01-01 RX ADMIN — MONTELUKAST 10 MG: 10 TABLET, FILM COATED ORAL at 08:35

## 2022-01-01 RX ADMIN — Medication 6 UNITS: at 08:20

## 2022-01-01 RX ADMIN — WATER 5 MG: 1 INJECTION INTRAMUSCULAR; INTRAVENOUS; SUBCUTANEOUS at 16:52

## 2022-01-01 RX ADMIN — DEXAMETHASONE SODIUM PHOSPHATE 20 MG: 4 INJECTION INTRA-ARTICULAR; INTRALESIONAL; INTRAMUSCULAR; INTRAVENOUS; SOFT TISSUE at 10:09

## 2022-01-01 RX ADMIN — SODIUM CHLORIDE, PRESERVATIVE FREE 10 ML: 5 INJECTION INTRAVENOUS at 15:00

## 2022-01-01 RX ADMIN — HEPARIN SODIUM 5000 UNITS: 5000 INJECTION INTRAVENOUS; SUBCUTANEOUS at 15:12

## 2022-01-01 RX ADMIN — Medication 6 UNITS: at 12:16

## 2022-01-01 RX ADMIN — MONTELUKAST 10 MG: 10 TABLET, FILM COATED ORAL at 08:32

## 2022-01-01 RX ADMIN — Medication 4 UNITS: at 21:46

## 2022-01-01 RX ADMIN — PANTOPRAZOLE SODIUM 40 MG: 40 TABLET, DELAYED RELEASE ORAL at 06:38

## 2022-01-01 RX ADMIN — Medication 8 UNITS: at 23:02

## 2022-01-01 RX ADMIN — DOXYCYCLINE HYCLATE 100 MG: 100 CAPSULE ORAL at 08:35

## 2022-01-01 RX ADMIN — Medication 6 UNITS: at 11:47

## 2022-01-01 RX ADMIN — HYDROMORPHONE HYDROCHLORIDE 0.5 MG: 1 INJECTION, SOLUTION INTRAMUSCULAR; INTRAVENOUS; SUBCUTANEOUS at 10:30

## 2022-01-01 RX ADMIN — CEFTRIAXONE 1 G: 1 INJECTION, POWDER, FOR SOLUTION INTRAMUSCULAR; INTRAVENOUS at 17:05

## 2022-01-01 RX ADMIN — AMLODIPINE BESYLATE 5 MG: 5 TABLET ORAL at 08:24

## 2022-01-01 RX ADMIN — DOXYCYCLINE HYCLATE 100 MG: 100 CAPSULE ORAL at 20:48

## 2022-01-01 RX ADMIN — PANTOPRAZOLE SODIUM 40 MG: 40 TABLET, DELAYED RELEASE ORAL at 17:37

## 2022-01-01 RX ADMIN — Medication 8 UNITS: at 21:32

## 2022-01-01 RX ADMIN — DICYCLOMINE HYDROCHLORIDE 20 MG: 20 TABLET ORAL at 21:43

## 2022-01-01 RX ADMIN — LORAZEPAM 0.5 MG: 0.5 TABLET ORAL at 05:17

## 2022-01-01 RX ADMIN — PREGABALIN 50 MG: 50 CAPSULE ORAL at 17:00

## 2022-01-01 RX ADMIN — TOPIRAMATE 25 MG: 25 TABLET, FILM COATED ORAL at 22:03

## 2022-01-01 RX ADMIN — TRAZODONE HYDROCHLORIDE 50 MG: 50 TABLET ORAL at 23:03

## 2022-01-01 RX ADMIN — INSULIN GLARGINE 15 UNITS: 100 INJECTION, SOLUTION SUBCUTANEOUS at 21:46

## 2022-01-01 RX ADMIN — ATORVASTATIN CALCIUM 20 MG: 10 TABLET, FILM COATED ORAL at 21:44

## 2022-01-01 RX ADMIN — ATORVASTATIN CALCIUM 20 MG: 10 TABLET, FILM COATED ORAL at 21:46

## 2022-01-01 RX ADMIN — ACETAMINOPHEN 650 MG: 325 TABLET ORAL at 06:36

## 2022-01-01 RX ADMIN — SODIUM CHLORIDE, PRESERVATIVE FREE 10 ML: 5 INJECTION INTRAVENOUS at 21:28

## 2022-01-01 RX ADMIN — DULOXETINE HYDROCHLORIDE 20 MG: 20 CAPSULE, DELAYED RELEASE ORAL at 08:35

## 2022-01-01 RX ADMIN — ALBUTEROL SULFATE 2 PUFF: 90 AEROSOL, METERED RESPIRATORY (INHALATION) at 15:30

## 2022-01-01 RX ADMIN — HEPARIN SODIUM 5000 UNITS: 5000 INJECTION INTRAVENOUS; SUBCUTANEOUS at 06:02

## 2022-01-01 RX ADMIN — Medication 1 AMPULE: at 21:43

## 2022-01-01 RX ADMIN — DOXYCYCLINE HYCLATE 100 MG: 100 CAPSULE ORAL at 21:46

## 2022-01-01 RX ADMIN — LEVOTHYROXINE SODIUM 137 MCG: 50 TABLET ORAL at 05:52

## 2022-01-01 RX ADMIN — SODIUM CHLORIDE, PRESERVATIVE FREE 10 ML: 5 INJECTION INTRAVENOUS at 21:46

## 2022-01-01 RX ADMIN — DEXAMETHASONE SODIUM PHOSPHATE 20 MG: 4 INJECTION INTRA-ARTICULAR; INTRALESIONAL; INTRAMUSCULAR; INTRAVENOUS; SOFT TISSUE at 11:01

## 2022-01-01 RX ADMIN — HEPARIN SODIUM 15 UNITS/KG/HR: 5000 INJECTION, SOLUTION INTRAVENOUS at 09:40

## 2022-01-01 RX ADMIN — Medication 1 AMPULE: at 21:45

## 2022-01-01 RX ADMIN — ACETAMINOPHEN 650 MG: 325 TABLET ORAL at 15:30

## 2022-01-01 RX ADMIN — BUTALBITAL, ACETAMINOPHEN, AND CAFFEINE 1 TABLET: 50; 325; 40 TABLET ORAL at 20:59

## 2022-01-01 RX ADMIN — LAMOTRIGINE 150 MG: 100 TABLET ORAL at 10:01

## 2022-01-01 RX ADMIN — MONTELUKAST 10 MG: 10 TABLET, FILM COATED ORAL at 13:24

## 2022-01-01 RX ADMIN — DULOXETINE HYDROCHLORIDE 20 MG: 20 CAPSULE, DELAYED RELEASE ORAL at 08:44

## 2022-01-01 RX ADMIN — Medication 1 AMPULE: at 21:52

## 2022-01-01 RX ADMIN — AMLODIPINE BESYLATE 5 MG: 5 TABLET ORAL at 13:20

## 2022-01-01 RX ADMIN — Medication 8 UNITS: at 17:12

## 2022-01-01 RX ADMIN — HYDROMORPHONE HYDROCHLORIDE 0.5 MG: 1 INJECTION, SOLUTION INTRAMUSCULAR; INTRAVENOUS; SUBCUTANEOUS at 14:02

## 2022-01-01 RX ADMIN — BUDESONIDE AND FORMOTEROL FUMARATE DIHYDRATE 2 PUFF: 160; 4.5 AEROSOL RESPIRATORY (INHALATION) at 07:35

## 2022-01-01 RX ADMIN — DICYCLOMINE HYDROCHLORIDE 20 MG: 20 TABLET ORAL at 05:38

## 2022-01-01 RX ADMIN — Medication 10 UNITS: at 17:06

## 2022-01-01 RX ADMIN — Medication 6 UNITS: at 21:02

## 2022-01-01 RX ADMIN — BUDESONIDE AND FORMOTEROL FUMARATE DIHYDRATE 2 PUFF: 160; 4.5 AEROSOL RESPIRATORY (INHALATION) at 20:01

## 2022-01-01 RX ADMIN — Medication 6 UNITS: at 12:20

## 2022-01-01 RX ADMIN — Medication 10 UNITS: at 21:44

## 2022-01-01 RX ADMIN — LAMOTRIGINE 150 MG: 100 TABLET ORAL at 08:32

## 2022-01-01 RX ADMIN — FUROSEMIDE 40 MG: 10 INJECTION, SOLUTION INTRAMUSCULAR; INTRAVENOUS at 18:04

## 2022-01-01 RX ADMIN — INSULIN LISPRO 3 UNITS: 100 INJECTION, SOLUTION INTRAVENOUS; SUBCUTANEOUS at 17:33

## 2022-01-01 RX ADMIN — SODIUM CHLORIDE, PRESERVATIVE FREE 10 ML: 5 INJECTION INTRAVENOUS at 05:07

## 2022-01-01 RX ADMIN — SODIUM CHLORIDE, PRESERVATIVE FREE 5 ML: 5 INJECTION INTRAVENOUS at 05:40

## 2022-01-01 RX ADMIN — LORAZEPAM 1 MG: 2 INJECTION INTRAMUSCULAR; INTRAVENOUS at 12:15

## 2022-01-01 RX ADMIN — MONTELUKAST 10 MG: 10 TABLET, FILM COATED ORAL at 08:53

## 2022-01-01 RX ADMIN — Medication 8 UNITS: at 18:05

## 2022-01-01 RX ADMIN — HEPARIN SODIUM 1720 UNITS: 1000 INJECTION, SOLUTION INTRAVENOUS; SUBCUTANEOUS at 22:31

## 2022-01-01 RX ADMIN — HEPARIN SODIUM 3450 UNITS: 1000 INJECTION INTRAVENOUS; SUBCUTANEOUS at 22:52

## 2022-01-01 RX ADMIN — ALBUTEROL SULFATE 2 PUFF: 90 AEROSOL, METERED RESPIRATORY (INHALATION) at 19:54

## 2022-01-01 RX ADMIN — LAMOTRIGINE 150 MG: 100 TABLET ORAL at 08:35

## 2022-01-01 RX ADMIN — BUDESONIDE AND FORMOTEROL FUMARATE DIHYDRATE 2 PUFF: 160; 4.5 AEROSOL RESPIRATORY (INHALATION) at 19:35

## 2022-01-01 RX ADMIN — DICYCLOMINE HYDROCHLORIDE 20 MG: 20 TABLET ORAL at 06:32

## 2022-01-01 RX ADMIN — INSULIN GLARGINE 10 UNITS: 100 INJECTION, SOLUTION SUBCUTANEOUS at 21:31

## 2022-01-01 RX ADMIN — ALBUMIN (HUMAN) 25 G: 0.25 INJECTION, SOLUTION INTRAVENOUS at 05:41

## 2022-01-01 RX ADMIN — TRAZODONE HYDROCHLORIDE 50 MG: 50 TABLET ORAL at 21:52

## 2022-01-01 RX ADMIN — PREGABALIN 50 MG: 50 CAPSULE ORAL at 08:35

## 2022-01-01 RX ADMIN — FUROSEMIDE 40 MG: 10 INJECTION, SOLUTION INTRAMUSCULAR; INTRAVENOUS at 17:37

## 2022-01-01 RX ADMIN — Medication 6 UNITS: at 17:24

## 2022-01-01 RX ADMIN — ALBUTEROL SULFATE 2 PUFF: 90 AEROSOL, METERED RESPIRATORY (INHALATION) at 11:45

## 2022-01-01 RX ADMIN — TOPIRAMATE 25 MG: 25 TABLET, FILM COATED ORAL at 21:44

## 2022-01-01 RX ADMIN — DOXYCYCLINE HYCLATE 100 MG: 100 CAPSULE ORAL at 21:23

## 2022-01-01 RX ADMIN — DICYCLOMINE HYDROCHLORIDE 20 MG: 20 TABLET ORAL at 17:11

## 2022-01-01 RX ADMIN — SODIUM CHLORIDE, PRESERVATIVE FREE 10 ML: 5 INJECTION INTRAVENOUS at 05:56

## 2022-01-01 RX ADMIN — Medication 4 UNITS: at 07:30

## 2022-01-01 RX ADMIN — PREGABALIN 50 MG: 50 CAPSULE ORAL at 08:24

## 2022-01-01 RX ADMIN — SODIUM CHLORIDE, PRESERVATIVE FREE 10 ML: 5 INJECTION INTRAVENOUS at 14:19

## 2022-01-01 RX ADMIN — Medication 4 UNITS: at 17:04

## 2022-01-01 RX ADMIN — FUROSEMIDE 40 MG: 10 INJECTION, SOLUTION INTRAMUSCULAR; INTRAVENOUS at 08:24

## 2022-01-01 RX ADMIN — HEPARIN SODIUM 6900 UNITS: 1000 INJECTION INTRAVENOUS; SUBCUTANEOUS at 16:48

## 2022-01-01 RX ADMIN — ONDANSETRON 4 MG: 2 INJECTION INTRAMUSCULAR; INTRAVENOUS at 10:09

## 2022-01-01 RX ADMIN — Medication 1 AMPULE: at 08:03

## 2022-01-01 RX ADMIN — SODIUM CHLORIDE 100 ML/HR: 900 INJECTION, SOLUTION INTRAVENOUS at 12:47

## 2022-01-01 RX ADMIN — DICYCLOMINE HYDROCHLORIDE 20 MG: 20 TABLET ORAL at 06:38

## 2022-01-01 RX ADMIN — HEPARIN SODIUM 5000 UNITS: 5000 INJECTION INTRAVENOUS; SUBCUTANEOUS at 05:58

## 2022-01-01 RX ADMIN — BUTALBITAL, ACETAMINOPHEN, AND CAFFEINE 1 TABLET: 50; 325; 40 TABLET ORAL at 08:45

## 2022-01-01 RX ADMIN — FUROSEMIDE 40 MG: 10 INJECTION, SOLUTION INTRAMUSCULAR; INTRAVENOUS at 17:04

## 2022-01-01 RX ADMIN — Medication 20 MEQ: at 08:46

## 2022-01-01 RX ADMIN — DULOXETINE HYDROCHLORIDE 20 MG: 20 CAPSULE, DELAYED RELEASE ORAL at 08:32

## 2022-01-01 RX ADMIN — ALBUTEROL SULFATE 2 PUFF: 90 AEROSOL, METERED RESPIRATORY (INHALATION) at 08:14

## 2022-01-01 RX ADMIN — TOPIRAMATE 25 MG: 25 TABLET, FILM COATED ORAL at 21:28

## 2022-01-01 RX ADMIN — BUSPIRONE HYDROCHLORIDE 7.5 MG: 5 TABLET ORAL at 17:20

## 2022-01-01 RX ADMIN — FUROSEMIDE 40 MG: 10 INJECTION, SOLUTION INTRAMUSCULAR; INTRAVENOUS at 08:54

## 2022-01-01 RX ADMIN — BUSPIRONE HYDROCHLORIDE 7.5 MG: 5 TABLET ORAL at 08:33

## 2022-01-01 RX ADMIN — Medication 1 AMPULE: at 22:19

## 2022-01-01 RX ADMIN — BUDESONIDE AND FORMOTEROL FUMARATE DIHYDRATE 2 PUFF: 160; 4.5 AEROSOL RESPIRATORY (INHALATION) at 21:45

## 2022-01-01 RX ADMIN — Medication 4 UNITS: at 08:30

## 2022-01-01 RX ADMIN — BUDESONIDE AND FORMOTEROL FUMARATE DIHYDRATE 2 PUFF: 160; 4.5 AEROSOL RESPIRATORY (INHALATION) at 19:54

## 2022-01-01 RX ADMIN — TRAZODONE HYDROCHLORIDE 50 MG: 50 TABLET ORAL at 21:44

## 2022-01-01 RX ADMIN — HEPARIN SODIUM 5000 UNITS: 5000 INJECTION INTRAVENOUS; SUBCUTANEOUS at 21:25

## 2022-01-01 RX ADMIN — DICYCLOMINE HYDROCHLORIDE 20 MG: 20 TABLET ORAL at 21:28

## 2022-01-01 RX ADMIN — Medication 2 UNITS: at 16:57

## 2022-01-01 RX ADMIN — BUSPIRONE HYDROCHLORIDE 7.5 MG: 5 TABLET ORAL at 20:14

## 2022-01-01 RX ADMIN — ATORVASTATIN CALCIUM 20 MG: 10 TABLET, FILM COATED ORAL at 22:03

## 2022-01-01 RX ADMIN — DICYCLOMINE HYDROCHLORIDE 20 MG: 20 TABLET ORAL at 17:32

## 2022-01-01 RX ADMIN — DEXAMETHASONE SODIUM PHOSPHATE 3 MG: 4 INJECTION, SOLUTION INTRA-ARTICULAR; INTRALESIONAL; INTRAMUSCULAR; INTRAVENOUS; SOFT TISSUE at 08:41

## 2022-01-01 RX ADMIN — Medication 4 UNITS: at 08:35

## 2022-01-01 RX ADMIN — ALBUMIN (HUMAN) 25 G: 0.25 INJECTION, SOLUTION INTRAVENOUS at 00:21

## 2022-01-01 RX ADMIN — LEVOTHYROXINE SODIUM 137 MCG: 50 TABLET ORAL at 06:20

## 2022-01-01 RX ADMIN — DICYCLOMINE HYDROCHLORIDE 20 MG: 20 TABLET ORAL at 06:22

## 2022-01-01 RX ADMIN — WATER 5 MG: 1 INJECTION INTRAMUSCULAR; INTRAVENOUS; SUBCUTANEOUS at 00:18

## 2022-01-01 RX ADMIN — PANTOPRAZOLE SODIUM 40 MG: 40 TABLET, DELAYED RELEASE ORAL at 17:32

## 2022-01-01 RX ADMIN — BUDESONIDE AND FORMOTEROL FUMARATE DIHYDRATE 2 PUFF: 160; 4.5 AEROSOL RESPIRATORY (INHALATION) at 20:00

## 2022-01-01 RX ADMIN — HEPARIN SODIUM 5000 UNITS: 5000 INJECTION INTRAVENOUS; SUBCUTANEOUS at 06:09

## 2022-01-01 RX ADMIN — Medication 20 MEQ: at 17:08

## 2022-01-01 RX ADMIN — Medication 6 UNITS: at 12:27

## 2022-01-01 RX ADMIN — DULOXETINE HYDROCHLORIDE 20 MG: 20 CAPSULE, DELAYED RELEASE ORAL at 11:01

## 2022-01-01 RX ADMIN — ALBUTEROL SULFATE 2 PUFF: 90 AEROSOL, METERED RESPIRATORY (INHALATION) at 16:00

## 2022-01-01 RX ADMIN — INSULIN GLARGINE 16 UNITS: 100 INJECTION, SOLUTION SUBCUTANEOUS at 21:51

## 2022-01-01 RX ADMIN — LEVOTHYROXINE SODIUM 137 MCG: 50 TABLET ORAL at 06:08

## 2022-01-01 RX ADMIN — HEPARIN SODIUM 5000 UNITS: 5000 INJECTION INTRAVENOUS; SUBCUTANEOUS at 05:41

## 2022-01-01 RX ADMIN — Medication 6 UNITS: at 17:42

## 2022-01-01 RX ADMIN — Medication 4 UNITS: at 11:56

## 2022-01-01 RX ADMIN — TOPIRAMATE 25 MG: 25 TABLET, FILM COATED ORAL at 21:24

## 2022-01-01 RX ADMIN — DICYCLOMINE HYDROCHLORIDE 20 MG: 20 TABLET ORAL at 11:40

## 2022-01-01 RX ADMIN — DICYCLOMINE HYDROCHLORIDE 20 MG: 20 TABLET ORAL at 21:48

## 2022-01-01 RX ADMIN — LORAZEPAM 1 MG: 2 INJECTION INTRAMUSCULAR; INTRAVENOUS at 14:02

## 2022-01-01 RX ADMIN — ALBUTEROL SULFATE 2 PUFF: 90 AEROSOL, METERED RESPIRATORY (INHALATION) at 07:16

## 2022-01-01 RX ADMIN — LORAZEPAM 1 MG: 2 INJECTION INTRAMUSCULAR; INTRAVENOUS at 11:07

## 2022-01-01 RX ADMIN — DOXYCYCLINE HYCLATE 100 MG: 100 CAPSULE ORAL at 08:33

## 2022-01-01 RX ADMIN — HEPARIN SODIUM 16 UNITS/KG/HR: 5000 INJECTION, SOLUTION INTRAVENOUS at 22:12

## 2022-01-01 RX ADMIN — CYCLOSPORINE 1 DROP: 0.5 EMULSION OPHTHALMIC at 21:54

## 2022-01-01 RX ADMIN — MONTELUKAST 10 MG: 10 TABLET, FILM COATED ORAL at 08:55

## 2022-01-01 RX ADMIN — ALBUTEROL SULFATE 2 PUFF: 90 AEROSOL, METERED RESPIRATORY (INHALATION) at 11:48

## 2022-01-01 RX ADMIN — SODIUM CHLORIDE, PRESERVATIVE FREE 10 ML: 5 INJECTION INTRAVENOUS at 23:26

## 2022-01-01 RX ADMIN — DICYCLOMINE HYDROCHLORIDE 20 MG: 20 TABLET ORAL at 21:32

## 2022-01-01 RX ADMIN — DICYCLOMINE HYDROCHLORIDE 20 MG: 20 TABLET ORAL at 17:37

## 2022-01-01 RX ADMIN — SODIUM CHLORIDE, PRESERVATIVE FREE 10 ML: 5 INJECTION INTRAVENOUS at 21:33

## 2022-01-01 RX ADMIN — TIZANIDINE 4 MG: 2 TABLET ORAL at 17:22

## 2022-01-01 RX ADMIN — DICYCLOMINE HYDROCHLORIDE 20 MG: 20 TABLET ORAL at 16:56

## 2022-01-01 RX ADMIN — Medication 8 UNITS: at 21:45

## 2022-01-01 RX ADMIN — FUROSEMIDE 40 MG: 10 INJECTION, SOLUTION INTRAMUSCULAR; INTRAVENOUS at 17:18

## 2022-01-01 RX ADMIN — Medication 1 AMPULE: at 21:46

## 2022-01-01 RX ADMIN — PREGABALIN 50 MG: 50 CAPSULE ORAL at 18:04

## 2022-01-01 RX ADMIN — INSULIN GLARGINE 15 UNITS: 100 INJECTION, SOLUTION SUBCUTANEOUS at 23:04

## 2022-01-01 RX ADMIN — TRAZODONE HYDROCHLORIDE 50 MG: 50 TABLET ORAL at 21:32

## 2022-01-01 RX ADMIN — FUROSEMIDE 40 MG: 10 INJECTION, SOLUTION INTRAMUSCULAR; INTRAVENOUS at 17:00

## 2022-01-01 RX ADMIN — ALBUTEROL SULFATE 2 PUFF: 90 AEROSOL, METERED RESPIRATORY (INHALATION) at 20:00

## 2022-01-01 RX ADMIN — DEXAMETHASONE SODIUM PHOSPHATE 3 MG: 4 INJECTION, SOLUTION INTRA-ARTICULAR; INTRALESIONAL; INTRAMUSCULAR; INTRAVENOUS; SOFT TISSUE at 08:45

## 2022-01-01 RX ADMIN — Medication 1 AMPULE: at 22:03

## 2022-01-01 RX ADMIN — PANTOPRAZOLE SODIUM 40 MG: 40 TABLET, DELAYED RELEASE ORAL at 17:11

## 2022-01-01 RX ADMIN — AMLODIPINE BESYLATE 5 MG: 5 TABLET ORAL at 11:12

## 2022-01-01 RX ADMIN — DICYCLOMINE HYDROCHLORIDE 20 MG: 20 TABLET ORAL at 11:07

## 2022-01-01 RX ADMIN — DICYCLOMINE HYDROCHLORIDE 20 MG: 20 TABLET ORAL at 10:13

## 2022-01-01 RX ADMIN — DICYCLOMINE HYDROCHLORIDE 20 MG: 20 TABLET ORAL at 21:25

## 2022-01-01 RX ADMIN — ATORVASTATIN CALCIUM 20 MG: 10 TABLET, FILM COATED ORAL at 21:51

## 2022-01-01 RX ADMIN — BUTALBITAL, ACETAMINOPHEN, AND CAFFEINE 1 TABLET: 50; 325; 40 TABLET ORAL at 03:04

## 2022-01-01 RX ADMIN — FUROSEMIDE 40 MG: 10 INJECTION, SOLUTION INTRAMUSCULAR; INTRAVENOUS at 10:05

## 2022-01-01 RX ADMIN — PREGABALIN 50 MG: 50 CAPSULE ORAL at 17:20

## 2022-01-01 RX ADMIN — BUTALBITAL, ACETAMINOPHEN, AND CAFFEINE 1 TABLET: 50; 325; 40 TABLET ORAL at 16:30

## 2022-01-01 RX ADMIN — PANTOPRAZOLE SODIUM 40 MG: 40 TABLET, DELAYED RELEASE ORAL at 15:53

## 2022-01-01 RX ADMIN — HEPARIN SODIUM 5000 UNITS: 5000 INJECTION INTRAVENOUS; SUBCUTANEOUS at 14:19

## 2022-01-01 RX ADMIN — ALBUTEROL SULFATE 2 PUFF: 90 AEROSOL, METERED RESPIRATORY (INHALATION) at 15:56

## 2022-01-01 RX ADMIN — BUDESONIDE AND FORMOTEROL FUMARATE DIHYDRATE 2 PUFF: 160; 4.5 AEROSOL RESPIRATORY (INHALATION) at 20:35

## 2022-01-01 RX ADMIN — BUSPIRONE HYDROCHLORIDE 7.5 MG: 5 TABLET ORAL at 21:46

## 2022-01-01 RX ADMIN — TRAZODONE HYDROCHLORIDE 50 MG: 50 TABLET ORAL at 22:03

## 2022-01-01 RX ADMIN — DICYCLOMINE HYDROCHLORIDE 20 MG: 20 TABLET ORAL at 06:00

## 2022-01-01 RX ADMIN — WATER 5 MG: 1 INJECTION INTRAMUSCULAR; INTRAVENOUS; SUBCUTANEOUS at 01:39

## 2022-01-01 RX ADMIN — DULOXETINE HYDROCHLORIDE 20 MG: 20 CAPSULE, DELAYED RELEASE ORAL at 08:24

## 2022-01-01 RX ADMIN — ALBUTEROL SULFATE 2 PUFF: 90 AEROSOL, METERED RESPIRATORY (INHALATION) at 20:35

## 2022-01-01 RX ADMIN — LEVOTHYROXINE SODIUM 137 MCG: 50 TABLET ORAL at 05:27

## 2022-01-01 RX ADMIN — Medication 1 AMPULE: at 21:32

## 2022-01-01 RX ADMIN — HYDROMORPHONE HYDROCHLORIDE 0.5 MG: 1 INJECTION, SOLUTION INTRAMUSCULAR; INTRAVENOUS; SUBCUTANEOUS at 21:08

## 2022-01-01 RX ADMIN — SODIUM CHLORIDE, PRESERVATIVE FREE 10 ML: 5 INJECTION INTRAVENOUS at 13:42

## 2022-01-01 RX ADMIN — TOPIRAMATE 25 MG: 25 TABLET, FILM COATED ORAL at 21:32

## 2022-01-01 RX ADMIN — HEPARIN SODIUM 5000 UNITS: 5000 INJECTION INTRAVENOUS; SUBCUTANEOUS at 21:46

## 2022-01-01 RX ADMIN — SODIUM CHLORIDE, PRESERVATIVE FREE 10 ML: 5 INJECTION INTRAVENOUS at 22:04

## 2022-01-01 RX ADMIN — HEPARIN SODIUM 5000 UNITS: 5000 INJECTION INTRAVENOUS; SUBCUTANEOUS at 23:03

## 2022-01-01 RX ADMIN — BUSPIRONE HYDROCHLORIDE 7.5 MG: 5 TABLET ORAL at 08:53

## 2022-01-01 RX ADMIN — HYDROCODONE BITARTRATE AND ACETAMINOPHEN 1 TABLET: 5; 325 TABLET ORAL at 23:10

## 2022-01-01 RX ADMIN — TIZANIDINE 4 MG: 2 TABLET ORAL at 14:43

## 2022-01-01 RX ADMIN — ALBUTEROL SULFATE 2 PUFF: 90 AEROSOL, METERED RESPIRATORY (INHALATION) at 15:58

## 2022-01-01 RX ADMIN — DEXAMETHASONE SODIUM PHOSPHATE 10 MG: 10 INJECTION INTRAMUSCULAR; INTRAVENOUS at 09:39

## 2022-01-01 RX ADMIN — HEPARIN SODIUM 3450 UNITS: 1000 INJECTION INTRAVENOUS; SUBCUTANEOUS at 13:25

## 2022-01-01 RX ADMIN — SODIUM CHLORIDE, PRESERVATIVE FREE 10 ML: 5 INJECTION INTRAVENOUS at 14:34

## 2022-01-01 RX ADMIN — SODIUM CHLORIDE 100 ML/HR: 900 INJECTION, SOLUTION INTRAVENOUS at 01:08

## 2022-01-01 RX ADMIN — BUSPIRONE HYDROCHLORIDE 7.5 MG: 5 TABLET ORAL at 08:03

## 2022-01-01 RX ADMIN — TOPIRAMATE 25 MG: 25 TABLET, FILM COATED ORAL at 23:03

## 2022-01-01 RX ADMIN — PREGABALIN 50 MG: 50 CAPSULE ORAL at 08:44

## 2022-01-01 RX ADMIN — TIZANIDINE 4 MG: 2 TABLET ORAL at 20:48

## 2022-01-01 RX ADMIN — PREGABALIN 50 MG: 50 CAPSULE ORAL at 08:55

## 2022-01-01 RX ADMIN — SODIUM CHLORIDE, PRESERVATIVE FREE 10 ML: 5 INJECTION INTRAVENOUS at 23:04

## 2022-01-01 RX ADMIN — INSULIN LISPRO 3 UNITS: 100 INJECTION, SOLUTION INTRAVENOUS; SUBCUTANEOUS at 08:33

## 2022-01-01 RX ADMIN — DICYCLOMINE HYDROCHLORIDE 20 MG: 20 TABLET ORAL at 21:44

## 2022-01-01 RX ADMIN — ALBUTEROL SULFATE 2 PUFF: 90 AEROSOL, METERED RESPIRATORY (INHALATION) at 11:32

## 2022-01-01 RX ADMIN — LAMOTRIGINE 150 MG: 100 TABLET ORAL at 08:54

## 2022-01-01 RX ADMIN — HEPARIN SODIUM 5000 UNITS: 5000 INJECTION INTRAVENOUS; SUBCUTANEOUS at 06:20

## 2022-01-01 RX ADMIN — DICYCLOMINE HYDROCHLORIDE 20 MG: 20 TABLET ORAL at 22:03

## 2022-01-01 RX ADMIN — SODIUM CHLORIDE, PRESERVATIVE FREE 10 ML: 5 INJECTION INTRAVENOUS at 13:45

## 2022-01-01 RX ADMIN — ALBUTEROL SULFATE 2 PUFF: 90 AEROSOL, METERED RESPIRATORY (INHALATION) at 07:35

## 2022-01-01 RX ADMIN — ALBUTEROL SULFATE 2 PUFF: 90 AEROSOL, METERED RESPIRATORY (INHALATION) at 11:40

## 2022-01-01 RX ADMIN — ALBUTEROL SULFATE 2 PUFF: 90 AEROSOL, METERED RESPIRATORY (INHALATION) at 19:35

## 2022-01-01 RX ADMIN — Medication 6 UNITS: at 13:51

## 2022-01-01 RX ADMIN — PANTOPRAZOLE SODIUM 40 MG: 40 TABLET, DELAYED RELEASE ORAL at 16:00

## 2022-01-01 RX ADMIN — INSULIN GLARGINE 17 UNITS: 100 INJECTION, SOLUTION SUBCUTANEOUS at 22:03

## 2022-01-01 RX ADMIN — INSULIN GLARGINE 22 UNITS: 100 INJECTION, SOLUTION SUBCUTANEOUS at 21:37

## 2022-01-01 RX ADMIN — HEPARIN SODIUM 5000 UNITS: 5000 INJECTION INTRAVENOUS; SUBCUTANEOUS at 13:34

## 2022-01-01 RX ADMIN — DOXYCYCLINE HYCLATE 100 MG: 100 CAPSULE ORAL at 10:01

## 2022-01-01 RX ADMIN — PREGABALIN 50 MG: 50 CAPSULE ORAL at 17:22

## 2022-01-01 RX ADMIN — Medication 2 UNITS: at 08:44

## 2022-01-01 RX ADMIN — INSULIN GLARGINE 26 UNITS: 100 INJECTION, SOLUTION SUBCUTANEOUS at 21:45

## 2022-01-01 RX ADMIN — Medication 4 UNITS: at 11:31

## 2022-01-01 RX ADMIN — PANTOPRAZOLE SODIUM 40 MG: 40 TABLET, DELAYED RELEASE ORAL at 17:02

## 2022-01-01 RX ADMIN — CEFTRIAXONE 1 G: 1 INJECTION, POWDER, FOR SOLUTION INTRAMUSCULAR; INTRAVENOUS at 17:11

## 2022-01-01 RX ADMIN — Medication 4 UNITS: at 21:44

## 2022-01-01 RX ADMIN — BUSPIRONE HYDROCHLORIDE 7.5 MG: 5 TABLET ORAL at 17:05

## 2022-01-01 RX ADMIN — SODIUM CHLORIDE, SODIUM LACTATE, POTASSIUM CHLORIDE, AND CALCIUM CHLORIDE 1000 ML: 600; 310; 30; 20 INJECTION, SOLUTION INTRAVENOUS at 09:39

## 2022-01-01 RX ADMIN — HEPARIN SODIUM 5000 UNITS: 5000 INJECTION INTRAVENOUS; SUBCUTANEOUS at 13:36

## 2022-01-01 RX ADMIN — PANTOPRAZOLE SODIUM 40 MG: 40 TABLET, DELAYED RELEASE ORAL at 16:57

## 2022-01-01 RX ADMIN — MONTELUKAST 10 MG: 10 TABLET, FILM COATED ORAL at 08:03

## 2022-01-01 RX ADMIN — SODIUM CHLORIDE, PRESERVATIVE FREE 10 ML: 5 INJECTION INTRAVENOUS at 13:35

## 2022-01-01 RX ADMIN — PREGABALIN 50 MG: 50 CAPSULE ORAL at 08:32

## 2022-01-01 RX ADMIN — Medication 4 UNITS: at 11:30

## 2022-01-01 RX ADMIN — AMLODIPINE BESYLATE 5 MG: 5 TABLET ORAL at 08:55

## 2022-01-01 RX ADMIN — INSULIN GLARGINE 22 UNITS: 100 INJECTION, SOLUTION SUBCUTANEOUS at 21:03

## 2022-01-01 RX ADMIN — LAMOTRIGINE 150 MG: 100 TABLET ORAL at 08:03

## 2022-01-01 RX ADMIN — ALBUTEROL SULFATE 2 PUFF: 90 AEROSOL, METERED RESPIRATORY (INHALATION) at 21:45

## 2022-01-01 RX ADMIN — DICYCLOMINE HYDROCHLORIDE 20 MG: 20 TABLET ORAL at 15:53

## 2022-01-01 RX ADMIN — BUSPIRONE HYDROCHLORIDE 7.5 MG: 5 TABLET ORAL at 13:23

## 2022-01-01 RX ADMIN — Medication 2 UNITS: at 17:25

## 2022-01-01 RX ADMIN — DICYCLOMINE HYDROCHLORIDE 20 MG: 20 TABLET ORAL at 17:05

## 2022-01-01 RX ADMIN — DEXAMETHASONE SODIUM PHOSPHATE 3 MG: 4 INJECTION, SOLUTION INTRA-ARTICULAR; INTRALESIONAL; INTRAMUSCULAR; INTRAVENOUS; SOFT TISSUE at 17:22

## 2022-01-01 RX ADMIN — TOPIRAMATE 25 MG: 25 TABLET, FILM COATED ORAL at 21:43

## 2022-01-01 RX ADMIN — AMLODIPINE BESYLATE 5 MG: 5 TABLET ORAL at 08:03

## 2022-01-01 RX ADMIN — SODIUM CHLORIDE, PRESERVATIVE FREE 10 ML: 5 INJECTION INTRAVENOUS at 06:03

## 2022-01-01 RX ADMIN — Medication 1 AMPULE: at 10:01

## 2022-01-01 RX ADMIN — CEFTRIAXONE 1 G: 1 INJECTION, POWDER, FOR SOLUTION INTRAMUSCULAR; INTRAVENOUS at 17:32

## 2022-01-01 RX ADMIN — CEFTRIAXONE 1 G: 1 INJECTION, POWDER, FOR SOLUTION INTRAMUSCULAR; INTRAVENOUS at 17:37

## 2022-01-01 RX ADMIN — HEPARIN SODIUM 18 UNITS/KG/HR: 5000 INJECTION, SOLUTION INTRAVENOUS at 16:49

## 2022-01-01 RX ADMIN — DEXAMETHASONE SODIUM PHOSPHATE 10 MG: 10 INJECTION INTRAMUSCULAR; INTRAVENOUS at 08:53

## 2022-01-01 RX ADMIN — Medication 8 UNITS: at 21:50

## 2022-01-01 RX ADMIN — ALBUMIN (HUMAN) 25 G: 0.25 INJECTION, SOLUTION INTRAVENOUS at 19:22

## 2022-01-01 RX ADMIN — LEVOTHYROXINE SODIUM 137 MCG: 50 TABLET ORAL at 05:39

## 2022-01-01 RX ADMIN — HEPARIN SODIUM 5000 UNITS: 5000 INJECTION INTRAVENOUS; SUBCUTANEOUS at 22:03

## 2022-01-01 RX ADMIN — DICYCLOMINE HYDROCHLORIDE 20 MG: 20 TABLET ORAL at 06:09

## 2022-01-01 RX ADMIN — TRAZODONE HYDROCHLORIDE 50 MG: 50 TABLET ORAL at 21:24

## 2022-01-01 RX ADMIN — Medication 4 UNITS: at 11:48

## 2022-01-01 RX ADMIN — AMLODIPINE BESYLATE 5 MG: 5 TABLET ORAL at 08:44

## 2022-01-01 RX ADMIN — PREGABALIN 50 MG: 50 CAPSULE ORAL at 17:32

## 2022-01-01 RX ADMIN — DEXAMETHASONE SODIUM PHOSPHATE 20 MG: 4 INJECTION INTRA-ARTICULAR; INTRALESIONAL; INTRAMUSCULAR; INTRAVENOUS; SOFT TISSUE at 11:12

## 2022-01-01 RX ADMIN — ATORVASTATIN CALCIUM 20 MG: 10 TABLET, FILM COATED ORAL at 21:01

## 2022-01-01 RX ADMIN — INSULIN LISPRO 3 UNITS: 100 INJECTION, SOLUTION INTRAVENOUS; SUBCUTANEOUS at 12:21

## 2022-01-01 RX ADMIN — Medication 1 AMPULE: at 21:44

## 2022-01-01 RX ADMIN — BUDESONIDE AND FORMOTEROL FUMARATE DIHYDRATE 2 PUFF: 160; 4.5 AEROSOL RESPIRATORY (INHALATION) at 20:41

## 2022-01-01 RX ADMIN — TRAZODONE HYDROCHLORIDE 50 MG: 50 TABLET ORAL at 21:28

## 2022-01-01 RX ADMIN — CYCLOSPORINE 1 DROP: 0.5 EMULSION OPHTHALMIC at 09:00

## 2022-01-01 RX ADMIN — INSULIN GLARGINE 18 UNITS: 100 INJECTION, SOLUTION SUBCUTANEOUS at 21:46

## 2022-01-01 RX ADMIN — MONTELUKAST 10 MG: 10 TABLET, FILM COATED ORAL at 08:24

## 2022-01-01 RX ADMIN — HEPARIN SODIUM 5000 UNITS: 5000 INJECTION INTRAVENOUS; SUBCUTANEOUS at 14:45

## 2022-01-01 RX ADMIN — TRAZODONE HYDROCHLORIDE 50 MG: 50 TABLET ORAL at 21:01

## 2022-01-01 RX ADMIN — Medication 2 UNITS: at 07:54

## 2022-01-01 RX ADMIN — PREGABALIN 50 MG: 50 CAPSULE ORAL at 17:11

## 2022-01-01 RX ADMIN — DULOXETINE HYDROCHLORIDE 20 MG: 20 CAPSULE, DELAYED RELEASE ORAL at 08:34

## 2022-01-01 RX ADMIN — Medication 2 UNITS: at 08:20

## 2022-01-01 RX ADMIN — FUROSEMIDE 40 MG: 10 INJECTION, SOLUTION INTRAMUSCULAR; INTRAVENOUS at 08:30

## 2022-01-01 RX ADMIN — BUSPIRONE HYDROCHLORIDE 7.5 MG: 5 TABLET ORAL at 17:35

## 2022-01-01 RX ADMIN — INSULIN GLARGINE 22 UNITS: 100 INJECTION, SOLUTION SUBCUTANEOUS at 21:44

## 2022-01-01 RX ADMIN — BUSPIRONE HYDROCHLORIDE 7.5 MG: 5 TABLET ORAL at 17:11

## 2022-01-01 RX ADMIN — ALBUTEROL SULFATE 2 PUFF: 90 AEROSOL, METERED RESPIRATORY (INHALATION) at 20:41

## 2022-01-01 RX ADMIN — ALBUTEROL SULFATE 2 PUFF: 90 AEROSOL, METERED RESPIRATORY (INHALATION) at 16:30

## 2022-01-01 RX ADMIN — DOXYCYCLINE HYCLATE 100 MG: 100 CAPSULE ORAL at 20:14

## 2022-01-01 RX ADMIN — TRAZODONE HYDROCHLORIDE 50 MG: 50 TABLET ORAL at 21:47

## 2022-01-01 RX ADMIN — Medication 1 AMPULE: at 08:45

## 2022-01-01 RX ADMIN — BUSPIRONE HYDROCHLORIDE 7.5 MG: 5 TABLET ORAL at 08:44

## 2022-01-01 RX ADMIN — AMLODIPINE BESYLATE 5 MG: 5 TABLET ORAL at 08:54

## 2022-01-01 RX ADMIN — DEXAMETHASONE SODIUM PHOSPHATE 10 MG: 10 INJECTION INTRAMUSCULAR; INTRAVENOUS at 08:34

## 2022-01-01 RX ADMIN — Medication 4 UNITS: at 08:41

## 2022-01-01 RX ADMIN — PREGABALIN 50 MG: 50 CAPSULE ORAL at 17:05

## 2022-01-01 RX ADMIN — BUSPIRONE HYDROCHLORIDE 7.5 MG: 5 TABLET ORAL at 17:01

## 2022-01-01 RX ADMIN — Medication 10 UNITS: at 22:03

## 2022-01-01 RX ADMIN — DICYCLOMINE HYDROCHLORIDE 20 MG: 20 TABLET ORAL at 06:20

## 2022-01-01 RX ADMIN — SODIUM CHLORIDE, PRESERVATIVE FREE 10 ML: 5 INJECTION INTRAVENOUS at 22:00

## 2022-01-01 RX ADMIN — BUSPIRONE HYDROCHLORIDE 7.5 MG: 5 TABLET ORAL at 10:01

## 2022-01-01 RX ADMIN — Medication 1 AMPULE: at 08:54

## 2022-01-01 RX ADMIN — HEPARIN SODIUM 10 UNITS/KG/HR: 5000 INJECTION, SOLUTION INTRAVENOUS at 13:00

## 2022-01-01 RX ADMIN — DOXYCYCLINE HYCLATE 100 MG: 100 CAPSULE ORAL at 08:55

## 2022-01-01 RX ADMIN — Medication 1 AMPULE: at 20:48

## 2022-01-01 RX ADMIN — BUSPIRONE HYDROCHLORIDE 7.5 MG: 5 TABLET ORAL at 18:03

## 2022-01-01 RX ADMIN — SODIUM CHLORIDE, PRESERVATIVE FREE 10 ML: 5 INJECTION INTRAVENOUS at 16:56

## 2022-01-01 RX ADMIN — DEXAMETHASONE SODIUM PHOSPHATE 10 MG: 10 INJECTION INTRAMUSCULAR; INTRAVENOUS at 08:25

## 2022-01-01 RX ADMIN — INSULIN LISPRO 3 UNITS: 100 INJECTION, SOLUTION INTRAVENOUS; SUBCUTANEOUS at 12:11

## 2022-01-01 RX ADMIN — PANTOPRAZOLE SODIUM 40 MG: 40 TABLET, DELAYED RELEASE ORAL at 06:03

## 2022-01-01 RX ADMIN — PREGABALIN 50 MG: 50 CAPSULE ORAL at 17:37

## 2022-01-01 RX ADMIN — ALBUTEROL SULFATE 2 PUFF: 90 AEROSOL, METERED RESPIRATORY (INHALATION) at 15:21

## 2022-01-01 RX ADMIN — FUROSEMIDE 40 MG: 10 INJECTION, SOLUTION INTRAMUSCULAR; INTRAVENOUS at 17:09

## 2022-01-01 RX ADMIN — Medication 4 UNITS: at 08:02

## 2022-01-01 RX ADMIN — ONDANSETRON 4 MG: 2 INJECTION INTRAMUSCULAR; INTRAVENOUS at 20:14

## 2022-01-01 RX ADMIN — PREGABALIN 50 MG: 50 CAPSULE ORAL at 08:03

## 2022-01-01 RX ADMIN — SODIUM CHLORIDE, PRESERVATIVE FREE 10 ML: 5 INJECTION INTRAVENOUS at 06:21

## 2022-01-01 RX ADMIN — DULOXETINE HYDROCHLORIDE 20 MG: 20 CAPSULE, DELAYED RELEASE ORAL at 08:55

## 2022-01-01 RX ADMIN — DICYCLOMINE HYDROCHLORIDE 20 MG: 20 TABLET ORAL at 06:03

## 2022-01-01 RX ADMIN — ALBUTEROL SULFATE 2 PUFF: 90 AEROSOL, METERED RESPIRATORY (INHALATION) at 08:13

## 2022-01-01 RX ADMIN — ALBUTEROL SULFATE 2 PUFF: 90 AEROSOL, METERED RESPIRATORY (INHALATION) at 11:53

## 2022-01-01 RX ADMIN — BUSPIRONE HYDROCHLORIDE 7.5 MG: 5 TABLET ORAL at 11:00

## 2022-01-01 RX ADMIN — Medication 1 AMPULE: at 21:01

## 2022-01-01 RX ADMIN — SODIUM CHLORIDE, PRESERVATIVE FREE 10 ML: 5 INJECTION INTRAVENOUS at 21:48

## 2022-01-01 RX ADMIN — HYDROXYZINE HYDROCHLORIDE 25 MG: 25 TABLET, FILM COATED ORAL at 10:03

## 2022-01-01 RX ADMIN — DULOXETINE HYDROCHLORIDE 20 MG: 20 CAPSULE, DELAYED RELEASE ORAL at 13:19

## 2022-01-01 RX ADMIN — Medication 1 AMPULE: at 08:33

## 2022-01-01 RX ADMIN — ALBUTEROL SULFATE 2 PUFF: 90 AEROSOL, METERED RESPIRATORY (INHALATION) at 07:39

## 2022-01-01 RX ADMIN — Medication 20 MEQ: at 14:00

## 2022-01-01 RX ADMIN — PANTOPRAZOLE SODIUM 40 MG: 40 TABLET, DELAYED RELEASE ORAL at 17:22

## 2022-01-01 RX ADMIN — POTASSIUM CHLORIDE 20 MEQ: 20 TABLET, EXTENDED RELEASE ORAL at 10:00

## 2022-01-01 RX ADMIN — DOXYCYCLINE HYCLATE 100 MG: 100 CAPSULE ORAL at 11:43

## 2022-01-01 RX ADMIN — LEVOTHYROXINE SODIUM 137 MCG: 50 TABLET ORAL at 05:58

## 2022-01-01 RX ADMIN — HEPARIN SODIUM 5000 UNITS: 5000 INJECTION INTRAVENOUS; SUBCUTANEOUS at 14:32

## 2022-01-01 RX ADMIN — LEVOTHYROXINE SODIUM 137 MCG: 50 TABLET ORAL at 06:02

## 2022-01-01 RX ADMIN — ALBUTEROL SULFATE 2 PUFF: 90 AEROSOL, METERED RESPIRATORY (INHALATION) at 16:06

## 2022-01-01 RX ADMIN — TIZANIDINE 4 MG: 2 TABLET ORAL at 04:44

## 2022-01-01 RX ADMIN — PANTOPRAZOLE SODIUM 40 MG: 40 TABLET, DELAYED RELEASE ORAL at 05:38

## 2022-01-01 RX ADMIN — DICYCLOMINE HYDROCHLORIDE 20 MG: 20 TABLET ORAL at 11:15

## 2022-01-01 RX ADMIN — Medication 1 AMPULE: at 08:56

## 2022-01-01 RX ADMIN — BUTALBITAL, ACETAMINOPHEN, AND CAFFEINE 1 TABLET: 50; 325; 40 TABLET ORAL at 17:23

## 2022-01-01 RX ADMIN — PANTOPRAZOLE SODIUM 40 MG: 40 TABLET, DELAYED RELEASE ORAL at 06:08

## 2022-01-01 RX ADMIN — SODIUM CHLORIDE, PRESERVATIVE FREE 5 ML: 5 INJECTION INTRAVENOUS at 21:53

## 2022-01-01 RX ADMIN — DULOXETINE HYDROCHLORIDE 20 MG: 20 CAPSULE, DELAYED RELEASE ORAL at 10:01

## 2022-01-01 RX ADMIN — Medication 1 AMPULE: at 08:35

## 2022-01-01 RX ADMIN — ALBUTEROL SULFATE 2 PUFF: 90 AEROSOL, METERED RESPIRATORY (INHALATION) at 19:44

## 2022-01-01 RX ADMIN — ATORVASTATIN CALCIUM 20 MG: 10 TABLET, FILM COATED ORAL at 21:43

## 2022-01-01 RX ADMIN — SODIUM CHLORIDE, PRESERVATIVE FREE 10 ML: 5 INJECTION INTRAVENOUS at 05:17

## 2022-01-01 RX ADMIN — PANTOPRAZOLE SODIUM 40 MG: 40 TABLET, DELAYED RELEASE ORAL at 11:43

## 2022-01-01 RX ADMIN — TRAZODONE HYDROCHLORIDE 50 MG: 50 TABLET ORAL at 21:43

## 2022-01-01 RX ADMIN — LOPERAMIDE HYDROCHLORIDE 2 MG: 2 CAPSULE ORAL at 14:42

## 2022-01-01 RX ADMIN — GUAIFENESIN SYRUP AND DEXTROMETHORPHAN 5 ML: 100; 10 SYRUP ORAL at 21:45

## 2022-01-01 RX ADMIN — DULOXETINE HYDROCHLORIDE 20 MG: 20 CAPSULE, DELAYED RELEASE ORAL at 08:03

## 2022-01-01 RX ADMIN — HEPARIN SODIUM 5000 UNITS: 5000 INJECTION INTRAVENOUS; SUBCUTANEOUS at 21:53

## 2022-01-01 RX ADMIN — ATORVASTATIN CALCIUM 20 MG: 10 TABLET, FILM COATED ORAL at 23:03

## 2022-01-01 RX ADMIN — GUAIFENESIN SYRUP AND DEXTROMETHORPHAN 5 ML: 100; 10 SYRUP ORAL at 11:00

## 2022-01-01 RX ADMIN — Medication 1 AMPULE: at 21:24

## 2022-01-01 RX ADMIN — Medication 1 AMPULE: at 08:34

## 2022-01-01 RX ADMIN — DEXAMETHASONE SODIUM PHOSPHATE 20 MG: 4 INJECTION INTRA-ARTICULAR; INTRALESIONAL; INTRAMUSCULAR; INTRAVENOUS; SOFT TISSUE at 10:13

## 2022-01-01 RX ADMIN — Medication 8 UNITS: at 17:33

## 2022-01-01 RX ADMIN — DICYCLOMINE HYDROCHLORIDE 20 MG: 20 TABLET ORAL at 21:01

## 2022-01-01 RX ADMIN — LEVOTHYROXINE SODIUM 137 MCG: 50 TABLET ORAL at 06:38

## 2022-01-01 RX ADMIN — LAMOTRIGINE 150 MG: 100 TABLET ORAL at 13:21

## 2022-01-01 RX ADMIN — WATER 5 MG: 1 INJECTION INTRAMUSCULAR; INTRAVENOUS; SUBCUTANEOUS at 09:28

## 2022-01-01 RX ADMIN — SODIUM CHLORIDE, PRESERVATIVE FREE 10 ML: 5 INJECTION INTRAVENOUS at 04:46

## 2022-01-01 RX ADMIN — Medication 6 UNITS: at 16:57

## 2022-01-01 RX ADMIN — LEVOTHYROXINE SODIUM 137 MCG: 50 TABLET ORAL at 05:18

## 2022-01-01 RX ADMIN — ALBUTEROL SULFATE 2 PUFF: 90 AEROSOL, METERED RESPIRATORY (INHALATION) at 19:56

## 2022-01-01 RX ADMIN — POTASSIUM CHLORIDE 40 MEQ: 20 TABLET, EXTENDED RELEASE ORAL at 08:43

## 2022-01-01 RX ADMIN — DEXAMETHASONE SODIUM PHOSPHATE 10 MG: 10 INJECTION INTRAMUSCULAR; INTRAVENOUS at 08:02

## 2022-01-01 RX ADMIN — DICYCLOMINE HYDROCHLORIDE 20 MG: 20 TABLET ORAL at 23:03

## 2022-01-01 RX ADMIN — DICYCLOMINE HYDROCHLORIDE 20 MG: 20 TABLET ORAL at 11:56

## 2022-01-01 RX ADMIN — MONTELUKAST 10 MG: 10 TABLET, FILM COATED ORAL at 11:01

## 2022-01-01 RX ADMIN — ATORVASTATIN CALCIUM 20 MG: 10 TABLET, FILM COATED ORAL at 21:28

## 2022-01-01 RX ADMIN — BUSPIRONE HYDROCHLORIDE 7.5 MG: 5 TABLET ORAL at 08:24

## 2022-01-01 RX ADMIN — GUAIFENESIN SYRUP AND DEXTROMETHORPHAN 5 ML: 100; 10 SYRUP ORAL at 15:00

## 2022-01-01 RX ADMIN — HYDROMORPHONE HYDROCHLORIDE 0.5 MG: 1 INJECTION, SOLUTION INTRAMUSCULAR; INTRAVENOUS; SUBCUTANEOUS at 01:23

## 2022-01-01 RX ADMIN — Medication 6 UNITS: at 17:25

## 2022-01-01 RX ADMIN — FUROSEMIDE 40 MG: 10 INJECTION, SOLUTION INTRAMUSCULAR; INTRAVENOUS at 08:41

## 2022-01-01 RX ADMIN — DICYCLOMINE HYDROCHLORIDE 20 MG: 20 TABLET ORAL at 16:01

## 2022-01-01 RX ADMIN — MONTELUKAST 10 MG: 10 TABLET, FILM COATED ORAL at 08:44

## 2022-01-01 RX ADMIN — LEVOTHYROXINE SODIUM 137 MCG: 50 TABLET ORAL at 04:49

## 2022-01-01 RX ADMIN — BUSPIRONE HYDROCHLORIDE 7.5 MG: 5 TABLET ORAL at 17:32

## 2022-01-01 RX ADMIN — DICYCLOMINE HYDROCHLORIDE 20 MG: 20 TABLET ORAL at 17:02

## 2022-01-01 RX ADMIN — HEPARIN SODIUM 5000 UNITS: 5000 INJECTION INTRAVENOUS; SUBCUTANEOUS at 21:44

## 2022-01-01 RX ADMIN — FUROSEMIDE 40 MG: 10 INJECTION, SOLUTION INTRAMUSCULAR; INTRAVENOUS at 08:01

## 2022-01-01 RX ADMIN — ALBUMIN (HUMAN) 25 G: 0.25 INJECTION, SOLUTION INTRAVENOUS at 12:17

## 2022-01-01 RX ADMIN — PREGABALIN 50 MG: 50 CAPSULE ORAL at 08:53

## 2022-01-01 RX ADMIN — ALBUTEROL SULFATE 2 PUFF: 90 AEROSOL, METERED RESPIRATORY (INHALATION) at 15:45

## 2022-01-01 RX ADMIN — FUROSEMIDE 40 MG: 10 INJECTION, SOLUTION INTRAMUSCULAR; INTRAVENOUS at 08:36

## 2022-01-01 RX ADMIN — AMLODIPINE BESYLATE 5 MG: 5 TABLET ORAL at 11:01

## 2022-01-01 RX ADMIN — FUROSEMIDE 40 MG: 10 INJECTION, SOLUTION INTRAMUSCULAR; INTRAVENOUS at 08:53

## 2022-01-01 RX ADMIN — BUDESONIDE AND FORMOTEROL FUMARATE DIHYDRATE 2 PUFF: 160; 4.5 AEROSOL RESPIRATORY (INHALATION) at 08:13

## 2022-01-01 RX ADMIN — TIZANIDINE 4 MG: 2 TABLET ORAL at 10:03

## 2022-01-01 RX ADMIN — HEPARIN SODIUM 14 UNITS/KG/HR: 5000 INJECTION, SOLUTION INTRAVENOUS at 15:11

## 2022-01-01 RX ADMIN — ALBUTEROL SULFATE 2 PUFF: 90 AEROSOL, METERED RESPIRATORY (INHALATION) at 11:12

## 2022-01-01 RX ADMIN — ACETAMINOPHEN 650 MG: 325 TABLET ORAL at 05:39

## 2022-01-14 PROBLEM — J96.01 ACUTE HYPOXEMIC RESPIRATORY FAILURE DUE TO COVID-19 (HCC): Status: ACTIVE | Noted: 2022-01-01

## 2022-01-14 PROBLEM — U07.1 ACUTE HYPOXEMIC RESPIRATORY FAILURE DUE TO COVID-19 (HCC): Status: ACTIVE | Noted: 2022-01-01

## 2022-01-14 PROBLEM — N17.9 AKI (ACUTE KIDNEY INJURY) (HCC): Status: ACTIVE | Noted: 2022-01-01

## 2022-01-14 NOTE — PROGRESS NOTES
TRANSFER - IN REPORT:    Verbal report received from [facility] on Williams Carmona  being received from ED for progression of care. Report consisted of patients Situation, Background, Assessment and   Recommendations(SBAR). Information from the following report(s) was reviewed with the receiving nurse. 66 YOF transported via EMS who was called by spouse as patient was AMS. Upon arrival patient was found to be SOB and placed on CPAP. Since arrival to ED patient has been transitioned to Airvo 60/100 with SPO2 93%. Refused all oral meds, spitting out medications, and hospitalist notified with order to only document. BGL was covered in ED. Patient has been restless took out purwick and has removed airvo from nares. Most recent nurse reports that patient has been primarily sleeping. Recommends bedpan only, no OOB at this time due to AMS and weakness. /50, t 97.9 RR 25 with Heart rate of 58-60.    20 LFA. Will send inhalers with patient. Patient to supply eye doors. Opportunity for questions and clarification was provided. Assessment completed upon patients arrival to unit and care assumed.

## 2022-01-14 NOTE — H&P
Hospitalist History and Physical   Admit Date:  2022  5:25 AM   Name:  Crystal Carmona   Age:  66 y.o. Sex:  female  :  1950   MRN:  [id number]   Room:  ER01/    Presenting Complaint: Altered mental status    Reason(s) for Admission: Acute hypoxemic respiratory failure due to COVID-19 ([facility] [U07.1, J96.01]  SUSANNE (acute kidney injury) ([facility] [N17.9]     History of Present Illness:   Tory White is a 66 y.o. female with medical history of anxiety, depression, fibromyalgia, GERD, obesity with BMI of 33, who presented to the ER because of cough and shortness of breath. EMS was called patient was very confused and short of breath. When EMS arrived patient was found to be hypoxic. She was placed on CPAP and transported to the ER. Patient states that she has not been feeling well for the last 5 days. She has been having productive cough with white sputum. Shortness of breath at rest worse with activity. She denies any chest pain palpitations. No nausea no vomiting. No diarrhea. She has poor p.o. intake. She is not vaccinated against COVID. No fever no chills. No tingling numbness or weakness of extremities. Patient's  is at bedside and reports that she had a fall few days ago but she did not hit her head or lose consciousness. ER course:     Patient is afebrile. Initial oxygen saturation was 81% on nasal cannula. Arterial blood gas was done which showed pH of 7.4, PCO2 23, PO2 46, bicarbonate 16, oxygen saturation 85% on 10L HFNC. Patient was placed on BiPAP and subsequently transitioned to Airvo. Chest x-ray shows hazy bilateral lung opacities likely related to pulmonary edema/pneumonia. Cardiomegaly. COVID test positive. CMP significant for Cr of 3.74. CRP is 16.9. NT ProBNP ordered.      Patient admitted for further evaluation management of acute hypoxemic respiratory failure secondary to COVID-19 pneumonia, acute kidney injury. Review of Systems:  10 systems reviewed and negative except as noted in HPI. Assessment & Plan:     Principal Problem:    Acute hypoxemic respiratory failure due to COVID-19 [facility]) (1/14/2022)    Patient is currently needing Airvo 60 L 100% FiO2 to maintain saturations greater than 88%. Chest x-ray on admission shows hazy bilateral lung opacities. Rapid COVID test on admission positive. Dexamethasone 20 mg daily for 5 days followed by 10 mg daily for 5 days. CRP significant elevated at 16.9. Risk/benefits of Tocilizumab discussed with patient and  at bedside. Pharmacy consulted as patient meets criteria. Patient verbally consented  for Tocilizumab. D-dimer ordered. Acute kidney injury on CKD 2?   ?  Prerenal   Creatinine of 3.7 on admission. 2 months ago creatinine is 0.98  1L Bolus LR ordered. Continue hydration with IV fluids. Check urine sodium, urine creatinine. Check retroperitoneal ultrasound. Nephrology consulted. Appreciate recommendations. Avoid nephrotoxic medications. Insulin-dependent diabetes mellitus 2  Humalog sliding scale insulin. May need Lantus but will hold off for now given poor renal function. Assess insulin needs over the next 24 hours and add Lantus accordingly. Hypertension  Hold losartan hydrochlorothiazide due to acute kidney injury. Hydralazine as needed    Morbid obesity with BMI of 33  Adding to complexity. GERD  PPI    Overactive bladder  Continue oxybutynin    COPD  Continue home medications  Not in acute exacerbation. Anxiety and depression  Continue home medications    Hypothyroidism  Levothyroxine    Fibromyalgia  Continue home medication      Dispo/Discharge Planning:   Medical, inpatient. Anticipate inpatient hospital stay for at least 48 to 72 hours.     Very high risk of decompensation given patient's presentation with acute hypoxemic respiratory failure secondary to COVID-19 pneumonia , Acute kidney injury and other comorbidities. Diet: ADULT DIET Regular; 3 carb choices (45 gm/meal)  VTE ppx: Heparin sc  Code status: Full Code   DPOA: Pt's , Mr Josselyn Delvalle [address] Problems as of 1/14/2022 Date Reviewed: 6/10/2021          Codes Class Noted - Resolved POA    SUSANNE (acute kidney injury) ([facility] ICD-10-CM: N17.9  ICD-9-CM: 584.9  1/14/2022 - Present Unknown        * (Principal) Acute hypoxemic respiratory failure due to COVID-19 [facility]) ICD-10-CM: U07.1, J96.01  ICD-9-CM: 518.81, 079.89, 799.02  1/14/2022 - Present Unknown        Type 2 diabetes mellitus with diabetic neuropathy, without long-term current use of insulin (HCC) ICD-10-CM: E11.40  ICD-9-CM: 250.60, 357.2  1/12/2018 - Present Yes        Fibromyalgia ICD-10-CM: M79.7  ICD-9-CM: 729.1  Unknown - Present Yes    Overview Signed 4/12/2017  9:40 AM by Michi      HX FIBROMYALGIA             Anxiety and depression ICD-10-CM: F41.9, F32. A  ICD-9-CM: 300.00, 311  Unknown - Present Yes        Essential hypertension ICD-10-CM: I10  ICD-9-CM: 401.9  9/20/2016 - Present Yes        Gastroesophageal reflux disease without esophagitis ICD-10-CM: K21.9  ICD-9-CM: 530.81  9/20/2016 - Present Yes        OAB (overactive bladder) ICD-10-CM: N32.81  ICD-9-CM: 596.51  9/20/2016 - Present Yes        Morbid obesity ([facility] ICD-10-CM: E66.01  ICD-9-CM: 278.01  Unknown - Present Yes        Acquired hypothyroidism ICD-10-CM: E03.9  ICD-9-CM: 244.9  11/14/2012 - Present Yes              Past History:  Past Medical History:   Diagnosis Date    Acute lumbar radiculopathy     Allergic rhinitis 12/10/2013    Anxiety and depression     Arthritis     Asthma     Atony of bladder     Chronic kidney disease     STONES    Chronic pain     HX FIBROMYALGIA    Claustrophobia     Diabetes ([facility]     BORDRLINE DIET CONTROLLED    GERD (gastroesophageal reflux disease)     Heart disease     Hypercholesteremia     Hypertension     Hypothyroidism 11/14/2012    Hypoxemia     IBS (irritable bowel syndrome) 4/8/2013    Ill-defined condition     MRSA    Incomplete bladder emptying     Liver disease     elevated liver enzymes    Menopause     Migraine headache     Morbid obesity (HCC)     Myalgia and myositis, unspecified     Neurogenic bladder, NOS 4/3/2014    Organic hypersomnia, unspecified     JESSICA (obstructive sleep apnea)     Other chronic cystitis     Peripheral neuropathy 4/8/2013    Sciatica     Symptomatic menopausal or female climacteric states     Thyroid disease     Unspecified sleep apnea     CPAP machine    Unspecified urinary incontinence     Vitamin D deficiency 4/8/2013     Past Surgical History:   Procedure Laterality Date    COLONOSCOPY  2020    due for repeat 2025    HX BUNIONECTOMY      HX CATARACT REMOVAL      HX CHOLECYSTECTOMY      HX GI  09/14/2017    small bowel obstruction/lysis of adhesions    HX HYSTERECTOMY      HX ORTHOPAEDIC      right 5th digit x 2    HX OTHER SURGICAL      bladder lift x 2; rectocele    HX OTHER SURGICAL      pelvis floor    HX OTHER SURGICAL  09/2016    [facility]    HX PACEMAKER      HX THYROIDECTOMY      HX TONSILLECTOMY      HX UROLOGICAL      Bladder sx; rectocele      Allergies   Allergen Reactions    Amoxicillin Itching    Ciprofloxacin Unknown (comments)    Codeine Other (comments) and Unknown (comments)     Pt reports stomach pain    Melon Flavor Itching     MELON CAUSE MOUTH ITCHING      Morphine Anaphylaxis, Shortness of Breath and Itching    Nut Flavor Other (comments)     GAS/ BLOATING    Other Medication Other (comments)     BEANS CAUSES GAS/BLOATING AND ACHES/PAINS ALL OVER              Social History     Tobacco Use    Smoking status: Never Smoker    Smokeless tobacco: Never Used   Substance Use Topics    Alcohol use: No     Alcohol/week: 0.0 standard drinks     Comment: rarely      Family History   Problem Relation Age of Onset    Heart Failure Mother 54        CHF    Breast Cancer Mother 76    Heart Disease Mother     Hypertension Mother     Asthma Mother    [address] Mother     Diabetes Mother     Thyroid Disease Mother     Psychiatric Disorder Mother         Depression    Heart Disease Father     Heart Attack Father 47        mi    Hypertension Father     Cancer Father     Stroke Father     Hypertension Brother     Diabetes Brother     Depression Sister     Anxiety Sister     OCD Sister     Hypertension Brother     Diabetes Brother     Depression Brother     No Known Problems Brother     Kidney Disease Other         gen fam hx    Osteoporosis Other     OSTEOARTHRITIS Other     Lung Disease Other       Family history reviewed and negative except as noted above. Immunization History   Administered Date(s) Administered    Influenza High Dose Vaccine PF 09/01/2016, 09/27/2018, 09/08/2019, 09/28/2020, 10/22/2021    Influenza Vaccine 10/03/2013, 09/10/2014, 12/01/2014    Influenza Vaccine (Quad) PF (>6 Mo Flulaval, Fluarix, and >3 Yrs Afluria, Fluzone [id number]) 09/23/2017    Influenza Vaccine PF 09/02/2015    Pneumococcal Conjugate (PCV-13) 09/02/2015    Pneumococcal Polysaccharide (PPSV-23) 01/01/2012, 10/03/2013, 01/07/2019    Tdap 07/14/2017     Prior to Admit Medications:  Current Outpatient Medications   Medication Instructions    albuterol (PROVENTIL HFA, VENTOLIN HFA, PROAIR HFA) 90 mcg/actuation inhaler 1 Puff, Inhalation, EVERY 6 HOURS AS NEEDED    aspirin-acetaminophen-caffeine (EXCEDRIN ES) 250-250-65 mg per tablet TAKE 1 TABLET AS NEEDED.     atorvastatin (LIPITOR) 20 mg tablet TAKE ONE TABLET BY MOUTH DAILY    budesonide-formoteroL (Symbicort) 160-4.5 mcg/actuation HFAA 2 Puffs, Inhalation, 2 TIMES DAILY    busPIRone (BUSPAR) 7.5 mg, Oral, 2 TIMES DAILY    Catheter (BARD CLEAN-CATH) misc Does Not Apply    cephALEXin (KEFLEX) 250 mg, Oral, DAILY    cetirizine (ZYRTEC) 10 mg tablet take 1 tablet by oral route  every day    cpap machine kit Does Not Apply    cycloSPORINE (RESTASIS) 0.05 % ophthalmic emulsion 1 Drop, EVERY 12 HOURS    dapagliflozin (FARXIGA) 10 mg, Oral, DAILY    dicyclomine (BENTYL) 20 mg tablet TAKE ONE TABLET BY MOUTH DAILY BEFORE MEALS AND ONE EVERY NIGHT AT BEDTIME    dimenhyDRINATE (DRAMAMINE) 50 mg tablet TAKE 1 TABLET AS NEEDED.  diphenhydrAMINE (BENADRYL) 25 mg, Oral, EVERY 6 HOURS AS NEEDED    DULoxetine (CYMBALTA) 20 mg, Oral, DAILY    EPINEPHrine (EpiPen) 0.3 mg/0.3 mL injection inject (0.3MG)  by intramuscular route once as needed for anaphylaxis    ergocalciferol (VITAMIN D2) 50,000 Units, Oral    estradiol (ESTRACE) 0.01 % (0.1 mg/gram) vaginal cream 1 gram vaginally 1-2x/week    fluticasone (FLONASE) 50 mcg/actuation nasal spray 2 Sprays, Both Nostrils, DAILY    glucose blood VI test strips (ASCENSIA AUTODISC VI, ONE TOUCH ULTRA TEST VI) strip Test 4 times daily    hydrocortisone (CORTAID) 1 % topical cream Topical, 2 TIMES DAILY, use thin layer     insulin degludec Twilla Doom FlexTouch U-100) 100 unit/mL (3 mL) inpn Give 15 units SQ daily.  Insulin Needles, Disposable, (Lite Touch Insulin Pen Needles) 31 gauge x 3/16\" ndle Use daily w/ Victoria Yeison Flextouch pen.  Dx: Type 2 diabetes mellitus with hyperglycemia, with long-term current use of insulin ([facility]) (E11.65 , Z79.4)    lamoTRIgine (LAMICTAL) 150 mg, Oral, EVERY MORNING    levothyroxine (SYNTHROID) 137 mcg tablet TAKE ONE TABLET BY MOUTH DAILY BEFORE BREAKFAST    losartan-hydroCHLOROthiazide (HYZAAR) 50-12.5 mg per tablet 1 Tablet, Oral, DAILY    metFORMIN (GLUCOPHAGE) 1,000 mg, Oral, 2 TIMES DAILY WITH MEALS    montelukast (SINGULAIR) 10 mg tablet TAKE ONE TABLET BY MOUTH ONCE DAILY    omeprazole (PRILOSEC) 40 mg, Oral, 2 TIMES DAILY    oxybutynin chloride XL (DITROPAN XL) 10 mg, Oral, DAILY    oxyCODONE IR (ROXICODONE) 10 mg tab immediate release tablet Oral, EVERY 6 HOURS AS NEEDED    pregabalin (LYRICA) 100 mg, Oral, 2 TIMES DAILY    terconazole (TERAZOL 7) 0.4 % vaginal cream 1 Applicator, Vaginal, EVERY BEDTIME    tiZANidine (ZANAFLEX) 4 mg, Oral, 3 TIMES DAILY AS NEEDED    topiramate (TOPAMAX) 25 mg, Oral, EVERY BEDTIME    traZODone (DESYREL) 50 mg, Oral, EVERY BEDTIME       Objective:     Patient Vitals for the past 24 hrs:   Temp Pulse Resp BP SpO2   01/14/22 1225  78  (!) 131/53 (!) 87 %   01/14/22 1200  76  (!) 115/58 (!) 86 %   01/14/22 1145  72  113/69 (!) 89 %   01/14/22 1125  76  (!) 116/97 (!) 86 %   01/14/22 1045  81  131/60 90 %   01/14/22 0716  77 (!) 32 (!) 141/51 94 %   01/14/22 0656  80 (!) 34 136/64 97 %   01/14/22 0650  82 29 (!) 113/95 94 %   01/14/22 0635  93 21 (!) 132/94 95 %   01/14/22 0620  83 (!) 31 (!) 128/51 94 %   01/14/22 0616     97 %   01/14/22 0600     (!) 72 %   01/14/22 0543  86 (!) 31 (!) 138/54 (!) 84 %   01/14/22 0525 97.9 °F (36.6 °C) 88 22 (!) 152/51 (!) 81 %     Oxygen Therapy  O2 Sat (%): (!) 87 % (01/14/22 1225)  Pulse via Oximetry: 64 beats per minute (01/14/22 1145)  O2 Device: BIPAP (01/14/22 0616)  FIO2 (%): 80 % (01/14/22 0616)    Estimated body mass index is 33.66 kg/m² as calculated from the following:    Height as of this encounter: 5' 3\" (1.6 m). Weight as of this encounter: 86.2 kg (190 lb). No intake or output data in the 24 hours ending 01/14/22 1420      Physical Exam:    Blood pressure (!) 131/53, pulse 78, temperature 97.9 °F (36.6 °C), resp. rate (!) 32, height 5' 3\" (1.6 m), weight 86.2 kg (190 lb), SpO2 (!) 87 %. General:    Elderly female, alert, awake, ill-appearing, increased work of breathing, unable to speak in full sentences, currently needing AirVo 60 L 100% FiO2,  Head:  Normocephalic, atraumatic  Eyes:  Sclerae appear normal.  Pupils equally round. ENT:  Nares appear normal, no drainage. dry oral mucosa  Neck:  No restricted ROM. Trachea midline   CV:   RRR. No m/r/g. No jugular venous distension.   Lungs:   Diminished breath sounds bilaterally, no wheezing, scattered crackles,  Abdomen: Bowel sounds present. Soft, nontender, nondistended. Extremities: No cyanosis or clubbing. No edema  Skin:     No rashes and normal coloration. Warm and dry. Neuro:  CN II-XII grossly intact. Sensation intact. A&Ox3  Psych:  Normal mood and affect. I have reviewed ordered lab tests and independently visualized imaging below:    Last 24hr Labs:  Recent Results (from the past 24 hour(s))   CBC WITH AUTOMATED DIFF    Collection Time: 01/14/22  5:30 AM   Result Value Ref Range    WBC 7.5 4.3 - 11.1 K/uL    RBC 4.10 4.05 - 5.2 M/uL    HGB 10.7 (L) 11.7 - 15.4 g/dL    HCT 35.3 (L) 35.8 - 46.3 %    MCV 86.1 79.6 - 97.8 FL    MCH 26.1 26.1 - 32.9 PG    MCHC 30.3 (L) 31.4 - 35.0 g/dL    RDW 15.0 (H) 11.9 - 14.6 %    PLATELET 505 359 - 156 K/uL    MPV 10.0 9.4 - 12.3 FL    ABSOLUTE NRBC 0.00 0.0 - 0.2 K/uL    DF AUTOMATED      NEUTROPHILS 82 (H) 43 - 78 %    LYMPHOCYTES 7 (L) 13 - 44 %    MONOCYTES 10 4.0 - 12.0 %    EOSINOPHILS 0 (L) 0.5 - 7.8 %    BASOPHILS 0 0.0 - 2.0 %    IMMATURE GRANULOCYTES 1 0.0 - 5.0 %    ABS. NEUTROPHILS 6.2 1.7 - 8.2 K/UL    ABS. LYMPHOCYTES 0.5 0.5 - 4.6 K/UL    ABS. MONOCYTES 0.7 0.1 - 1.3 K/UL    ABS. EOSINOPHILS 0.0 0.0 - 0.8 K/UL    ABS. BASOPHILS 0.0 0.0 - 0.2 K/UL    ABS. IMM. GRANS. 0.1 0.0 - 0.5 K/UL   METABOLIC PANEL, COMPREHENSIVE    Collection Time: 01/14/22  5:30 AM   Result Value Ref Range    Sodium 136 136 - 145 mmol/L    Potassium 3.6 3.5 - 5.1 mmol/L    Chloride 103 98 - 107 mmol/L    CO2 19 (L) 21 - 32 mmol/L    Anion gap 14 7 - 16 mmol/L    Glucose 253 (H) 65 - 100 mg/dL    BUN 49 (H) 8 - 23 MG/DL    Creatinine 3.74 (H) 0.6 - 1.0 MG/DL    GFR est AA 15 (L) >60 ml/min/1.73m2    GFR est non-AA 13 (L) >60 ml/min/1.73m2    Calcium 8.2 (L) 8.3 - 10.4 MG/DL    Bilirubin, total 0.2 0.2 - 1.1 MG/DL    ALT (SGPT) 23 12 - 65 U/L    AST (SGOT) 49 (H) 15 - 37 U/L    Alk.  phosphatase 65 50 - 136 U/L    Protein, total 7.4 6.3 - 8.2 g/dL Albumin 3.0 (L) 3.2 - 4.6 g/dL    Globulin 4.4 (H) 2.3 - 3.5 g/dL    A-G Ratio 0.7 (L) 1.2 - 3.5     TSH 3RD GENERATION    Collection Time: 01/14/22  5:30 AM   Result Value Ref Range    TSH 0.329 (L) 0.358 - 3.740 uIU/mL   LACTIC ACID    Collection Time: 01/14/22  5:30 AM   Result Value Ref Range    Lactic acid 2.7 (H) 0.4 - 2.0 MMOL/L   C REACTIVE PROTEIN, QT    Collection Time: 01/14/22  5:30 AM   Result Value Ref Range    C-Reactive protein 16.9 (H) 0.0 - 0.9 mg/dL   EKG, 12 LEAD, INITIAL    Collection Time: 01/14/22  5:36 AM   Result Value Ref Range    Ventricular Rate 84 BPM    Atrial Rate 83 BPM    P-R Interval 151 ms    QRS Duration 105 ms    Q-T Interval 364 ms    QTC Calculation (Bezet) 431 ms    Calculated P Axis 73 degrees    Calculated R Axis -21 degrees    Calculated T Axis 90 degrees    Diagnosis       Sinus rhythm  Ventricular premature complex  Borderline left axis deviation  Borderline T abnormalities, lateral leads     BLOOD GAS, ARTERIAL POC    Collection Time: 01/14/22  5:56 AM   Result Value Ref Range    Device: NASAL CANNULA      pH (POC) 7.45 7.35 - 7.45      pCO2 (POC) 23.4 (L) 35 - 45 MMHG    pO2 (POC) 46 (L) 75 - 100 MMHG    HCO3 (POC) 16.2 (L) 22 - 26 MMOL/L    sO2 (POC) 84.5 (L) 95 - 98 %    Base deficit (POC) 6.3 mmol/L    Allens test (POC) Positive      Site LEFT RADIAL      Specimen type (POC) ARTERIAL      Performed by Erin     Respiratory comment: [id number]    COVID-19 RAPID TEST    Collection Time: 01/14/22  6:00 AM   Result Value Ref Range    Specimen source NASAL      COVID-19 rapid test Detected (AA) NOTD     GLUCOSE, POC    Collection Time: 01/14/22 10:17 AM   Result Value Ref Range    Glucose (POC) 228 (H) 65 - 100 mg/dL    Performed by Suzanne Cardona    DRUG SCREEN, URINE    Collection Time: 01/14/22 11:12 AM   Result Value Ref Range    PCP(PHENCYCLIDINE) Negative      BENZODIAZEPINES Negative      COCAINE Negative      AMPHETAMINES Negative      METHADONE Negative THC (TH-CANNABINOL) Negative      OPIATES Positive      BARBITURATES Negative     SODIUM, UR, RANDOM    Collection Time: 01/14/22 11:12 AM   Result Value Ref Range    Sodium,urine random 16 MMOL/L   CREATININE, UR, RANDOM    Collection Time: 01/14/22 11:12 AM   Result Value Ref Range    Creatinine, urine 151.00 mg/dL   GLUCOSE, POC    Collection Time: 01/14/22  1:47 PM   Result Value Ref Range    Glucose (POC) 251 (H) 65 - 100 mg/dL    Performed by Darci        All Micro Results     Procedure Component Value Units Date/Time    COVID-19 RAPID TEST [[id number]]  (Abnormal) Collected: 01/14/22 0600    Order Status: Completed Specimen: Nasopharyngeal Updated: 01/14/22 0720     Specimen source NASAL        Comment: RAPID ONLY        COVID-19 rapid test Detected        Comment:      The specimen is POSITIVE for SARS-CoV-2, the novel coronavirus associated with COVID-19. This test has been authorized by the FDA under an Emergency Use Authorization (EUA) for use by authorized laboratories. Fact sheet for Healthcare Providers: Paybubbledate.co.nz  Fact sheet for Patients: Paybubbledate.co.nz       Methodology: Isothermal Nucleic Acid Amplification               Other Studies:  XR CHEST PORT    Result Date: 1/14/2022  EXAM: Chest x-ray. INDICATION: Mental status changes. COMPARISON: Prior chest x-ray on January 10, 2020. TECHNIQUE: Frontal view chest x-ray. FINDINGS: The heart is enlarged and there are hazy opacities throughout both lungs, which could relate to early pulmonary edema or pneumonia. No pneumothorax or pleural effusion is seen. Again noted is elevation of the right diaphragm and surgical clips in the lower neck. 1. Hazy bilateral lung opacities may relate to early pulmonary edema or pneumonia. 2. Cardiomegaly.       Medications Administered     0.9% sodium chloride infusion     Admin Date  01/14/2022 Action  New Bag Dose  100 mL/hr Rate  100 mL/hr Route  IntraVENous Administered By  Amber Espinosa RN          dexamethasone (DECADRON) 20 mg in 0.9% sodium chloride 50 mL IVPB     Admin Date  01/14/2022 Action  New Bag Dose  20 mg Route  IntraVENous Administered By  Luann Valdez RN          doxycycline (VIBRAMYCIN) capsule 100 mg     Admin Date  01/14/2022 Action  Given Dose  100 mg Route  Oral Administered By  Amber Espinosa RN          insulin lispro (HUMALOG) injection 0-10 Units     Admin Date  01/14/2022 Action  Given Dose  6 Units Route  SubCUTAneous Administered By  Matthew Fulton RN          lactated ringers bolus infusion 1,000 mL     Admin Date  01/14/2022 Action  New Bag Dose  1,000 mL Rate  2,000 mL/hr Route  IntraVENous Administered By  Luann Valdez, JEANNETTE          pantoprazole (PROTONIX) tablet 40 mg     Admin Date  01/14/2022 Action  Given Dose  40 mg Route  Oral Administered By  Amber Espinosa RN          tocilizumab (ACTEMRA) 600 mg in 0.9% sodium chloride 100 mL infusion     Admin Date  01/14/2022 Action  New Bag Dose  600 mg Rate  100 mL/hr Route  IntraVENous Administered By  Amber Espinosa RN                Signed:  Tabatha Gomes MD    Part of this note may have been written by using a voice dictation software. The note has been proof read but may still contain some grammatical/other typographical errors.

## 2022-01-14 NOTE — ACP (ADVANCE CARE PLANNING)
Advance Care Planning   Healthcare Decision Maker:   Spouse Junior Ascencio [phone]      Click here to complete [phone] including selection of the Healthcare Decision Maker Relationship (ie \"Primary\")  Today we documented Decision Maker(s) consistent with Legal Next of Kin hierarchy.

## 2022-01-14 NOTE — PROGRESS NOTES
Per Spouse patient manages her own medications. Patient is choosing not to speak at this time.  reports list should be in computer and patient had a written list in ED.

## 2022-01-14 NOTE — ED TRIAGE NOTES
Patient arrives via EMS from home with altered mental status. Patient presented to EMS stumbling around and mumbling. EMS .  states that patient was being treated for confusion. EMS also noted pills all over the floor, muscle relaxers, pain pills. patient alert upon arrival. Alert to self only. Patient does follow commands.

## 2022-01-14 NOTE — CONSULTS
[facility] Consult Note    Subjective:     Lovely Rousseau is a 66 y.o.  female who is being seen for SUSANNE. She has a history of hypertension, diabetes, and obesity who called the MS service because of cough and shortness of breath. During assessment she was confused short of breath and hypoxemic with initial oxygen saturation 81% on nasal cannula she tested positive for COVID and is on oxygen supplementation currently. Labs showed a creatinine of 3.7 baseline is normal at 0.98 which was documented a couple months ago. Urine sodium was low, urinalysis is pending. She has been on angiotensin receptor blocker for hypertension which is on hold. IV fluid bolus has been ordered. He was alert and oriented but was slow to be able to process the questions and provide answers but answers did make sense. No fever, chills, sweats, epistaxis, vision changes, headache, , wheezing,  chest pain, palpitations. No nausea/vomitting, diarrhea or constipation. No dysuria, hematuria. No paresthesia, seizure history, no arthritis/ arthralgia or skin rashes.       Past Medical History:   Diagnosis Date    Acute lumbar radiculopathy     Allergic rhinitis 12/10/2013    Anxiety and depression     Arthritis     Asthma     Atony of bladder     Chronic kidney disease     STONES    Chronic pain     HX FIBROMYALGIA    Claustrophobia     Diabetes ([facility] [address]     BORDRLINE DIET CONTROLLED    GERD (gastroesophageal reflux disease)     Heart disease     Hypercholesteremia     Hypertension     Hypothyroidism 11/14/2012    Hypoxemia     IBS (irritable bowel syndrome) 4/8/2013    Ill-defined condition     MRSA    Incomplete bladder emptying     Liver disease     elevated liver enzymes    Menopause     Migraine headache     Morbid obesity (HCC)     Myalgia and myositis, unspecified     Neurogenic bladder, NOS 4/3/2014    Organic hypersomnia, unspecified     JESSICA (obstructive sleep apnea)     Other chronic cystitis     Peripheral neuropathy 4/8/2013    Sciatica     Symptomatic menopausal or female climacteric states     Thyroid disease     Unspecified sleep apnea     CPAP machine    Unspecified urinary incontinence     Vitamin D deficiency 4/8/2013      Past Surgical History:   Procedure Laterality Date    COLONOSCOPY  2020    due for repeat 2025    HX BUNIONECTOMY      HX CATARACT REMOVAL      HX CHOLECYSTECTOMY      HX GI  09/14/2017    small bowel obstruction/lysis of adhesions    HX HYSTERECTOMY      HX ORTHOPAEDIC      right 5th digit x 2    HX OTHER SURGICAL      bladder lift x 2; rectocele    HX OTHER SURGICAL      pelvis floor    HX OTHER SURGICAL  09/2016    [facility]-Dr. Iliana Tarango    HX PACEMAKER      HX THYROIDECTOMY      HX TONSILLECTOMY      HX UROLOGICAL      Bladder sx; rectocele     Family History   Problem Relation Age of Onset    Heart Failure Mother 54        CHF    Breast Cancer Mother 76    Heart Disease Mother     Hypertension Mother    Bourgeois Asthma Mother     Cancer Mother     Diabetes Mother     Thyroid Disease Mother     Psychiatric Disorder Mother         Depression    Heart Disease Father     Heart Attack Father 47        mi    Hypertension Father     Cancer Father     Stroke Father     Hypertension Brother     Diabetes Brother     Depression Sister     Anxiety Sister    Bourgeois OCD Sister     Hypertension Brother     Diabetes Brother     Depression Brother     No Known Problems Brother     Kidney Disease Other         gen fam hx    Osteoporosis Other     OSTEOARTHRITIS Other     Lung Disease Other       Social History     Tobacco Use    Smoking status: Never Smoker    Smokeless tobacco: Never Used   Substance Use Topics    Alcohol use: No     Alcohol/week: 0.0 standard drinks     Comment: rarely       Current Facility-Administered Medications   Medication Dose Route Frequency Provider Last Rate Last Admin    sodium chloride (NS) flush 5-40 mL  5-40 mL IntraVENous Q8H Caty Dallas MD        sodium chloride (NS) flush 5-40 mL  5-40 mL IntraVENous PRN Caty Dallas MD        acetaminophen (TYLENOL) tablet 650 mg  650 mg Oral Q6H PRN Caty Dallas MD        Or    acetaminophen (TYLENOL) suppository 650 mg  650 mg Rectal Q6H PRN Caty Dallas MD        polyethylene glycol (MIRALAX) packet 17 g  17 g Oral DAILY PRN Caty Dallas MD        ondansetron (ZOFRAN ODT) tablet 4 mg  4 mg Oral Q8H PRN Caty Dallas MD        Or    ondansetron (ZOFRAN) injection 4 mg  4 mg IntraVENous Q6H PRN Caty Dallas MD        0.9% sodium chloride infusion  100 mL/hr IntraVENous CONTINUOUS Caty Dallas  mL/hr at 01/14/22 1247 100 mL/hr at 01/14/22 1247    heparin (porcine) injection 5,000 Units  5,000 Units SubCUTAneous Q8H Caty Dallas MD   5,000 Units at 01/14/22 1445    guaiFENesin-dextromethorphan (ROBITUSSIN DM) 100-10 mg/5 mL syrup 5 mL  5 mL Oral Q4H PRN Caty Dallas MD        insulin lispro (HUMALOG) injection 0-10 Units  0-10 Units SubCUTAneous AC&HS Caty Dallas MD   6 Units at 01/14/22 1351    albuterol (PROVENTIL HFA, VENTOLIN HFA, PROAIR HFA) inhaler 2 Puff  2 Puff Inhalation QID RT Caty Dallas MD        atorvastatin (LIPITOR) tablet 20 mg  20 mg Oral QHS Ledy Dallas MD        budesonide-formoteroL (SYMBICORT) 160-4.5 mcg/actuation HFA inhaler 2 Puff  2 Puff Inhalation BID Caty Dallas MD        busPIRone (BUSPAR) tablet 7.5 mg  7.5 mg Oral BID Caty Dallas MD        cycloSPORINE (RESTASIS) 0.05 % ophthalmic emulsion 1 Drop (Patient Supplied)  1 Drop Both Eyes Q12H Caty Dallas MD Lavona Euler [START ON 1/15/2022] DULoxetine (CYMBALTA) capsule 20 mg  20 mg Oral DAILY Caty Dallas MD Lavona Euler [START ON 1/15/2022] lamoTRIgine (LaMICtal) tablet 150 mg  150 mg Oral QAM Caty Dallas MD Lavona Euler [START ON 1/15/2022] levothyroxine (SYNTHROID) tablet 137 mcg  137 mcg Oral 6am Caty Dallas MD Lavona Euler [START ON 1/15/2022] montelukast (SINGULAIR) tablet 10 mg  10 mg Oral DAILY Tameka Dallas MD        tiZANidine (ZANAFLEX) tablet 4 mg  4 mg Oral TID PRN Tameka Dallas MD        pregabalin (LYRICA) capsule 50 mg  50 mg Oral BID Tameka Dallas MD        topiramate (TOPAMAX) tablet 25 mg  25 mg Oral QHS Tameka Dallas MD        traZODone (DESYREL) tablet 50 mg  50 mg Oral QHS Tameka Dallas MD        dicyclomine (BENTYL) tablet 20 mg  20 mg Oral AC&HS Tameka Dallas MD        pantoprazole (PROTONIX) tablet 40 mg  40 mg Oral ACB&D Tameka Dallas MD   40 mg at 01/14/22 1143    doxycycline (VIBRAMYCIN) capsule 100 mg  100 mg Oral Q12H Ledy Dallas MD   100 mg at 01/14/22 1143    [START ON 1/19/2022] dexamethasone (DECADRON) 10 mg/mL injection 10 mg  10 mg IntraVENous Q24H Ledy Dallas MD        dexamethasone (DECADRON) 20 mg in 0.9% sodium chloride 50 mL IVPB  20 mg IntraVENous Q24H Ledy Dallas MD   IV Completed at 01/14/22 1131    hydrALAZINE (APRESOLINE) tablet 50 mg  50 mg Oral Q6H PRN Tameka Dallas MD         Current Outpatient Medications   Medication Sig Dispense Refill    Insulin Needles, Disposable, (Lite Touch Insulin Pen Needles) 31 gauge x 3/16\" ndle Use daily w/ Derrick  Flextouch pen. Dx: Type 2 diabetes mellitus with hyperglycemia, with long-term current use of insulin (HCC) (E11.65 , Z79.4) 100 Pen Needle 3    topiramate (Topamax) 25 mg tablet Take 1 Tablet by mouth nightly. 90 Tablet 1    lamoTRIgine (LaMICtal) 150 mg tablet Take 1 Tablet by mouth Every morning. 90 Tablet 0    omeprazole (PRILOSEC) 40 mg capsule Take 1 Capsule by mouth two (2) times a day. 180 Capsule 1    dicyclomine (BENTYL) 20 mg tablet TAKE ONE TABLET BY MOUTH DAILY BEFORE MEALS AND ONE EVERY NIGHT AT BEDTIME 360 Tablet 1    insulin degludec Roddy Garcia FlexTouch U-100) 100 unit/mL (3 mL) inpn Give 15 units SQ daily. 1 Adjustable Dose Pre-filled Pen Syringe 1    busPIRone (BUSPAR) 7.5 mg tablet Take 1 Tablet by mouth two (2) times a day.  180 Tablet 1    dapagliflozin (FARXIGA) 10 mg tab tablet Take 1 Tablet by mouth daily. 90 Tablet 1    dimenhyDRINATE (DRAMAMINE) 50 mg tablet TAKE 1 TABLET AS NEEDED. 90 Tablet 1    DULoxetine (CYMBALTA) 20 mg capsule Take 1 Capsule by mouth daily. 90 Capsule 1    EPINEPHrine (EpiPen) 0.3 mg/0.3 mL injection inject (0.3MG)  by intramuscular route once as needed for anaphylaxis 1 Each 1    hydrocortisone (CORTAID) 1 % topical cream Apply  to affected area two (2) times a day. use thin layer 30 g 2    levothyroxine (SYNTHROID) 137 mcg tablet TAKE ONE TABLET BY MOUTH DAILY BEFORE BREAKFAST 90 Tablet 1    metFORMIN (GLUCOPHAGE) 1,000 mg tablet Take 1 Tablet by mouth two (2) times daily (with meals). 180 Tablet 1    montelukast (SINGULAIR) 10 mg tablet TAKE ONE TABLET BY MOUTH ONCE DAILY 90 Tablet 1    traZODone (DESYREL) 50 mg tablet Take 1 Tablet by mouth nightly. 90 Tablet 1    ergocalciferol (Vitamin D2) 1,250 mcg (50,000 unit) capsule Take 50,000 Units by mouth.  losartan-hydroCHLOROthiazide (HYZAAR) 50-12.5 mg per tablet Take 1 Tablet by mouth daily. 90 Tablet 1    budesonide-formoteroL (Symbicort) 160-4.5 mcg/actuation HFAA Take 2 Puffs by inhalation two (2) times a day. 3 Each 3    cephALEXin (KEFLEX) 250 mg capsule Take 1 Capsule by mouth daily. 90 Capsule 3    atorvastatin (LIPITOR) 20 mg tablet TAKE ONE TABLET BY MOUTH DAILY 90 Tablet 3    tiZANidine (ZANAFLEX) 4 mg tablet Take 4 mg by mouth three (3) times daily as needed for Muscle Spasm(s).  pregabalin (LYRICA) 100 mg capsule Take 100 mg by mouth two (2) times a day.  aspirin-acetaminophen-caffeine (EXCEDRIN ES) 250-250-65 mg per tablet TAKE 1 TABLET AS NEEDED.  cetirizine (ZYRTEC) 10 mg tablet take 1 tablet by oral route  every day      albuterol (PROVENTIL HFA, VENTOLIN HFA, PROAIR HFA) 90 mcg/actuation inhaler Take 1 Puff by inhalation every six (6) hours as needed for Wheezing. 3 Inhaler 3    terconazole (TERAZOL 7) 0.4 % vaginal cream Insert 1 Applicator into vagina nightly.  45 g 1    estradiol (ESTRACE) 0.01 % (0.1 mg/gram) vaginal cream 1 gram vaginally 1-2x/week 42.5 g 6    oxyCODONE IR (ROXICODONE) 10 mg tab immediate release tablet Take  by mouth every six (6) hours as needed.  diphenhydrAMINE (BENADRYL) 25 mg capsule Take 25 mg by mouth every six (6) hours as needed.  cpap machine kit by Does Not Apply route.  fluticasone (FLONASE) 50 mcg/actuation nasal spray 2 Sprays by Both Nostrils route daily.  oxybutynin chloride XL (DITROPAN XL) 10 mg CR tablet Take 1 Tab by mouth daily. 90 Tab 3    Catheter (BARD CLEAN-CATH) misc by Does Not Apply route.  cycloSPORINE (RESTASIS) 0.05 % ophthalmic emulsion Administer 1 Drop to both eyes every twelve (12) hours.  glucose blood VI test strips (ASCENSIA AUTODISC VI, ONE TOUCH ULTRA TEST VI) strip Test 4 times daily 100 Strip 11        Allergies   Allergen Reactions    Amoxicillin Itching    Ciprofloxacin Unknown (comments)    Codeine Other (comments) and Unknown (comments)     Pt reports stomach pain    Melon Flavor Itching     MELON CAUSE MOUTH ITCHING      Morphine Anaphylaxis, Shortness of Breath and Itching    Nut Flavor Other (comments)     GAS/ BLOATING    Other Medication Other (comments)     BEANS CAUSES GAS/BLOATING AND ACHES/PAINS ALL OVER                Review of Systems:  A comprehensive review of systems was negative except for that written in the History of Present Illness. Objective:     Visit Vitals  BP (!) 131/53   Pulse 78   Temp 97.9 °F (36.6 °C)   Resp (!) 32   Ht 5' 3\" (1.6 m)   Wt 86.2 kg (190 lb)   SpO2 (!) 87%   BMI 33.66 kg/m²        Physical Exam:   General appearance: no distress, appears stated age  Head: atraumatic  Eyes: conjunctivae/corneas clear.   Nose: no discharge  Throat: Lips, mucosa, dry membranes  Neck: supple, symmetrical, trachea midline and no JVD  Lungs: coarse auscultation bilaterally  Heart: regular rate and rhythm, S1, S2, no pericardial friction rub  Abdomen: soft, non-tender. Bowel sounds normal.   Extremities: No edema    Skin:  No rashes or lesions          Data Review:   BMP:   Lab Results   Component Value Date/Time     01/14/2022 05:30 AM    K 3.6 01/14/2022 05:30 AM     01/14/2022 05:30 AM    CO2 19 (L) 01/14/2022 05:30 AM    AGAP 14 01/14/2022 05:30 AM     (H) 01/14/2022 05:30 AM    BUN 49 (H) 01/14/2022 05:30 AM    CREA 3.74 (H) 01/14/2022 05:30 AM    GFRAA 15 (L) 01/14/2022 05:30 AM    GFRNA 13 (L) 01/14/2022 05:30 AM       CBC:    Lab Results   Component Value Date/Time    WBC 7.5 01/14/2022 05:30 AM    HGB 10.7 (L) 01/14/2022 05:30 AM    HCT 35.3 (L) 01/14/2022 05:30 AM     01/14/2022 05:30 AM            Assessment:     Principal Problem:    Acute hypoxemic respiratory failure due to COVID-19 [facility]) (1/14/2022)    Active Problems: Morbid obesity ([facility] [address] ()      Essential hypertension (9/20/2016)      Gastroesophageal reflux disease without esophagitis (9/20/2016)      OAB (overactive bladder) (9/20/2016)      Acquired hypothyroidism (11/14/2012)      Fibromyalgia ()      Overview: HX FIBROMYALGIA      Anxiety and depression ()      Type 2 diabetes mellitus with diabetic neuropathy, without long-term current use of insulin ([facility] [address] (1/12/2018)      SUSANNE [facility]) [facility] (1/14/2022)        Plan:     Acute kidney injury in the setting of COVID positivity. She is admitted with hypoxemia and COVID-pneumonia, treated with dexamethasone and Tocilizumab. She has normal kidney baseline as documented with creatinine 0.9 a couple months ago. At this point all indications point to prerenal azotemia with decreased oral intake and relative hypotension. I agree with stopping her losartan and hydrochlorothiazide at this point giving a fluid bolus and reassessing. Urinalysis pending but the urine sodium suggest prerenal state.     COVID ATN is certainly prevalent and will be following for needs for renal replacement therapy but I am hopeful that she will improve with some volume.     Aging has been ordered to assess for any obstruction    Signed By: Alanna Bains MD     January 14, 2022

## 2022-01-14 NOTE — ED PROVIDER NOTES
Mask was worn during the entire patient examination. Asha Domínguez is a 66 y.o. female who presents to the ED with report of confusion and shortness of breath. She was altered for EMS and was not answering most of her questions appropriately. There was A lot of prescriptions in the house and some pills on the floor she thought she may have could have gotten them confused. She also reports a cough and not feeling well for several days. Talking to the nurse when I am interviewing her and now she looks much better than she did an hour ago. They already placed her on CPAP prior to me seeing her. It was primarily for hypoxia and then having trouble keeping her oxygen up on 10 L. Her ABG shows that she is actually not retaining any CO2 though. Patient states she is not vaccinated for COVID. The history is provided by the patient.    Altered mental status          Past Medical History:   Diagnosis Date    Acute lumbar radiculopathy     Allergic rhinitis 12/10/2013    Anxiety and depression     Arthritis     Asthma     Atony of bladder     Chronic kidney disease     STONES    Chronic pain     HX FIBROMYALGIA    Claustrophobia     Diabetes ([address]     BORDRLINE DIET CONTROLLED    GERD (gastroesophageal reflux disease)     Heart disease     Hypercholesteremia     Hypertension     Hypothyroidism 11/14/2012    Hypoxemia     IBS (irritable bowel syndrome) 4/8/2013    Ill-defined condition     MRSA    Incomplete bladder emptying     Liver disease     elevated liver enzymes    Menopause     Migraine headache     Morbid obesity (HCC)     Myalgia and myositis, unspecified     Neurogenic bladder, NOS 4/3/2014    Organic hypersomnia, unspecified     JESSICA (obstructive sleep apnea)     Other chronic cystitis     Peripheral neuropathy 4/8/2013    Sciatica     Symptomatic menopausal or female climacteric states     Thyroid disease     Unspecified sleep apnea     CPAP machine    Unspecified urinary incontinence     Vitamin D deficiency 4/8/2013       Past Surgical History:   Procedure Laterality Date    COLONOSCOPY  2020    due for repeat 2025    HX BUNIONECTOMY      HX CATARACT REMOVAL      HX CHOLECYSTECTOMY      HX GI  09/14/2017    small bowel obstruction/lysis of adhesions    HX HYSTERECTOMY      HX ORTHOPAEDIC      right 5th digit x 2    HX OTHER SURGICAL      bladder lift x 2; rectocele    HX OTHER SURGICAL      pelvis floor    HX OTHER SURGICAL  09/2016    [facility]-Dr. Rosendo Giron    HX PACEMAKER      HX THYROIDECTOMY      HX TONSILLECTOMY      HX UROLOGICAL      Bladder sx; rectocele         Family History:   Problem Relation Age of Onset    Heart Failure Mother 54        CHF    Breast Cancer Mother 76    Heart Disease Mother     Hypertension Mother    [facility] Asthma Mother     Cancer Mother     Diabetes Mother     Thyroid Disease Mother     Psychiatric Disorder Mother         Depression    Heart Disease Father     Heart Attack Father 47        mi    Hypertension Father     Cancer Father     Stroke Father     Hypertension Brother     Diabetes Brother     Depression Sister     Anxiety Sister     OCD Sister     Hypertension Brother     Diabetes Brother     Depression Brother     No Known Problems Brother     Kidney Disease Other         gen fam hx    Osteoporosis Other     OSTEOARTHRITIS Other     Lung Disease Other        Social History     Socioeconomic History    Marital status:      Spouse name: Not on file    Number of children: Not on file    Years of education: Not on file    Highest education level: Not on file   Occupational History    Not on file   Tobacco Use    Smoking status: Never Smoker    Smokeless tobacco: Never Used   Vaping Use    Vaping Use: Never used   Substance and Sexual Activity    Alcohol use: No     Alcohol/week: 0.0 standard drinks     Comment: rarely    Drug use: No    Sexual activity: Not Currently   Other Topics Concern    Not on file   Social History Narrative    Not on file     Social Determinants of Health     Financial Resource Strain:     Difficulty of Paying Living Expenses: Not on file   Food Insecurity:     Worried About Running Out of Food in the Last Year: Not on file    Sincere of Food in the Last Year: Not on file   Transportation Needs:     Lack of Transportation (Medical): Not on file    Lack of Transportation (Non-Medical): Not on file   Physical Activity:     Days of Exercise per Week: Not on file    Minutes of Exercise per Session: Not on file   Stress:     Feeling of Stress : Not on file   Social Connections:     Frequency of Communication with Friends and Family: Not on file    Frequency of Social Gatherings with Friends and Family: Not on file    Attends Yarsani Services: Not on file    Active Member of [address] [place] [address] or Organizations: Not on file    Attends Club or Organization Meetings: Not on file    Marital Status: Not on file   Intimate Partner Violence:     Fear of Current or Ex-Partner: Not on file    Emotionally Abused: Not on file    Physically Abused: Not on file    Sexually Abused: Not on file   Housing Stability:     Unable to Pay for Housing in the Last Year: Not on file    Number of [place] in the Last Year: Not on file    Unstable Housing in the Last Year: Not on file         ALLERGIES: Amoxicillin, Ciprofloxacin, Codeine, Melon flavor, Morphine, Nut flavor, and Other medication    Review of Systems   Constitutional: Negative for chills and fever (feels warm). HENT: Positive for congestion. Respiratory: Positive for cough. Negative for choking, shortness of breath, wheezing and stridor. Cardiovascular: Negative for chest pain and palpitations. Gastrointestinal: Negative for abdominal pain, diarrhea, nausea and vomiting. Musculoskeletal: Negative for arthralgias, neck pain and neck stiffness. Skin: Negative for color change, rash and wound.        Vitals:    01/14/22 0525 01/14/22 0543 01/14/22 0600 01/14/22 0616   BP: (!) 152/51 (!) 138/54     Pulse: 88 86     Resp: 22 (!) 31     Temp: 97.9 °F (36.6 °C)      SpO2: (!) 81% (!) 84% (!) 72% 97%   Weight: 86.2 kg (190 lb)      Height: 5' 3\" (1.6 m)               Physical Exam  Vitals and nursing note reviewed. Constitutional:       General: She is not in acute distress. Appearance: She is well-developed. She is not ill-appearing, toxic-appearing or diaphoretic. Comments: On Bipap intermittently coughing. HENT:      Head: Normocephalic and atraumatic. Eyes:      General: No visual field deficit or scleral icterus. Extraocular Movements: Extraocular movements intact. Right eye: Normal extraocular motion and no nystagmus. Left eye: Normal extraocular motion and no nystagmus. Conjunctiva/sclera: Conjunctivae normal.      Pupils: Pupils are equal, round, and reactive to light. Pupils are equal.   Neck:      Thyroid: No thyromegaly. Cardiovascular:      Rate and Rhythm: Normal rate and regular rhythm. Pulmonary:      Effort: Pulmonary effort is normal. No tachypnea, accessory muscle usage or respiratory distress. Breath sounds: No decreased breath sounds, wheezing, rhonchi or rales. Abdominal:      General: There is no distension. Palpations: Abdomen is soft. There is no mass. Tenderness: There is no abdominal tenderness. There is no guarding or rebound. Musculoskeletal:         General: Normal range of motion. Cervical back: Normal range of motion and neck supple. No rigidity. Lymphadenopathy:      Cervical: No cervical adenopathy. Skin:     General: Skin is warm. Capillary Refill: Capillary refill takes less than 2 seconds. Neurological:      General: No focal deficit present. Mental Status: She is alert. Mental status is at baseline. GCS: GCS eye subscore is 4. GCS verbal subscore is 4. GCS motor subscore is 6.       Cranial Nerves: No cranial nerve deficit, dysarthria or facial asymmetry. Sensory: No sensory deficit. Motor: No weakness. Psychiatric:         Mood and Affect: Mood normal. Mood is not anxious. Behavior: Behavior normal. Behavior is not agitated. MDM  Number of Diagnoses or Management Options  Diagnosis management comments: Patient has rapid COVID positive. Also has acute kidney injury. Initially she was placed on positive pressure ventilation. I was able to wean her off of this however she was still satting on high flow nasal cannula. We have now switched her to air Vo and she seems to be tolerating this better. Her mental status has improved with increased oxygenation. Baljeet Taveras MD; 1/14/2022 @8:14 AM Voice dictation software was used during the making of this note. This software is not perfect and grammatical and other typographical errors may be present. This note has not been proofread for errors.  ====================================        Amount and/or Complexity of Data Reviewed  Clinical lab tests: ordered and reviewed  Tests in the radiology section of CPT®: ordered and reviewed (XR CHEST PORT    Result Date: 1/14/2022  EXAM: Chest x-ray. INDICATION: Mental status changes. COMPARISON: Prior chest x-ray on January 10, 2020. TECHNIQUE: Frontal view chest x-ray. FINDINGS: The heart is enlarged and there are hazy opacities throughout both lungs, which could relate to early pulmonary edema or pneumonia. No pneumothorax or pleural effusion is seen. Again noted is elevation of the right diaphragm and surgical clips in the lower neck. 1. Hazy bilateral lung opacities may relate to early pulmonary edema or pneumonia.  2. Cardiomegaly.   )  Independent visualization of images, tracings, or specimens: yes (Normal sinus rhythm, narrow QRS complex, no bundle branch blocks, and no ST segment elevation )      ED Course as of 01/14/22 0813   Fri Jan 14, 2022   4509 ===================================  ED EKG Interpretation  EKG was interpreted in the absence of a cardiologist.    EKG rhythm: normal sinus rhythm, occasional PVC noted, unifocal  Rate: 84  ST Segments: Normal ST segments - NO STEMI      Bhanu Haskins MD; 1/14/2022 @7:00 AM================    [CT]      ED Course User Index  [CT] Breanna Bustos MD       Critical Care  Performed by: Breanna Bustos MD  Authorized by:  Breanna Bustos MD     Critical care provider statement:     Critical care time (minutes):  35    Critical care was necessary to treat or prevent imminent or life-threatening deterioration of the following conditions:  Respiratory failure    Critical care was time spent personally by me on the following activities:  Ordering and review of laboratory studies, ordering and review of radiographic studies, re-evaluation of patient's condition, evaluation of patient's response to treatment, examination of patient, ordering and performing treatments and interventions and discussions with consultants

## 2022-01-14 NOTE — PROGRESS NOTES
Patient resting in bed with airvo at 60/100. No s/s of acute distress. Will continue to monitor and report off to oncoming nurse. Safety measures in place.

## 2022-01-14 NOTE — PROGRESS NOTES
Pt was taken off CPAP and placed on a heated high flow nasal canula (Airvo) with settings of 60L at 100% FIO2. Pt has a current 02 sat of 93% and is exhibiting no signs of respiratory distress. Will continue to monitor pt and make changes as needed.

## 2022-01-14 NOTE — ED NOTES
TRANSFER - OUT REPORT:    Verbal report given to [facility] RN on Beatris Carmona  being transferred to  for routine progression of care       Report consisted of patients Situation, Background, Assessment and   Recommendations(SBAR). Information from the following report(s) SBAR, ED Summary, [facility] and Recent Results was reviewed with the receiving nurse. Lines:   Peripheral IV 01/14/22 Anterior;Proximal;Right Forearm (Active)        Opportunity for questions and clarification was provided.       Patient transported with:   Transport   RT

## 2022-01-14 NOTE — PROGRESS NOTES
Chart review complete, CM met with pt and spouse from doorway, social distance and PPE maintained, pt appears confused and spouse answers all questions, per spouse he and pt live in tri level home with 7 steps to enter, 5 steps inside to reach bedroom, per spouse pt is normally independent with ADLS, does not drive spouse provides transportation as needed. Affords medications without difficulty. Per spouse pt has cane and walker in home for use. Demographics, insurance and PCP confirmed. PT/OT evaluations pending, CM staff will remain available to assist as needed. Care Management Interventions  PCP Verified by CM:  Yes (Dr Carri Moon last visit approx 6mths ago)  MyChart Signup: No  Discharge Durable Medical Equipment: No  Physical Therapy Consult: Yes  Occupational Therapy Consult: Yes  Speech Therapy Consult: Yes  Support Systems: Spouse/Significant Other  Confirm Follow Up Transport: Family  Discharge Location  Discharge Placement: Unable to determine at this time

## 2022-01-15 PROBLEM — K64.9 HEMORRHOIDS: Chronic | Status: ACTIVE | Noted: 2020-03-02

## 2022-01-15 PROBLEM — K59.04 CHRONIC IDIOPATHIC CONSTIPATION: Chronic | Status: ACTIVE | Noted: 2020-03-02

## 2022-01-15 PROBLEM — E11.40 TYPE 2 DIABETES MELLITUS WITH DIABETIC NEUROPATHY, WITHOUT LONG-TERM CURRENT USE OF INSULIN (HCC): Chronic | Status: ACTIVE | Noted: 2018-01-12

## 2022-01-15 PROBLEM — K29.60 BILE REFLUX GASTRITIS: Chronic | Status: ACTIVE | Noted: 2020-03-02

## 2022-01-15 PROBLEM — Z86.010 PERSONAL HISTORY OF COLONIC POLYPS: Chronic | Status: ACTIVE | Noted: 2020-03-02

## 2022-01-15 PROBLEM — D13.1 BENIGN NEOPLASM OF STOMACH: Chronic | Status: ACTIVE | Noted: 2020-03-02

## 2022-01-15 PROBLEM — K44.9 DIAPHRAGMATIC HERNIA WITHOUT OBSTRUCTION OR GANGRENE: Chronic | Status: ACTIVE | Noted: 2020-03-02

## 2022-01-15 NOTE — PROGRESS NOTES
SpO2 87% maxed out on Airvo and NRB. Dr. Lenny Thompson at bedside, order BiPap. Notified RRT.   Also stopped IVFs, per Dr. Lenny Thompson.

## 2022-01-15 NOTE — ACP (ADVANCE CARE PLANNING)
[facility] Hospitalist Service  At the heart of better care     Advance Care Planning   Admit Date:  2022  5:25 AM   Name:  Get Carmona   Age:  66 y.o. Sex:  female  :  1950   MRN:  [id number]   Room:  [place] [address] Mathew is able to make her own decisions: Yes    If pt unable to make decisions, POA/surrogate decision maker:  Spouse Ne Carmona    Patient / surrogate decision-maker directed:  Code Status: FULL CODE -full aggressive medical and surgical interventions, including intubations, resuscitations, pressors, artificial tube feeding      Pt reported to me that for herself, she does NOT want to be resuscitated or intubated. However, she would like to discuss this over with her  for now and will let us know of her decision. For now she wants to be FULL code until she discusses with her . Patient or surrogate consented to discussion of the current conditions, workup, management plans, prognosis, and understand the risk for further deterioration. Time spent: 20 minutes in direct discussion (face to face and/or over phone). Signed:   Yomi Wood DO

## 2022-01-15 NOTE — PROGRESS NOTES
ACUTE OT GOALS:  (Developed with and agreed upon by patient and/or caregiver. 1) Patient will complete lower body bathing and dressing with SBA and adaptive equipment as needed. 2) Patient will complete toileting with SBA. 3) Patient will complete functional transfers with SBA and adaptive equipment as needed. 4) Patient will tolerate at least 30 minutes of OT activity with 1-2 rest breaks while maintaining O2 sats >90%. 5) Patient will verbalize at least 3 energy conservation technique to utilize during ADL/IADL. Timeframe: 7 visits       OCCUPATIONAL THERAPY ASSESSMENT: Initial Assessment and Daily Note OT Treatment Day # 1    Johnathon Napoles is a 66 y.o. female   PRIMARY DIAGNOSIS: Acute hypoxemic respiratory failure due to COVID-19 [facility])  Acute hypoxemic respiratory failure due to COVID-19 ([facility] [U07.1, J96.01]  SUSANNE (acute kidney injury) ([facility] [N17.9]       Reason for Referral:   ICD-10: Treatment Diagnosis: Generalized Muscle Weakness (M62.81)  INPATIENT: Payor: Cheri Taveras / Plan: Juan Smith / Product Type: [employer] Medicare /   ASSESSMENT:     REHAB RECOMMENDATIONS:   Recommendation to date pending progress:  Setting:   Short-term Rehab (pending progress)  Equipment:    To Be Determined     PRIOR LEVEL OF FUNCTION:  (Prior to Hospitalization)  INITIAL/CURRENT LEVEL OF FUNCTION:  (Based on today's evaluation)   Bathing:   Independent  Dressing:   Independent  Feeding/Grooming:   Independent  Toileting:   Independent  Functional Mobility:   Independent intermittent use of cane Bathing:   Moderate Assistance  Dressing:   Moderate Assistance  Feeding/Grooming:   Set Up  Toileting:   Moderate Assistance  Functional Mobility:   Moderate Assistance - bed mobility     ASSESSMENT:  Ms. Brittnay Fairchild presents with deficits in overall strength, activity tolerance, ADL performance and functional mobility.  Admitted for acute respiratory failure d/t Covid-19; currently resting on Airvo at 50L/75% with Sp02 at 92%. Mild confusion noted today but following commands and cooperative. Mod A  for functional bed mobility/transfers; fair (+) EOB sitting balance. Set-up for self-grooming tasks including washing face and brushing teeth. Tolerated therapy well with sats maintaining at > 90% during functional activity. At this time, Beatris Carmona is functioning below baseline for ADLs and functional mobility. Pt would benefit from skilled OT services to address OT goals and and plan of care. .     SUBJECTIVE:   Ms. Benjamin Torres states, \"I've been getting weaker for a while now. \"    SOCIAL HISTORY/LIVING ENVIRONMENT: Lives with ; multiple level home with 8 steps to enter; B/l hand rails. Independent with ADLs and functional mobility with intermittent use of SPC.    Home Environment: Private residence  One/Two Story Residence: One story  Living Alone: No  Support Systems: Spouse/Significant Other    OBJECTIVE:     PAIN: VITAL SIGNS: LINES/DRAINS:   Pre Treatment: Pain Screen  Pain Scale 1: Numeric (0 - 10)  Pain Intensity 1: 0  Post Treatment: 0   IV  O2 Device: Heated,Hi flow nasal cannula     GROSS EVALUATION:  BUEs Within Functional Limits Abnormal/ Functional Abnormal/ Non-Functional (see comments) Not Tested Comments:   AROM [] [x] [] []    PROM [] [] [] []    Strength [] [x] [] [] Generally weak   Balance [] [x] [] [] Fair (+) EOB sitting balance; standing not tested   Posture [] [] [] []    Sensation [] [] [] []    Coordination [] [] [] []    Tone [] [] [] []    Edema [] [] [] []    Activity Tolerance [] [x] [] [] Airvo at 60L/75%    [] [] [] []      COGNITION/  PERCEPTION: Intact Impaired   (see comments) Comments:   Orientation [] [x] Mild confusion   Vision [] [x] Wears glasses   Hearing [x] []    Judgment/ Insight [x] []    Attention [x] []    Memory [x] []    Command Following [x] []    Emotional Regulation [x] []     [] []      ACTIVITIES OF DAILY LIVING: I Mod I S SBA CGA Min Mod Max Total NT Comments   BASIC ADLs:              Bathing/ Showering [] [] [] [] [] [] [] [] [] [x]    Toileting [] [] [] [] [] [] [] [] [] [x]    Dressing [] [] [] [] [] [] [] [] [] [x]    Feeding [] [] [] [] [] [] [] [] [] [x]    Grooming [] [] [x] [x] [] [] [] [] [] [] Washing face and brushing teeth   Personal Device Care [] [] [] [] [] [] [] [] [] [x]    Functional Mobility [] [] [] [] [] [] [x] [] [] [] Bed mobility   I=Independent, Mod I=Modified Independent, S=Supervision, SBA=Standby Assistance, CGA=Contact Guard Assistance,   Min=Minimal Assistance, Mod=Moderate Assistance, Max=Maximal Assistance, Total=Total Assistance, NT=Not Tested    MOBILITY: I Mod I S SBA CGA Min Mod Max Total  NT x2 Comments:   Supine to sit [] [] [] [] [] [] [x] [] [] [] []    Sit to supine [] [] [] [] [] [] [x] [x] [] [] []    Sit to stand [] [] [] [] [] [] [] [] [] [x] []    Bed to chair [] [] [] [] [] [] [] [] [] [x] []    I=Independent, Mod I=Modified Independent, S=Supervision, SBA=Standby Assistance, CGA=Contact Guard Assistance,   Min=Minimal Assistance, Mod=Moderate Assistance, Max=Maximal Assistance, Total=Total Assistance, NT=Not Tested    [address] [facility] [address] [address] AM-PAC 6 Clicks   Daily Activity Inpatient Short Form        How much help from another person does the patient currently need. .. Total A Lot A Little None   1. Putting on and taking off regular lower body clothing? [] 1   [x] 2   [] 3   [] 4   2. Bathing (including washing, rinsing, drying)? [] 1   [x] 2   [] 3   [] 4   3. Toileting, which includes using toilet, bedpan or urinal?   [] 1   [x] 2   [] 3   [] 4   4. Putting on and taking off regular upper body clothing? [] 1   [] 2   [x] 3   [] 4   5. Taking care of personal grooming such as brushing teeth? [] 1   [] 2   [x] 3   [] 4   6. Eating meals? [] 1   [] 2   [x] 3   [] 4   © 2007, Trustees of [address] [place] [address] [address] [id number], under license to [facility].  All rights reserved     Score:  Initial: 15 Most Recent: X (Date: -- )   Interpretation of Tool:  Represents activities that are increasingly more difficult (i.e. Bed mobility, Transfers, Gait). PLAN:   FREQUENCY/DURATION: OT Plan of Care: 3 times/week for duration of hospital stay or until stated goals are met, whichever comes first.    PROBLEM LIST:   (Skilled intervention is medically necessary to address:)  1. Decreased ADL/Functional Activities  2. Decreased Activity Tolerance  3. Decreased AROM/PROM  4. Decreased Balance  5. Decreased Cognition  6. Decreased Coordination  7. Decreased Gait Ability  8. Decreased Strength  9. Decreased Transfer Abilities   INTERVENTIONS PLANNED:   (Benefits and precautions of occupational therapy have been discussed with the patient.)  1. Self Care Training  2. Therapeutic Activity  3. Therapeutic Exercise/HEP  4. Neuromuscular Re-education  5. Education     TREATMENT:     EVALUATION: Low Complexity : (Untimed Charge)    TREATMENT:   ($$ Self Care/Home Management: 8-22 mins    )  Self Care (10 Minutes): Self care including Grooming and Energy Conservation Training to increase independence and decrease level of assistance required.     TREATMENT GRID:  N/A    AFTER TREATMENT POSITION/PRECAUTIONS:  Bed, Needs within reach and RN notified    INTERDISCIPLINARY COLLABORATION:  RN/PCT, PT/PTA and OT/OWEN    TOTAL TREATMENT DURATION:  OT Patient Time In/Time Out  Time In: [address] [facility]  Time Out: [address] [facility], OT

## 2022-01-15 NOTE — PROGRESS NOTES
[facility] Progress Note      Admission Date: 1/14/2022   Subjective:      Feels ok. Less short of breath.       Objective:     Physical Exam:      Visit Vitals  BP (!) 119/56   Pulse 71   Temp 98.4 °F (36.9 °C)   Resp 20   Ht 5' 3\" (1.6 m)   Wt 86.2 kg (190 lb)   SpO2 93%   BMI 33.66 kg/m²      Gen: comfortable , NAD  HEENT: moist membranes  CV: S1, S2  Lungs: Coarseness bilaterally  Extem: no edema      Current Facility-Administered Medications   Medication Dose Route Frequency    sodium chloride (NS) flush 5-40 mL  5-40 mL IntraVENous Q8H    sodium chloride (NS) flush 5-40 mL  5-40 mL IntraVENous PRN    acetaminophen (TYLENOL) tablet 650 mg  650 mg Oral Q6H PRN    Or    acetaminophen (TYLENOL) suppository 650 mg  650 mg Rectal Q6H PRN    polyethylene glycol (MIRALAX) packet 17 g  17 g Oral DAILY PRN    ondansetron (ZOFRAN ODT) tablet 4 mg  4 mg Oral Q8H PRN    Or    ondansetron (ZOFRAN) injection 4 mg  4 mg IntraVENous Q6H PRN    0.9% sodium chloride infusion  100 mL/hr IntraVENous CONTINUOUS    heparin (porcine) injection 5,000 Units  5,000 Units SubCUTAneous Q8H    guaiFENesin-dextromethorphan (ROBITUSSIN DM) 100-10 mg/5 mL syrup 5 mL  5 mL Oral Q4H PRN    insulin lispro (HUMALOG) injection 0-10 Units  0-10 Units SubCUTAneous AC&HS    albuterol (PROVENTIL HFA, VENTOLIN HFA, PROAIR HFA) inhaler 2 Puff  2 Puff Inhalation QID RT    atorvastatin (LIPITOR) tablet 20 mg  20 mg Oral QHS    budesonide-formoteroL (SYMBICORT) 160-4.5 mcg/actuation HFA inhaler 2 Puff  2 Puff Inhalation BID    busPIRone (BUSPAR) tablet 7.5 mg  7.5 mg Oral BID    cycloSPORINE (RESTASIS) 0.05 % ophthalmic emulsion 1 Drop (Patient Supplied)  1 Drop Both Eyes Q12H    DULoxetine (CYMBALTA) capsule 20 mg  20 mg Oral DAILY    lamoTRIgine (LaMICtal) tablet 150 mg  150 mg Oral QAM    levothyroxine (SYNTHROID) tablet 137 mcg  137 mcg Oral 6am    montelukast (SINGULAIR) tablet 10 mg  10 mg Oral DAILY    tiZANidine (ZANAFLEX) tablet 4 mg  4 mg Oral TID PRN    pregabalin (LYRICA) capsule 50 mg  50 mg Oral BID    topiramate (TOPAMAX) tablet 25 mg  25 mg Oral QHS    traZODone (DESYREL) tablet 50 mg  50 mg Oral QHS    dicyclomine (BENTYL) tablet 20 mg  20 mg Oral AC&HS    pantoprazole (PROTONIX) tablet 40 mg  40 mg Oral ACB&D    doxycycline (VIBRAMYCIN) capsule 100 mg  100 mg Oral Q12H    [START ON 1/19/2022] dexamethasone (DECADRON) 10 mg/mL injection 10 mg  10 mg IntraVENous Q24H    dexamethasone (DECADRON) 20 mg in 0.9% sodium chloride 50 mL IVPB  20 mg IntraVENous Q24H    hydrALAZINE (APRESOLINE) tablet 50 mg  50 mg Oral Q6H PRN    alcohol 62% (NOZIN) nasal  1 Ampule  1 Ampule Topical Q12H            Data Review:     LABS:   Recent Results (from the past 12 hour(s))   CBC WITH AUTOMATED DIFF    Collection Time: 01/15/22  4:04 AM   Result Value Ref Range    WBC 5.5 4.3 - 11.1 K/uL    RBC 4.11 4.05 - 5.2 M/uL    HGB 10.5 (L) 11.7 - 15.4 g/dL    HCT 34.7 (L) 35.8 - 46.3 %    MCV 84.4 79.6 - 97.8 FL    MCH 25.5 (L) 26.1 - 32.9 PG    MCHC 30.3 (L) 31.4 - 35.0 g/dL    RDW 14.9 (H) 11.9 - 14.6 %    PLATELET 661 727 - 935 K/uL    MPV 9.8 9.4 - 12.3 FL    ABSOLUTE NRBC 0.00 0.0 - 0.2 K/uL    DF AUTOMATED      NEUTROPHILS 71 43 - 78 %    LYMPHOCYTES 17 13 - 44 %    MONOCYTES 11 4.0 - 12.0 %    EOSINOPHILS 0 (L) 0.5 - 7.8 %    BASOPHILS 0 0.0 - 2.0 %    IMMATURE GRANULOCYTES 1 0.0 - 5.0 %    ABS. NEUTROPHILS 3.9 1.7 - 8.2 K/UL    ABS. LYMPHOCYTES 1.0 0.5 - 4.6 K/UL    ABS. MONOCYTES 0.6 0.1 - 1.3 K/UL    ABS. EOSINOPHILS 0.0 0.0 - 0.8 K/UL    ABS. BASOPHILS 0.0 0.0 - 0.2 K/UL    ABS. IMM.  GRANS. 0.1 0.0 - 0.5 K/UL   METABOLIC PANEL, COMPREHENSIVE    Collection Time: 01/15/22  4:04 AM   Result Value Ref Range    Sodium 139 136 - 145 mmol/L    Potassium 3.6 3.5 - 5.1 mmol/L    Chloride 108 (H) 98 - 107 mmol/L    CO2 22 21 - 32 mmol/L    Anion gap 9 7 - 16 mmol/L    Glucose 242 (H) 65 - 100 mg/dL    BUN 66 (H) 8 - 23 MG/DL    Creatinine 3.92 (H) 0.6 - 1.0 MG/DL    GFR est AA 14 (L) >60 ml/min/1.73m2    GFR est non-AA 12 (L) >60 ml/min/1.73m2    Calcium 7.6 (L) 8.3 - 10.4 MG/DL    Bilirubin, total 0.3 0.2 - 1.1 MG/DL    ALT (SGPT) 22 12 - 65 U/L    AST (SGOT) 56 (H) 15 - 37 U/L    Alk. phosphatase 61 50 - 136 U/L    Protein, total 6.8 6.3 - 8.2 g/dL    Albumin 2.6 (L) 3.2 - 4.6 g/dL    Globulin 4.2 (H) 2.3 - 3.5 g/dL    A-G Ratio 0.6 (L) 1.2 - 3.5     D DIMER    Collection Time: 01/15/22  4:04 AM   Result Value Ref Range    D DIMER 3.20 (H) <0.56 ug/ml(FEU)   C REACTIVE PROTEIN, QT    Collection Time: 01/15/22  4:04 AM   Result Value Ref Range    C-Reactive protein 17.0 (H) 0.0 - 0.9 mg/dL   GLUCOSE, POC    Collection Time: 01/15/22  7:39 AM   Result Value Ref Range    Glucose (POC) 233 (H) 65 - 100 mg/dL    Performed by [facility]    GLUCOSE, POC    Collection Time: 01/15/22 11:13 AM   Result Value Ref Range    Glucose (POC) 225 (H) 65 - 100 mg/dL    [facility]          Plan:     Principal Problem:    Acute hypoxemic respiratory failure due to COVID-19 [facility]) (1/14/2022)    Active Problems: Morbid obesity ([facility] [address] ()      Essential hypertension (9/20/2016)      Gastroesophageal reflux disease without esophagitis (9/20/2016)      OAB (overactive bladder) (9/20/2016)      Acquired hypothyroidism (11/14/2012)      Fibromyalgia ()      Overview: HX FIBROMYALGIA      Anxiety and depression ()      Type 2 diabetes mellitus with diabetic neuropathy, without long-term current use of insulin ([facility] [address] (1/12/2018)      SUSANNE (acute kidney injury) ([facility] [address] (1/14/2022)      Acute kidney injury in the setting of COVID positivity.   She is admitted with hypoxemia and COVID-pneumonia, treated with dexamethasone and Tocilizumab.     She has normal kidney baseline   SUSANNE- no improvement yet  - urine studies point to hypoperfusion- still on IVF and no sign of overload  - following status closely

## 2022-01-15 NOTE — PROGRESS NOTES
Hospitalist Progress Note   Admit Date:  2022  5:25 AM   Name:  Tommy Carmona   Age:  66 y.o. Sex:  female  :  1950   MRN:  [id number]   Room:      Presenting Complaint: Altered mental status    Reason(s) for Admission: Acute hypoxemic respiratory failure due to COVID-19 ([facility] [U07.1, J96.01]  SUSANNE (acute kidney injury) [facility]) [N17.9]     Hospital Course & Interval History:   66 y.o. female with medical history of anxiety, depression, fibromyalgia, GERD, obesity with BMI of 33, who presented to the ER because of cough and shortness of breath with decrease po intake for 5 days. EMS was called patient was very confused and short of breath. When EMS arrived patient was found to be hypoxic. She was placed on CPAP and transported to the ER.     ER course: Patient was afebrile. Initial oxygen saturation was 81% on nasal cannula. Arterial blood gas was done which showed pH of 7.4, PCO2 23, PO2 46, bicarbonate 16, oxygen saturation 85% on 10L HFNC. Patient was placed on BiPAP and subsequently transitioned to Airvo. Chest x-ray shows hazy bilateral lung opacities likely related to pulmonary edema/pneumonia. Cardiomegaly. COVID test positive. CMP significant for Cr of 3.74. CRP is 16.9. Patient admitted for further evaluation management of acute hypoxemic respiratory failure secondary to COVID-19 pneumonia, acute kidney injury. Subjective (01/15/22):    O2sat 70's-80's in the room on maxed Airvo. Placed on NRB as well and O2 sat improved to 89%. Pt stated she is breathing. Denies chest pain, fever, chills. Assessment & Plan:     Acute hypoxemic respiratory failure due to COVID-19  Symptom onset 5 days, tested positive . In ED, O2 sat 85% on 10L HFNC. ABG shows  pH of 7.4, PCO2 23, PO2 46, Bicarb 16. CXR shows hazy bilateral lung opacities likely related to pulmonary edema/pneumonia; Cardiomegaly. ProBNP 10083. CRP 16.9.  D-Dimer 3.2  Dexamethasone for 10 days, 20mg IV daily for 5 days then 10mg daily for 5 days EOT 1/23  Not a candidate for Baricitinib d/t kidney function  Added IS and albuterol prn  Cont Symbicort  Prone as tolerated  On Airvo 60L/100% and NRB. Wean O2 as tolerated  F/u D-Dimer and inflammatory markers  Audible rales/rhonchi on exam, d/c IVF and give Lasix 1x. D/w RN to have RT place pt on BiPAP  Recheck STAT CXR, ABG and procal    SUSANNE  ? Prerenal.  Creatinine of 3.7 on admission (2 months ago was 0.98). Renal US shows no hydronephrosis; 2.9 cm L parapelvic cyst and minimal layering debris w/in bladder  Avoid nephrotoxic meds  Accurate I&O's  Holding home Losartan/Hctz  Nephrology consulted, appreciate recs  Holding IVF for now with pBNP 58032 and worsening respiratory status    Morbid obesity  Adds to complexity of care  Has not been diagnosed with JESSICA, does not wear CPAP at home    Essential hypertension  Hold losartan hydrochlorothiazide due to SUSANNE  Hydralazine as needed    GERD  PPI    OAB  Cont Oxybutynin    Acquired hypothyroidism  Cont home Synthroid    Fibromyalgia  Cont home Cymbalta, Topamax    Anxiety and depression  Cont home Cymbalta, Trazodone, Pregabalin, Buspar, Lamictal    Type 2 diabetes mellitus  Holding home Metformin, Farxiga  SSI  Add Lantus  Check hA1C        Dispo/Discharge Planning:      >2 days. PT/OT    I spent 37 minutes of time caring for this patient at bedside or nearby on the unit, more than 50 percent of which was spent on coordination of care and/or counseling regarding the disease process, treatment options, and treatment plan. There is a high probability of acute organ impairment or life-threatening deterioration in the patient's condition from: hypoxia, maxed on Airvo/NRB  Critical care interventions: BIPAP, CXR, ABG  Total critical care time spent: 35 minutes. Time is indicative of direct patient attendance at bedside and on the patient's floor nearby.   Includes time spent at bedside performing history and exam, performing chart review, discussing findings and treatment plan with patient and/or family, discussing patient with consultants and colleagues, ordering and reviewing pertinent laboratory and radiographic evaluations, and discussing patient with nursing staff. Time excludes procedures. Diet:  ADULT DIET Regular; 3 carb choices (45 gm/meal)  DVT PPx: Heparin SQ  Code status: Full Code    Hospital Problems as of 1/15/2022 Date Reviewed: 6/10/2021          Codes Class Noted - Resolved POA    SUSANNE (acute kidney injury) ([facility] ICD-10-CM: N17.9  ICD-9-CM: 584.9  1/14/2022 - Present Unknown        * (Principal) Acute hypoxemic respiratory failure due to COVID-19 [facility]) ICD-10-CM: U07.1, J96.01  ICD-9-CM: 518.81, 079.89, 799.02  1/14/2022 - Present Unknown        Type 2 diabetes mellitus with diabetic neuropathy, without long-term current use of insulin (HCC) ICD-10-CM: E11.40  ICD-9-CM: 250.60, 357.2  1/12/2018 - Present Yes        Fibromyalgia ICD-10-CM: M79.7  ICD-9-CM: 729.1  Unknown - Present Yes    Overview Signed 4/12/2017  9:40 AM by Franco JENSEN FIBROMYALGIA             Anxiety and depression ICD-10-CM: F41.9, F32. A  ICD-9-CM: 300.00, 311  Unknown - Present Yes        Essential hypertension ICD-10-CM: I10  ICD-9-CM: 401.9  9/20/2016 - Present Yes        Gastroesophageal reflux disease without esophagitis ICD-10-CM: K21.9  ICD-9-CM: 530.81  9/20/2016 - Present Yes        OAB (overactive bladder) ICD-10-CM: N32.81  ICD-9-CM: 596.51  9/20/2016 - Present Yes        Morbid obesity ([facility] ICD-10-CM: E66.01  ICD-9-CM: 278.01  Unknown - Present Yes        Acquired hypothyroidism ICD-10-CM: E03.9  ICD-9-CM: 244.9  11/14/2012 - Present Yes              Objective:     Patient Vitals for the past 24 hrs:   Temp Pulse Resp BP SpO2   01/15/22 1145     93 %   01/15/22 1135 98.4 °F (36.9 °C) 71 20 (!) 119/56 90 %   01/15/22 0813     96 %   01/15/22 0803 98.4 °F (36.9 °C) (!) 57 24 (!) 148/72 93 %   01/15/22 0327 97.8 °F (36.6 °C) (!) 59 27 128/60 97 %   01/14/22 2354 97.9 °F (36.6 °C) (!) 55 29 (!) 127/54 96 %   01/14/22 1945 98.2 °F (36.8 °C) (!) 57 29 (!) 127/52 95 %   01/14/22 1932     94 %   01/14/22 1832     93 %   01/14/22 1817 98 °F (36.7 °C) 61 20 (!) 124/57 90 %   01/14/22 1740  62      01/14/22 1730    (!) 114/51 93 %   01/14/22 1715  (!) 58  (!) 108/50 93 %   01/14/22 1645  (!) 58  (!) 109/57 93 %   01/14/22 1530  63  (!) 107/51 93 %   01/14/22 1515  63  (!) 110/56 92 %     Oxygen Therapy  O2 Sat (%): 93 % (01/15/22 1145)  Pulse via Oximetry: 70 beats per minute (01/15/22 1145)  O2 Device: Heated; Hi flow nasal cannula (01/15/22 1145)  Skin Assessment: Clean, dry, & intact (01/15/22 0813)  Skin Protection for O2 Device: No (01/15/22 0327)  O2 Flow Rate (L/min): 60 l/min (01/15/22 1145)  FIO2 (%): 95 % (01/15/22 1145)    Estimated body mass index is 33.66 kg/m² as calculated from the following:    Height as of this encounter: 5' 3\" (1.6 m). Weight as of this encounter: 86.2 kg (190 lb). Intake/Output Summary (Last 24 hours) at 1/15/2022 1243  Last data filed at 1/15/2022 0949  Gross per 24 hour   Intake 340 ml   Output    Net 340 ml         Physical Exam:     Blood pressure (!) 119/56, pulse 71, temperature 98.4 °F (36.9 °C), resp. rate 20, height 5' 3\" (1.6 m), weight 86.2 kg (190 lb), SpO2 93 %. General:    Alert and oriented x4. Ill appearing. Head:  Normocephalic, atraumatic  Eyes:  Sclerae appear normal.  Pupils equally round. ENT:  Nares appear normal, no drainage. Moist oral mucosa  Neck:  No restricted ROM. Trachea midline   CV:   RRR. No m/r/g. No jugular venous distension. Lungs: Audible rales/rhonchi diffusey. Mild tachypnea  Abdomen: Bowel sounds present. Soft, nontender, nondistended. Extremities: No cyanosis or clubbing. No edema  Skin:     No rashes and normal coloration. Warm and dry. Neuro:  CN II-XII grossly intact. Sensation intact. A&Ox3  Psych:  Normal mood and affect. I have reviewed ordered lab tests and independently visualized imaging below:    Recent Labs:  Recent Results (from the past 48 hour(s))   CBC WITH AUTOMATED DIFF    Collection Time: 01/14/22  5:30 AM   Result Value Ref Range    WBC 7.5 4.3 - 11.1 K/uL    RBC 4.10 4.05 - 5.2 M/uL    HGB 10.7 (L) 11.7 - 15.4 g/dL    HCT 35.3 (L) 35.8 - 46.3 %    MCV 86.1 79.6 - 97.8 FL    MCH 26.1 26.1 - 32.9 PG    MCHC 30.3 (L) 31.4 - 35.0 g/dL    RDW 15.0 (H) 11.9 - 14.6 %    PLATELET 519 044 - 042 K/uL    MPV 10.0 9.4 - 12.3 FL    ABSOLUTE NRBC 0.00 0.0 - 0.2 K/uL    DF AUTOMATED      NEUTROPHILS 82 (H) 43 - 78 %    LYMPHOCYTES 7 (L) 13 - 44 %    MONOCYTES 10 4.0 - 12.0 %    EOSINOPHILS 0 (L) 0.5 - 7.8 %    BASOPHILS 0 0.0 - 2.0 %    IMMATURE GRANULOCYTES 1 0.0 - 5.0 %    ABS. NEUTROPHILS 6.2 1.7 - 8.2 K/UL    ABS. LYMPHOCYTES 0.5 0.5 - 4.6 K/UL    ABS. MONOCYTES 0.7 0.1 - 1.3 K/UL    ABS. EOSINOPHILS 0.0 0.0 - 0.8 K/UL    ABS. BASOPHILS 0.0 0.0 - 0.2 K/UL    ABS. IMM. GRANS. 0.1 0.0 - 0.5 K/UL   METABOLIC PANEL, COMPREHENSIVE    Collection Time: 01/14/22  5:30 AM   Result Value Ref Range    Sodium 136 136 - 145 mmol/L    Potassium 3.6 3.5 - 5.1 mmol/L    Chloride 103 98 - 107 mmol/L    CO2 19 (L) 21 - 32 mmol/L    Anion gap 14 7 - 16 mmol/L    Glucose 253 (H) 65 - 100 mg/dL    BUN 49 (H) 8 - 23 MG/DL    Creatinine 3.74 (H) 0.6 - 1.0 MG/DL    GFR est AA 15 (L) >60 ml/min/1.73m2    GFR est non-AA 13 (L) >60 ml/min/1.73m2    Calcium 8.2 (L) 8.3 - 10.4 MG/DL    Bilirubin, total 0.2 0.2 - 1.1 MG/DL    ALT (SGPT) 23 12 - 65 U/L    AST (SGOT) 49 (H) 15 - 37 U/L    Alk.  phosphatase 65 50 - 136 U/L    Protein, total 7.4 6.3 - 8.2 g/dL    Albumin 3.0 (L) 3.2 - 4.6 g/dL    Globulin 4.4 (H) 2.3 - 3.5 g/dL    A-G Ratio 0.7 (L) 1.2 - 3.5     TSH 3RD GENERATION    Collection Time: 01/14/22  5:30 AM   Result Value Ref Range    TSH 0.329 (L) 0.358 - 3.740 uIU/mL   LACTIC ACID    Collection Time: 01/14/22  5:30 AM   Result Value Ref Range    Lactic acid 2.7 (H) 0.4 - 2.0 MMOL/L   C REACTIVE PROTEIN, QT    Collection Time: 01/14/22  5:30 AM   Result Value Ref Range    C-Reactive protein 16.9 (H) 0.0 - 0.9 mg/dL   EKG, 12 LEAD, INITIAL    Collection Time: 01/14/22  5:36 AM   Result Value Ref Range    Ventricular Rate 84 BPM    Atrial Rate 83 BPM    P-R Interval 151 ms    QRS Duration 105 ms    Q-T Interval 364 ms    QTC Calculation (Bezet) 431 ms    Calculated P Axis 73 degrees    Calculated R Axis -21 degrees    Calculated T Axis 90 degrees    Diagnosis       Sinus rhythm  Ventricular premature complex  Borderline left axis deviation  Borderline T abnormalities, lateral leads    Confirmed by Jose Mcclelland ([id number]) on 1/14/2022 3:56:54 PM     BLOOD GAS, ARTERIAL POC    Collection Time: 01/14/22  5:56 AM   Result Value Ref Range    Device: NASAL CANNULA      pH (POC) 7.45 7.35 - 7.45      pCO2 (POC) 23.4 (L) 35 - 45 MMHG    pO2 (POC) 46 (L) 75 - 100 MMHG    HCO3 (POC) 16.2 (L) 22 - 26 MMOL/L    sO2 (POC) 84.5 (L) 95 - 98 %    Base deficit (POC) 6.3 mmol/L    Allens test (POC) Positive      Site LEFT RADIAL      Specimen type (POC) ARTERIAL      Performed by Erin     Respiratory comment: [id number]    COVID-19 RAPID TEST    Collection Time: 01/14/22  6:00 AM   Result Value Ref Range    Specimen source NASAL      COVID-19 rapid test Detected (AA) NOTD     GLUCOSE, POC    Collection Time: 01/14/22 10:17 AM   Result Value Ref Range    Glucose (POC) 228 (H) 65 - 100 mg/dL    Performed by Mely Kraus    DRUG SCREEN, URINE    Collection Time: 01/14/22 11:12 AM   Result Value Ref Range    PCP(PHENCYCLIDINE) Negative      BENZODIAZEPINES Negative      COCAINE Negative      AMPHETAMINES Negative      METHADONE Negative      THC (TH-CANNABINOL) Negative      OPIATES Positive      BARBITURATES Negative     SODIUM, UR, RANDOM    Collection Time: 01/14/22 11:12 AM   Result Value Ref Range    Sodium,urine random 16 MMOL/L   CREATININE, UR, RANDOM    Collection Time: 01/14/22 11:12 AM   Result Value Ref Range    Creatinine, urine 151.00 mg/dL   URINALYSIS W/ RFLX MICROSCOPIC    Collection Time: 01/14/22 11:12 AM   Result Value Ref Range    Color YELLOW      Appearance CLOUDY      Specific gravity 1.026 (H) 1.001 - 1.023      pH (UA) 5.0 5.0 - 9.0      Protein 100 (A) NEG mg/dL    Glucose 500 mg/dL    Ketone TRACE (A) NEG mg/dL    Bilirubin MODERATE (A) NEG      Blood MODERATE (A) NEG      Urobilinogen 0.2 0.2 - 1.0 EU/dL    Nitrites Negative NEG      Leukocyte Esterase Negative NEG      WBC 10-20 0 /hpf    RBC 0-3 0 /hpf    Epithelial cells 0-3 0 /hpf    Bacteria TRACE 0 /hpf    Yeast MARKED      Other observations RESULTS VERIFIED MANUALLY     GLUCOSE, POC    Collection Time: 01/14/22  1:47 PM   Result Value Ref Range    Glucose (POC) 251 (H) 65 - 100 mg/dL    Performed by Darci    LACTIC ACID    Collection Time: 01/14/22  8:39 PM   Result Value Ref Range    Lactic acid 1.1 0.4 - 2.0 MMOL/L   NT-PRO BNP    Collection Time: 01/14/22  8:39 PM   Result Value Ref Range    NT pro-BNP 18,243 (H) 5 - 125 PG/ML   C REACTIVE PROTEIN, QT    Collection Time: 01/14/22  8:39 PM   Result Value Ref Range    C-Reactive protein 17.7 (H) 0.0 - 0.9 mg/dL   GLUCOSE, POC    Collection Time: 01/14/22  9:05 PM   Result Value Ref Range    Glucose (POC) 305 (H) 65 - 100 mg/dL    Performed by Sara    CBC WITH AUTOMATED DIFF    Collection Time: 01/15/22  4:04 AM   Result Value Ref Range    WBC 5.5 4.3 - 11.1 K/uL    RBC 4.11 4.05 - 5.2 M/uL    HGB 10.5 (L) 11.7 - 15.4 g/dL    HCT 34.7 (L) 35.8 - 46.3 %    MCV 84.4 79.6 - 97.8 FL    MCH 25.5 (L) 26.1 - 32.9 PG    MCHC 30.3 (L) 31.4 - 35.0 g/dL    RDW 14.9 (H) 11.9 - 14.6 %    PLATELET 729 151 - 332 K/uL    MPV 9.8 9.4 - 12.3 FL    ABSOLUTE NRBC 0.00 0.0 - 0.2 K/uL    DF AUTOMATED      NEUTROPHILS 71 43 - 78 %    LYMPHOCYTES 17 13 - 44 %    MONOCYTES 11 4.0 - 12.0 %    EOSINOPHILS 0 (L) 0.5 - 7.8 %    BASOPHILS 0 0.0 - 2.0 %    IMMATURE GRANULOCYTES 1 0.0 - 5.0 %    ABS. NEUTROPHILS 3.9 1.7 - 8.2 K/UL    ABS. LYMPHOCYTES 1.0 0.5 - 4.6 K/UL    ABS. MONOCYTES 0.6 0.1 - 1.3 K/UL    ABS. EOSINOPHILS 0.0 0.0 - 0.8 K/UL    ABS. BASOPHILS 0.0 0.0 - 0.2 K/UL    ABS. IMM. GRANS. 0.1 0.0 - 0.5 K/UL   METABOLIC PANEL, COMPREHENSIVE    Collection Time: 01/15/22  4:04 AM   Result Value Ref Range    Sodium 139 136 - 145 mmol/L    Potassium 3.6 3.5 - 5.1 mmol/L    Chloride 108 (H) 98 - 107 mmol/L    CO2 22 21 - 32 mmol/L    Anion gap 9 7 - 16 mmol/L    Glucose 242 (H) 65 - 100 mg/dL    BUN 66 (H) 8 - 23 MG/DL    Creatinine 3.92 (H) 0.6 - 1.0 MG/DL    GFR est AA 14 (L) >60 ml/min/1.73m2    GFR est non-AA 12 (L) >60 ml/min/1.73m2    Calcium 7.6 (L) 8.3 - 10.4 MG/DL    Bilirubin, total 0.3 0.2 - 1.1 MG/DL    ALT (SGPT) 22 12 - 65 U/L    AST (SGOT) 56 (H) 15 - 37 U/L    Alk.  phosphatase 61 50 - 136 U/L    Protein, total 6.8 6.3 - 8.2 g/dL    Albumin 2.6 (L) 3.2 - 4.6 g/dL    Globulin 4.2 (H) 2.3 - 3.5 g/dL    A-G Ratio 0.6 (L) 1.2 - 3.5     D DIMER    Collection Time: 01/15/22  4:04 AM   Result Value Ref Range    D DIMER 3.20 (H) <0.56 ug/ml(FEU)   C REACTIVE PROTEIN, QT    Collection Time: 01/15/22  4:04 AM   Result Value Ref Range    C-Reactive protein 17.0 (H) 0.0 - 0.9 mg/dL   GLUCOSE, POC    Collection Time: 01/15/22  7:39 AM   Result Value Ref Range    Glucose (POC) 233 (H) 65 - 100 mg/dL    Performed by [employer]    GLUCOSE, POC    Collection Time: 01/15/22 11:13 AM   Result Value Ref Range    Glucose (POC) 225 (H) 65 - 100 mg/dL    Performed by [employer]        All Micro Results     Procedure Component Value Units Date/Time    COVID-19 RAPID TEST [[id number]]  (Abnormal) Collected: 01/14/22 0600    Order Status: Completed Specimen: Nasopharyngeal Updated: 01/14/22 0720     Specimen source NASAL        Comment: RAPID ONLY        COVID-19 rapid test Detected        Comment:      The specimen is POSITIVE for SARS-CoV-2, the novel coronavirus associated with COVID-19. This test has been authorized by the FDA under an Emergency Use Authorization (EUA) for use by authorized laboratories. Fact sheet for Healthcare Providers: ConventionUpdate.co.nz  Fact sheet for Patients: ConventionUpdate.co.nz       Methodology: Isothermal Nucleic Acid Amplification               Other Studies:  US RETROPERITONEUM COMP    Result Date: 1/14/2022  RENAL ULTRASOUND 1/14/2022 4:18 PM HISTORY: ; SUSANNE TECHNIQUE: Sonographic imaging of the kidneys, bladder and retroperitoneal vessels was performed. COMPARISON: None FINDINGS: The right kidney is 11.6 cm in length. The left kidney is 10.9 cm in length. There is a 2.9 cm parapelvic cyst in the left kidney. There is no hydronephrosis. There are no visible shadowing renal calculi. The bladder is well-distended and there is minimal dependent debris within the bladder. The IVC is patent. The abdominal aorta is 1.7 cm in diameter. 1. No hydronephrosis. 2. 2.9 cm left parapelvic cyst. 3. Minimal layering debris within the bladder.       Current Meds:  Current Facility-Administered Medications   Medication Dose Route Frequency    sodium chloride (NS) flush 5-40 mL  5-40 mL IntraVENous Q8H    sodium chloride (NS) flush 5-40 mL  5-40 mL IntraVENous PRN    acetaminophen (TYLENOL) tablet 650 mg  650 mg Oral Q6H PRN    Or    acetaminophen (TYLENOL) suppository 650 mg  650 mg Rectal Q6H PRN    polyethylene glycol (MIRALAX) packet 17 g  17 g Oral DAILY PRN    ondansetron (ZOFRAN ODT) tablet 4 mg  4 mg Oral Q8H PRN    Or    ondansetron (ZOFRAN) injection 4 mg  4 mg IntraVENous Q6H PRN    0.9% sodium chloride infusion  100 mL/hr IntraVENous CONTINUOUS    heparin (porcine) injection 5,000 Units  5,000 Units SubCUTAneous Q8H    guaiFENesin-dextromethorphan (ROBITUSSIN DM) 100-10 mg/5 mL syrup 5 mL  5 mL Oral Q4H PRN    insulin lispro (HUMALOG) injection 0-10 Units  0-10 Units SubCUTAneous AC&HS    albuterol (PROVENTIL HFA, VENTOLIN HFA, PROAIR HFA) inhaler 2 Puff  2 Puff Inhalation QID RT    atorvastatin (LIPITOR) tablet 20 mg  20 mg Oral QHS    budesonide-formoteroL (SYMBICORT) 160-4.5 mcg/actuation HFA inhaler 2 Puff  2 Puff Inhalation BID    busPIRone (BUSPAR) tablet 7.5 mg  7.5 mg Oral BID    cycloSPORINE (RESTASIS) 0.05 % ophthalmic emulsion 1 Drop (Patient Supplied)  1 Drop Both Eyes Q12H    DULoxetine (CYMBALTA) capsule 20 mg  20 mg Oral DAILY    lamoTRIgine (LaMICtal) tablet 150 mg  150 mg Oral QAM    levothyroxine (SYNTHROID) tablet 137 mcg  137 mcg Oral 6am    montelukast (SINGULAIR) tablet 10 mg  10 mg Oral DAILY    tiZANidine (ZANAFLEX) tablet 4 mg  4 mg Oral TID PRN    pregabalin (LYRICA) capsule 50 mg  50 mg Oral BID    topiramate (TOPAMAX) tablet 25 mg  25 mg Oral QHS    traZODone (DESYREL) tablet 50 mg  50 mg Oral QHS    dicyclomine (BENTYL) tablet 20 mg  20 mg Oral AC&HS    pantoprazole (PROTONIX) tablet 40 mg  40 mg Oral ACB&D    doxycycline (VIBRAMYCIN) capsule 100 mg  100 mg Oral Q12H    [START ON 1/19/2022] dexamethasone (DECADRON) 10 mg/mL injection 10 mg  10 mg IntraVENous Q24H    dexamethasone (DECADRON) 20 mg in 0.9% sodium chloride 50 mL IVPB  20 mg IntraVENous Q24H    hydrALAZINE (APRESOLINE) tablet 50 mg  50 mg Oral Q6H PRN    alcohol 62% (NOZIN) nasal  1 Ampule  1 Ampule Topical Q12H       Signed: Elina Warren DO    Part of this note may have been written by using a voice dictation software. The note has been proof read but may still contain some grammatical/other typographical errors.

## 2022-01-15 NOTE — CONSULTS
Vinicio Carmona  Admission Date: 1/14/2022         Daily Progress Note: 1/15/2022    The patient's chart is reviewed and the patient is discussed with the staff. Background: 66year old unvaccinated female was admitted with cough, shortness of breath and decreased appetite. She tested + for COVID on 1/14. She is being treated with Decadron and Tocilizumab. She is also on Doxycycline. CRP is 16.9 and d dimer is 3.2. She is hypoxic and currently requiring bipap with FIO2 100%. She is undecided about intubation but a full code at present. She had SUSANNE on admission. She has been seen by nephrology who suspects hypoperfusion. She was initially on IV fluids but these have been stopped. Her fluid balance is + 100 mls. BNP measured 18,243 on admission. Her initial creatinine was 3.9 and it is up 3. 9. her urine output has only been 340 mls since admission. She is on BID lasix. She has never smoked but there is a history of asthma which is stable. She has been tested for JESSICA (about 2 years ago) but no tx was recommended. Subjective:     She is wearing bipap and comfortable. She is on FIO2 of 100% and sat is 97%. She denies any significant dyspnea but she does feel short of breath.      Current Facility-Administered Medications   Medication Dose Route Frequency    insulin glargine (LANTUS) injection 10 Units  10 Units SubCUTAneous QHS    furosemide (LASIX) injection 40 mg  40 mg IntraVENous BID    sodium chloride (NS) flush 5-40 mL  5-40 mL IntraVENous Q8H    sodium chloride (NS) flush 5-40 mL  5-40 mL IntraVENous PRN    acetaminophen (TYLENOL) tablet 650 mg  650 mg Oral Q6H PRN    Or    acetaminophen (TYLENOL) suppository 650 mg  650 mg Rectal Q6H PRN    polyethylene glycol (MIRALAX) packet 17 g  17 g Oral DAILY PRN    ondansetron (ZOFRAN ODT) tablet 4 mg  4 mg Oral Q8H PRN    Or    ondansetron (ZOFRAN) injection 4 mg  4 mg IntraVENous Q6H PRN    [Held by provider] 0.9% sodium chloride infusion  75 mL/hr IntraVENous CONTINUOUS    heparin (porcine) injection 5,000 Units  5,000 Units SubCUTAneous Q8H    guaiFENesin-dextromethorphan (ROBITUSSIN DM) 100-10 mg/5 mL syrup 5 mL  5 mL Oral Q4H PRN    insulin lispro (HUMALOG) injection 0-10 Units  0-10 Units SubCUTAneous AC&HS    albuterol (PROVENTIL HFA, VENTOLIN HFA, PROAIR HFA) inhaler 2 Puff  2 Puff Inhalation QID RT    atorvastatin (LIPITOR) tablet 20 mg  20 mg Oral QHS    budesonide-formoteroL (SYMBICORT) 160-4.5 mcg/actuation HFA inhaler 2 Puff  2 Puff Inhalation BID    busPIRone (BUSPAR) tablet 7.5 mg  7.5 mg Oral BID    cycloSPORINE (RESTASIS) 0.05 % ophthalmic emulsion 1 Drop (Patient Supplied)  1 Drop Both Eyes Q12H    DULoxetine (CYMBALTA) capsule 20 mg  20 mg Oral DAILY    lamoTRIgine (LaMICtal) tablet 150 mg  150 mg Oral QAM    levothyroxine (SYNTHROID) tablet 137 mcg  137 mcg Oral 6am    montelukast (SINGULAIR) tablet 10 mg  10 mg Oral DAILY    tiZANidine (ZANAFLEX) tablet 4 mg  4 mg Oral TID PRN    pregabalin (LYRICA) capsule 50 mg  50 mg Oral BID    topiramate (TOPAMAX) tablet 25 mg  25 mg Oral QHS    traZODone (DESYREL) tablet 50 mg  50 mg Oral QHS    dicyclomine (BENTYL) tablet 20 mg  20 mg Oral AC&HS    pantoprazole (PROTONIX) tablet 40 mg  40 mg Oral ACB&D    doxycycline (VIBRAMYCIN) capsule 100 mg  100 mg Oral Q12H    [START ON 1/19/2022] dexamethasone (DECADRON) 10 mg/mL injection 10 mg  10 mg IntraVENous Q24H    dexamethasone (DECADRON) 20 mg in 0.9% sodium chloride 50 mL IVPB  20 mg IntraVENous Q24H    hydrALAZINE (APRESOLINE) tablet 50 mg  50 mg Oral Q6H PRN    alcohol 62% (NOZIN) nasal  1 Ampule  1 Ampule Topical Q12H     Review of Systems    Constitutional: negative for fever, chills, sweats  Cardiovascular: negative for chest pain, palpitations, syncope, edema  Gastrointestinal:  negative for dysphagia, reflux, vomiting, diarrhea, abdominal pain, or melena  Neurologic:  negative for focal weakness, numbness, headache  Objective:     Vitals:    01/15/22 1145 01/15/22 1316 01/15/22 1330 01/15/22 1545   BP:    132/75   Pulse:    71   Resp:    20   Temp:    98.7 °F (37.1 °C)   SpO2: 93% (!) 87% 91% 94%   Weight:       Height:           Intake/Output Summary (Last 24 hours) at 1/15/2022 1550  Last data filed at 1/15/2022 0949  Gross per 24 hour   Intake 240 ml   Output    Net 240 ml     Physical Exam:   Constitution:  the patient is obese, well developed and in no acute distress  HEENT:  Sclera clear, pupils equal, oral mucosa moist  Respiratory: some crackles from posterior. Overall decreased. Cardiovascular:  RRR without M,G,R  Gastrointestinal: soft and non-tender; with positive bowel sounds. Musculoskeletal: warm without cyanosis. There is no lower extremity edema. Skin:  no jaundice or rashes, no wounds   Neurologic: no gross neuro deficits     Psychiatric:  alert and oriented     CXR:       LAB:  Recent Labs     01/15/22  0404 01/14/22  2039 01/14/22  0530   WBC 5.5  --  7.5   HGB 10.5*  --  10.7*   HCT 34.7*  --  35.3*     --  265   LAC  --  1.1 2.7*     Recent Labs     01/15/22  0404 01/14/22  0530    136   K 3.6 3.6   * 103   CO2 22 19*   * 253*   BUN 66* 49*   CREA 3.92* 3.74*   CA 7.6* 8.2*   ALB 2.6* 3.0*   AST 56* 49*   ALT 22 23   AP 61 65     Recent Labs     01/15/22  0404 01/14/22  2039 01/14/22  0530   LAC  --  1.1 2.7*   BNPNT  --  18,243*  --    CRP 17.0* 17.7* 16.9*     Recent Labs     01/15/22  1421 01/14/22  0556   PHI 7.36 7.45   PCO2I 35.0 23.4*   PO2I 54* 46*   HCO3I 19.7* 16.2*     No results for input(s): SDES, CULT in the last 72 hours. Assessment and Plan:  (Medical Decision Making)   Principal Problem:    Acute hypoxemic respiratory failure due to COVID-19 ([facility] [address] (1/14/2022)  Now using bipap with FIO2 at 100%. Sat 97%. Was previously on airvo with nonrebreather. Her CXR looks worse - infiltrates patchy in appearance.  . She is in renal failure so wonder if there could be some edema. IV fluids have been stopped and she has not made much urine. She is on lasix. On Decadron and Tocilizumab. CRP 16.9 and d dimer 3. 2. will add Rocephin to Doxycycline in case of bacterial pneumonia. Try CPAP as opposed to Bipap and reassess her sats. Will have to transfer to ICU if she needs Bipap. Active Problems: Morbid obesity ([facility] [address] ()   had PSG about 2 years ago by her report. She was told she did not have JESSICA. Essential hypertension (9/20/2016)    controlled      Gastroesophageal reflux disease without esophagitis (9/20/2016)    stable      OAB (overactive bladder) (9/20/2016)    noted      Acquired hypothyroidism (11/14/2012)    On tx      Fibromyalgia ()    noted      Anxiety and depression ()    Stable at presnt    Type 2 diabetes mellitus with diabetic neuropathy, without long-term current use of insulin ([facility] [address] (1/12/2018)    BS low 200s with steroids      SUSANNE (acute kidney injury) ([facility] [address] (1/14/2022)    Worse. Nephrology has seen. Low urine output. CXR looks worse - COVID +/- edema        More than 50% of the time documented was spent in face-to-face contact with the patient and in the care of the patient on the floor/unit where the patient is located. Jenny Wyatt NP   I have spoken with and examined the patient. I agree with the above assessment and plan as documented.     Gen: alert, on bipap in nad but rr 30  Lungs:  Crackles posterior  Heart:  RRR with no Murmur/Rubs/Gallops  Abd:soft  Ext: no edema    Will try on cpap but if ongoing hypoxemia and needs bipap she will need to go to icu    Aby Mclean MD

## 2022-01-15 NOTE — PROGRESS NOTES
Report received from [facility]. Patient resting quietly in bed. Respirations present, even and unlabored on Airvo 60L/95%. Bed low and locked, safety measures in place. No signs of distress, no needs expressed by the patient. NS infusing at 100mL/hr. Call light within reach, encouraged patient to call with any needs. Will continue to monitor.

## 2022-01-15 NOTE — PROGRESS NOTES
No acute events overnight. Patient resting quietly in bed. Respirations present, even and unlabored on Airvo 60L/95%. Bed low and locked, safety measures in place. No signs of distress, no needs expressed by the patient. NS infusing at 100mL/hr. Call light within reach, encouraged patient to call with any needs.

## 2022-01-16 NOTE — PROGRESS NOTES
Pt alert in bed.  at bedside to discuss code status. Call light in reach. Respirations even and unlabored CPAP. Will continue to monitor. No signs of distress noted or needs stated.

## 2022-01-16 NOTE — PROGRESS NOTES
Pt resting quietly in bed. Respirations are even and unlabored on CPAP. No signs of distress noted or needs stated at this time. Call light in reach. Will continue to monitor. SBAR to be given to oncoming nurse.

## 2022-01-16 NOTE — PROGRESS NOTES
Mirian Carmona  Admission Date: 1/14/2022         Daily Progress Note: 1/16/2022    The patient's chart is reviewed and the patient is discussed with the staff. Background:     Background: 66year old unvaccinated female was admitted with cough, shortness of breath and decreased appetite. She tested + for COVID on 1/14. She is being treated with Decadron and Tocilizumab. She is also on Doxycycline. CRP is 16.9 and d dimer is 3.2. She is hypoxic and currently requiring bipap with FIO2 100%. She is undecided about intubation but a full code at present. She had SUSANNE on admission. She has been seen by nephrology who suspects hypoperfusion. She was initially on IV fluids but these have been stopped. Her fluid balance is + 100 mls. BNP measured 18,243 on admission. Her initial creatinine was 3.9 and it is up 3. 9. her urine output has only been 340 mls since admission. She is on BID lasix. She has never smoked but there is a history of asthma which is stable. She has been tested for JESSICA (about 2 years ago) but no tx was recommended. Subjective:     Did well on CPAP 100% with sat 100. Now on Airvo 100% with sat of 98%. DNR status established last night.  Feels a little better this AM.     Current Facility-Administered Medications   Medication Dose Route Frequency    albumin human 25% (BUMINATE) solution 25 g  25 g IntraVENous Q6H    insulin glargine (LANTUS) injection 15 Units  15 Units SubCUTAneous QHS    furosemide (LASIX) injection 40 mg  40 mg IntraVENous BID    cefTRIAXone (ROCEPHIN) 1 g in 0.9% sodium chloride (MBP/ADV) 50 mL MBP  1 g IntraVENous Q24H    sodium chloride (NS) flush 5-40 mL  5-40 mL IntraVENous Q8H    sodium chloride (NS) flush 5-40 mL  5-40 mL IntraVENous PRN    acetaminophen (TYLENOL) tablet 650 mg  650 mg Oral Q6H PRN    Or    acetaminophen (TYLENOL) suppository 650 mg  650 mg Rectal Q6H PRN    polyethylene glycol (MIRALAX) packet 17 g  17 g Oral DAILY PRN    ondansetron (ZOFRAN ODT) tablet 4 mg  4 mg Oral Q8H PRN    Or    ondansetron (ZOFRAN) injection 4 mg  4 mg IntraVENous Q6H PRN    [Held by provider] 0.9% sodium chloride infusion  75 mL/hr IntraVENous CONTINUOUS    heparin (porcine) injection 5,000 Units  5,000 Units SubCUTAneous Q8H    guaiFENesin-dextromethorphan (ROBITUSSIN DM) 100-10 mg/5 mL syrup 5 mL  5 mL Oral Q4H PRN    insulin lispro (HUMALOG) injection 0-10 Units  0-10 Units SubCUTAneous AC&HS    albuterol (PROVENTIL HFA, VENTOLIN HFA, PROAIR HFA) inhaler 2 Puff  2 Puff Inhalation QID RT    atorvastatin (LIPITOR) tablet 20 mg  20 mg Oral QHS    budesonide-formoteroL (SYMBICORT) 160-4.5 mcg/actuation HFA inhaler 2 Puff  2 Puff Inhalation BID    busPIRone (BUSPAR) tablet 7.5 mg  7.5 mg Oral BID    cycloSPORINE (RESTASIS) 0.05 % ophthalmic emulsion 1 Drop (Patient Supplied)  1 Drop Both Eyes Q12H    DULoxetine (CYMBALTA) capsule 20 mg  20 mg Oral DAILY    lamoTRIgine (LaMICtal) tablet 150 mg  150 mg Oral QAM    levothyroxine (SYNTHROID) tablet 137 mcg  137 mcg Oral 6am    montelukast (SINGULAIR) tablet 10 mg  10 mg Oral DAILY    tiZANidine (ZANAFLEX) tablet 4 mg  4 mg Oral TID PRN    pregabalin (LYRICA) capsule 50 mg  50 mg Oral BID    topiramate (TOPAMAX) tablet 25 mg  25 mg Oral QHS    traZODone (DESYREL) tablet 50 mg  50 mg Oral QHS    dicyclomine (BENTYL) tablet 20 mg  20 mg Oral AC&HS    pantoprazole (PROTONIX) tablet 40 mg  40 mg Oral ACB&D    doxycycline (VIBRAMYCIN) capsule 100 mg  100 mg Oral Q12H    [START ON 1/19/2022] dexamethasone (DECADRON) 10 mg/mL injection 10 mg  10 mg IntraVENous Q24H    dexamethasone (DECADRON) 20 mg in 0.9% sodium chloride 50 mL IVPB  20 mg IntraVENous Q24H    hydrALAZINE (APRESOLINE) tablet 50 mg  50 mg Oral Q6H PRN    alcohol 62% (NOZIN) nasal  1 Ampule  1 Ampule Topical Q12H     Review of Systems    Constitutional: negative for fever, chills, sweats  Cardiovascular: negative for chest pain, palpitations, syncope, edema  Gastrointestinal:  negative for dysphagia, reflux, vomiting, diarrhea, abdominal pain, or melena  Neurologic:  negative for focal weakness, numbness, headache    Objective:     Vitals:    01/16/22 0226 01/16/22 0335 01/16/22 0745 01/16/22 0941   BP:  116/77  126/76   Pulse:  (!) 57  60   Resp:  18  18   Temp:  98.1 °F (36.7 °C)  98.4 °F (36.9 °C)   SpO2: 99% 98% 98% 96%   Weight:       Height:           Intake/Output Summary (Last 24 hours) at 1/16/2022 1043  Last data filed at 1/16/2022 0603  Gross per 24 hour   Intake    Output 200 ml   Net -200 ml     Physical Exam:   Constitution:  the patient is well overweight and in no acute distress on Airvo 100 % with sat 96%  HEENT:  Sclera clear, pupils equal, oral mucosa moist  Respiratory: decreased BS bilaterally  Cardiovascular:  RRR without M,G,R  Gastrointestinal: soft and non-tender; with positive bowel sounds. Musculoskeletal: warm without cyanosis. There is no lower extremity edema.   Skin:  no jaundice or rashes  Neurologic: no gross neuro deficits     Psychiatric:  alert and oriented x 3    CXR:     1/16, 15:          LAB:  Recent Labs     01/16/22  0346 01/15/22  1324 01/15/22  0404 01/14/22 2039 01/14/22  0530   WBC 8.8  --  5.5  --  7.5   HGB 10.1*  --  10.5*  --  10.7*   HCT 33.1*  --  34.7*  --  35.3*     --  248  --  265   PCT  --  1.53*  --   --   --    LAC  --   --   --  1.1 2.7*     Recent Labs     01/16/22  0346 01/15/22  0404 01/14/22  0530    139 136   K 4.0 3.6 3.6    108* 103   CO2 22 22 19*   * 242* 253*   BUN 78* 66* 49*   CREA 4.10* 3.92* 3.74*   CA 7.9* 7.6* 8.2*   ALB  --  2.6* 3.0*   AST  --  56* 49*   ALT  --  22 23   AP  --  61 65     Recent Labs     01/16/22  0346 01/15/22  0404 01/14/22 2039 01/14/22  0530 01/14/22  0530   LAC  --   --  1.1  --  2.7*   BNPNT  --   --  18,243*  --   --    CRP 8.9* 17.0* 17.7*   < > 16.9*    < > = values in this interval not displayed. Recent Labs     01/15/22  1421 01/14/22  0556   PHI 7.36 7.45   PCO2I 35.0 23.4*   PO2I 54* 46*   HCO3I 19.7* 16.2*     No results for input(s): SDES, CULT in the last 72 hours. Assessment and Plan:  (Medical Decision Making)   Principal Problem:    Acute hypoxemic respiratory failure due to COVID-19 [facility]) (1/14/2022)   Seems better on CPAP with improved CXR. ? Element of pulmonary edema. Agree with lasix. On high dose decadron, Doxy, and Rocephin. Active Problems: Morbid obesity (HCC) ()    BMI almost 34. Essential hypertension (9/20/2016)    Per primary      Type 2 diabetes mellitus with diabetic neuropathy, without long-term current use of insulin ([facility] [address] (1/12/2018)    Per primary. SUSANNE (acute kidney injury) ([facility] [address] (1/14/2022)    Follow. Getting BID lasix. Watch creatinine. More than 50% of the time documented was spent in face-to-face contact with the patient and in the care of the patient on the floor/unit where the patient is located.     Markos Marley MD

## 2022-01-16 NOTE — ACP (ADVANCE CARE PLANNING)
[facility]  At the heart of better care     Advance Care Planning   Admit Date:  2022  5:25 AM   Name:  Rekha Carmona   Age:  66 y.o. Sex:  female  :  1950   MRN:  [id number]   Room:  [place] [id number] Mathew is able to make her own decisions: Yes    If pt unable to make decisions, POA/surrogate decision maker:  Spouse Lana Carmona        Patient / surrogate decision-maker directed:  Code Status: DO NOT RESUSCITATE, DO NOT INTUBATE -okay for other aggressive medical and surgical intervention     has came in last p.m. and after discussion with patient, final decision is DNR/DNI to maximize aggressive intervention on medical floor instead of ICU. Patient or surrogate consented to discussion of the current conditions, workup, management plans, prognosis, and understand the risk for further deterioration. Time spent: 18 minutes in direct discussion (face to face and/or over phone). Signed:   Saskia Rooney DO

## 2022-01-16 NOTE — PROGRESS NOTES
Hospitalist Progress Note   Admit Date:  2022  5:25 AM   Name:  Antonio Carmona   Age:  66 y.o. Sex:  female  :  1950   MRN:  [id number]   Room:      Presenting Complaint: Altered mental status    Reason(s) for Admission: Acute hypoxemic respiratory failure due to COVID-19 ([facility] [U07.1, J96.01]  SUSANNE (acute kidney injury) [facility]) [N17.9]     Hospital Course & Interval History:   66 y.o. female with medical history of anxiety, depression, fibromyalgia, GERD, obesity with BMI of 33, who presented to the ER because of cough and shortness of breath with decrease po intake for 5 days. EMS was called patient was very confused and short of breath. When EMS arrived patient was found to be hypoxic. She was placed on CPAP and transported to the ER.     ER course: Patient was afebrile. Initial oxygen saturation was 81% on nasal cannula. Arterial blood gas was done which showed pH of 7.4, PCO2 23, PO2 46, bicarbonate 16, oxygen saturation 85% on 10L HFNC. Patient was placed on BiPAP and subsequently transitioned to Airvo. Chest x-ray shows hazy bilateral lung opacities likely related to pulmonary edema/pneumonia. Cardiomegaly. COVID test positive. CMP significant for Cr of 3.74. CRP is 16.9. Patient admitted for further evaluation management of acute hypoxemic respiratory failure secondary to COVID-19 pneumonia, acute kidney injury. Subjective (22): Overnight has been came and patient as well as family decided on DNR. She is breathing better today. Reported feeling better 2. Weaning down from CPAP to Airvo this morning. Creatinine slightly worsened from yesterday. Denies chest pain, fever, chills. Assessment & Plan:     Acute hypoxemic respiratory failure due to COVID-19  Superimposed bacterial pneumonia  Symptom onset 5 days, tested positive . In ED, O2 sat 85% on 10L HFNC. ABG shows  pH of 7.4, PCO2 23, PO2 46, Bicarb 16.  CXR shows hazy bilateral lung opacities likely related to pulmonary edema/pneumonia; Cardiomegaly. ProBNP 19089. CRP 16.9. D-Dimer 3.2. procal>1  Dexamethasone for 10 days, 20mg IV daily for 5 days then 10mg daily for 5 days EOT 1/23  Not a candidate for Baricitinib d/t kidney function  Abx: Rocephin/Doxycycline (1/15-. ..)  Added IS and albuterol prn  Cont Symbicort  Prone as tolerated  Cont Lasix IV BID. Monitor I&O's, daily weights  Pulmonary team on board, appreciate recs  Weaned off of CPAP this AM down to Airvo 60L/100%, wean further as tolerated  Repeat CXR    SUSANNE, worseing  ? Prerenal.  Creatinine of 3.7 on admission (2 months ago was 0.98). Renal US shows no hydronephrosis; 2.9 cm L parapelvic cyst and minimal layering debris w/in bladder  Avoid nephrotoxic meds  Accurate I&O's  Holding home [address]  Nephrology consulted, appreciate recs  Resuming IVF for now. Add albumin 4 doses. Morbid obesity  Adds to complexity of care  Has not been diagnosed with JESSICA, does not wear CPAP at home    Essential hypertension  Hold losartan hydrochlorothiazide due to SUSANNE  Hydralazine as needed    GERD  PPI    OAB  Cont Oxybutynin    Acquired hypothyroidism  Cont home Synthroid    Fibromyalgia  Cont home Cymbalta, Topamax    Anxiety and depression  Cont home Cymbalta, Trazodone, Pregabalin, Buspar, Lamictal    Type 2 diabetes mellitus  Holding home Metformin, Farxiga  SSI and lantus, adjust doses as appropriate  hA1C 8.0        Dispo/Discharge Planning:      >2 days. PT/OT    I spent 36 minutes of time caring for this patient at bedside or nearby on the unit, more than 50 percent of which was spent on coordination of care and/or counseling regarding the disease process, treatment options, and treatment plan.           Diet:  ADULT DIET Regular; 3 carb choices (45 gm/meal)  DVT PPx: Heparin SQ  Code status: DNR    Hospital Problems as of 1/16/2022 Date Reviewed: 6/10/2021          Codes Class Noted - Resolved POA    SUSANNE (acute kidney injury) ([facility] ICD-10-CM: N17.9  ICD-9-CM: 584.9  1/14/2022 - Present Yes        * (Principal) Acute hypoxemic respiratory failure due to COVID-19 [facility]) ICD-10-CM: U07.1, J96.01  ICD-9-CM: 518.81, 079.89, 799.02  1/14/2022 - Present Yes        Type 2 diabetes mellitus with diabetic neuropathy, without long-term current use of insulin (HCC) (Chronic) ICD-10-CM: E11.40  ICD-9-CM: 250.60, 357.2  1/12/2018 - Present Yes        Fibromyalgia (Chronic) ICD-10-CM: M79.7  ICD-9-CM: 729.1  Unknown - Present Yes    Overview Signed 4/12/2017  9:40 AM by Holden JENSEN FIBROMYALGIA             Anxiety and depression (Chronic) ICD-10-CM: F41.9, F32. A  ICD-9-CM: 300.00, 311  Unknown - Present Yes        Essential hypertension (Chronic) ICD-10-CM: I10  ICD-9-CM: 401.9  9/20/2016 - Present Yes        Gastroesophageal reflux disease without esophagitis (Chronic) ICD-10-CM: K21.9  ICD-9-CM: 530.81  9/20/2016 - Present Yes        OAB (overactive bladder) (Chronic) ICD-10-CM: N32.81  ICD-9-CM: 596.51  9/20/2016 - Present Yes        Morbid obesity ([address] (Chronic) ICD-10-CM: E66.01  ICD-9-CM: 278.01  Unknown - Present Yes        Acquired hypothyroidism (Chronic) ICD-10-CM: E03.9  ICD-9-CM: 244.9  11/14/2012 - Present Yes              Objective:     Patient Vitals for the past 24 hrs:   Temp Pulse Resp BP SpO2   01/16/22 0745     98 %   01/16/22 0335 98.1 °F (36.7 °C) (!) 57 18 116/77 98 %   01/16/22 0226     99 %   01/16/22 0009 97.9 °F (36.6 °C) 64 18 139/61 98 %   01/15/22 2325     100 %   01/15/22 2141 98.2 °F (36.8 °C) 62 18 (!) 144/56 100 %   01/15/22 1944     96 %   01/15/22 1645     96 %   01/15/22 1545 98.7 °F (37.1 °C) 71 20 132/75 94 %   01/15/22 1330     91 %   01/15/22 1316     (!) 87 %   01/15/22 1145     93 %   01/15/22 1135 98.4 °F (36.9 °C) 71 20 (!) 119/56 90 %   01/15/22 0813     96 %   01/15/22 0803 98.4 °F (36.9 °C) (!) 57 24 (!) 148/72 93 %     Oxygen Therapy  O2 Sat (%): 98 % (01/16/22 0745)  Pulse via Oximetry: 61 beats per minute (01/16/22 0745)  O2 Device: Heated; Hi flow nasal cannula (01/16/22 0745)  Skin Assessment: Clean, dry, & intact (01/15/22 0813)  Skin Protection for O2 Device: No (01/15/22 0327)  O2 Flow Rate (L/min): 60 l/min (01/16/22 0745)  FIO2 (%): 100 % (01/16/22 0745)    Estimated body mass index is 33.66 kg/m² as calculated from the following:    Height as of this encounter: 5' 3\" (1.6 m). Weight as of this encounter: 86.2 kg (190 lb). Intake/Output Summary (Last 24 hours) at 1/16/2022 0802  Last data filed at 1/16/2022 0603  Gross per 24 hour   Intake 240 ml   Output 200 ml   Net 40 ml         Physical Exam:     Blood pressure 116/77, pulse (!) 57, temperature 98.1 °F (36.7 °C), resp. rate 18, height 5' 3\" (1.6 m), weight 86.2 kg (190 lb), SpO2 98 %. General:    Alert and oriented x4. Ill appearing. Head:  Normocephalic, atraumatic  Eyes:  Sclerae appear normal.  Pupils equally round. ENT:  Nares appear normal, no drainage. Moist oral mucosa  Neck:  No restricted ROM. Trachea midline   CV:   RRR. No m/r/g. No jugular venous distension. Lungs:   Coarse lung sounds at bases. Breathing non-labored   Abdomen: Bowel sounds present. Soft, nontender, nondistended. Extremities: No cyanosis or clubbing. No edema  Skin:     No rashes and normal coloration. Warm and dry. Neuro:  CN II-XII grossly intact. Sensation intact. A&Ox3  Psych:  Normal mood and affect.       I have reviewed ordered lab tests and independently visualized imaging below:    Recent Labs:  Recent Results (from the past 48 hour(s))   GLUCOSE, POC    Collection Time: 01/14/22 10:17 AM   Result Value Ref Range    Glucose (POC) 228 (H) 65 - 100 mg/dL    Performed by [employer]    DRUG SCREEN, URINE    Collection Time: 01/14/22 11:12 AM   Result Value Ref Range    PCP(PHENCYCLIDINE) Negative      BENZODIAZEPINES Negative      COCAINE Negative      AMPHETAMINES Negative      METHADONE Negative      THC (-CANNABINOL) Negative      OPIATES Positive      BARBITURATES Negative     SODIUM, UR, RANDOM    Collection Time: 01/14/22 11:12 AM   Result Value Ref Range    Sodium,urine random 16 MMOL/L   CREATININE, UR, RANDOM    Collection Time: 01/14/22 11:12 AM   Result Value Ref Range    Creatinine, urine 151.00 mg/dL   URINALYSIS W/ RFLX MICROSCOPIC    Collection Time: 01/14/22 11:12 AM   Result Value Ref Range    Color YELLOW      Appearance CLOUDY      Specific gravity 1.026 (H) 1.001 - 1.023      pH (UA) 5.0 5.0 - 9.0      Protein 100 (A) NEG mg/dL    Glucose 500 mg/dL    Ketone TRACE (A) NEG mg/dL    Bilirubin MODERATE (A) NEG      Blood MODERATE (A) NEG      Urobilinogen 0.2 0.2 - 1.0 EU/dL    Nitrites Negative NEG      Leukocyte Esterase Negative NEG      WBC 10-20 0 /hpf    RBC 0-3 0 /hpf    Epithelial cells 0-3 0 /hpf    Bacteria TRACE 0 /hpf    Yeast MARKED      Other observations RESULTS VERIFIED MANUALLY     GLUCOSE, POC    Collection Time: 01/14/22  1:47 PM   Result Value Ref Range    Glucose (POC) 251 (H) 65 - 100 mg/dL    Performed by Darci    LACTIC ACID    Collection Time: 01/14/22  8:39 PM   Result Value Ref Range    Lactic acid 1.1 0.4 - 2.0 MMOL/L   NT-PRO BNP    Collection Time: 01/14/22  8:39 PM   Result Value Ref Range    NT pro-BNP 18,243 (H) 5 - 125 PG/ML   C REACTIVE PROTEIN, QT    Collection Time: 01/14/22  8:39 PM   Result Value Ref Range    C-Reactive protein 17.7 (H) 0.0 - 0.9 mg/dL   GLUCOSE, POC    Collection Time: 01/14/22  9:05 PM   Result Value Ref Range    Glucose (POC) 305 (H) 65 - 100 mg/dL    Performed by Sara    CBC WITH AUTOMATED DIFF    Collection Time: 01/15/22  4:04 AM   Result Value Ref Range    WBC 5.5 4.3 - 11.1 K/uL    RBC 4.11 4.05 - 5.2 M/uL    HGB 10.5 (L) 11.7 - 15.4 g/dL    HCT 34.7 (L) 35.8 - 46.3 %    MCV 84.4 79.6 - 97.8 FL    MCH 25.5 (L) 26.1 - 32.9 PG    MCHC 30.3 (L) 31.4 - 35.0 g/dL    RDW 14.9 (H) 11.9 - 14.6 %    PLATELET 096 233 - 818 K/uL    MPV 9.8 9.4 - 12.3 FL    ABSOLUTE NRBC 0.00 0.0 - 0.2 K/uL    DF AUTOMATED      NEUTROPHILS 71 43 - 78 %    LYMPHOCYTES 17 13 - 44 %    MONOCYTES 11 4.0 - 12.0 %    EOSINOPHILS 0 (L) 0.5 - 7.8 %    BASOPHILS 0 0.0 - 2.0 %    IMMATURE GRANULOCYTES 1 0.0 - 5.0 %    ABS. NEUTROPHILS 3.9 1.7 - 8.2 K/UL    ABS. LYMPHOCYTES 1.0 0.5 - 4.6 K/UL    ABS. MONOCYTES 0.6 0.1 - 1.3 K/UL    ABS. EOSINOPHILS 0.0 0.0 - 0.8 K/UL    ABS. BASOPHILS 0.0 0.0 - 0.2 K/UL    ABS. IMM. GRANS. 0.1 0.0 - 0.5 K/UL   METABOLIC PANEL, COMPREHENSIVE    Collection Time: 01/15/22  4:04 AM   Result Value Ref Range    Sodium 139 136 - 145 mmol/L    Potassium 3.6 3.5 - 5.1 mmol/L    Chloride 108 (H) 98 - 107 mmol/L    CO2 22 21 - 32 mmol/L    Anion gap 9 7 - 16 mmol/L    Glucose 242 (H) 65 - 100 mg/dL    BUN 66 (H) 8 - 23 MG/DL    Creatinine 3.92 (H) 0.6 - 1.0 MG/DL    GFR est AA 14 (L) >60 ml/min/1.73m2    GFR est non-AA 12 (L) >60 ml/min/1.73m2    Calcium 7.6 (L) 8.3 - 10.4 MG/DL    Bilirubin, total 0.3 0.2 - 1.1 MG/DL    ALT (SGPT) 22 12 - 65 U/L    AST (SGOT) 56 (H) 15 - 37 U/L    Alk.  phosphatase 61 50 - 136 U/L    Protein, total 6.8 6.3 - 8.2 g/dL    Albumin 2.6 (L) 3.2 - 4.6 g/dL    Globulin 4.2 (H) 2.3 - 3.5 g/dL    A-G Ratio 0.6 (L) 1.2 - 3.5     D DIMER    Collection Time: 01/15/22  4:04 AM   Result Value Ref Range    D DIMER 3.20 (H) <0.56 ug/ml(FEU)   C REACTIVE PROTEIN, QT    Collection Time: 01/15/22  4:04 AM   Result Value Ref Range    C-Reactive protein 17.0 (H) 0.0 - 0.9 mg/dL   GLUCOSE, POC    Collection Time: 01/15/22  7:39 AM   Result Value Ref Range    Glucose (POC) 233 (H) 65 - 100 mg/dL    Performed by [employer]    GLUCOSE, POC    Collection Time: 01/15/22 11:13 AM   Result Value Ref Range    Glucose (POC) 225 (H) 65 - 100 mg/dL    Performed by [employer]    PROCALCITONIN    Collection Time: 01/15/22  1:24 PM   Result Value Ref Range    Procalcitonin 1.53 (H) 0.00 - 0.49 ng/mL   BLOOD GAS, ARTERIAL POC Collection Time: 01/15/22  2:21 PM   Result Value Ref Range    FIO2 (POC) 100 %    pH (POC) 7.36 7.35 - 7.45      pCO2 (POC) 35.0 35 - 45 MMHG    pO2 (POC) 54 (L) 75 - 100 MMHG    HCO3 (POC) 19.7 (L) 22 - 26 MMOL/L    sO2 (POC) 86.8 (L) 95 - 98 %    Base deficit (POC) 5.1 mmol/L    Allens test (POC) Positive      Site LEFT RADIAL      Specimen type (POC) ARTERIAL      Performed by Philly     Respiratory comment: 14/[id number]%    GLUCOSE, POC    Collection Time: 01/15/22  5:03 PM   Result Value Ref Range    Glucose (POC) 317 (H) 65 - 100 mg/dL    Performed by Carleen    GLUCOSE, POC    Collection Time: 01/15/22  8:54 PM   Result Value Ref Range    Glucose (POC) 370 (H) 65 - 100 mg/dL    Performed by Ambar    CBC W/O DIFF    Collection Time: 01/16/22  3:46 AM   Result Value Ref Range    WBC 8.8 4.3 - 11.1 K/uL    RBC 3.93 (L) 4.05 - 5.2 M/uL    HGB 10.1 (L) 11.7 - 15.4 g/dL    HCT 33.1 (L) 35.8 - 46.3 %    MCV 84.2 79.6 - 97.8 FL    MCH 25.7 (L) 26.1 - 32.9 PG    MCHC 30.5 (L) 31.4 - 35.0 g/dL    RDW 14.7 (H) 11.9 - 14.6 %    PLATELET 581 733 - 233 K/uL    MPV 9.8 9.4 - 12.3 FL    ABSOLUTE NRBC 0.00 0.0 - 0.2 K/uL   METABOLIC PANEL, BASIC    Collection Time: 01/16/22  3:46 AM   Result Value Ref Range    Sodium 139 136 - 145 mmol/L    Potassium 4.0 3.5 - 5.1 mmol/L    Chloride 107 98 - 107 mmol/L    CO2 22 21 - 32 mmol/L    Anion gap 10 7 - 16 mmol/L    Glucose 268 (H) 65 - 100 mg/dL    BUN 78 (H) 8 - 23 MG/DL    Creatinine 4.10 (H) 0.6 - 1.0 MG/DL    GFR est AA 14 (L) >60 ml/min/1.73m2    GFR est non-AA 11 (L) >60 ml/min/1.73m2    Calcium 7.9 (L) 8.3 - 10.4 MG/DL   D DIMER    Collection Time: 01/16/22  3:46 AM   Result Value Ref Range    D DIMER 3.64 (H) <0.56 ug/ml(FEU)   C REACTIVE PROTEIN, QT    Collection Time: 01/16/22  3:46 AM   Result Value Ref Range    C-Reactive protein 8.9 (H) 0.0 - 0.9 mg/dL   HEMOGLOBIN A1C WITH EAG    Collection Time: 01/16/22  3:46 AM   Result Value Ref Range Hemoglobin A1c 8.0 (H) 4.20 - 6.30 %    Est. average glucose 183 mg/dL       All Micro Results     Procedure Component Value Units Date/Time    COVID-19 RAPID TEST [[id number]]  (Abnormal) Collected: 01/14/22 0600    Order Status: Completed Specimen: Nasopharyngeal Updated: 01/14/22 0720     Specimen source NASAL        Comment: RAPID ONLY        COVID-19 rapid test Detected        Comment:      The specimen is POSITIVE for SARS-CoV-2, the novel coronavirus associated with COVID-19. This test has been authorized by the FDA under an Emergency Use Authorization (EUA) for use by authorized laboratories. Fact sheet for Healthcare Providers: [employer]  Fact sheet for Patients: [employer]       Methodology: Isothermal Nucleic Acid Amplification               Other Studies:  XR CHEST SNGL V    Result Date: 1/15/2022  PORTABLE CHEST, January 15, 2022 at 1325 hours CLINICAL HISTORY:  Hypoxemia with Covid . COMPARISON:  January 14, 2022. FINDINGS:  AP erect image demonstrates significant interval worsening of the bilateral inhomogeneous infiltrates with areas of consolidation at the left base and in the right upper lobe. No definite pleural fluid. The heart size is within normal limits without definite pneumothorax. There are overlying radiopaque support devices. SIGNIFICANT INTERVAL WORSENING OF BILATERAL INFILTRATES.       Current Meds:  Current Facility-Administered Medications   Medication Dose Route Frequency    insulin glargine (LANTUS) injection 10 Units  10 Units SubCUTAneous QHS    [Held by provider] furosemide (LASIX) injection 40 mg  40 mg IntraVENous BID    cefTRIAXone (ROCEPHIN) 1 g in 0.9% sodium chloride (MBP/ADV) 50 mL MBP  1 g IntraVENous Q24H    sodium chloride (NS) flush 5-40 mL  5-40 mL IntraVENous Q8H    sodium chloride (NS) flush 5-40 mL  5-40 mL IntraVENous PRN    acetaminophen (TYLENOL) tablet 650 mg  650 mg Oral Q6H PRN    Or    acetaminophen (TYLENOL) suppository 650 mg  650 mg Rectal Q6H PRN    polyethylene glycol (MIRALAX) packet 17 g  17 g Oral DAILY PRN    ondansetron (ZOFRAN ODT) tablet 4 mg  4 mg Oral Q8H PRN    Or    ondansetron (ZOFRAN) injection 4 mg  4 mg IntraVENous Q6H PRN    0.9% sodium chloride infusion  75 mL/hr IntraVENous CONTINUOUS    heparin (porcine) injection 5,000 Units  5,000 Units SubCUTAneous Q8H    guaiFENesin-dextromethorphan (ROBITUSSIN DM) 100-10 mg/5 mL syrup 5 mL  5 mL Oral Q4H PRN    insulin lispro (HUMALOG) injection 0-10 Units  0-10 Units SubCUTAneous AC&HS    albuterol (PROVENTIL HFA, VENTOLIN HFA, PROAIR HFA) inhaler 2 Puff  2 Puff Inhalation QID RT    atorvastatin (LIPITOR) tablet 20 mg  20 mg Oral QHS    budesonide-formoteroL (SYMBICORT) 160-4.5 mcg/actuation HFA inhaler 2 Puff  2 Puff Inhalation BID    busPIRone (BUSPAR) tablet 7.5 mg  7.5 mg Oral BID    cycloSPORINE (RESTASIS) 0.05 % ophthalmic emulsion 1 Drop (Patient Supplied)  1 Drop Both Eyes Q12H    DULoxetine (CYMBALTA) capsule 20 mg  20 mg Oral DAILY    lamoTRIgine (LaMICtal) tablet 150 mg  150 mg Oral QAM    levothyroxine (SYNTHROID) tablet 137 mcg  137 mcg Oral 6am    montelukast (SINGULAIR) tablet 10 mg  10 mg Oral DAILY    tiZANidine (ZANAFLEX) tablet 4 mg  4 mg Oral TID PRN    pregabalin (LYRICA) capsule 50 mg  50 mg Oral BID    topiramate (TOPAMAX) tablet 25 mg  25 mg Oral QHS    traZODone (DESYREL) tablet 50 mg  50 mg Oral QHS    dicyclomine (BENTYL) tablet 20 mg  20 mg Oral AC&HS    pantoprazole (PROTONIX) tablet 40 mg  40 mg Oral ACB&D    doxycycline (VIBRAMYCIN) capsule 100 mg  100 mg Oral Q12H    [START ON 1/19/2022] dexamethasone (DECADRON) 10 mg/mL injection 10 mg  10 mg IntraVENous Q24H    dexamethasone (DECADRON) 20 mg in 0.9% sodium chloride 50 mL IVPB  20 mg IntraVENous Q24H    hydrALAZINE (APRESOLINE) tablet 50 mg  50 mg Oral Q6H PRN    alcohol 62% (NOZIN) nasal  1 Ampule  1 Andrea Topical Q12H       Signed: Maribel Alejandro DO    Part of this note may have been written by using a voice dictation software. The note has been proof read but may still contain some grammatical/other typographical errors.

## 2022-01-16 NOTE — PROGRESS NOTES
Received bedside shift report from Margarita Ortiz RN. Pt lying in bed. No apparent distress. Respirations even and unlabored on on CPAP 100%. Instructed to call for assistance with needs, as they arise. Pt voiced understanding. Radha

## 2022-01-16 NOTE — PROGRESS NOTES
Met briefly with patient and spouse. They DO NOT want ICU or intubation and prefer to maximize aggressive care that can be given on the floor.  Will change code status to DNR    Nguyen Hatch MD

## 2022-01-16 NOTE — PROGRESS NOTES
[facility] Progress Note      Admission Date: 1/14/2022   Subjective:      Feels ok. Less short of breath.       Objective:     Physical Exam:      Visit Vitals  /77 (BP 1 Location: Right upper arm, BP Patient Position: At rest;Semi fowlers)   Pulse (!) 57   Temp 98.1 °F (36.7 °C)   Resp 18   Ht 5' 3\" (1.6 m)   Wt 86.2 kg (190 lb)   SpO2 98%   BMI 33.66 kg/m²      Gen: comfortable , NAD  HEENT: moist membranes  CV: S1, S2  Lungs: Coarseness bilaterally  Extem: no edema      Current Facility-Administered Medications   Medication Dose Route Frequency    albumin human 25% (BUMINATE) solution 25 g  25 g IntraVENous Q6H    insulin glargine (LANTUS) injection 15 Units  15 Units SubCUTAneous QHS    furosemide (LASIX) injection 40 mg  40 mg IntraVENous BID    cefTRIAXone (ROCEPHIN) 1 g in 0.9% sodium chloride (MBP/ADV) 50 mL MBP  1 g IntraVENous Q24H    sodium chloride (NS) flush 5-40 mL  5-40 mL IntraVENous Q8H    sodium chloride (NS) flush 5-40 mL  5-40 mL IntraVENous PRN    acetaminophen (TYLENOL) tablet 650 mg  650 mg Oral Q6H PRN    Or    acetaminophen (TYLENOL) suppository 650 mg  650 mg Rectal Q6H PRN    polyethylene glycol (MIRALAX) packet 17 g  17 g Oral DAILY PRN    ondansetron (ZOFRAN ODT) tablet 4 mg  4 mg Oral Q8H PRN    Or    ondansetron (ZOFRAN) injection 4 mg  4 mg IntraVENous Q6H PRN    0.9% sodium chloride infusion  75 mL/hr IntraVENous CONTINUOUS    heparin (porcine) injection 5,000 Units  5,000 Units SubCUTAneous Q8H    guaiFENesin-dextromethorphan (ROBITUSSIN DM) 100-10 mg/5 mL syrup 5 mL  5 mL Oral Q4H PRN    insulin lispro (HUMALOG) injection 0-10 Units  0-10 Units SubCUTAneous AC&HS    albuterol (PROVENTIL HFA, VENTOLIN HFA, PROAIR HFA) inhaler 2 Puff  2 Puff Inhalation QID RT    atorvastatin (LIPITOR) tablet 20 mg  20 mg Oral QHS    budesonide-formoteroL (SYMBICORT) 160-4.5 mcg/actuation HFA inhaler 2 Puff  2 Puff Inhalation BID    busPIRone (BUSPAR) tablet 7.5 mg 7.5 mg Oral BID    cycloSPORINE (RESTASIS) 0.05 % ophthalmic emulsion 1 Drop (Patient Supplied)  1 Drop Both Eyes Q12H    DULoxetine (CYMBALTA) capsule 20 mg  20 mg Oral DAILY    lamoTRIgine (LaMICtal) tablet 150 mg  150 mg Oral QAM    levothyroxine (SYNTHROID) tablet 137 mcg  137 mcg Oral 6am    montelukast (SINGULAIR) tablet 10 mg  10 mg Oral DAILY    tiZANidine (ZANAFLEX) tablet 4 mg  4 mg Oral TID PRN    pregabalin (LYRICA) capsule 50 mg  50 mg Oral BID    topiramate (TOPAMAX) tablet 25 mg  25 mg Oral QHS    traZODone (DESYREL) tablet 50 mg  50 mg Oral QHS    dicyclomine (BENTYL) tablet 20 mg  20 mg Oral AC&HS    pantoprazole (PROTONIX) tablet 40 mg  40 mg Oral ACB&D    doxycycline (VIBRAMYCIN) capsule 100 mg  100 mg Oral Q12H    [START ON 1/19/2022] dexamethasone (DECADRON) 10 mg/mL injection 10 mg  10 mg IntraVENous Q24H    dexamethasone (DECADRON) 20 mg in 0.9% sodium chloride 50 mL IVPB  20 mg IntraVENous Q24H    hydrALAZINE (APRESOLINE) tablet 50 mg  50 mg Oral Q6H PRN    alcohol 62% (NOZIN) nasal  1 Ampule  1 Ampule Topical Q12H            Data Review:     LABS:   Recent Results (from the past 12 hour(s))   CBC W/O DIFF    Collection Time: 01/16/22  3:46 AM   Result Value Ref Range    WBC 8.8 4.3 - 11.1 K/uL    RBC 3.93 (L) 4.05 - 5.2 M/uL    HGB 10.1 (L) 11.7 - 15.4 g/dL    HCT 33.1 (L) 35.8 - 46.3 %    MCV 84.2 79.6 - 97.8 FL    MCH 25.7 (L) 26.1 - 32.9 PG    MCHC 30.5 (L) 31.4 - 35.0 g/dL    RDW 14.7 (H) 11.9 - 14.6 %    PLATELET 560 441 - 602 K/uL    MPV 9.8 9.4 - 12.3 FL    ABSOLUTE NRBC 0.00 0.0 - 0.2 K/uL   METABOLIC PANEL, BASIC    Collection Time: 01/16/22  3:46 AM   Result Value Ref Range    Sodium 139 136 - 145 mmol/L    Potassium 4.0 3.5 - 5.1 mmol/L    Chloride 107 98 - 107 mmol/L    CO2 22 21 - 32 mmol/L    Anion gap 10 7 - 16 mmol/L    Glucose 268 (H) 65 - 100 mg/dL    BUN 78 (H) 8 - 23 MG/DL    Creatinine 4.10 (H) 0.6 - 1.0 MG/DL    GFR est AA 14 (L) >60 ml/min/1.73m2    GFR est non-AA 11 (L) >60 ml/min/1.73m2    Calcium 7.9 (L) 8.3 - 10.4 MG/DL   D DIMER    Collection Time: 01/16/22  3:46 AM   Result Value Ref Range    D DIMER 3.64 (H) <0.56 ug/ml(FEU)   C REACTIVE PROTEIN, QT    Collection Time: 01/16/22  3:46 AM   Result Value Ref Range    C-Reactive protein 8.9 (H) 0.0 - 0.9 mg/dL   HEMOGLOBIN A1C WITH EAG    Collection Time: 01/16/22  3:46 AM   Result Value Ref Range    Hemoglobin A1c 8.0 (H) 4.20 - 6.30 %    Est. average glucose 183 mg/dL   GLUCOSE, POC    Collection Time: 01/16/22  8:35 AM   Result Value Ref Range    Glucose (POC) 241 (H) 65 - 100 mg/dL    Performed by Rosi          Plan:     Principal Problem:    Acute hypoxemic respiratory failure due to COVID-19 [facility]) (1/14/2022)    Active Problems: Morbid obesity ([facility] [address] ()      Essential hypertension (9/20/2016)      Gastroesophageal reflux disease without esophagitis (9/20/2016)      OAB (overactive bladder) (9/20/2016)      Acquired hypothyroidism (11/14/2012)      Fibromyalgia ()      Overview: HX FIBROMYALGIA      Anxiety and depression ()      Type 2 diabetes mellitus with diabetic neuropathy, without long-term current use of insulin ([facility] [address] (1/12/2018)      [facility] (1/14/2022)      Acute kidney injury in the setting of COVID positivity. She is admitted with hypoxemia and COVID-pneumonia, treated with dexamethasone and Tocilizumab.     She has normal kidney baseline   SUSANNE- no improvement yet  - urine studies point to hypoperfusion- some question of pulmonary edema and got some diuretic.  - urine out is measured marginally    Can continue diuretic if helps resp status  - discussed dialysis indications.  Would hope if she needed with normal baseline, then would be temporary  - following status closely

## 2022-01-17 NOTE — PROGRESS NOTES
STEPAN JOSEPH:  Patient currently on 60/90 airvo today. Patient's discharge plan is unclear at this time. Patient to be reevaluated prior to discharge. Case Management will continue to follow.

## 2022-01-17 NOTE — PROGRESS NOTES
ACUTE PHYSICAL THERAPY GOALS:  (Developed with and agreed upon by patient and/or caregiver. )  LTG:  (1.)Ms. Carmona will move from supine to sit and sit to supine , scoot up and down and roll side to side in bed with MODIFIED INDEPENDENCE within 7 treatment day(s). (2.)Ms. Carmona will transfer from bed to chair and chair to bed with STAND BY ASSIST using the least restrictive device within 7 treatment day(s). (3.)Ms. Carmona will ambulate with STAND BY ASSIST for 100 feet with the least restrictive device within 7 treatment day(s). (4.)Ms. Carmona will participate in therapeutic activity/exercises x 25 minutes for increased activity tolerance with SpO2 above 90% within 7 treatment days.     ________________________________________________________________________________________________          PHYSICAL THERAPY ASSESSMENT: Initial Assessment and AM PT Treatment Day # 1      Geoff Humphrey is a 66 y.o. female   PRIMARY DIAGNOSIS: Acute hypoxemic respiratory failure due to COVID-19 [facility])  [facility] [U07.1, J96.01]  SUSANNE (acute kidney injury) ([facility] [N17.9]       Reason for Referral:    ICD-10: Treatment Diagnosis: Generalized Muscle Weakness (M62.81)  Difficulty in walking, Not elsewhere classified (R26.2)  INPATIENT: Payor: Azalea Clifton / Plan: Vanessa Kahn / Product Type: Managed Care Medicare /     ASSESSMENT:     REHAB RECOMMENDATIONS:   Recommendation to date pending progress:  Setting:   Short-term Rehab  Equipment:    To Be Determined     PRIOR LEVEL OF FUNCTION:  (Prior to Hospitalization) INITIAL/CURRENT LEVEL OF FUNCTION:  (Most Recently Demonstrated)   Bed Mobility:   Independent  Sit to Stand:   Modified Independent  Transfers:   Modified Independent  Gait/Mobility:   Modified Independent Bed Mobility:   Minimal Assistance  Sit to Stand:   Minimal Assistance  Transfers:   Not tested  Gait/Mobility:   min-modA ASSESSMENT:  Ms. Nancy Barrios presents to PT with [facility] AROM and decreased strength in B LEs. Pt also presented with fair+ sitting balance and fair-poor standing balance. Pt was on max Airvo support today and reported feeling anxious. She was given Meredith for bed mobility and STS transfers. Pt required min-modA for side steps at EOB. Pt desaturated after getting back in bed to 79% but recovered with rest and cuing for breathing techniques. Ms. Nancy Barrios could benefit from skilled PT as she is currently functioning below her baseline.         SUBJECTIVE:   Ms. Nancy Barrios states, \"I'm scared\"    SOCIAL HISTORY/LIVING ENVIRONMENT: Lives with spouse and PTA using SPC PRN for ambulation  Home Environment: Private residence  One/Two Story Residence: One story  Living Alone: No  Support Systems: Spouse/Significant Other  OBJECTIVE:     PAIN: VITAL SIGNS: LINES/DRAINS:   Pre Treatment: Pain Screen  Pain Scale 1: Numeric (0 - 10)  Pain Intensity 1: 0  Post Treatment: 0 Vital Signs  O2 Sat (%): 94 % Continuous Pulse Oximetry and Purewick  O2 Device: Heated,Hi flow nasal cannula     GROSS EVALUATION:  B LE Within Functional Limits Abnormal/ Functional Abnormal/ Non-Functional (see comments) Not Tested Comments:   AROM [x] [] [] []    PROM [] [] [] []    Strength [] [x] [] [] Observed generally decreased   Balance [] [] [x] [] Fair-poor standing   Posture [] [] [] []    Sensation [] [] [] []    Coordination [] [x] [] []    Tone [] [] [] []    Edema [] [] [] []    Activity Tolerance [] [] [x] [] desat to 79% with side steps    [] [] [] []      COGNITION/  PERCEPTION: Intact Impaired   (see comments) Comments:   Orientation [x] []    Vision [] []    Hearing [] []    Command Following [x] []    Safety Awareness [x] []     [] []      MOBILITY: I Mod I S SBA CGA Min Mod Max Total  NT x2 Comments:   Bed Mobility    Rolling [] [] [] [] [] [x] [] [] [] [] []    Supine to Sit [] [] [] [] [] [x] [] [] [] [] []    Scooting [] [] [] [] [] [] [] [] [] [] []    Sit to Supine [] [] [] [] [] [x] [] [] [] [] []    Transfers    Sit to Stand [] [] [] [] [] [x] [] [] [] [] []    Bed to Chair [] [] [] [] [] [] [] [] [] [] []    Stand to Sit [] [] [] [] [] [x] [] [] [] [] []    I=Independent, Mod I=Modified Independent, S=Supervision, SBA=Standby Assistance, CGA=Contact Guard Assistance,   Min=Minimal Assistance, Mod=Moderate Assistance, Max=Maximal Assistance, Total=Total Assistance, NT=Not Tested  GAIT: I Mod I S SBA CGA Min Mod Max Total  NT x2 Comments:   Level of Assistance [] [] [] [] [] [x] [x] [] [] [] []    Distance 2 ft    DME HHA    Gait Quality Decrease B LE step length/clearance    Weightbearing Status N/A     I=Independent, Mod I=Modified Independent, S=Supervision, SBA=Standby Assistance, CGA=Contact Guard Assistance,   Min=Minimal Assistance, Mod=Moderate Assistance, Max=Maximal Assistance, Total=Total Assistance, NT=Not Tested    [facility]       How much difficulty does the patient currently have. .. Unable A Lot A Little None   1. Turning over in bed (including adjusting bedclothes, sheets and blankets)? [] 1   [] 2   [x] 3   [] 4   2. Sitting down on and standing up from a chair with arms ( e.g., wheelchair, bedside commode, etc.)   [] 1   [] 2   [x] 3   [] 4   3. Moving from lying on back to sitting on the side of the bed? [] 1   [] 2   [x] 3   [] 4   How much help from another person does the patient currently need. .. Total A Lot A Little None   4. Moving to and from a bed to a chair (including a wheelchair)? [] 1   [x] 2   [] 3   [] 4   5. Need to walk in hospital room? [] 1   [x] 2   [] 3   [] 4   6. Climbing 3-5 steps with a railing? [x] 1   [] 2   [] 3   [] 4   © 2007, Trustees of [facility] [employer], under license to [employer].  All rights reserved     Score:  Initial: 14 Most Recent: X (Date: -- )    Interpretation of Tool:  Represents activities that are increasingly more difficult (i.e. Bed mobility, Transfers, Gait). PLAN:   FREQUENCY/DURATION: PT Plan of Care: 3 times/week for duration of hospital stay or until stated goals are met, whichever comes first.    PROBLEM LIST:   (Skilled intervention is medically necessary to address:)  1. Decreased ADL/Functional Activities  2. Decreased Activity Tolerance  3. Decreased Balance  4. Decreased Coordination  5. Decreased Gait Ability  6. Decreased Strength  7. Decreased Transfer Abilities   INTERVENTIONS PLANNED:   (Benefits and precautions of physical therapy have been discussed with the patient.)  1. Therapeutic Activity  2. Therapeutic Exercise/HEP  3. Neuromuscular Re-education  4. Gait Training  5. Manual Therapy  6. Education     TREATMENT:     EVALUATION: Moderate Complexity : (Untimed Charge)    TREATMENT:   ($$ Therapeutic Activity: 8-22 mins    )  Therapeutic Activity (14 Minutes): Therapeutic activity included Rolling, Supine to Sit, Sit to Supine, Transfer Training, Ambulation on level ground, Sitting balance  and Standing balance to improve functional Mobility, Strength and Activity tolerance.     TREATMENT GRID:  N/A    AFTER TREATMENT POSITION/PRECAUTIONS:  Chair, Needs within reach and RN notified    INTERDISCIPLINARY COLLABORATION:  RN/PCT and PT/PTA    TOTAL TREATMENT DURATION:  PT Patient Time In/Time Out  Time In: 1010  Time Out: Lesley Bright, PT, DPT

## 2022-01-17 NOTE — PROGRESS NOTES
Pt given one time norco tab for pain. Will monitor for any adverse reactions.        01/16/22 2310   Pain 1   Pain Scale 1 Numeric (0 - 10)   Pain Intensity 1 6   Patient Stated Pain Goal 0   Pain Location 1 Chest   Pain Orientation 1 Mid   Pain Description 1 Aching  (\"from cough\" per patient)   Pain Intervention(s) 1 Medication (see MAR)

## 2022-01-17 NOTE — PROGRESS NOTES
Pt resting quietly in bed. Respirations are even and unlabored on CPAP. No signs of distress noted or needs stated at this time. Call light in reach. Will continue to monitor.

## 2022-01-17 NOTE — PROGRESS NOTES
Sudheer Reillycomamol  Admission Date: 1/14/2022         Daily Progress Note: 1/17/2022    The patient's chart is reviewed and the patient is discussed with the staff. Background:     Background: 66year old unvaccinated female was admitted with cough, shortness of breath and decreased appetite. She tested + for COVID on 1/14. She is being treated with Decadron and Tocilizumab. She is also on Doxycycline. CRP is 16.9 and d dimer is 3.2. She is hypoxic and currently requiring bipap with FIO2 100%. She is undecided about intubation but a full code at present. She had SUSANNE on admission. She has been seen by nephrology who suspects hypoperfusion. She was initially on IV fluids but these have been stopped. Her fluid balance is + 100 mls. BNP measured 18,243 on admission. Her initial creatinine was 3.9 and it is now elevated. Nephrology is following and she has gotten some lasix. She has never smoked but there is a history of asthma which is stable. She has been tested for JESSICA (about 2 years ago) but no tx was recommended. Subjective:     Was able to take some steps with PT. Coughing up white colored secretions. Still short of breath. Wearing CPAP at night and wearing airvo during the day. FIO2 near 100% at present and she desaturates even with coughing.       Current Facility-Administered Medications   Medication Dose Route Frequency    amLODIPine (NORVASC) tablet 5 mg  5 mg Oral DAILY    insulin glargine (LANTUS) injection 15 Units  15 Units SubCUTAneous QHS    furosemide (LASIX) injection 40 mg  40 mg IntraVENous BID    cefTRIAXone (ROCEPHIN) 1 g in 0.9% sodium chloride (MBP/ADV) 50 mL MBP  1 g IntraVENous Q24H    sodium chloride (NS) flush 5-40 mL  5-40 mL IntraVENous Q8H    sodium chloride (NS) flush 5-40 mL  5-40 mL IntraVENous PRN    acetaminophen (TYLENOL) tablet 650 mg  650 mg Oral Q6H PRN    Or    acetaminophen (TYLENOL) suppository 650 mg  650 mg Rectal Q6H PRN  polyethylene glycol (MIRALAX) packet 17 g  17 g Oral DAILY PRN    ondansetron (ZOFRAN ODT) tablet 4 mg  4 mg Oral Q8H PRN    Or    ondansetron (ZOFRAN) injection 4 mg  4 mg IntraVENous Q6H PRN    [Held by provider] 0.9% sodium chloride infusion  75 mL/hr IntraVENous CONTINUOUS    heparin (porcine) injection 5,000 Units  5,000 Units SubCUTAneous Q8H    guaiFENesin-dextromethorphan (ROBITUSSIN DM) 100-10 mg/5 mL syrup 5 mL  5 mL Oral Q4H PRN    insulin lispro (HUMALOG) injection 0-10 Units  0-10 Units SubCUTAneous AC&HS    albuterol (PROVENTIL HFA, VENTOLIN HFA, PROAIR HFA) inhaler 2 Puff  2 Puff Inhalation QID RT    atorvastatin (LIPITOR) tablet 20 mg  20 mg Oral QHS    budesonide-formoteroL (SYMBICORT) 160-4.5 mcg/actuation HFA inhaler 2 Puff  2 Puff Inhalation BID    busPIRone (BUSPAR) tablet 7.5 mg  7.5 mg Oral BID    cycloSPORINE (RESTASIS) 0.05 % ophthalmic emulsion 1 Drop (Patient Supplied)  1 Drop Both Eyes Q12H    DULoxetine (CYMBALTA) capsule 20 mg  20 mg Oral DAILY    lamoTRIgine (LaMICtal) tablet 150 mg  150 mg Oral QAM    levothyroxine (SYNTHROID) tablet 137 mcg  137 mcg Oral 6am    montelukast (SINGULAIR) tablet 10 mg  10 mg Oral DAILY    tiZANidine (ZANAFLEX) tablet 4 mg  4 mg Oral TID PRN    pregabalin (LYRICA) capsule 50 mg  50 mg Oral BID    topiramate (TOPAMAX) tablet 25 mg  25 mg Oral QHS    traZODone (DESYREL) tablet 50 mg  50 mg Oral QHS    dicyclomine (BENTYL) tablet 20 mg  20 mg Oral AC&HS    pantoprazole (PROTONIX) tablet 40 mg  40 mg Oral ACB&D    doxycycline (VIBRAMYCIN) capsule 100 mg  100 mg Oral Q12H    [START ON 1/19/2022] dexamethasone (DECADRON) 10 mg/mL injection 10 mg  10 mg IntraVENous Q24H    dexamethasone (DECADRON) 20 mg in 0.9% sodium chloride 50 mL IVPB  20 mg IntraVENous Q24H    hydrALAZINE (APRESOLINE) tablet 50 mg  50 mg Oral Q6H PRN    alcohol 62% (NOZIN) nasal  1 Ampule  1 Ampule Topical Q12H     Review of Systems  Constitutional: negative for fever, chills, sweats  Cardiovascular: negative for chest pain, palpitations, syncope, edema  Gastrointestinal:  negative for dysphagia, reflux, vomiting, diarrhea, abdominal pain, or melena  Neurologic:  negative for focal weakness, numbness, headache    Objective:     Vitals:    01/17/22 0336 01/17/22 0727 01/17/22 0735 01/17/22 0737   BP:  (!) 176/58     Pulse:  (!) 53     Resp:  18     Temp:  97.6 °F (36.4 °C)     SpO2: 100% 100% 100% 93%   Weight:       Height:           Intake/Output Summary (Last 24 hours) at 1/17/2022 1120  Last data filed at 1/17/2022 0559  Gross per 24 hour   Intake    Output 1650 ml   Net -1650 ml     Physical Exam:   Constitution:  the patient is overweight and in no acute distress on Airvo 100 %   HEENT:  Sclera clear, pupils equal, oral mucosa moist  Respiratory: crackles from posterior. Coughing during exam  Cardiovascular:  RRR without M,G,R  Gastrointestinal: soft and non-tender; with positive bowel sounds. Musculoskeletal: warm without cyanosis. There is no lower extremity edema.   Skin:  no jaundice or rashes  Neurologic: no gross neuro deficits     Psychiatric:  calm    CXR: 1/16, 15:        LAB:  Recent Labs     01/17/22  0352 01/16/22  0346 01/15/22  1324 01/15/22  0404 01/14/22 2039   WBC 7.9 8.8  --  5.5  --    HGB 9.9* 10.1*  --  10.5*  --    HCT 32.4* 33.1*  --  34.7*  --     266  --  248  --    PCT  --   --  1.53*  --   --    LAC  --   --   --   --  1.1     Recent Labs     01/17/22  0352 01/16/22  0346 01/15/22  0404    139 139   K 3.5 4.0 3.6    107 108*   CO2 23 22 22   * 268* 242*   BUN 86* 78* 66*   CREA 3.80* 4.10* 3.92*   CA 8.1* 7.9* 7.6*   ALB  --   --  2.6*   AST  --   --  56*   ALT  --   --  22   AP  --   --  61     Recent Labs     01/17/22  0352 01/16/22  0346 01/15/22  0404 01/14/22 2039 01/14/22 2039   LAC  --   --   --   --  1.1   BNPNT  --   --   --   --  18,243*   CRP 4.3* 8.9* 17.0*   < > 17.7*    < > = values in this interval not displayed. Recent Labs     01/15/22  1421   PHI 7.36   PCO2I 35.0   PO2I 54*   HCO3I 19.7*     No results for input(s): DOUG, CULT in the last 72 hours. Assessment and Plan:  (Medical Decision Making)   Principal Problem:    Acute hypoxemic respiratory failure due to COVID-19 [facility]) (1/14/2022)  Continues to require high flow oxygen - near 100% FIO2 via airvo and using CPAP at night. Got a dose of lasix yesterday with diuresis of negative 1.6 liters. Tx with Rocephin/Doxycycline in case of bacterial pneumonia. + Decadron     Active Problems: Morbid obesity (HCC) ()    BMI almost 34. Contributes to the complexity of care      Essential hypertension (9/20/2016)  Norvasc added by nephrology      Type 2 diabetes mellitus with diabetic neuropathy, without long-term current use of insulin ([facility] [address] (1/12/2018)    BS in the 200s. Hospitalist managing. SUSANNE (acute kidney injury) ([facility] [address] (1/14/2022)   nephrology following. Creatinine still elevated but no worse after lasix yesterday. 1.6 liters out with lasix but no real improvement in oxygenation. BNP 18,243 on 1/14. More than 50% of the time documented was spent in face-to-face contact with the patient and in the care of the patient on the floor/unit where the patient is located. Noted DNR status    Princess Murdock NP     I have spoken with and examined the patient. I agree with the above assessment and plan as documented. Gen: alert, fatigued  Lungs: diminished  Heart:  RRR with no Murmur/Rubs/Gallops  Abd: soft  Ext: no edema    Still on 100%, but seems stable. Was in chair yesterday. Appears euvolemic. CRP improved. SUSANNE stable. CRP down to 4.3. On heparin 5K TID. Continue abx, Dexa. S/P toci. Continue supportive care, wean FiO2 as able. Continue CPAP QHS. DNR/DNI per wishes per ACP documents.      Itzel Arcos MD

## 2022-01-17 NOTE — PROGRESS NOTES
[facility] Progress Note      Admission Date: 1/14/2022   Follow up SUSANNE  Subjective:   Pt seen and examined in room, sitting up on edge of the bed with PT at bedside, remains on 100 % AirVo, SOB a bit better, good uop.      ROS:  General: no fever/chills  CV: no CP  Lung: + SOB, + cough  GI: no N/V/D  : no dysuria  Ext: no edema         Objective:     Physical Exam:      Visit Vitals  BP (!) 176/58 (BP 1 Location: Left upper arm, BP Patient Position: Supine)   Pulse (!) 53   Temp 97.6 °F (36.4 °C)   Resp 18   Ht 5' 3\" (1.6 m)   Wt 86.2 kg (190 lb)   SpO2 93%   BMI 33.66 kg/m²      Gen: alert and oriented, NAD  HEENT: moist membranes  CV: regular rate, S1, S2, no Rub   Lungs: Coarseness bilaterally  Abd: soft, non tender, + BS  Extem: no edema      Current Facility-Administered Medications   Medication Dose Route Frequency    insulin glargine (LANTUS) injection 15 Units  15 Units SubCUTAneous QHS    furosemide (LASIX) injection 40 mg  40 mg IntraVENous BID    cefTRIAXone (ROCEPHIN) 1 g in 0.9% sodium chloride (MBP/ADV) 50 mL MBP  1 g IntraVENous Q24H    sodium chloride (NS) flush 5-40 mL  5-40 mL IntraVENous Q8H    sodium chloride (NS) flush 5-40 mL  5-40 mL IntraVENous PRN    acetaminophen (TYLENOL) tablet 650 mg  650 mg Oral Q6H PRN    Or    acetaminophen (TYLENOL) suppository 650 mg  650 mg Rectal Q6H PRN    polyethylene glycol (MIRALAX) packet 17 g  17 g Oral DAILY PRN    ondansetron (ZOFRAN ODT) tablet 4 mg  4 mg Oral Q8H PRN    Or    ondansetron (ZOFRAN) injection 4 mg  4 mg IntraVENous Q6H PRN    [Held by provider] 0.9% sodium chloride infusion  75 mL/hr IntraVENous CONTINUOUS    heparin (porcine) injection 5,000 Units  5,000 Units SubCUTAneous Q8H    guaiFENesin-dextromethorphan (ROBITUSSIN DM) 100-10 mg/5 mL syrup 5 mL  5 mL Oral Q4H PRN    insulin lispro (HUMALOG) injection 0-10 Units  0-10 Units SubCUTAneous AC&HS    albuterol (PROVENTIL HFA, VENTOLIN HFA, PROAIR HFA) inhaler 2 Puff  2 Puff Inhalation QID RT    atorvastatin (LIPITOR) tablet 20 mg  20 mg Oral QHS    budesonide-formoteroL (SYMBICORT) 160-4.5 mcg/actuation HFA inhaler 2 Puff  2 Puff Inhalation BID    busPIRone (BUSPAR) tablet 7.5 mg  7.5 mg Oral BID    cycloSPORINE (RESTASIS) 0.05 % ophthalmic emulsion 1 Drop (Patient Supplied)  1 Drop Both Eyes Q12H    DULoxetine (CYMBALTA) capsule 20 mg  20 mg Oral DAILY    lamoTRIgine (LaMICtal) tablet 150 mg  150 mg Oral QAM    levothyroxine (SYNTHROID) tablet 137 mcg  137 mcg Oral 6am    montelukast (SINGULAIR) tablet 10 mg  10 mg Oral DAILY    tiZANidine (ZANAFLEX) tablet 4 mg  4 mg Oral TID PRN    pregabalin (LYRICA) capsule 50 mg  50 mg Oral BID    topiramate (TOPAMAX) tablet 25 mg  25 mg Oral QHS    traZODone (DESYREL) tablet 50 mg  50 mg Oral QHS    dicyclomine (BENTYL) tablet 20 mg  20 mg Oral AC&HS    pantoprazole (PROTONIX) tablet 40 mg  40 mg Oral ACB&D    doxycycline (VIBRAMYCIN) capsule 100 mg  100 mg Oral Q12H    [START ON 1/19/2022] dexamethasone (DECADRON) 10 mg/mL injection 10 mg  10 mg IntraVENous Q24H    dexamethasone (DECADRON) 20 mg in 0.9% sodium chloride 50 mL IVPB  20 mg IntraVENous Q24H    hydrALAZINE (APRESOLINE) tablet 50 mg  50 mg Oral Q6H PRN    alcohol 62% (NOZIN) nasal  1 Ampule  1 Ampule Topical Q12H            Data Review:     LABS:   Recent Results (from the past 12 hour(s))   CBC W/O DIFF    Collection Time: 01/17/22  3:52 AM   Result Value Ref Range    WBC 7.9 4.3 - 11.1 K/uL    RBC 3.85 (L) 4.05 - 5.2 M/uL    HGB 9.9 (L) 11.7 - 15.4 g/dL    HCT 32.4 (L) 35.8 - 46.3 %    MCV 84.2 79.6 - 97.8 FL    MCH 25.7 (L) 26.1 - 32.9 PG    MCHC 30.6 (L) 31.4 - 35.0 g/dL    RDW 14.7 (H) 11.9 - 14.6 %    PLATELET 896 086 - 293 K/uL    MPV 9.5 9.4 - 12.3 FL    ABSOLUTE NRBC 0.00 0.0 - 0.2 K/uL   METABOLIC PANEL, BASIC    Collection Time: 01/17/22  3:52 AM   Result Value Ref Range    Sodium 138 136 - 145 mmol/L    Potassium 3.5 3.5 - 5.1 mmol/L    Chloride 104 98 - 107 mmol/L    CO2 23 21 - 32 mmol/L    Anion gap 11 7 - 16 mmol/L    Glucose 208 (H) 65 - 100 mg/dL    BUN 86 (H) 8 - 23 MG/DL    Creatinine 3.80 (H) 0.6 - 1.0 MG/DL    GFR est AA 15 (L) >60 ml/min/1.73m2    GFR est non-AA 12 (L) >60 ml/min/1.73m2    Calcium 8.1 (L) 8.3 - 10.4 MG/DL   D DIMER    Collection Time: 01/17/22  3:52 AM   Result Value Ref Range    D DIMER 2.14 (H) <0.56 ug/ml(FEU)   C REACTIVE PROTEIN, QT    Collection Time: 01/17/22  3:52 AM   Result Value Ref Range    C-Reactive protein 4.3 (H) 0.0 - 0.9 mg/dL   GLUCOSE, POC    Collection Time: 01/17/22  7:28 AM   Result Value Ref Range    Glucose (POC) 216 (H) 65 - 100 mg/dL    Performed by Tierra          Plan:     Principal Problem:    Acute hypoxemic respiratory failure due to COVID-19 [facility]) (1/14/2022)    Active Problems: Morbid obesity ([facility] ()      Essential hypertension (9/20/2016)      Gastroesophageal reflux disease without esophagitis (9/20/2016)      OAB (overactive bladder) (9/20/2016)      Acquired hypothyroidism (11/14/2012)      Fibromyalgia ()      Overview: HX FIBROMYALGIA      Anxiety and depression ()      Type 2 diabetes mellitus with diabetic neuropathy, without long-term current use of insulin ([facility] (1/12/2018)      [facility] ([facility] kidney [facility] (1/14/2022)      Acute kidney injury in the setting of COVID positivity. She is admitted with hypoxemia and COVID-pneumonia, treated with dexamethasone and Tocilizumab.   She has normal kidney baseline   - urine studies point to hypoperfusion- some question of pulmonary edema and got some diuretic.   SUSANNE- improving, Cr down to 3.80, non oliguric, 1650 cc uop recorded last 24 hrs     COVID 19, acute respiratory failure with hypoxemia on 100% AivVo  On lasix     Anemia stable- transfuse hgb < 7.0    Hypertension add norvasc

## 2022-01-17 NOTE — ACP (ADVANCE CARE PLANNING)
Advance Care Planning     General Advance Care Planning (ACP) Conversation      Date of Conversation: 1/14/2022  Conducted with: Patient with Decision Making Capacity    Healthcare Decision Maker:     Primary Decision Maker: Moshe Bill - Spouse - [phone]  Click here to complete [address] including selection of the Healthcare Decision Maker Relationship (ie \"Primary\")      Today we documented Decision Maker(s) consistent with Legal Next of Kin hierarchy. Content/Action Overview:    Has ACP document(s) on file - reflects the patient's care preferences  Reviewed DNR/DNI and patient elects DNR order - referred to ACP Clinical Specialist & placed order  Topics discussed: ventilation preferences and resuscitation preferences       Length of Voluntary ACP Conversation in minutes:  <16 minutes (Non-Billable)    Nikolas Myers RN

## 2022-01-17 NOTE — PROGRESS NOTES
Pt resting quietly in bed. Respirations are even and unlabored. No signs of distress noted or needs stated at this time. Call light in reach. Will continue to monitor. SBAR to be given to oncoming nurse.

## 2022-01-17 NOTE — PROGRESS NOTES
Hospitalist Progress Note   Admit Date:  2022  5:25 AM   Name:  Antonio Carmona   Age:  66 y.o. Sex:  female  :  1950   MRN:  [id number]   Room:      Presenting Complaint: Altered mental status    Reason(s) for Admission: Acute hypoxemic respiratory failure due to COVID-19 ([facility] [U07.1, J96.01]  SUSANNE (acute kidney injury) [facility]) [N17.9]     Hospital Course & Interval History:   66 y.o. female with medical history of anxiety, depression, fibromyalgia, GERD, obesity with BMI of 33, who presented to the ER because of cough and shortness of breath with decrease po intake for 5 days. EMS was called patient was very confused and short of breath. When EMS arrived patient was found to be hypoxic. She was placed on CPAP and transported to the ER.     ER course: Patient was afebrile. Initial oxygen saturation was 81% on nasal cannula. Arterial blood gas was done which showed pH of 7.4, PCO2 23, PO2 46, bicarbonate 16, oxygen saturation 85% on 10L HFNC. Patient was placed on BiPAP and subsequently transitioned to Airvo. Chest x-ray shows hazy bilateral lung opacities likely related to pulmonary edema/pneumonia. Cardiomegaly. COVID test positive. CMP significant for Cr of 3.74. CRP is 16.9. Patient admitted for further evaluation management of acute hypoxemic respiratory failure secondary to COVID-19 pneumonia, acute kidney injury. Subjective (22): Patient continues to wear CPAP at nighttime and Airvo during the day, on 60L/92%. Creatinine slightly improved today. Patient stated that she overall is feeling a little better compared to yesterday and on admission. Denies chest pain, fever, chills. Assessment & Plan:     Acute hypoxemic respiratory failure due to COVID-19  Superimposed bacterial pneumonia  Symptom onset 5 days, tested positive . In ED, O2 sat 85% on 10L HFNC. ABG shows  pH of 7.4, PCO2 23, PO2 46, Bicarb 16.  CXR shows hazy bilateral lung opacities likely related to pulmonary edema/pneumonia; Cardiomegaly. ProBNP 86981. CRP 16.9. D-Dimer 3.2. procal>1  Dexamethasone for 10 days, 20mg IV daily for 5 days then 10mg daily for 5 days EOT 1/23  Not a candidate for Baricitinib d/t kidney function  Abx: Rocephin/Doxycycline (1/15-. ..)  Added IS and albuterol prn  Cont Symbicort  Prone as tolerated  CXR on 1/16 shows interval improvement in of lungs clear improved alveolar edema; persistent airspace disease suspicious for pneumonia with persistent interstitial edema. Cont Lasix IV BID. Monitor I&O's, daily weights  Pulmonary team on board, appreciate recs  Cont Airvo 60L/92%, wean as tolerated  CXR in AM    SUSANNE  ? Prerenal.  Creatinine of 3.7 on admission (2 months ago was 0.98). Renal US shows no hydronephrosis; 2.9 cm L parapelvic cyst and minimal layering debris w/in bladder  Avoid nephrotoxic meds  Accurate I&O's  Holding home Losartan/Hctz  Nephrology consulted, appreciate recs  Cont Lasix IV, monitor renal function closely    Morbid obesity  Adds to complexity of care  Has not been diagnosed with JESSICA, does not wear CPAP at home    Essential hypertension  Hold losartan hydrochlorothiazide due to SUSANNE  Hydralazine as needed    GERD  PPI    OAB  Cont Oxybutynin    Acquired hypothyroidism  Cont home Synthroid    Fibromyalgia  Cont home Cymbalta, Topamax    Anxiety and depression  Cont home Cymbalta, Trazodone, Pregabalin, Buspar, Lamictal    Type 2 diabetes mellitus  Holding home Metformin, Farxiga  SSI and lantus, adjust doses as appropriate  hA1C 8.0        Dispo/Discharge Planning:      >2 days. PT/OT    I spent 36 minutes of time caring for this patient at bedside or nearby on the unit, more than 50 percent of which was spent on coordination of care and/or counseling regarding the disease process, treatment options, and treatment plan.           Diet:  ADULT DIET Regular; 3 carb choices (45 gm/meal)  DVT PPx: Heparin SQ  Code status: DNR    Hospital Problems as of 1/17/2022 Date Reviewed: 6/10/2021          Codes Class Noted - Resolved POA    SUSANNE (acute kidney injury) ([facility] ICD-10-CM: N17.9  ICD-9-CM: 584.9  1/14/2022 - Present Yes        * (Principal) Acute hypoxemic respiratory failure due to COVID-19 [facility]) ICD-10-CM: U07.1, J96.01  ICD-9-CM: 518.81, 079.89, 799.02  1/14/2022 - Present Yes        Type 2 diabetes mellitus with diabetic neuropathy, without long-term current use of insulin (HCC) (Chronic) ICD-10-CM: E11.40  ICD-9-CM: 250.60, 357.2  1/12/2018 - Present Yes        Fibromyalgia (Chronic) ICD-10-CM: M79.7  ICD-9-CM: 729.1  Unknown - Present Yes    Overview Signed 4/12/2017  9:40 AM by Holden JENSEN FIBROMYALGIA             Anxiety and depression (Chronic) ICD-10-CM: F41.9, F32. A  ICD-9-CM: 300.00, 311  Unknown - Present Yes        Essential hypertension (Chronic) ICD-10-CM: I10  ICD-9-CM: 401.9  9/20/2016 - Present Yes        Gastroesophageal reflux disease without esophagitis (Chronic) ICD-10-CM: K21.9  ICD-9-CM: 530.81  9/20/2016 - Present Yes        OAB (overactive bladder) (Chronic) ICD-10-CM: N32.81  ICD-9-CM: 596.51  9/20/2016 - Present Yes        Morbid obesity ([facility] (Chronic) ICD-10-CM: E66.01  ICD-9-CM: 278.01  Unknown - Present Yes        Acquired hypothyroidism (Chronic) ICD-10-CM: E03.9  ICD-9-CM: 244.9  11/14/2012 - Present Yes              Objective:     Patient Vitals for the past 24 hrs:   Temp Pulse Resp BP SpO2   01/17/22 1153     91 %   01/17/22 1124 97.9 °F (36.6 °C) 71 19 (!) 141/56 90 %   01/17/22 0737     93 %   01/17/22 0735     100 %   01/17/22 0727 97.6 °F (36.4 °C) (!) 53 18 (!) 176/58 100 %   01/17/22 0336     100 %   01/17/22 0322 97.6 °F (36.4 °C) (!) 57 23 (!) 149/55 100 %   01/16/22 2350 97.3 °F (36.3 °C) 62 21 (!) 156/58 100 %   01/16/22 2332     99 %   01/16/22 2119     100 %   01/16/22 1956     100 %   01/16/22 1939 97.7 °F (36.5 °C) (!) 59 23 (!) 143/65 98 %   01/16/22 1542 98.4 °F (36.9 °C) 74 18 (!) 149/71 91 %   01/16/22 1521     90 %     Oxygen Therapy  O2 Sat (%): 91 % (01/17/22 1153)  Pulse via Oximetry: 73 beats per minute (01/17/22 1153)  O2 Device: Heated; Hi flow nasal cannula (01/17/22 1153)  Skin Assessment: Clean, dry, & intact (01/15/22 0813)  Skin Protection for O2 Device: No (01/15/22 0327)  O2 Flow Rate (L/min): 60 l/min (01/17/22 1153)  O2 Temperature: 87.8 °F (31 °C) (01/17/22 1153)  FIO2 (%): 92 % (01/17/22 1153)    Estimated body mass index is 33.66 kg/m² as calculated from the following:    Height as of this encounter: 5' 3\" (1.6 m). Weight as of this encounter: 86.2 kg (190 lb). Intake/Output Summary (Last 24 hours) at 1/17/2022 1230  Last data filed at 1/17/2022 0830  Gross per 24 hour   Intake 100 ml   Output 1650 ml   Net -1550 ml         Physical Exam:     Blood pressure (!) 141/56, pulse 71, temperature 97.9 °F (36.6 °C), resp. rate 19, height 5' 3\" (1.6 m), weight 86.2 kg (190 lb), SpO2 91 %. General:    Alert and oriented x4. Ill appearing. Head:  Normocephalic, atraumatic  Eyes:  Sclerae appear normal.  Pupils equally round. ENT:  Nares appear normal, no drainage. Moist oral mucosa  Neck:  No restricted ROM. Trachea midline   CV:   RRR. No m/r/g. No jugular venous distension. Lungs:   Coarse lung sounds at bases. Breathing non-labored   Abdomen: Bowel sounds present. Soft, nontender, nondistended. Extremities: No cyanosis or clubbing. No edema  Skin:     No rashes and normal coloration. Warm and dry. Neuro:  CN II-XII grossly intact. Sensation intact. A&Ox3  Psych:  Normal mood and affect.       I have reviewed ordered lab tests and independently visualized imaging below:    Recent Labs:  Recent Results (from the past 48 hour(s))   PROCALCITONIN    Collection Time: 01/15/22  1:24 PM   Result Value Ref Range    Procalcitonin 1.53 (H) 0.00 - 0.49 ng/mL   BLOOD GAS, ARTERIAL POC    Collection Time: 01/15/22  2:21 PM   Result Value Ref Range FIO2 (POC) 100 %    pH (POC) 7.36 7.35 - 7.45      pCO2 (POC) 35.0 35 - 45 MMHG    pO2 (POC) 54 (L) 75 - 100 MMHG    HCO3 (POC) 19.7 (L) 22 - 26 MMOL/L    sO2 (POC) 86.8 (L) 95 - 98 %    Base deficit (POC) 5.1 mmol/L    Allens test (POC) Positive      Site LEFT RADIAL      Specimen type (POC) ARTERIAL      Performed by Philly     Respiratory comment: 14/[id number]%    GLUCOSE, POC    Collection Time: 01/15/22  5:03 PM   Result Value Ref Range    Glucose (POC) 317 (H) 65 - 100 mg/dL    Performed by Carleen    GLUCOSE, POC    Collection Time: 01/15/22  8:54 PM   Result Value Ref Range    Glucose (POC) 370 (H) 65 - 100 mg/dL    Performed by Ambar    CBC W/O DIFF    Collection Time: 01/16/22  3:46 AM   Result Value Ref Range    WBC 8.8 4.3 - 11.1 K/uL    RBC 3.93 (L) 4.05 - 5.2 M/uL    HGB 10.1 (L) 11.7 - 15.4 g/dL    HCT 33.1 (L) 35.8 - 46.3 %    MCV 84.2 79.6 - 97.8 FL    MCH 25.7 (L) 26.1 - 32.9 PG    MCHC 30.5 (L) 31.4 - 35.0 g/dL    RDW 14.7 (H) 11.9 - 14.6 %    PLATELET 773 546 - 526 K/uL    MPV 9.8 9.4 - 12.3 FL    ABSOLUTE NRBC 0.00 0.0 - 0.2 K/uL   METABOLIC PANEL, BASIC    Collection Time: 01/16/22  3:46 AM   Result Value Ref Range    Sodium 139 136 - 145 mmol/L    Potassium 4.0 3.5 - 5.1 mmol/L    Chloride 107 98 - 107 mmol/L    CO2 22 21 - 32 mmol/L    Anion gap 10 7 - 16 mmol/L    Glucose 268 (H) 65 - 100 mg/dL    BUN 78 (H) 8 - 23 MG/DL    Creatinine 4.10 (H) 0.6 - 1.0 MG/DL    GFR est AA 14 (L) >60 ml/min/1.73m2    GFR est non-AA 11 (L) >60 ml/min/1.73m2    Calcium 7.9 (L) 8.3 - 10.4 MG/DL   D DIMER    Collection Time: 01/16/22  3:46 AM   Result Value Ref Range    D DIMER 3.64 (H) <0.56 ug/ml(FEU)   C REACTIVE PROTEIN, QT    Collection Time: 01/16/22  3:46 AM   Result Value Ref Range    C-Reactive protein 8.9 (H) 0.0 - 0.9 mg/dL   HEMOGLOBIN A1C WITH EAG    Collection Time: 01/16/22  3:46 AM   Result Value Ref Range    Hemoglobin A1c 8.0 (H) 4.20 - 6.30 %    Est. average glucose 183 mg/dL   GLUCOSE, POC    Collection Time: 01/16/22  8:35 AM   Result Value Ref Range    Glucose (POC) 241 (H) 65 - 100 mg/dL    Performed by Rosi    GLUCOSE, POC    Collection Time: 01/16/22 11:17 AM   Result Value Ref Range    Glucose (POC) 261 (H) 65 - 100 mg/dL    Performed by Carleen    GLUCOSE, POC    Collection Time: 01/16/22  4:58 PM   Result Value Ref Range    Glucose (POC) 285 (H) 65 - 100 mg/dL    Performed by Carleen    GLUCOSE, POC    Collection Time: 01/16/22  9:30 PM   Result Value Ref Range    Glucose (POC) 248 (H) 65 - 100 mg/dL    Performed by Porsha    CBC W/O DIFF    Collection Time: 01/17/22  3:52 AM   Result Value Ref Range    WBC 7.9 4.3 - 11.1 K/uL    RBC 3.85 (L) 4.05 - 5.2 M/uL    HGB 9.9 (L) 11.7 - 15.4 g/dL    HCT 32.4 (L) 35.8 - 46.3 %    MCV 84.2 79.6 - 97.8 FL    MCH 25.7 (L) 26.1 - 32.9 PG    MCHC 30.6 (L) 31.4 - 35.0 g/dL    RDW 14.7 (H) 11.9 - 14.6 %    PLATELET 478 448 - 291 K/uL    MPV 9.5 9.4 - 12.3 FL    ABSOLUTE NRBC 0.00 0.0 - 0.2 K/uL   METABOLIC PANEL, BASIC    Collection Time: 01/17/22  3:52 AM   Result Value Ref Range    Sodium 138 136 - 145 mmol/L    Potassium 3.5 3.5 - 5.1 mmol/L    Chloride 104 98 - 107 mmol/L    CO2 23 21 - 32 mmol/L    Anion gap 11 7 - 16 mmol/L    Glucose 208 (H) 65 - 100 mg/dL    BUN 86 (H) 8 - 23 MG/DL    Creatinine 3.80 (H) 0.6 - 1.0 MG/DL    GFR est AA 15 (L) >60 ml/min/1.73m2    GFR est non-AA 12 (L) >60 ml/min/1.73m2    Calcium 8.1 (L) 8.3 - 10.4 MG/DL   D DIMER    Collection Time: 01/17/22  3:52 AM   Result Value Ref Range    D DIMER 2.14 (H) <0.56 ug/ml(FEU)   C REACTIVE PROTEIN, QT    Collection Time: 01/17/22  3:52 AM   Result Value Ref Range    C-Reactive protein 4.3 (H) 0.0 - 0.9 mg/dL   GLUCOSE, POC    Collection Time: 01/17/22  7:28 AM   Result Value Ref Range    Glucose (POC) 216 (H) 65 - 100 mg/dL    Performed by Tierra    GLUCOSE, POC    Collection Time: 01/17/22 11:25 AM Result Value Ref Range    Glucose (POC) 234 (H) 65 - 100 mg/dL    Performed by Tierra        All Micro Results     Procedure Component Value Units Date/Time    COVID-19 RAPID TEST [[id number]]  (Abnormal) Collected: 01/14/22 0600    Order Status: Completed Specimen: Nasopharyngeal Updated: 01/14/22 0720     Specimen source NASAL        Comment: RAPID ONLY        COVID-19 rapid test Detected        Comment:      The specimen is POSITIVE for SARS-CoV-2, the novel coronavirus associated with COVID-19. This test has been authorized by the FDA under an Emergency Use Authorization (EUA) for use by authorized laboratories. Fact sheet for Healthcare Providers: [employer]  Fact sheet for Patients: [employer]       Methodology: Isothermal Nucleic Acid Amplification               Other Studies:  No results found.     Current Meds:  Current Facility-Administered Medications   Medication Dose Route Frequency    amLODIPine (NORVASC) tablet 5 mg  5 mg Oral DAILY    insulin glargine (LANTUS) injection 15 Units  15 Units SubCUTAneous QHS    furosemide (LASIX) injection 40 mg  40 mg IntraVENous BID    cefTRIAXone (ROCEPHIN) 1 g in 0.9% sodium chloride (MBP/ADV) 50 mL MBP  1 g IntraVENous Q24H    sodium chloride (NS) flush 5-40 mL  5-40 mL IntraVENous Q8H    sodium chloride (NS) flush 5-40 mL  5-40 mL IntraVENous PRN    acetaminophen (TYLENOL) tablet 650 mg  650 mg Oral Q6H PRN    Or    acetaminophen (TYLENOL) suppository 650 mg  650 mg Rectal Q6H PRN    polyethylene glycol (MIRALAX) packet 17 g  17 g Oral DAILY PRN    ondansetron (ZOFRAN ODT) tablet 4 mg  4 mg Oral Q8H PRN    Or    ondansetron (ZOFRAN) injection 4 mg  4 mg IntraVENous Q6H PRN    [Held by provider] 0.9% sodium chloride infusion  75 mL/hr IntraVENous CONTINUOUS    heparin (porcine) injection 5,000 Units  5,000 Units SubCUTAneous Q8H    guaiFENesin-dextromethorphan (ROBITUSSIN DM) 100-10 mg/5 mL syrup 5 mL  5 mL Oral Q4H PRN    insulin lispro (HUMALOG) injection 0-10 Units  0-10 Units SubCUTAneous AC&HS    albuterol (PROVENTIL HFA, VENTOLIN HFA, PROAIR HFA) inhaler 2 Puff  2 Puff Inhalation QID RT    atorvastatin (LIPITOR) tablet 20 mg  20 mg Oral QHS    budesonide-formoteroL (SYMBICORT) 160-4.5 mcg/actuation HFA inhaler 2 Puff  2 Puff Inhalation BID    busPIRone (BUSPAR) tablet 7.5 mg  7.5 mg Oral BID    cycloSPORINE (RESTASIS) 0.05 % ophthalmic emulsion 1 Drop (Patient Supplied)  1 Drop Both Eyes Q12H    DULoxetine (CYMBALTA) capsule 20 mg  20 mg Oral DAILY    lamoTRIgine (LaMICtal) tablet 150 mg  150 mg Oral QAM    levothyroxine (SYNTHROID) tablet 137 mcg  137 mcg Oral 6am    montelukast (SINGULAIR) tablet 10 mg  10 mg Oral DAILY    tiZANidine (ZANAFLEX) tablet 4 mg  4 mg Oral TID PRN    pregabalin (LYRICA) capsule 50 mg  50 mg Oral BID    topiramate (TOPAMAX) tablet 25 mg  25 mg Oral QHS    traZODone (DESYREL) tablet 50 mg  50 mg Oral QHS    dicyclomine (BENTYL) tablet 20 mg  20 mg Oral AC&HS    pantoprazole (PROTONIX) tablet 40 mg  40 mg Oral ACB&D    doxycycline (VIBRAMYCIN) capsule 100 mg  100 mg Oral Q12H    [START ON 1/19/2022] dexamethasone (DECADRON) 10 mg/mL injection 10 mg  10 mg IntraVENous Q24H    dexamethasone (DECADRON) 20 mg in 0.9% sodium chloride 50 mL IVPB  20 mg IntraVENous Q24H    hydrALAZINE (APRESOLINE) tablet 50 mg  50 mg Oral Q6H PRN    alcohol 62% (NOZIN) nasal  1 Ampule  1 Ampule Topical Q12H       Signed: Yomi Wood DO    Part of this note may have been written by using a voice dictation software. The note has been proof read but may still contain some grammatical/other typographical errors.

## 2022-01-17 NOTE — PROGRESS NOTES
01/17/22 0735 01/17/22 0737   Oxygen Therapy   O2 Sat (%) 100 % 93 %   Pulse via Oximetry 45 beats per minute 59 beats per minute   O2 Device CPAP mask  (changed from CPAP to Airvo) Heated; Hi flow nasal cannula   O2 Flow Rate (L/min)  --  60 l/min   O2 Temperature  --  87.8 °F (31 °C)   FIO2 (%) 100 % 92 %

## 2022-01-17 NOTE — PROGRESS NOTES
Pt given PRN acetaminophen 650mg for pain.        01/17/22 0539   Pain 1   Pain Scale 1 Numeric (0 - 10)   Pain Intensity 1 3   Patient Stated Pain Goal 0   Pain Location 1 Jaw  (Pt states from cpap)   Pain Description 1 Aching   Pain Intervention(s) 1 Medication (see MAR)

## 2022-01-18 NOTE — DIABETES MGMT
Patient admitted with acute hypoxemic respiratory failure due to COVID-19. Admitting blood glucose 253. HbA1c 8.0 (). Blood glucose ranged 208-372 yesterday with patient receiving Lantus 15 units, Humalog 26 units, and Decadron 20mg. Blood glucose this morning was 177. Most recent FSBS 215. Reviewed patient current regimen: Lantus 20 units nightly, Humalog correctional insulin, and Decadron 10mg daily. Noted patient Decadron dose weaned. Patient would likely benefit from a reduction in basal insulin to reduce risk of hypoglycemia, as steroid therapy is weaned patient insulin needs will likely decrease. Provider updated via [employer] regarding recommendations and patient glycemic control. New orders received to decrease Lantus to 0.2 units/kg weight based dosing. Creatinine 3.40. GFR 14. Per chart review patient on Airvo, will continue to follow along.

## 2022-01-18 NOTE — PROGRESS NOTES
Hospitalist Progress Note   Admit Date:  2022  5:25 AM   Name:  Connie Carmona   Age:  66 y.o. Sex:  female  :  1950   MRN:  [id number]   Room:      Presenting Complaint: Altered mental status    Reason(s) for Admission: Acute hypoxemic respiratory failure due to [facility]19 ([facility] [U07.1, J96.01]  SUSANNE (acute kidney injury) [facility]) [N17.9]     Hospital Course & Interval History:   66 y.o. female with medical history of anxiety, depression, fibromyalgia, GERD, obesity with BMI of 33, who presented to the ER because of cough and shortness of breath with decrease po intake for 5 days. EMS was called patient was very confused and short of breath. When EMS arrived patient was found to be hypoxic. She was placed on CPAP and transported to the ER.     ER course: Patient was afebrile. Initial oxygen saturation was 81% on nasal cannula. Arterial blood gas was done which showed pH of 7.4, PCO2 23, PO2 46, bicarbonate 16, oxygen saturation 85% on 10L HFNC. Patient was placed on BiPAP and subsequently transitioned to Airvo. Chest x-ray shows hazy bilateral lung opacities likely related to pulmonary edema/pneumonia. Cardiomegaly. COVID test positive. CMP significant for Cr of 3.74. CRP is 16.9. ProBNP 18K. D-Dimer 3.2. Patient admitted for further evaluation management of acute hypoxemic respiratory failure secondary to COVID-19 pneumonia, acute kidney injury. Her respiratory status continued to worsen and she required CPAP. Pulmonary team was consulted. She has been started on Decadron, and empiric antibiotics for CAP. Not a candidate for baricitinib due to kidney function. Nephrology team is on board as well. Patient has been on Lasix IV twice daily due to chest x-ray showing possible pulmonary edema versus pneumonia. She has been tolerating diuresis thus far. She has been weaned down from CPAP to Airvo. Subjective (22): Patient feels like she is doing better.   Stated that she can take deeper breaths. Still on Airvo 60 L/100%. Denies chest pain, fever, chills. Assessment & Plan:     Acute hypoxemic respiratory failure due to COVID-19  Superimposed bacterial pneumonia  Symptom onset 5 days, tested positive 1/14. In ED, O2 sat 85% on 10L HFNC. ABG shows  pH of 7.4, PCO2 23, PO2 46, Bicarb 16. CXR shows hazy bilateral lung opacities likely related to pulmonary edema/pneumonia; Cardiomegaly. ProBNP 09116. CRP 16.9. D-Dimer 3.2. procal>1  Dexamethasone for 10 days, 20mg IV daily for 5 days then 10mg daily for 5 days EOT 1/23  Not a candidate for Baricitinib d/t kidney function  Abx: Rocephin/Doxycycline (1/15-1/22)  Added IS and albuterol prn  Cont Symbicort  Prone as tolerated  Cont Lasix IV BID. Monitor I&O's, daily weights  Pulmonary team on board, appreciate recs  Cont Airvo 60L/100%, wean as tolerated  CXR shows some improvement. Cont course of treatment    SUSANNE  ? Prerenal.  Creatinine of 3.7 on admission (2 months ago was 0.98). Renal US shows no hydronephrosis; 2.9 cm L parapelvic cyst and minimal layering debris w/in bladder  Avoid nephrotoxic meds  Accurate I&O's  Holding home Losartan/Hctz  Nephrology consulted, appreciate recs  Cont Lasix IV, monitor renal function closely. Tolerating diuresis thus far, Cr improving    Morbid obesity  Adds to complexity of care  Has not been diagnosed with JESSICA, does not wear CPAP at home    Essential hypertension  Hold losartan hydrochlorothiazide due to SUSANNE  Hydralazine as needed    GERD  PPI    OAB  Cont Oxybutynin    Acquired hypothyroidism  Cont home Synthroid    Fibromyalgia  Cont home Cymbalta, Topamax    Anxiety and depression  Cont home Cymbalta, Trazodone, Pregabalin, Buspar, Lamictal    Type 2 diabetes mellitus  Holding home Metformin, Farxiga  SSI and lantus, adjust doses as appropriate  hA1C 8.0        Dispo/Discharge Planning:      >2 days.  PT/OT--recommended STR    I spent 36 minutes of time caring for this patient at bedside or nearby on the unit, more than 50 percent of which was spent on coordination of care and/or counseling regarding the disease process, treatment options, and treatment plan. Diet:  ADULT DIET Regular; 3 carb choices (45 gm/meal)  DVT PPx: Heparin SQ  Code status: DNR    Hospital Problems as of 1/18/2022 Date Reviewed: 6/10/2021          Codes Class Noted - Resolved POA    SUSANNE (acute kidney injury) ([facility] ICD-10-CM: N17.9  ICD-9-CM: 584.9  1/14/2022 - Present Yes        * (Principal) Acute hypoxemic respiratory failure due to COVID-19 [facility]) ICD-10-CM: U07.1, J96.01  ICD-9-CM: 518.81, 079.89, 799.02  1/14/2022 - Present Yes        Type 2 diabetes mellitus with diabetic neuropathy, without long-term current use of insulin (HCC) (Chronic) ICD-10-CM: E11.40  ICD-9-CM: 250.60, 357.2  1/12/2018 - Present Yes        Fibromyalgia (Chronic) ICD-10-CM: M79.7  ICD-9-CM: 729.1  Unknown - Present Yes    Overview Signed 4/12/2017  9:40 AM by Marcelle JENSEN FIBROMYALGIA             Anxiety and depression (Chronic) ICD-10-CM: F41.9, F32. A  ICD-9-CM: 300.00, 311  Unknown - Present Yes        Essential hypertension (Chronic) ICD-10-CM: I10  ICD-9-CM: 401.9  9/20/2016 - Present Yes        Gastroesophageal reflux disease without esophagitis (Chronic) ICD-10-CM: K21.9  ICD-9-CM: 530.81  9/20/2016 - Present Yes        OAB (overactive bladder) (Chronic) ICD-10-CM: N32.81  ICD-9-CM: 596.51  9/20/2016 - Present Yes        Morbid obesity (HCC) (Chronic) ICD-10-CM: E66.01  ICD-9-CM: 278.01  Unknown - Present Yes        Acquired hypothyroidism (Chronic) ICD-10-CM: E03.9  ICD-9-CM: 244.9  11/14/2012 - Present Yes              Objective:     Patient Vitals for the past 24 hrs:   Temp Pulse Resp BP SpO2   01/18/22 1120     95 %   01/18/22 1119 97.7 °F (36.5 °C) 78 21 (!) 131/51 95 %   01/18/22 0731 98.3 °F (36.8 °C) 66 20 (!) 149/50 91 %   01/18/22 0717     94 %   01/18/22 0418 97.3 °F (36.3 °C) 60 21 (!) 176/58  01/17/22 2342 97.3 °F (36.3 °C) 60 21 (!) 120/54 100 %   01/17/22 2245     100 %   01/17/22 2041     94 %   01/17/22 1938 97.6 °F (36.4 °C) 71 21 136/62 95 %   01/17/22 1606     90 %   01/17/22 1456 98.1 °F (36.7 °C) 67 19 (!) 142/59 92 %     Oxygen Therapy  O2 Sat (%): 95 % (01/18/22 1120)  Pulse via Oximetry: 71 beats per minute (01/18/22 1120)  O2 Device: Heated; Hi flow nasal cannula (01/18/22 0717)  Skin Assessment: Clean, dry, & intact (01/15/22 0813)  Skin Protection for O2 Device: No (01/15/22 0327)  O2 Flow Rate (L/min): 60 l/min (01/18/22 1120)  O2 Temperature: 87.8 °F (31 °C) (01/17/22 2041)  FIO2 (%): 100 % (01/18/22 1120)    Estimated body mass index is 33.66 kg/m² as calculated from the following:    Height as of this encounter: 5' 3\" (1.6 m). Weight as of this encounter: 86.2 kg (190 lb). Intake/Output Summary (Last 24 hours) at 1/18/2022 1305  Last data filed at 1/18/2022 0959  Gross per 24 hour   Intake 500 ml   Output 2200 ml   Net -1700 ml         Physical Exam:     Blood pressure (!) 131/51, pulse 78, temperature 97.7 °F (36.5 °C), resp. rate 21, height 5' 3\" (1.6 m), weight 86.2 kg (190 lb), SpO2 95 %. General:    Alert and oriented x4. Ill appearing. Head:  Normocephalic, atraumatic  Eyes:  Sclerae appear normal.  Pupils equally round. ENT:  Nares appear normal, no drainage. Moist oral mucosa  Neck:  No restricted ROM. Trachea midline   CV:   RRR. No m/r/g. No jugular venous distension. Lungs:   Coarse lung sounds at bases. Breathing non-labored   Abdomen: Bowel sounds present. Soft, nontender, nondistended. Extremities: No cyanosis or clubbing. No edema  Skin:     No rashes and normal coloration. Warm and dry. Neuro:  CN II-XII grossly intact. Sensation intact. A&Ox3  Psych:  Normal mood and affect.       I have reviewed ordered lab tests and independently visualized imaging below:    Recent Labs:  Recent Results (from the past 48 hour(s))   GLUCOSE, POC Collection Time: 01/16/22  4:58 PM   Result Value Ref Range    Glucose (POC) 285 (H) 65 - 100 mg/dL    Performed by Carleen    GLUCOSE, POC    Collection Time: 01/16/22  9:30 PM   Result Value Ref Range    Glucose (POC) 248 (H) 65 - 100 mg/dL    Performed by Porsha    CBC W/O DIFF    Collection Time: 01/17/22  3:52 AM   Result Value Ref Range    WBC 7.9 4.3 - 11.1 K/uL    RBC 3.85 (L) 4.05 - 5.2 M/uL    HGB 9.9 (L) 11.7 - 15.4 g/dL    HCT 32.4 (L) 35.8 - 46.3 %    MCV 84.2 79.6 - 97.8 FL    MCH 25.7 (L) 26.1 - 32.9 PG    MCHC 30.6 (L) 31.4 - 35.0 g/dL    RDW 14.7 (H) 11.9 - 14.6 %    PLATELET 842 357 - 287 K/uL    MPV 9.5 9.4 - 12.3 FL    ABSOLUTE NRBC 0.00 0.0 - 0.2 K/uL   METABOLIC PANEL, BASIC    Collection Time: 01/17/22  3:52 AM   Result Value Ref Range    Sodium 138 136 - 145 mmol/L    Potassium 3.5 3.5 - 5.1 mmol/L    Chloride 104 98 - 107 mmol/L    CO2 23 21 - 32 mmol/L    Anion gap 11 7 - 16 mmol/L    Glucose 208 (H) 65 - 100 mg/dL    BUN 86 (H) 8 - 23 MG/DL    Creatinine 3.80 (H) 0.6 - 1.0 MG/DL    GFR est AA 15 (L) >60 ml/min/1.73m2    GFR est non-AA 12 (L) >60 ml/min/1.73m2    Calcium 8.1 (L) 8.3 - 10.4 MG/DL   D DIMER    Collection Time: 01/17/22  3:52 AM   Result Value Ref Range    D DIMER 2.14 (H) <0.56 ug/ml(FEU)   C REACTIVE PROTEIN, QT    Collection Time: 01/17/22  3:52 AM   Result Value Ref Range    C-Reactive protein 4.3 (H) 0.0 - 0.9 mg/dL   GLUCOSE, POC    Collection Time: 01/17/22  7:28 AM   Result Value Ref Range    Glucose (POC) 216 (H) 65 - 100 mg/dL    Performed by [employer]    GLUCOSE, POC    Collection Time: 01/17/22 11:25 AM   Result Value Ref Range    Glucose (POC) 234 (H) 65 - 100 mg/dL    Performed by [employer]    GLUCOSE, POC    Collection Time: 01/17/22  4:27 PM   Result Value Ref Range    Glucose (POC) 372 (H) 65 - 100 mg/dL    Performed by [employer]    GLUCOSE, POC    Collection Time: 01/17/22  8:47 PM   Result Value Ref Range    Glucose (POC) 319 (H) 65 - 100 mg/dL    Performed by Timmy    GLUCOSE, POC    Collection Time: 01/17/22 10:34 PM   Result Value Ref Range    Glucose (POC) 329 (H) 65 - 100 mg/dL    Performed by Nathaly Scott    D DIMER    Collection Time: 01/18/22  4:08 AM   Result Value Ref Range    D DIMER 3.95 (H) <0.56 ug/ml(FEU)   C REACTIVE PROTEIN, QT    Collection Time: 01/18/22  4:08 AM   Result Value Ref Range    C-Reactive protein 2.2 (H) 0.0 - 0.9 mg/dL   CBC W/O DIFF    Collection Time: 01/18/22  4:08 AM   Result Value Ref Range    WBC 8.1 4.3 - 11.1 K/uL    RBC 4.22 4.05 - 5.2 M/uL    HGB 10.8 (L) 11.7 - 15.4 g/dL    HCT 35.3 (L) 35.8 - 46.3 %    MCV 83.6 79.6 - 97.8 FL    MCH 25.6 (L) 26.1 - 32.9 PG    MCHC 30.6 (L) 31.4 - 35.0 g/dL    RDW 14.6 11.9 - 14.6 %    PLATELET 954 868 - 459 K/uL    MPV 9.7 9.4 - 12.3 FL    ABSOLUTE NRBC 0.00 0.0 - 0.2 K/uL   METABOLIC PANEL, BASIC    Collection Time: 01/18/22  4:08 AM   Result Value Ref Range    Sodium 141 136 - 145 mmol/L    Potassium 3.5 3.5 - 5.1 mmol/L    Chloride 104 98 - 107 mmol/L    CO2 25 21 - 32 mmol/L    Anion gap 12 7 - 16 mmol/L    Glucose 192 (H) 65 - 100 mg/dL    BUN 89 (H) 8 - 23 MG/DL    Creatinine 3.40 (H) 0.6 - 1.0 MG/DL    GFR est AA 17 (L) >60 ml/min/1.73m2    GFR est non-AA 14 (L) >60 ml/min/1.73m2    Calcium 8.4 8.3 - 10.4 MG/DL   GLUCOSE, POC    Collection Time: 01/18/22  7:32 AM   Result Value Ref Range    Glucose (POC) 177 (H) 65 - 100 mg/dL    [facility]    GLUCOSE, POC    Collection Time: 01/18/22 11:20 AM   Result Value Ref Range    Glucose (POC) 215 (H) 65 - 100 mg/dL    [facility]        All Micro Results     Procedure Component Value Units Date/Time    COVID-19 RAPID TEST [[id number]]  (Abnormal) Collected: 01/14/22 0600    Order Status: Completed Specimen: Nasopharyngeal Updated: 01/14/22 0720     Specimen source NASAL        Comment: RAPID ONLY        COVID-19 rapid test Detected        Comment:      The specimen is POSITIVE for SARS-CoV-2, the novel coronavirus associated with COVID-19. This test has been authorized by the FDA under an Emergency Use Authorization (EUA) for use by authorized laboratories. Fact sheet for Healthcare Providers: ConventionUpdate.co.nz  Fact sheet for Patients: ConventionUpdate.co.nz       Methodology: Isothermal Nucleic Acid Amplification               Other Studies:  XR CHEST SNGL V    Result Date: 1/18/2022   Portable view of the chest COMPARISON: January 16, 2022 CLINICAL HISTORY: Hypoxia. FINDINGS: There are bilateral groundglass airspace densities. No pneumothorax or large pleural effusion. Heart is enlarged. Mediastinal contour is within normal limits.      1. Bilateral airspace disease, similar to prior exam.      Current Meds:  Current Facility-Administered Medications   Medication Dose Route Frequency    insulin glargine (LANTUS) injection 20 Units  20 Units SubCUTAneous QHS    lip protectant (BLISTEX) ointment 1 Each  1 Each Topical PRN    amLODIPine (NORVASC) tablet 5 mg  5 mg Oral DAILY    furosemide (LASIX) injection 40 mg  40 mg IntraVENous BID    cefTRIAXone (ROCEPHIN) 1 g in 0.9% sodium chloride (MBP/ADV) 50 mL MBP  1 g IntraVENous Q24H    sodium chloride (NS) flush 5-40 mL  5-40 mL IntraVENous Q8H    sodium chloride (NS) flush 5-40 mL  5-40 mL IntraVENous PRN    acetaminophen (TYLENOL) tablet 650 mg  650 mg Oral Q6H PRN    Or    acetaminophen (TYLENOL) suppository 650 mg  650 mg Rectal Q6H PRN    polyethylene glycol (MIRALAX) packet 17 g  17 g Oral DAILY PRN    ondansetron (ZOFRAN ODT) tablet 4 mg  4 mg Oral Q8H PRN    Or    ondansetron (ZOFRAN) injection 4 mg  4 mg IntraVENous Q6H PRN    [Held by provider] 0.9% sodium chloride infusion  75 mL/hr IntraVENous CONTINUOUS    heparin (porcine) injection 5,000 Units  5,000 Units SubCUTAneous Q8H    guaiFENesin-dextromethorphan (ROBITUSSIN DM) 100-10 mg/5 mL syrup 5 mL  5 mL Oral Q4H PRN    insulin lispro (HUMALOG) injection 0-10 Units  0-10 Units SubCUTAneous AC&HS    albuterol (PROVENTIL HFA, VENTOLIN HFA, PROAIR HFA) inhaler 2 Puff  2 Puff Inhalation QID RT    atorvastatin (LIPITOR) tablet 20 mg  20 mg Oral QHS    budesonide-formoteroL (SYMBICORT) 160-4.5 mcg/actuation HFA inhaler 2 Puff  2 Puff Inhalation BID    busPIRone (BUSPAR) tablet 7.5 mg  7.5 mg Oral BID    cycloSPORINE (RESTASIS) 0.05 % ophthalmic emulsion 1 Drop (Patient Supplied)  1 Drop Both Eyes Q12H    DULoxetine (CYMBALTA) capsule 20 mg  20 mg Oral DAILY    lamoTRIgine (LaMICtal) tablet 150 mg  150 mg Oral QAM    levothyroxine (SYNTHROID) tablet 137 mcg  137 mcg Oral 6am    montelukast (SINGULAIR) tablet 10 mg  10 mg Oral DAILY    tiZANidine (ZANAFLEX) tablet 4 mg  4 mg Oral TID PRN    pregabalin (LYRICA) capsule 50 mg  50 mg Oral BID    topiramate (TOPAMAX) tablet 25 mg  25 mg Oral QHS    traZODone (DESYREL) tablet 50 mg  50 mg Oral QHS    dicyclomine (BENTYL) tablet 20 mg  20 mg Oral AC&HS    pantoprazole (PROTONIX) tablet 40 mg  40 mg Oral ACB&D    doxycycline (VIBRAMYCIN) capsule 100 mg  100 mg Oral Q12H    [START ON 1/19/2022] dexamethasone (DECADRON) 10 mg/mL injection 10 mg  10 mg IntraVENous Q24H    hydrALAZINE (APRESOLINE) tablet 50 mg  50 mg Oral Q6H PRN    alcohol 62% (NOZIN) nasal  1 Ampule  1 Ampule Topical Q12H       Signed: Liana Li DO    Part of this note may have been written by using a voice dictation software. The note has been proof read but may still contain some grammatical/other typographical errors.

## 2022-01-18 NOTE — PROGRESS NOTES
Pt is resting in bed at this time. Pt is on Airvo 60L/100% with NRB at this time. Pt has no s/sx if acute distress.  [facility]

## 2022-01-18 NOTE — PROGRESS NOTES
Pt sitting in bed eating dinner. Pt on 60 L 100% no distress noted at this time. Will monitor. Pt encouraged to call for assistance if needed call light  In reach, will monitor. Spoke with pt [email or web address] updated on condition.

## 2022-01-18 NOTE — PROGRESS NOTES
Pt is resting in bed at this time. Pt is alert and oriented times 3. Pt is on Airvo 60L./100% sating 92%. Pt has no s/sx of acute distress at this time. Pt has no requests at this time. Pt has safety measures in place. Call light within reach. Will continue to monitor.

## 2022-01-18 NOTE — PROGRESS NOTES
Pt sitting up in chair. Pt on NRB and 60L 100% airvo, sat 91% at this time. Call light in reach, will monitor.

## 2022-01-18 NOTE — PROGRESS NOTES
Attempted to call patient's  Cameron Ball but could not reach him called patient's son Durga Johnson and updated him. Durga Johnson requested for me to call patient's brother Chris Chatterjee to update as well which I did. All questions answered and concerns addressed.     Parag Campbell, DO

## 2022-01-18 NOTE — PROGRESS NOTES
[facility] Progress Note      Admission Date: 1/14/2022   Follow up SUSANNE  Subjective:   Pt seen and examined in room, sitting up on edge of the bed with weakness, SOB, remains on 100 % AirVo, good uop.      ROS:  General: no fever/chills  CV: no CP  Lung: + SOB, + cough  GI: no N/V/D  : no dysuria  Ext: no edema         Objective:     Physical Exam:      Visit Vitals  BP (!) 149/50 (BP 1 Location: Left upper arm, BP Patient Position: Supine)   Pulse 66   Temp 98.3 °F (36.8 °C)   Resp 20   Ht 5' 3\" (1.6 m)   Wt 86.2 kg (190 lb)   SpO2 91%   BMI 33.66 kg/m²      Gen: alert and oriented, NAD  HEENT: moist membranes  CV: regular rate, S1, S2, no Rub   Lungs: Coarse lung sounds bilaterally  Abd: soft, non tender, + BS  Extem: no edema      Current Facility-Administered Medications   Medication Dose Route Frequency    insulin glargine (LANTUS) injection 20 Units  20 Units SubCUTAneous QHS    lip protectant (BLISTEX) ointment 1 Each  1 Each Topical PRN    amLODIPine (NORVASC) tablet 5 mg  5 mg Oral DAILY    furosemide (LASIX) injection 40 mg  40 mg IntraVENous BID    cefTRIAXone (ROCEPHIN) 1 g in 0.9% sodium chloride (MBP/ADV) 50 mL MBP  1 g IntraVENous Q24H    sodium chloride (NS) flush 5-40 mL  5-40 mL IntraVENous Q8H    sodium chloride (NS) flush 5-40 mL  5-40 mL IntraVENous PRN    acetaminophen (TYLENOL) tablet 650 mg  650 mg Oral Q6H PRN    Or    acetaminophen (TYLENOL) suppository 650 mg  650 mg Rectal Q6H PRN    polyethylene glycol (MIRALAX) packet 17 g  17 g Oral DAILY PRN    ondansetron (ZOFRAN ODT) tablet 4 mg  4 mg Oral Q8H PRN    Or    ondansetron (ZOFRAN) injection 4 mg  4 mg IntraVENous Q6H PRN    [Held by provider] 0.9% sodium chloride infusion  75 mL/hr IntraVENous CONTINUOUS    heparin (porcine) injection 5,000 Units  5,000 Units SubCUTAneous Q8H    guaiFENesin-dextromethorphan (ROBITUSSIN DM) 100-10 mg/5 mL syrup 5 mL  5 mL Oral Q4H PRN    insulin lispro (HUMALOG) injection 0-10 Units  0-10 Units SubCUTAneous AC&HS    albuterol (PROVENTIL HFA, VENTOLIN HFA, PROAIR HFA) inhaler 2 Puff  2 Puff Inhalation QID RT    atorvastatin (LIPITOR) tablet 20 mg  20 mg Oral QHS    budesonide-formoteroL (SYMBICORT) 160-4.5 mcg/actuation HFA inhaler 2 Puff  2 Puff Inhalation BID    busPIRone (BUSPAR) tablet 7.5 mg  7.5 mg Oral BID    cycloSPORINE (RESTASIS) 0.05 % ophthalmic emulsion 1 Drop (Patient Supplied)  1 Drop Both Eyes Q12H    DULoxetine (CYMBALTA) capsule 20 mg  20 mg Oral DAILY    lamoTRIgine (LaMICtal) tablet 150 mg  150 mg Oral QAM    levothyroxine (SYNTHROID) tablet 137 mcg  137 mcg Oral 6am    montelukast (SINGULAIR) tablet 10 mg  10 mg Oral DAILY    tiZANidine (ZANAFLEX) tablet 4 mg  4 mg Oral TID PRN    pregabalin (LYRICA) capsule 50 mg  50 mg Oral BID    topiramate (TOPAMAX) tablet 25 mg  25 mg Oral QHS    traZODone (DESYREL) tablet 50 mg  50 mg Oral QHS    dicyclomine (BENTYL) tablet 20 mg  20 mg Oral AC&HS    pantoprazole (PROTONIX) tablet 40 mg  40 mg Oral ACB&D    doxycycline (VIBRAMYCIN) capsule 100 mg  100 mg Oral Q12H    [START ON 1/19/2022] dexamethasone (DECADRON) 10 mg/mL injection 10 mg  10 mg IntraVENous Q24H    hydrALAZINE (APRESOLINE) tablet 50 mg  50 mg Oral Q6H PRN    alcohol 62% (NOZIN) nasal  1 Ampule  1 Ampule Topical Q12H            Data Review:     LABS:   Recent Results (from the past 12 hour(s))   D DIMER    Collection Time: 01/18/22  4:08 AM   Result Value Ref Range    D DIMER 3.95 (H) <0.56 ug/ml(FEU)   C REACTIVE PROTEIN, QT    Collection Time: 01/18/22  4:08 AM   Result Value Ref Range    C-Reactive protein 2.2 (H) 0.0 - 0.9 mg/dL   CBC W/O DIFF    Collection Time: 01/18/22  4:08 AM   Result Value Ref Range    WBC 8.1 4.3 - 11.1 K/uL    RBC 4.22 4.05 - 5.2 M/uL    HGB 10.8 (L) 11.7 - 15.4 g/dL    HCT 35.3 (L) 35.8 - 46.3 %    MCV 83.6 79.6 - 97.8 FL    MCH 25.6 (L) 26.1 - 32.9 PG    MCHC 30.6 (L) 31.4 - 35.0 g/dL    RDW 14.6 11.9 - 14.6 %    PLATELET 111 628 - 638 K/uL    MPV 9.7 9.4 - 12.3 FL    ABSOLUTE NRBC 0.00 0.0 - 0.2 K/uL   METABOLIC PANEL, BASIC    Collection Time: 01/18/22  4:08 AM   Result Value Ref Range    Sodium 141 136 - 145 mmol/L    Potassium 3.5 3.5 - 5.1 mmol/L    Chloride 104 98 - 107 mmol/L    CO2 25 21 - 32 mmol/L    Anion gap 12 7 - 16 mmol/L    Glucose 192 (H) 65 - 100 mg/dL    BUN 89 (H) 8 - 23 MG/DL    Creatinine 3.40 (H) 0.6 - 1.0 MG/DL    GFR est AA 17 (L) >60 ml/min/1.73m2    GFR est non-AA 14 (L) >60 ml/min/1.73m2    Calcium 8.4 8.3 - 10.4 MG/DL   GLUCOSE, POC    Collection Time: 01/18/22  7:32 AM   Result Value Ref Range    Glucose (POC) 177 (H) 65 - 100 mg/dL    Performed by Tierra          Plan:     Principal Problem:    Acute hypoxemic respiratory failure due to COVID-19 [facility]) (1/14/2022)    Active Problems: Morbid obesity ([facility] ()      Essential hypertension (9/20/2016)      Gastroesophageal reflux disease without esophagitis (9/20/2016)      OAB (overactive bladder) (9/20/2016)      Acquired hypothyroidism (11/14/2012)      Fibromyalgia ()      Overview: HX FIBROMYALGIA      Anxiety and depression ()      Type 2 diabetes mellitus with diabetic neuropathy, without long-term current use of insulin ([facility] (1/12/2018)      [facility]acute kidney [facility]) [facility] (1/14/2022)      Acute kidney injury in the setting of COVID positivity. She is admitted with hypoxemia and COVID-pneumonia, treated with dexamethasone and Tocilizumab.   She has normal kidney baseline   - urine studies point to hypoperfusion- some question of pulmonary edema and got some diuretic.   SUSANNE- improving, Cr down to 3.40, non oliguric, 2200 cc uop recorded last 24 hrs     COVID 19, acute respiratory failure with hypoxemia on 100% AivVo  tolerating lasix     Anemia stable- transfuse hgb < 7.0    Hypertension- on norvasc weight-bearing as tolerated

## 2022-01-18 NOTE — PROGRESS NOTES
ACUTE PHYSICAL THERAPY GOALS:  (Developed with and agreed upon by patient and/or caregiver. )  LTG:  (1.)Ms. Carmona will move from supine to sit and sit to supine , scoot up and down and roll side to side in bed with MODIFIED INDEPENDENCE within 7 treatment day(s). (2.)Ms. Carmona will transfer from bed to chair and chair to bed with STAND BY ASSIST using the least restrictive device within 7 treatment day(s). (3.)Ms. Carmona will ambulate with STAND BY ASSIST for 100 feet with the least restrictive device within 7 treatment day(s). (4.)Ms. Carmona will participate in therapeutic activity/exercises x 25 minutes for increased activity tolerance with SpO2 above 90% within 7 treatment days. PHYSICAL THERAPY: Daily Note and PM Treatment Day # 2    Daniel Garcia is a 66 y.o. female   PRIMARY DIAGNOSIS: Acute hypoxemic respiratory failure due to COVID-19 [facility])  Acute hypoxemic respiratory failure due to COVID-19 ([facility] [U07.1, J96.01]  SUSANNE (acute kidney injury) ([facility] [N17.9]         ASSESSMENT:     REHAB RECOMMENDATIONS: CURRENT LEVEL OF FUNCTION:  (Most Recently Demonstrated)   Recommendation to date pending progress:  Setting:   Short-term Rehab  Equipment:    To Be Determined Bed Mobility:   Minimal Assistance  Sit to Stand:   Minimal Assistance  Transfers:   Minimal Assistance x 2  Gait/Mobility:   only 1-2 steps from bed to chair     ASSESSMENT:  Ms. Benjamin Torres was supine in bed on airvo (60L; 100%) upon entering the room and agreeable to therapy. Today, pt demonstrates slow progress toward established goals as she was able to tolerate transferring to chair but continues to desat with all mobility. This session, pt required Min (A) for bed mobility/ sit-to-stand while requiring Min (A)x2 for bed-to-chair transfer.  Pt did desat into low 80s while performing all mobility and demonstrated notable anxiety regarding drop in O2; as such, pt was provided with NRB in chair and recovered fairly well following instruction on nasla/ pursed-lip breathing. At this time, pt remains a good candidate for skilled PT and will continue to benefit from advancing POC. At end of session, pt was positioned to comfort in chair with all needs in reach. RN was made aware of pt performance.         SUBJECTIVE:   Ms. Candis Del Valle states, \"Sounds like Hien Cypher Beason\"    SOCIAL HISTORY/ LIVING ENVIRONMENT: See Eval  Home Environment: Private residence  One/Two Story Residence: One story  Living Alone: No  Support Systems: Spouse/Significant Other  OBJECTIVE:     PAIN: VITAL SIGNS: LINES/DRAINS:   Pre Treatment: Pain Screen  Pain Scale 1: Numeric (0 - 10)  Pain Intensity 1: 0  Post Treatment: 0 Vital Signs  O2 Sat (%): 94 %  O2 Device:  (airvo)  O2 Flow Rate (L/min): 60 l/min  FIO2 (%): 100 % Continuous Pulse Oximetry  O2 Device:  (airvo)     MOBILITY: I Mod I S SBA CGA Min Mod Max Total  NT x2 Comments:   Bed Mobility    Rolling [] [] [] [] [] [] [] [] [] [x] []    Supine to Sit [] [] [] [] [] [x] [] [] [] [] []    Scooting [] [] [] [] [] [x] [] [] [] [] []    Sit to Supine [] [] [] [] [] [] [] [] [] [x] []    Transfers    Sit to Stand [] [] [] [] [] [x] [] [] [] [] []    Bed to Chair [] [] [] [] [] [x] [] [] [] [] [x]    Stand to Sit [] [] [] [] [] [x] [] [] [] [] []    I=Independent, Mod I=Modified Independent, S=Supervision, SBA=Standby Assistance, CGA=Contact Guard Assistance,   Min=Minimal Assistance, Mod=Moderate Assistance, Max=Maximal Assistance, Total=Total Assistance, NT=Not Tested    BALANCE: Good Fair+ Fair Fair- Poor NT Comments   Sitting Static [x] [] [] [] [] []    Sitting Dynamic [] [x] [] [] [] []              Standing Static [] [x] [] [] [] []    Standing Dynamic [] [] [x] [] [] []      GAIT: I Mod I S SBA CGA Min Mod Max Total  NT x2 Comments:   Level of Assistance [] [] [] [] [] [] [] [] [] [x] []    Distance NA    DME N/A    Gait Quality NA    Weightbearing  Status N/A     I=Independent, Mod I=Modified Independent, S=Supervision, SBA=Standby Assistance, CGA=Contact Guard Assistance,   Min=Minimal Assistance, Mod=Moderate Assistance, Max=Maximal Assistance, Total=Total Assistance, NT=Not Tested    PLAN:   FREQUENCY/DURATION: PT Plan of Care: 3 times/week for duration of hospital stay or until stated goals are met, whichever comes first.  TREATMENT:     TREATMENT:   ($$ Therapeutic Activity: 8-22 mins    )  Therapeutic Activity (8 Minutes): Therapeutic activity included Supine to Sit, Scooting, Lateral Scooting, Transfer Training, Ambulation on level ground, Sitting balance  and Standing balance to improve functional Mobility, Strength and Activity tolerance.     TREATMENT GRID:  N/A    AFTER TREATMENT POSITION/PRECAUTIONS:  Chair, Needs within reach and RN notified    INTERDISCIPLINARY COLLABORATION:  RN/PCT, PT/PTA and OT/OWEN    TOTAL TREATMENT DURATION:  PT Patient Time In/Time Out  Time In: 1400  Time Out: Amita Stevens, PT

## 2022-01-18 NOTE — PROGRESS NOTES
Pt sitting up in bed. Pt alert oriented to person and place at this time. Pt denies pain or distress at this time. Pt on airvo at 60L 100% with NRB in place with sat 97%. Continuous sat monitor in place. NRB removed at this time. Pt encouraged to call for assistance if needed call light in reach, will monitor.

## 2022-01-18 NOTE — PROGRESS NOTES
ACUTE OT GOALS:  (Developed with and agreed upon by patient and/or caregiver.)  1) Patient will complete lower body bathing and dressing with SBA and adaptive equipment as needed. 2) Patient will complete toileting with SBA. 3) Patient will complete functional transfers with SBA and adaptive equipment as needed. 4) Patient will tolerate at least 30 minutes of OT activity with 1-2 rest breaks while maintaining O2 sats >90%. 5) Patient will verbalize at least 3 energy conservation technique to utilize during ADL/IADL. Timeframe: 7 visits     OCCUPATIONAL THERAPY: Daily Note OT Treatment Day # 1    Shaji Marti is a 66 y.o. female   PRIMARY DIAGNOSIS: Acute hypoxemic respiratory failure due to COVID-19 [facility])  Acute hypoxemic respiratory failure due to COVID-19 ([facility] [U07.1, J96.01]  SUSANNE (acute kidney injury) ([facility] [N17.9]       Payor: Lena Last / Plan: Gino Matias / Product Type: [employer] /   ASSESSMENT:     REHAB RECOMMENDATIONS: CURRENT LEVEL OF FUNCTION:  (Most Recently Demonstrated)   Recommendation to date pending progress:  Setting:   Short-term Rehab  Equipment:    To Be Determined Bathing:   Not tested  Dressing:   Not tested  Feeding/Grooming:   Not tested  Toileting:   Not tested  Functional Mobility:   Minimal Assistance x 2     ASSESSMENT:  Ms. Faye Hoffman continues to present with deficits in overall strength, activity tolerance, ADL performance, and functional mobility. Resting on Airvo 60L/100% with intermittent use of NRB; Sp02 at 91% upon arrival. Min A for bed mobility. Min A x 2 for sit <> stand and steps to chair. Does de-sat into low 80s with activity and requires extended rest breaks with cues for pursed lip breathing; NRB placed back on patient. Sats at 90% by end of session (RN aware). No progress towards goals. Will continue OT efforts as indicated.      SUBJECTIVE:   Ms. Faye Hoffman states, \"I've gotten so weak\"    SOCIAL HISTORY/LIVING ENVIRONMENT: See initial evaluation  Home Environment: Private residence  One/Two Story Residence: One story  Living Alone: No  Support Systems: Spouse/Significant Other    OBJECTIVE:     PAIN: VITAL SIGNS: LINES/DRAINS:   Pre Treatment: Pain Screen  Pain Scale 1: Numeric (0 - 10)  Pain Intensity 1: 0  Post Treatment: 0   Continuous Pulse Oximetry  O2 Device:  (airvo)     ACTIVITIES OF DAILY LIVING: I Mod I S SBA CGA Min Mod Max Total NT Comments   BASIC ADLs:              Bathing/ Showering [] [] [] [] [] [] [] [] [] [x]    Toileting [] [] [] [] [] [] [] [] [] [x]    Dressing [] [] [] [] [] [] [] [] [] [x]    Feeding [] [] [] [] [] [] [] [] [] [x]    Grooming [] [] [] [] [] [] [] [] [] [x]    Personal Device Care [] [] [] [] [] [] [] [] [] [x]    Functional Mobility [] [] [] [] [] [x] [] [] [] [] X 2   I=Independent, Mod I=Modified Independent, S=Supervision, SBA=Standby Assistance, CGA=Contact Guard Assistance,   Min=Minimal Assistance, Mod=Moderate Assistance, Max=Maximal Assistance, Total=Total Assistance, NT=Not Tested    MOBILITY: I Mod I S SBA CGA Min Mod Max Total  NT x2 Comments:   Supine to sit [] [] [] [] [] [x] [] [] [] [] []    Sit to supine [] [] [] [] [] [] [] [] [] [x] []    Sit to stand [] [] [] [] [] [x] [] [] [] [] [x]    Bed to chair [] [] [] [] [] [x] [] [] [] [] [x]    I=Independent, Mod I=Modified Independent, S=Supervision, SBA=Standby Assistance, CGA=Contact Guard Assistance,   Min=Minimal Assistance, Mod=Moderate Assistance, Max=Maximal Assistance, Total=Total Assistance, NT=Not Tested    BALANCE: Good Fair+ Fair Fair- Poor NT Comments   Sitting Static [x] [x] [] [] [] []    Sitting Dynamic [] [x] [] [] [] []              Standing Static [] [x] [] [] [] []    Standing Dynamic [] [x] [x] [] [] []      PLAN:   FREQUENCY/DURATION: OT Plan of Care: 3 times/week for duration of hospital stay or until stated goals are met, whichever comes first.    TREATMENT:   TREATMENT:   ( $$ Neuromuscular Re-Education: 8-22 mins   )  Neuromuscular Re-education (10 Minutes): Neuromuscular Re-education included Balance Training, Coordination training, Postural training, Sitting balance training and Standing balance training to improve Balance, Coordination and Postural Control.     TREATMENT GRID:  N/A    AFTER TREATMENT POSITION/PRECAUTIONS:  Chair, Needs within reach and RN notified    INTERDISCIPLINARY COLLABORATION:  RN/PCT, PT/PTA and OT/OWEN    TOTAL TREATMENT DURATION:  OT Patient Time In/Time Out  Time In: 1400  Time Out: [address], OT

## 2022-01-18 NOTE — PROGRESS NOTES
Problem: Falls - Risk of  Goal: *Absence of Falls  Description: Document Clearence Skates Fall Risk and appropriate interventions in the flowsheet.   Outcome: Progressing Towards Goal  Note: Fall Risk Interventions:  Mobility Interventions: Patient to call before getting OOB         Medication Interventions: Evaluate medications/consider consulting pharmacy    Elimination Interventions: Call light in reach    History of Falls Interventions: Evaluate medications/consider consulting pharmacy         Problem: Patient Education: Go to Patient Education Activity  Goal: Patient/Family Education  Outcome: Progressing Towards Goal

## 2022-01-18 NOTE — PROGRESS NOTES
[facility]  [facility] Date: 1/14/2022         Daily Progress Note: 1/18/2022    The patient's chart is reviewed and the patient is discussed with the staff. Background:     Background: 66year old unvaccinated female was admitted with cough, shortness of breath and decreased appetite. She tested + for COVID on 1/14. She is being treated with Decadron and Tocilizumab. She is also on Doxycycline. CRP is 16.9 and d dimer is 3.2. She is hypoxic and currently requiring bipap with FIO2 100%. She is undecided about intubation but a full code at present. She had SUSANNE on admission. She has been seen by nephrology who suspects hypoperfusion. She was initially on IV fluids but these have been stopped. Her fluid balance is + 100 mls. BNP measured 18,243 on admission. Her initial creatinine was 3.9 and it is now elevated. Nephrology is following and she has gotten some lasix. She has never smoked but there is a history of asthma which is stable. She has been tested for JESSICA (about 2 years ago) but no tx was recommended. Subjective: On CPAP 100% with sat 100% overnight. Now on Airvo 100% with sat 98%. Net 3.4 L out since admit with creatinine falling to 3.4 today.          Current Facility-Administered Medications   Medication Dose Route Frequency    insulin glargine (LANTUS) injection 20 Units  20 Units SubCUTAneous QHS    amLODIPine (NORVASC) tablet 5 mg  5 mg Oral DAILY    furosemide (LASIX) injection 40 mg  40 mg IntraVENous BID    cefTRIAXone (ROCEPHIN) 1 g in 0.9% sodium chloride (MBP/ADV) 50 mL MBP  1 g IntraVENous Q24H    sodium chloride (NS) flush 5-40 mL  5-40 mL IntraVENous Q8H    sodium chloride (NS) flush 5-40 mL  5-40 mL IntraVENous PRN    acetaminophen (TYLENOL) tablet 650 mg  650 mg Oral Q6H PRN    Or    acetaminophen (TYLENOL) suppository 650 mg  650 mg Rectal Q6H PRN    polyethylene glycol (MIRALAX) packet 17 g  17 g Oral DAILY PRN    ondansetron (ZOFRAN ODT) tablet 4 mg  4 mg Oral Q8H PRN    Or    ondansetron (ZOFRAN) injection 4 mg  4 mg IntraVENous Q6H PRN    [Held by provider] 0.9% sodium chloride infusion  75 mL/hr IntraVENous CONTINUOUS    heparin (porcine) injection 5,000 Units  5,000 Units SubCUTAneous Q8H    guaiFENesin-dextromethorphan (ROBITUSSIN DM) 100-10 mg/5 mL syrup 5 mL  5 mL Oral Q4H PRN    insulin lispro (HUMALOG) injection 0-10 Units  0-10 Units SubCUTAneous AC&HS    albuterol (PROVENTIL HFA, VENTOLIN HFA, PROAIR HFA) inhaler 2 Puff  2 Puff Inhalation QID RT    atorvastatin (LIPITOR) tablet 20 mg  20 mg Oral QHS    budesonide-formoteroL (SYMBICORT) 160-4.5 mcg/actuation HFA inhaler 2 Puff  2 Puff Inhalation BID    busPIRone (BUSPAR) tablet 7.5 mg  7.5 mg Oral BID    cycloSPORINE (RESTASIS) 0.05 % ophthalmic emulsion 1 Drop (Patient Supplied)  1 Drop Both Eyes Q12H    DULoxetine (CYMBALTA) capsule 20 mg  20 mg Oral DAILY    lamoTRIgine (LaMICtal) tablet 150 mg  150 mg Oral QAM    levothyroxine (SYNTHROID) tablet 137 mcg  137 mcg Oral 6am    montelukast (SINGULAIR) tablet 10 mg  10 mg Oral DAILY    tiZANidine (ZANAFLEX) tablet 4 mg  4 mg Oral TID PRN    pregabalin (LYRICA) capsule 50 mg  50 mg Oral BID    topiramate (TOPAMAX) tablet 25 mg  25 mg Oral QHS    traZODone (DESYREL) tablet 50 mg  50 mg Oral QHS    dicyclomine (BENTYL) tablet 20 mg  20 mg Oral AC&HS    pantoprazole (PROTONIX) tablet 40 mg  40 mg Oral ACB&D    doxycycline (VIBRAMYCIN) capsule 100 mg  100 mg Oral Q12H    [START ON 1/19/2022] dexamethasone (DECADRON) 10 mg/mL injection 10 mg  10 mg IntraVENous Q24H    dexamethasone (DECADRON) 20 mg in 0.9% sodium chloride 50 mL IVPB  20 mg IntraVENous Q24H    hydrALAZINE (APRESOLINE) tablet 50 mg  50 mg Oral Q6H PRN    alcohol 62% (NOZIN) nasal  1 Ampule  1 Ampule Topical Q12H     Review of Systems  Constitutional: negative for fever, chills, sweats  Cardiovascular: negative for chest pain, palpitations, syncope, edema  Gastrointestinal:  negative for dysphagia, reflux, vomiting, diarrhea, abdominal pain, or melena  Neurologic:  negative for focal weakness, numbness, headache    Objective:     Vitals:    01/17/22 2342 01/18/22 0418 01/18/22 0717 01/18/22 0731   BP: (!) 120/54 (!) 176/58  (!) 149/50   Pulse: 60 60  66   Resp: 21 21  20   Temp: 97.3 °F (36.3 °C) 97.3 °F (36.3 °C)  98.3 °F (36.8 °C)   SpO2: 100%  94% 91%   Weight:       Height:           Intake/Output Summary (Last 24 hours) at 1/18/2022 0848  Last data filed at 1/18/2022 2677  Gross per 24 hour   Intake 200 ml   Output 2200 ml   Net -2000 ml     Physical Exam:   Constitution:  the patient is overweight and in no acute distress on Airvo 100% with sat 98%  HEENT:  Sclera clear, pupils equal, oral mucosa moist  Respiratory: few posterior crackles Coughing during exam.  Cardiovascular:  RRR without M,G,R  Gastrointestinal: soft and non-tender; with positive bowel sounds. Musculoskeletal: warm without cyanosis. There is no lower extremity edema. Skin:  no jaundice or rashes  Neurologic: no gross neuro deficits     Psychiatric:  calm    CXR:     1/18:            1/16, 15:        LAB:  Recent Labs     01/18/22 0408 01/17/22 0352 01/16/22  0346 01/15/22  1324   WBC 8.1 7.9 8.8  --    HGB 10.8* 9.9* 10.1*  --    HCT 35.3* 32.4* 33.1*  --     283 266  --    PCT  --   --   --  1.53*     Recent Labs     01/18/22 0408 01/17/22  0352 01/16/22  0346    138 139   K 3.5 3.5 4.0    104 107   CO2 25 23 22   * 208* 268*   BUN 89* 86* 78*   CREA 3.40* 3.80* 4.10*   CA 8.4 8.1* 7.9*     Recent Labs     01/18/22 0408 01/17/22  0352 01/16/22  0346   CRP 2.2* 4.3* 8.9*     Recent Labs     01/15/22  1421   PHI 7.36   PCO2I 35.0   PO2I 54*   HCO3I 19.7*     No results for input(s): SDES, CULT in the last 72 hours.   Assessment and Plan:  (Medical Decision Making)   Principal Problem:    [facility]ute hypoxemic respiratory failure [facility] (1/14/2022)  Continues to require high flow oxygen via airvo and using CPAP at night. Sat better this AM and can wean O2 via Airvo. Continue CPAP qHS and may be able to wean FIO2 at night. CXR better. Active Problems: Morbid obesity (HCC) ()    BMI almost 34. Contributes to the complexity of care. Need to mobilize      Essential hypertension (9/20/2016)  Norvasc added by nephrology      Type 2 diabetes mellitus with diabetic neuropathy, without long-term current use of insulin ([facility] (1/12/2018)    BS in the 200s. Hospitalist managing. SUSANNE (acute kidney injury) ([facility] (1/14/2022)   nephrology following. Creatinine falling with diuresis. BNP 18,243 on 1/14. More than 50% of the time documented was spent in face-to-face contact with the patient and in the care of the patient on the floor/unit where the patient is located.     Noted DNR status    Julia Ward MD

## 2022-01-19 NOTE — DIABETES MGMT
Patient admitted with acute hypoxemic respiratory failure due to COVID-19. Blood glucose ranged 177-357 yesterday with patient receiving Lantus 17 units, Humalog 24 units, and Decadron 20mg. Blood glucose this morning was 234. Creatinine 2.87. GFR 17. Reviewed patient current regimen: Lantus 17 units nightly, Humalog correctional insulin, and Decadron 10mg daily. Patient would likely benefit from initiation of prandial insulin to help offset hyperglycemia during the day related to caloric intake and steroid therapy. Provider updated via [employer] regarding recommendations and patient glycemic control.

## 2022-01-19 NOTE — PROGRESS NOTES
Varun Carmona  Admission Date: 1/14/2022         Daily Progress Note: 1/19/2022    The patient's chart is reviewed and the patient is discussed with the staff. Background:     Background: 66year old unvaccinated female was admitted with cough, shortness of breath and decreased appetite. She tested + for COVID on 1/14. She is being treated with Decadron and Tocilizumab. She is also on Doxycycline. CRP is 16.9 and d dimer is 3.2. She is hypoxic and currently requiring bipap with FIO2 100%. She is undecided about intubation but a full code at present. She had SUSANNE on admission. She has been seen by nephrology who suspects hypoperfusion. She was initially on IV fluids but these have been stopped. Her fluid balance is + 100 mls. BNP measured 18,243 on admission. Her initial creatinine was 3.9 and it is now elevated. Nephrology is following and she has gotten some lasix. She has never smoked but there is a history of asthma which is stable. She has been tested for JESSICA (about 2 years ago) but no tx was recommended at that time. Subjective:       Did not use CPAP 100% the entire night last night. Now on Airvo 100% with sat 99%. Net 5.2L out since admit, creatinine 2.87 today.        Current Facility-Administered Medications   Medication Dose Route Frequency    potassium chloride (K-DUR, KLOR-CON M20) SR tablet 20 mEq  20 mEq Oral DAILY    insulin glargine (LANTUS) injection 26 Units  0.3 Units/kg SubCUTAneous QHS    lip protectant (BLISTEX) ointment 1 Each  1 Each Topical PRN    amLODIPine (NORVASC) tablet 5 mg  5 mg Oral DAILY    furosemide (LASIX) injection 40 mg  40 mg IntraVENous BID    cefTRIAXone (ROCEPHIN) 1 g in 0.9% sodium chloride (MBP/ADV) 50 mL MBP  1 g IntraVENous Q24H    sodium chloride (NS) flush 5-40 mL  5-40 mL IntraVENous Q8H    sodium chloride (NS) flush 5-40 mL  5-40 mL IntraVENous PRN    acetaminophen (TYLENOL) tablet 650 mg  650 mg Oral Q6H PRN    Or    acetaminophen (TYLENOL) suppository 650 mg  650 mg Rectal Q6H PRN    polyethylene glycol (MIRALAX) packet 17 g  17 g Oral DAILY PRN    ondansetron (ZOFRAN ODT) tablet 4 mg  4 mg Oral Q8H PRN    Or    ondansetron (ZOFRAN) injection 4 mg  4 mg IntraVENous Q6H PRN    [Held by provider] 0.9% sodium chloride infusion  75 mL/hr IntraVENous CONTINUOUS    heparin (porcine) injection 5,000 Units  5,000 Units SubCUTAneous Q8H    guaiFENesin-dextromethorphan (ROBITUSSIN DM) 100-10 mg/5 mL syrup 5 mL  5 mL Oral Q4H PRN    insulin lispro (HUMALOG) injection 0-10 Units  0-10 Units SubCUTAneous AC&HS    albuterol (PROVENTIL HFA, VENTOLIN HFA, PROAIR HFA) inhaler 2 Puff  2 Puff Inhalation QID RT    atorvastatin (LIPITOR) tablet 20 mg  20 mg Oral QHS    budesonide-formoteroL (SYMBICORT) 160-4.5 mcg/actuation HFA inhaler 2 Puff  2 Puff Inhalation BID    busPIRone (BUSPAR) tablet 7.5 mg  7.5 mg Oral BID    cycloSPORINE (RESTASIS) 0.05 % ophthalmic emulsion 1 Drop (Patient Supplied)  1 Drop Both Eyes Q12H    DULoxetine (CYMBALTA) capsule 20 mg  20 mg Oral DAILY    lamoTRIgine (LaMICtal) tablet 150 mg  150 mg Oral QAM    levothyroxine (SYNTHROID) tablet 137 mcg  137 mcg Oral 6am    montelukast (SINGULAIR) tablet 10 mg  10 mg Oral DAILY    tiZANidine (ZANAFLEX) tablet 4 mg  4 mg Oral TID PRN    pregabalin (LYRICA) capsule 50 mg  50 mg Oral BID    topiramate (TOPAMAX) tablet 25 mg  25 mg Oral QHS    traZODone (DESYREL) tablet 50 mg  50 mg Oral QHS    dicyclomine (BENTYL) tablet 20 mg  20 mg Oral AC&HS    pantoprazole (PROTONIX) tablet 40 mg  40 mg Oral ACB&D    dexamethasone (DECADRON) 10 mg/mL injection 10 mg  10 mg IntraVENous Q24H    hydrALAZINE (APRESOLINE) tablet 50 mg  50 mg Oral Q6H PRN    alcohol 62% (NOZIN) nasal  1 Ampule  1 Ampule Topical Q12H     Review of Systems  Constitutional: negative for fever, chills, sweats  Cardiovascular: negative for chest pain, palpitations, syncope, edema  Gastrointestinal:  negative for dysphagia, reflux, vomiting, diarrhea, abdominal pain, or melena  Neurologic:  negative for focal weakness, numbness, headache    Objective:     Vitals:    01/18/22 2334 01/19/22 0035 01/19/22 0742 01/19/22 0747   BP: (!) 149/50  (!) 147/68    Pulse: 70  65    Resp: 22  22    Temp: 98.1 °F (36.7 °C)  97.9 °F (36.6 °C)    SpO2: 92% 93% 98% 99%   Weight:       Height:           Intake/Output Summary (Last 24 hours) at 1/19/2022 1105  Last data filed at 1/19/2022 1033  Gross per 24 hour   Intake 398 ml   Output 2300 ml   Net -1902 ml     Physical Exam:   Constitution:  the patient is overweight and in no acute distress   HEENT:  Sclera clear, pupils equal, oral mucosa moist  Respiratory: Crackles, on Airvo 60L  Cardiovascular:  RRR without M,G,R  Gastrointestinal: soft and non-tender; with positive bowel sounds. Musculoskeletal: warm without cyanosis. There is no lower extremity edema. Skin:  no jaundice or rashes  Neurologic: no gross neuro deficits     Psychiatric:  calm    CXR: None today     1/18:          1/16, 15:        LAB:  Recent Labs     01/19/22 0429 01/18/22 0408 01/17/22  0352   WBC 8.3 8.1 7.9   HGB 11.8 10.8* 9.9*   HCT 39.1 35.3* 32.4*    332 283     Recent Labs     01/19/22 0429 01/18/22 0408 01/17/22  0352    141 138   K 3.2* 3.5 3.5    104 104   CO2 25 25 23   * 192* 208*   BUN 94* 89* 86*   CREA 2.87* 3.40* 3.80*   CA 8.4 8.4 8.1*     Recent Labs     01/18/22 0408 01/17/22  0352   CRP 2.2* 4.3*     No results for input(s): PH, PCO2, PO2, HCO3, PHI, PCO2I, PO2I, HCO3I in the last 72 hours. No results for input(s): SDES, CULT in the last 72 hours. Assessment and Plan:  (Medical Decision Making)   Principal Problem:    Acute hypoxemic respiratory failure due to COVID-19 [facility]) (1/14/2022)  --Continues to require AirVo during the day and CPAP at night. Continue CPAP qHS and wean AirVo as tolerated. Active Problems:     Morbid obesity (HCC) ()  --BMI almost 34. Contributes to the complexity of care, mobilize      Essential hypertension (9/20/2016)  --Norvasc previously added by nephrology      Type 2 diabetes mellitus with diabetic neuropathy, without long-term current use of insulin ([facility] (1/12/2018)  --Per primary team       [facility]) [facility] (1/14/2022)  --[address] Nephrology following, Creatinine down to 2.87 this am with diuresis. BNP 18,243 on 1/14/22. Patient is a  DNR         More than 50% of the time documented was spent in face-to-face contact with the patient and in the care of the patient on the floor/unit where the patient is located. Ben Brito NP   I have spoken with and examined the patient. I agree with the above assessment and plan as documented.     Gen: alert  Lungs:  Crackle bilateral  Heart:  RRR with no Murmur/Rubs/Gallops  Abd:soft  Ext: no edema    Wean O2 ast tolerated-no much change in status    Francia Galdamez MD

## 2022-01-19 NOTE — PROGRESS NOTES
Pt sitting up in bed. Pt on airvo at 60L 100 % with sat 96% at this time. Pt has been very flat affect today and reports at times \"Just wanting to die\" spoke with pt at length and O2 sat maintaining well for this shift. Pt states \"Oh so Im getting better? \" assurance given. No s/sx of distress noted at this time. Call light in reach, will monitor.

## 2022-01-19 NOTE — PROGRESS NOTES
[facility] Nephrology Progress Note      Admission Date: 1/14/2022   Follow up SUSANNE  Subjective:   Pt seen and examined in room, complaints of poor rest overnight with weakness/fatigue, SOB, remains on 90 % AirVo, good uop.      ROS:  General: no fever/chills  CV: no CP  Lung: + SOB, + cough  GI: no N/V/D  : no dysuria  Ext: no edema         Objective:     Physical Exam:      Visit Vitals  BP (!) 147/68 (BP 1 Location: Left upper arm, BP Patient Position: At rest)   Pulse 65   Temp 97.9 °F (36.6 °C)   Resp 22   Ht 5' 3\" (1.6 m)   Wt 86.2 kg (190 lb)   SpO2 99%   BMI 33.66 kg/m²      Gen: alert and oriented, NAD  HEENT: moist membranes  CV: regular rate, S1, S2, no Rub   Lungs: Coarse lung sounds bilaterally  Abd: soft, non tender, + BS  Extem: no edema      Current Facility-Administered Medications   Medication Dose Route Frequency    potassium chloride (K-DUR, KLOR-CON M20) SR tablet 20 mEq  20 mEq Oral DAILY    lip protectant (BLISTEX) ointment 1 Each  1 Each Topical PRN    insulin glargine (LANTUS) injection 17 Units  0.2 Units/kg SubCUTAneous QHS    amLODIPine (NORVASC) tablet 5 mg  5 mg Oral DAILY    furosemide (LASIX) injection 40 mg  40 mg IntraVENous BID    cefTRIAXone (ROCEPHIN) 1 g in 0.9% sodium chloride (MBP/ADV) 50 mL MBP  1 g IntraVENous Q24H    sodium chloride (NS) flush 5-40 mL  5-40 mL IntraVENous Q8H    sodium chloride (NS) flush 5-40 mL  5-40 mL IntraVENous PRN    acetaminophen (TYLENOL) tablet 650 mg  650 mg Oral Q6H PRN    Or    acetaminophen (TYLENOL) suppository 650 mg  650 mg Rectal Q6H PRN    polyethylene glycol (MIRALAX) packet 17 g  17 g Oral DAILY PRN    ondansetron (ZOFRAN ODT) tablet 4 mg  4 mg Oral Q8H PRN    Or    ondansetron (ZOFRAN) injection 4 mg  4 mg IntraVENous Q6H PRN    [Held by provider] 0.9% sodium chloride infusion  75 mL/hr IntraVENous CONTINUOUS    heparin (porcine) injection 5,000 Units  5,000 Units SubCUTAneous Q8H    guaiFENesin-dextromethorphan (ROBITUSSIN DM) 100-10 mg/5 mL syrup 5 mL  5 mL Oral Q4H PRN    insulin lispro (HUMALOG) injection 0-10 Units  0-10 Units SubCUTAneous AC&HS    albuterol (PROVENTIL HFA, VENTOLIN HFA, PROAIR HFA) inhaler 2 Puff  2 Puff Inhalation QID RT    atorvastatin (LIPITOR) tablet 20 mg  20 mg Oral QHS    budesonide-formoteroL (SYMBICORT) 160-4.5 mcg/actuation HFA inhaler 2 Puff  2 Puff Inhalation BID    busPIRone (BUSPAR) tablet 7.5 mg  7.5 mg Oral BID    cycloSPORINE (RESTASIS) 0.05 % ophthalmic emulsion 1 Drop (Patient Supplied)  1 Drop Both Eyes Q12H    DULoxetine (CYMBALTA) capsule 20 mg  20 mg Oral DAILY    lamoTRIgine (LaMICtal) tablet 150 mg  150 mg Oral QAM    levothyroxine (SYNTHROID) tablet 137 mcg  137 mcg Oral 6am    montelukast (SINGULAIR) tablet 10 mg  10 mg Oral DAILY    tiZANidine (ZANAFLEX) tablet 4 mg  4 mg Oral TID PRN    pregabalin (LYRICA) capsule 50 mg  50 mg Oral BID    topiramate (TOPAMAX) tablet 25 mg  25 mg Oral QHS    traZODone (DESYREL) tablet 50 mg  50 mg Oral QHS    dicyclomine (BENTYL) tablet 20 mg  20 mg Oral AC&HS    pantoprazole (PROTONIX) tablet 40 mg  40 mg Oral ACB&D    dexamethasone (DECADRON) 10 mg/mL injection 10 mg  10 mg IntraVENous Q24H    hydrALAZINE (APRESOLINE) tablet 50 mg  50 mg Oral Q6H PRN    alcohol 62% (NOZIN) nasal  1 Ampule  1 Ampule Topical Q12H            Data Review:     LABS:   Recent Results (from the past 12 hour(s))   CBC W/O DIFF    Collection Time: 01/19/22  4:29 AM   Result Value Ref Range    WBC 8.3 4.3 - 11.1 K/uL    RBC 4.63 4.05 - 5.2 M/uL    HGB 11.8 11.7 - 15.4 g/dL    HCT 39.1 35.8 - 46.3 %    MCV 84.4 79.6 - 97.8 FL    MCH 25.5 (L) 26.1 - 32.9 PG    MCHC 30.2 (L) 31.4 - 35.0 g/dL    RDW 14.6 11.9 - 14.6 %    PLATELET 081 462 - 456 K/uL    MPV 9.9 9.4 - 12.3 FL    ABSOLUTE NRBC 0.00 0.0 - 0.2 K/uL   METABOLIC PANEL, BASIC    Collection Time: 01/19/22  4:29 AM   Result Value Ref Range    Sodium 140 136 - 145 mmol/L    Potassium 3.2 (L) 3.5 - 5.1 mmol/L    Chloride 102 98 - 107 mmol/L    CO2 25 21 - 32 mmol/L    Anion gap 13 7 - 16 mmol/L    Glucose 204 (H) 65 - 100 mg/dL    BUN 94 (H) 8 - 23 MG/DL    Creatinine 2.87 (H) 0.6 - 1.0 MG/DL    GFR est AA 21 (L) >60 ml/min/1.73m2    GFR est non-AA 17 (L) >60 ml/min/1.73m2    Calcium 8.4 8.3 - 10.4 MG/DL   GLUCOSE, POC    Collection Time: 01/19/22  7:45 AM   Result Value Ref Range    Glucose (POC) 234 (H) 65 - 100 mg/dL    Performed by Ivan          Plan:     Principal Problem:    Acute hypoxemic respiratory failure due to COVID-19 [facility]) (1/14/2022)    Active Problems: Morbid obesity ([email or web address] ()      Essential hypertension (9/20/2016)      Gastroesophageal reflux disease without esophagitis (9/20/2016)      OAB (overactive bladder) (9/20/2016)      Acquired hypothyroidism (11/14/2012)      Fibromyalgia ()      Overview: HX FIBROMYALGIA      Anxiety and depression ()      Type 2 diabetes mellitus with diabetic neuropathy, without long-term current use of insulin ([facility] (1/12/2018)      SUSANNE (acute kidney injury) ([facility] (1/14/2022)      Acute kidney injury in the setting of COVID positivity. She is admitted with hypoxemia and COVID-pneumonia, treated with dexamethasone and Tocilizumab.   She has normal kidney baseline   - urine studies point to hypoperfusion- some question of pulmonary edema and got some diuretic.   SUSANNE- improving, Cr down to 2.87, non oliguric, 2300 cc uop recorded last 24 hrs     COVID 19, acute respiratory failure with hypoxemia on 90% AivVo  tolerating lasix     Anemia stable- transfuse hgb < 7.0    Hypertension- on norvasc     Hypokalemia- replete

## 2022-01-19 NOTE — PROGRESS NOTES
STEPAN JOSEPH:  Patient currently on 60/100 Airvo. Discharge plan unclear at this time r/t oxygen demand. Case Management will continue to follow.

## 2022-01-19 NOTE — PROGRESS NOTES
Patient in bed resting. Respirations even and unlabored. On Airvo 60L/100%. All needs addressed. Safety measures in place. Call light within reach. No signs of acute distress at this time. Will continue to monitor. Preparing to give report to oncoming RN.

## 2022-01-19 NOTE — PROGRESS NOTES
Hospitalist Progress Note   Admit Date:  2022  5:25 AM   Name:  Pete Carmona   Age:  66 y.o. Sex:  female  :  1950   MRN:  [id number]   Room:      Presenting Complaint: Altered mental status    Reason(s) for Admission: Acute hypoxemic respiratory failure due to COVID-19 ([facility] [U07.1, J96.01]  SUSANNE (acute kidney injury) [facility]) [N17.9]     Hospital Course & Interval History:   66 y.o. female with medical history of anxiety, depression, fibromyalgia, GERD, obesity with BMI of 33, who presented to the ER because of cough and shortness of breath with decrease po intake for 5 days. EMS was called patient was very confused and short of breath. When EMS arrived patient was found to be hypoxic. She was placed on CPAP and transported to the ER.     ER course: Patient was afebrile. Initial oxygen saturation was 81% on nasal cannula. Arterial blood gas was done which showed pH of 7.4, PCO2 23, PO2 46, bicarbonate 16, oxygen saturation 85% on 10L HFNC. Patient was placed on BiPAP and subsequently transitioned to Airvo. Chest x-ray shows hazy bilateral lung opacities likely related to pulmonary edema/pneumonia. Cardiomegaly. COVID test positive. CMP significant for Cr of 3.74. CRP is 16.9. ProBNP 18K. D-Dimer 3.2. Patient admitted for further evaluation management of acute hypoxemic respiratory failure secondary to COVID-19 pneumonia, acute kidney injury. Her respiratory status continued to worsen and she required CPAP. Pulmonary team was consulted. She has been started on Decadron, and empiric antibiotics for CAP. Not a candidate for baricitinib due to kidney function. Nephrology team is on board as well. Patient has been on Lasix IV twice daily due to chest x-ray showing possible pulmonary edema versus pneumonia. She has been tolerating diuresis thus far. She has been weaned down from CPAP to Airvo. Subjective (22): Patient feels like she is doing better.   Stated that she can take deeper breaths. Still on Airvo 60 L/100%. Denies chest pain, fever, chills. Assessment & Plan:     Acute hypoxemic respiratory failure due to COVID-19  Superimposed bacterial pneumonia  Symptom onset 5 days, tested positive 1/14. In ED, O2 sat 85% on 10L HFNC. ABG shows  pH of 7.4, PCO2 23, PO2 46, Bicarb 16. CXR shows hazy bilateral lung opacities likely related to pulmonary edema/pneumonia; Cardiomegaly. ProBNP 53793. CRP 16.9. D-Dimer 3.2. procal>1  Dexamethasone for 10 days, 20mg IV daily for 5 days then 10mg daily for 5 days EOT 1/23  Not a candidate for Baricitinib d/t kidney function  Abx: Rocephin/Doxycycline (1/15-1/22)  Added IS and albuterol prn  Cont Symbicort  Prone as tolerated  Cont Lasix IV BID. Monitor I&O's, daily weights  Pulmonary team on board, appreciate recs  Cont Airvo 60L/100%, wean as tolerated  CXR shows some improvement. Cont course of treatment    SUSANNE  ? Prerenal.  Creatinine of 3.7 on admission (2 months ago was 0.98). Renal US shows no hydronephrosis; 2.9 cm L parapelvic cyst and minimal layering debris w/in bladder  Avoid nephrotoxic meds  Accurate I&O's  Holding home Losartan/Hctz  Nephrology consulted, appreciate recs  Cont Lasix IV, monitor renal function closely. Tolerating diuresis thus far, Cr improving    Morbid obesity  Adds to complexity of care  Has not been diagnosed with JESSICA, does not wear CPAP at home    Essential hypertension  Hold losartan hydrochlorothiazide due to SUSANNE  Hydralazine as needed    GERD  PPI    OAB  Cont Oxybutynin    Acquired hypothyroidism  Cont home Synthroid    Fibromyalgia  Cont home Cymbalta, Topamax    Anxiety and depression  Cont home Cymbalta, Trazodone, Pregabalin, Buspar, Lamictal    Type 2 diabetes mellitus  Holding home Metformin, Farxiga  SSI and lantus, adjust doses as appropriate  hA1C 8.0        Dispo/Discharge Planning:      >2 days. PT/OT--recommended STR    Spoke to patient's brother over phone. Patient's  was found dead at home yesterday and he wants to meet with patient tomorrow and break the sad news. Diet:  ADULT DIET Regular; 3 carb choices (45 gm/meal)  ADULT ORAL NUTRITION SUPPLEMENT Breakfast, Lunch, Dinner; Standard High Calorie/High Protein  DVT PPx: Heparin SQ  Code status: DNR    Hospital Problems as of 1/19/2022 Date Reviewed: 6/10/2021          Codes Class Noted - Resolved POA    SUSANNE (acute kidney injury) ([facility] ICD-10-CM: N17.9  ICD-9-CM: 584.9  1/14/2022 - Present Yes        * (Principal) Acute hypoxemic respiratory failure due to COVID-19 [facility]) ICD-10-CM: U07.1, J96.01  ICD-9-CM: 518.81, 079.89, 799.02  1/14/2022 - Present Yes        Type 2 diabetes mellitus with diabetic neuropathy, without long-term current use of insulin (HCC) (Chronic) ICD-10-CM: E11.40  ICD-9-CM: 250.60, 357.2  1/12/2018 - Present Yes        Fibromyalgia (Chronic) ICD-10-CM: M79.7  ICD-9-CM: 729.1  Unknown - Present Yes    Overview Signed 4/12/2017  9:40 AM by Tiki JENSEN FIBROMYALGIA             Anxiety and depression (Chronic) ICD-10-CM: F41.9, F32. A  ICD-9-CM: 300.00, 311  Unknown - Present Yes        Essential hypertension (Chronic) ICD-10-CM: I10  ICD-9-CM: 401.9  9/20/2016 - Present Yes        Gastroesophageal reflux disease without esophagitis (Chronic) ICD-10-CM: K21.9  ICD-9-CM: 530.81  9/20/2016 - Present Yes        OAB (overactive bladder) (Chronic) ICD-10-CM: N32.81  ICD-9-CM: 596.51  9/20/2016 - Present Yes        Morbid obesity ([facility] (Chronic) ICD-10-CM: E66.01  ICD-9-CM: 278.01  Unknown - Present Yes        Acquired hypothyroidism (Chronic) ICD-10-CM: E03.9  ICD-9-CM: 244.9  11/14/2012 - Present Yes              Objective:     Patient Vitals for the past 24 hrs:   Temp Pulse Resp BP SpO2   01/19/22 1132     94 %   01/19/22 1130 97.6 °F (36.4 °C) 83 22 (!) 141/68 94 %   01/19/22 0747     99 %   01/19/22 0742 97.9 °F (36.6 °C) 65 22 (!) 147/68 98 %   01/19/22 0035     93 %   01/18/22 2334 98.1 °F (36.7 °C) 70 22 (!) 149/50 92 %   01/18/22 2145     92 %   01/18/22 2010 97.9 °F (36.6 °C) 70 22 (!) 141/70 94 %   01/18/22 1511 97.8 °F (36.6 °C) 76 19 (!) 131/52 92 %   01/18/22 1505     94 %   01/18/22 1400     94 %     Oxygen Therapy  O2 Sat (%): 94 % (01/19/22 1132)  Pulse via Oximetry: 64 beats per minute (01/19/22 1132)  O2 Device: Heated; Hi flow nasal cannula (01/19/22 1132)  Skin Assessment: Clean, dry, & intact (01/19/22 0035)  Skin Protection for O2 Device: No (01/15/22 0327)  O2 Flow Rate (L/min): 60 l/min (01/19/22 1132)  O2 Temperature: 87.8 °F (31 °C) (01/18/22 2145)  FIO2 (%): 100 % (01/19/22 1132)    Estimated body mass index is 33.66 kg/m² as calculated from the following:    Height as of this encounter: 5' 3\" (1.6 m). Weight as of this encounter: 86.2 kg (190 lb). Intake/Output Summary (Last 24 hours) at 1/19/2022 1214  Last data filed at 1/19/2022 1033  Gross per 24 hour   Intake 398 ml   Output 2300 ml   Net -1902 ml         Physical Exam:     Blood pressure (!) 141/68, pulse 83, temperature 97.6 °F (36.4 °C), resp. rate 22, height 5' 3\" (1.6 m), weight 86.2 kg (190 lb), SpO2 94 %. General:    Alert and oriented x4. Ill appearing. Head:  Normocephalic, atraumatic  Eyes:  Sclerae appear normal.  Pupils equally round. ENT:  Nares appear normal, no drainage. Moist oral mucosa  Neck:  No restricted ROM. Trachea midline   CV:   RRR. No m/r/g. No jugular venous distension. Lungs:   Coarse lung sounds at bases. Breathing non-labored   Abdomen: Bowel sounds present. Soft, nontender, nondistended. Extremities: No cyanosis or clubbing. No edema  Skin:     No rashes and normal coloration. Warm and dry. Neuro:  CN II-XII grossly intact. Sensation intact. A&Ox3  Psych:  Normal mood and affect.       I have reviewed ordered lab tests and independently visualized imaging below:    Recent Labs:  Recent Results (from the past 48 hour(s))   GLUCOSE, POC    Collection Time: 01/17/22  4:27 PM   Result Value Ref Range    Glucose (POC) 372 (H) 65 - 100 mg/dL    Performed by Tierra    GLUCOSE, POC    Collection Time: 01/17/22  8:47 PM   Result Value Ref Range    Glucose (POC) 319 (H) 65 - 100 mg/dL    Performed by Timmy    GLUCOSE, POC    Collection Time: 01/17/22 10:34 PM   Result Value Ref Range    Glucose (POC) 329 (H) 65 - 100 mg/dL    Performed by Quyen Dixon    D DIMER    Collection Time: 01/18/22  4:08 AM   Result Value Ref Range    D DIMER 3.95 (H) <0.56 ug/ml(FEU)   C REACTIVE PROTEIN, QT    Collection Time: 01/18/22  4:08 AM   Result Value Ref Range    C-Reactive protein 2.2 (H) 0.0 - 0.9 mg/dL   CBC W/O DIFF    Collection Time: 01/18/22  4:08 AM   Result Value Ref Range    WBC 8.1 4.3 - 11.1 K/uL    RBC 4.22 4.05 - 5.2 M/uL    HGB 10.8 (L) 11.7 - 15.4 g/dL    HCT 35.3 (L) 35.8 - 46.3 %    MCV 83.6 79.6 - 97.8 FL    MCH 25.6 (L) 26.1 - 32.9 PG    MCHC 30.6 (L) 31.4 - 35.0 g/dL    RDW 14.6 11.9 - 14.6 %    PLATELET 858 611 - 050 K/uL    MPV 9.7 9.4 - 12.3 FL    ABSOLUTE NRBC 0.00 0.0 - 0.2 K/uL   METABOLIC PANEL, BASIC    Collection Time: 01/18/22  4:08 AM   Result Value Ref Range    Sodium 141 136 - 145 mmol/L    Potassium 3.5 3.5 - 5.1 mmol/L    Chloride 104 98 - 107 mmol/L    CO2 25 21 - 32 mmol/L    Anion gap 12 7 - 16 mmol/L    Glucose 192 (H) 65 - 100 mg/dL    BUN 89 (H) 8 - 23 MG/DL    Creatinine 3.40 (H) 0.6 - 1.0 MG/DL    GFR est AA 17 (L) >60 ml/min/1.73m2    GFR est non-AA 14 (L) >60 ml/min/1.73m2    Calcium 8.4 8.3 - 10.4 MG/DL   GLUCOSE, POC    Collection Time: 01/18/22  7:32 AM   Result Value Ref Range    Glucose (POC) 177 (H) 65 - 100 mg/dL    Performed by [employer]    GLUCOSE, POC    Collection Time: 01/18/22 11:20 AM   Result Value Ref Range    Glucose (POC) 215 (H) 65 - 100 mg/dL    Performed by [employer]    GLUCOSE, POC    Collection Time: 01/18/22  4:32 PM   Result Value Ref Range    Glucose (POC) 348 (H) 65 - 100 mg/dL    Performed by Tierra    GLUCOSE, POC    Collection Time: 01/18/22  9:34 PM   Result Value Ref Range    Glucose (POC) 357 (H) 65 - 100 mg/dL    Performed by Bart    CBC W/O DIFF    Collection Time: 01/19/22  4:29 AM   Result Value Ref Range    WBC 8.3 4.3 - 11.1 K/uL    RBC 4.63 4.05 - 5.2 M/uL    HGB 11.8 11.7 - 15.4 g/dL    HCT 39.1 35.8 - 46.3 %    MCV 84.4 79.6 - 97.8 FL    MCH 25.5 (L) 26.1 - 32.9 PG    MCHC 30.2 (L) 31.4 - 35.0 g/dL    RDW 14.6 11.9 - 14.6 %    PLATELET 263 663 - 877 K/uL    MPV 9.9 9.4 - 12.3 FL    ABSOLUTE NRBC 0.00 0.0 - 0.2 K/uL   METABOLIC PANEL, BASIC    Collection Time: 01/19/22  4:29 AM   Result Value Ref Range    Sodium 140 136 - 145 mmol/L    Potassium 3.2 (L) 3.5 - 5.1 mmol/L    Chloride 102 98 - 107 mmol/L    CO2 25 21 - 32 mmol/L    Anion gap 13 7 - 16 mmol/L    Glucose 204 (H) 65 - 100 mg/dL    BUN 94 (H) 8 - 23 MG/DL    Creatinine 2.87 (H) 0.6 - 1.0 MG/DL    GFR est AA 21 (L) >60 ml/min/1.73m2    GFR est non-AA 17 (L) >60 ml/min/1.73m2    Calcium 8.4 8.3 - 10.4 MG/DL   GLUCOSE, POC    Collection Time: 01/19/22  7:45 AM   Result Value Ref Range    Glucose (POC) 234 (H) 65 - 100 mg/dL    Performed by Ivan        All Micro Results     Procedure Component Value Units Date/Time    COVID-19 RAPID TEST [[id number]]  (Abnormal) Collected: 01/14/22 0600    Order Status: Completed Specimen: Nasopharyngeal Updated: 01/14/22 0720     Specimen source NASAL        Comment: RAPID ONLY        COVID-19 rapid test Detected        Comment:      The specimen is POSITIVE for SARS-CoV-2, the novel coronavirus associated with COVID-19. This test has been authorized by the FDA under an Emergency Use Authorization (EUA) for use by authorized laboratories.         Fact sheet for Healthcare Providers: ConventionUpdate.co.nz  Fact sheet for Patients: ConventionUpdate.co.nz       Methodology: Isothermal Nucleic Acid Amplification               Other Studies:  No results found.     Current Meds:  Current Facility-Administered Medications   Medication Dose Route Frequency    potassium chloride (K-DUR, KLOR-CON M20) SR tablet 20 mEq  20 mEq Oral DAILY    insulin glargine (LANTUS) injection 26 Units  0.3 Units/kg SubCUTAneous QHS    insulin lispro (HUMALOG) injection 3 Units  3 Units SubCUTAneous TIDAC    lip protectant (BLISTEX) ointment 1 Each  1 Each Topical PRN    amLODIPine (NORVASC) tablet 5 mg  5 mg Oral DAILY    furosemide (LASIX) injection 40 mg  40 mg IntraVENous BID    cefTRIAXone (ROCEPHIN) 1 g in 0.9% sodium chloride (MBP/ADV) 50 mL MBP  1 g IntraVENous Q24H    sodium chloride (NS) flush 5-40 mL  5-40 mL IntraVENous Q8H    sodium chloride (NS) flush 5-40 mL  5-40 mL IntraVENous PRN    acetaminophen (TYLENOL) tablet 650 mg  650 mg Oral Q6H PRN    Or    acetaminophen (TYLENOL) suppository 650 mg  650 mg Rectal Q6H PRN    polyethylene glycol (MIRALAX) packet 17 g  17 g Oral DAILY PRN    ondansetron (ZOFRAN ODT) tablet 4 mg  4 mg Oral Q8H PRN    Or    ondansetron (ZOFRAN) injection 4 mg  4 mg IntraVENous Q6H PRN    [Held by provider] 0.9% sodium chloride infusion  75 mL/hr IntraVENous CONTINUOUS    heparin (porcine) injection 5,000 Units  5,000 Units SubCUTAneous Q8H    guaiFENesin-dextromethorphan (ROBITUSSIN DM) 100-10 mg/5 mL syrup 5 mL  5 mL Oral Q4H PRN    insulin lispro (HUMALOG) injection 0-10 Units  0-10 Units SubCUTAneous AC&HS    albuterol (PROVENTIL HFA, VENTOLIN HFA, PROAIR HFA) inhaler 2 Puff  2 Puff Inhalation QID RT    atorvastatin (LIPITOR) tablet 20 mg  20 mg Oral QHS    budesonide-formoteroL (SYMBICORT) 160-4.5 mcg/actuation HFA inhaler 2 Puff  2 Puff Inhalation BID    busPIRone (BUSPAR) tablet 7.5 mg  7.5 mg Oral BID    cycloSPORINE (RESTASIS) 0.05 % ophthalmic emulsion 1 Drop (Patient Supplied)  1 Drop Both Eyes Q12H    DULoxetine (CYMBALTA) capsule 20 mg 20 mg Oral DAILY    lamoTRIgine (LaMICtal) tablet 150 mg  150 mg Oral QAM    levothyroxine (SYNTHROID) tablet 137 mcg  137 mcg Oral 6am    montelukast (SINGULAIR) tablet 10 mg  10 mg Oral DAILY    tiZANidine (ZANAFLEX) tablet 4 mg  4 mg Oral TID PRN    pregabalin (LYRICA) capsule 50 mg  50 mg Oral BID    topiramate (TOPAMAX) tablet 25 mg  25 mg Oral QHS    traZODone (DESYREL) tablet 50 mg  50 mg Oral QHS    dicyclomine (BENTYL) tablet 20 mg  20 mg Oral AC&HS    pantoprazole (PROTONIX) tablet 40 mg  40 mg Oral ACB&D    dexamethasone (DECADRON) 10 mg/mL injection 10 mg  10 mg IntraVENous Q24H    hydrALAZINE (APRESOLINE) tablet 50 mg  50 mg Oral Q6H PRN    alcohol 62% (NOZIN) nasal  1 Ampule  1 Ampule Topical Q12H       Signed:  Pa Sykes MD    Part of this note may have been written by using a voice dictation software. The note has been proof read but may still contain some grammatical/other typographical errors.

## 2022-01-19 NOTE — PROGRESS NOTES
Report received from [facility], [address] [place] [address]. Patient in bed resting. Respirations even and unlabored. On airvo 60L/100%. No needs expressed at this time. Safety measures in place. Call light in reach. No signs of acute distress at this time. Will continue to monitor.

## 2022-01-19 NOTE — PROGRESS NOTES
ACUTE PHYSICAL THERAPY GOALS:  (Developed with and agreed upon by patient and/or caregiver. )  LTG:  (1.)Ms. Carmona will move from supine to sit and sit to supine , scoot up and down and roll side to side in bed with MODIFIED INDEPENDENCE within 7 treatment day(s). (2.)Ms. Carmona will transfer from bed to chair and chair to bed with STAND BY ASSIST using the least restrictive device within 7 treatment day(s). (3.)Ms. Carmona will ambulate with STAND BY ASSIST for 100 feet with the least restrictive device within 7 treatment day(s). (4.)Ms. Carmona will participate in therapeutic activity/exercises x 25 minutes for increased activity tolerance with SpO2 above 90% within 7 treatment days. PHYSICAL THERAPY: Daily Note and PM Treatment Day # 3    Hilda Santizo is a 66 y.o. female   PRIMARY DIAGNOSIS: Acute hypoxemic respiratory failure due to COVID-19 [facility])  Acute hypoxemic respiratory failure due to [facility]-19 ([facility] [U07.1, J96.01]  SUSANNE (acute kidney injury) ([facility] [N17.9]         ASSESSMENT:     REHAB RECOMMENDATIONS: CURRENT LEVEL OF FUNCTION:  (Most Recently Demonstrated)   Recommendation to date pending progress:  Setting:   Short-term Rehab  Equipment:    To Be Determined Bed Mobility:   Minimal Assistance  Sit to Stand:   Not tested  Transfers:   Not tested  Gait/Mobility:   Not tested     ASSESSMENT:  Ms. Ghazala Greco was supine in bed on airvo (60L; 100%) upon entering the room and agreeable to therapy. Today, pt demonstrates slow progress toward established goals as she was able to tolerate inc time sitting EOB and perform exercises although she declined any/all OOB mobility. This session, pt required Min (A) for bed mobility but was able to stay seated EOB unsupported for duration of session. Furthermore, pt's SpO2 remained >93% throughout all activity today with pt reporting less SOB than previous sessions.  Overall, slow progress as pt did not desat today but performed lower-level activity than previous sessions. At this time, pt remains a good candidate for skilled PT and will continue to benefit from advancing POC. At end of session, pt was positioned to comfort in chair with all needs in reach. RN was made aware of pt performance.         SUBJECTIVE:   Ms. Coretta Tamayo states, \"My father was in the [employer] during [employer]"    SOCIAL HISTORY/ LIVING ENVIRONMENT: See [address]: Private residence  One/Two Story Residence: One story  Living Alone: No  Support Systems: Spouse/Significant Other  OBJECTIVE:     PAIN: VITAL SIGNS: LINES/DRAINS:   Pre Treatment: Pain Screen  Pain Scale 1: Numeric (0 - 10)  Pain Intensity 1: 5  Pain Onset 1: ongoing  Pain Location 1: Back  Pain Orientation 1: Lower  Pain Description 1: Aching  Pain Intervention(s) 1: Ambulation/Increased Activity  Post Treatment: 5 Vital Signs  O2 Sat (%): 93 %  O2 Device:  (Airvo)  O2 Flow Rate (L/min): 60 l/min  FIO2 (%): 100 % Continuous Pulse Oximetry  O2 Device:  (Airvo)     MOBILITY: I Mod I S SBA CGA Min Mod Max Total  NT x2 Comments:   Bed Mobility    Rolling [] [] [] [] [] [] [] [] [] [x] []    Supine to Sit [] [] [] [] [] [x] [] [] [] [] []    Scooting [] [] [] [] [] [x] [] [] [] [] []    Sit to Supine [] [] [] [] [] [x] [] [] [] [] []    Transfers    Sit to Stand [] [] [] [] [] [] [] [] [] [x] []    Bed to Chair [] [] [] [] [] [] [] [] [] [x] []    Stand to Sit [] [] [] [] [] [] [] [] [] [x] []    I=Independent, Mod I=Modified Independent, S=Supervision, SBA=Standby Assistance, CGA=Contact Guard Assistance,   Min=Minimal Assistance, Mod=Moderate Assistance, Max=Maximal Assistance, Total=Total Assistance, NT=Not Tested    BALANCE: Good Fair+ Fair Fair- Poor NT Comments   Sitting Static [x] [] [] [] [] []    Sitting Dynamic [] [x] [] [] [] []              Standing Static [] [x] [] [] [] [x]    Standing Dynamic [] [] [] [] [] [x]      GAIT: I Mod I S SBA CGA Min Mod Max Total  NT x2 Comments:   Level of Assistance [] [] [] [] [] [] [] [] [] [x] []    Distance NA    DME N/A    Gait Quality NA    Weightbearing  Status N/A     I=Independent, Mod I=Modified Independent, S=Supervision, SBA=Standby Assistance, CGA=Contact Guard Assistance,   Min=Minimal Assistance, Mod=Moderate Assistance, Max=Maximal Assistance, Total=Total Assistance, NT=Not Tested    PLAN:   FREQUENCY/DURATION: PT Plan of Care: 3 times/week for duration of hospital stay or until stated goals are met, whichever comes first.  TREATMENT:     TREATMENT:   ($$ Therapeutic Activity: 23-37 mins    )  Therapeutic Activity (25 Minutes): Therapeutic activity included Supine to Sit, Scooting, Lateral Scooting, Transfer Training, Ambulation on level ground, Sitting balance  and Standing balance to improve functional Mobility, Strength and Activity tolerance.     TREATMENT GRID:   Date:  1/19 Date:   Date:     Activity/Exercise Parameters Parameters Parameters   LAQ x10 GUILLERMO     APs x10 GUILLERMO     Hip Flex x10 GUILLERMO                                   AFTER TREATMENT POSITION/PRECAUTIONS:  Bed, Needs within reach and RN notified    INTERDISCIPLINARY COLLABORATION:  RN/PCT and PT/PTA    TOTAL TREATMENT DURATION:  PT Patient Time In/Time Out  Time In: 1256  Time Out: [address] [facility], PT

## 2022-01-19 NOTE — PROGRESS NOTES
Pt sitting up in bed. Pt on airvo at 60 l 100% with sat 96% at this time. When coughing pt desats to 63% with quick recovery. No distress noted at this time. Call light in reach, will monitor.

## 2022-01-19 NOTE — PROGRESS NOTES
Pt sitting up in bed. Pt alert oriented to person and place at this time. Pt denies pain or distress at this time. Pt on airvo at 60L 100%  with sat 97%. Continuous sat monitor in place. Pt encouraged to call for assistance if needed call light in reach, will monitor.

## 2022-01-20 NOTE — PROGRESS NOTES
[facility] Progress Note      Admission Date: 1/14/2022   Follow up SUSANNE  Subjective:   Pt seen and examined in room, complaints thirst, weakness/fatigue, SOB, remains on 100 % Bipap overnight, good uop.    DW RT plan to switch over to Avivo this AM     ROS:  General: no fever/chills  CV: no CP  Lung: + SOB, + cough  GI: no N/V/D  : no dysuria  Ext: no edema         Objective:     Physical Exam:      Visit Vitals  /64   Pulse 68   Temp 97.8 °F (36.6 °C)   Resp 21   Ht 5' 3\" (1.6 m)   Wt 86.2 kg (190 lb)   SpO2 95%   BMI 33.66 kg/m²      Gen: alert and oriented, NAD  HEENT: moist membranes  CV: regular rate, S1, S2, no Rub   Lungs: Coarse lung sounds bilaterally  Abd: soft, non tender, + BS  Extem: no edema      Current Facility-Administered Medications   Medication Dose Route Frequency    potassium chloride (K-DUR, KLOR-CON M20) SR tablet 20 mEq  20 mEq Oral DAILY    insulin glargine (LANTUS) injection 26 Units  0.3 Units/kg SubCUTAneous QHS    insulin lispro (HUMALOG) injection 3 Units  3 Units SubCUTAneous TIDAC    lip protectant (BLISTEX) ointment 1 Each  1 Each Topical PRN    amLODIPine (NORVASC) tablet 5 mg  5 mg Oral DAILY    furosemide (LASIX) injection 40 mg  40 mg IntraVENous BID    cefTRIAXone (ROCEPHIN) 1 g in 0.9% sodium chloride (MBP/ADV) 50 mL MBP  1 g IntraVENous Q24H    sodium chloride (NS) flush 5-40 mL  5-40 mL IntraVENous Q8H    sodium chloride (NS) flush 5-40 mL  5-40 mL IntraVENous PRN    acetaminophen (TYLENOL) tablet 650 mg  650 mg Oral Q6H PRN    Or    acetaminophen (TYLENOL) suppository 650 mg  650 mg Rectal Q6H PRN    polyethylene glycol (MIRALAX) packet 17 g  17 g Oral DAILY PRN    ondansetron (ZOFRAN ODT) tablet 4 mg  4 mg Oral Q8H PRN    Or    ondansetron (ZOFRAN) injection 4 mg  4 mg IntraVENous Q6H PRN    [Held by provider] 0.9% sodium chloride infusion  75 mL/hr IntraVENous CONTINUOUS    heparin (porcine) injection 5,000 Units  5,000 Units SubCUTAneous Q8H    guaiFENesin-dextromethorphan (ROBITUSSIN DM) 100-10 mg/5 mL syrup 5 mL  5 mL Oral Q4H PRN    insulin lispro (HUMALOG) injection 0-10 Units  0-10 Units SubCUTAneous AC&HS    albuterol (PROVENTIL HFA, VENTOLIN HFA, PROAIR HFA) inhaler 2 Puff  2 Puff Inhalation QID RT    atorvastatin (LIPITOR) tablet 20 mg  20 mg Oral QHS    budesonide-formoteroL (SYMBICORT) 160-4.5 mcg/actuation HFA inhaler 2 Puff  2 Puff Inhalation BID    busPIRone (BUSPAR) tablet 7.5 mg  7.5 mg Oral BID    cycloSPORINE (RESTASIS) 0.05 % ophthalmic emulsion 1 Drop (Patient Supplied)  1 Drop Both Eyes Q12H    DULoxetine (CYMBALTA) capsule 20 mg  20 mg Oral DAILY    lamoTRIgine (LaMICtal) tablet 150 mg  150 mg Oral QAM    levothyroxine (SYNTHROID) tablet 137 mcg  137 mcg Oral 6am    montelukast (SINGULAIR) tablet 10 mg  10 mg Oral DAILY    tiZANidine (ZANAFLEX) tablet 4 mg  4 mg Oral TID PRN    pregabalin (LYRICA) capsule 50 mg  50 mg Oral BID    topiramate (TOPAMAX) tablet 25 mg  25 mg Oral QHS    traZODone (DESYREL) tablet 50 mg  50 mg Oral QHS    dicyclomine (BENTYL) tablet 20 mg  20 mg Oral AC&HS    pantoprazole (PROTONIX) tablet 40 mg  40 mg Oral ACB&D    dexamethasone (DECADRON) 10 mg/mL injection 10 mg  10 mg IntraVENous Q24H    hydrALAZINE (APRESOLINE) tablet 50 mg  50 mg Oral Q6H PRN    alcohol 62% (NOZIN) nasal  1 Ampule  1 Ampule Topical Q12H            Data Review:     LABS:   Recent Results (from the past 12 hour(s))   GLUCOSE, POC    Collection Time: 01/19/22  9:23 PM   Result Value Ref Range    Glucose (POC) 330 (H) 65 - 100 mg/dL    Performed by Timmy    CBC W/O DIFF    Collection Time: 01/20/22  3:48 AM   Result Value Ref Range    WBC 12.0 (H) 4.3 - 11.1 K/uL    RBC 4.75 4.05 - 5.2 M/uL    HGB 12.4 11.7 - 15.4 g/dL    HCT 39.7 35.8 - 46.3 %    MCV 83.6 79.6 - 97.8 FL    MCH 26.1 26.1 - 32.9 PG    MCHC 31.2 (L) 31.4 - 35.0 g/dL    RDW 14.5 11.9 - 14.6 %    PLATELET 354 153 - 233 K/uL    MPV 10.4 9.4 - 12.3 FL    ABSOLUTE NRBC 0.00 0.0 - 0.2 K/uL   METABOLIC PANEL, BASIC    Collection Time: 01/20/22  3:48 AM   Result Value Ref Range    Sodium 142 136 - 145 mmol/L    Potassium 2.9 (L) 3.5 - 5.1 mmol/L    Chloride 103 98 - 107 mmol/L    CO2 27 21 - 32 mmol/L    Anion gap 12 7 - 16 mmol/L    Glucose 100 65 - 100 mg/dL     (H) 8 - 23 MG/DL    Creatinine 2.54 (H) 0.6 - 1.0 MG/DL    GFR est AA 24 (L) >60 ml/min/1.73m2    GFR est non-AA 20 (L) >60 ml/min/1.73m2    Calcium 8.9 8.3 - 10.4 MG/DL   GLUCOSE, POC    Collection Time: 01/20/22  7:45 AM   Result Value Ref Range    Glucose (POC) 132 (H) 65 - 100 mg/dL    Performed by Ivan          Plan:     Principal Problem:    Acute hypoxemic respiratory failure due to COVID-19 [facility]) (1/14/2022)    Active Problems: Morbid obesity ([facility] ()      Essential hypertension (9/20/2016)      Gastroesophageal reflux disease without esophagitis (9/20/2016)      OAB (overactive bladder) (9/20/2016)      Acquired hypothyroidism (11/14/2012)      Fibromyalgia ()      Overview: HX FIBROMYALGIA      Anxiety and depression ()      Type 2 diabetes mellitus with diabetic neuropathy, without long-term current use of insulin ([facility] (1/12/2018)      SUSANNE (acute kidney injury) ([facility] (1/14/2022)      Acute kidney injury in the setting of COVID positivity. She is admitted with hypoxemia and COVID-pneumonia, treated with dexamethasone and Tocilizumab.   She has normal kidney baseline   - urine studies point to hypoperfusion- some question of pulmonary edema and got some diuretic.   SUSANNE- improving, Cr down to 2.54, non oliguric,  COVID 19, acute respiratory failure with hypoxemia on 90% AivVo  tolerating lasix     Anemia stable    Hypertension- on norvasc     Hypokalemia- replete

## 2022-01-20 NOTE — PROGRESS NOTES
ACUTE PHYSICAL THERAPY GOALS:  (Developed with and agreed upon by patient and/or caregiver. )  LTG:  (1.)Ms. Carmona will move from supine to sit and sit to supine , scoot up and down and roll side to side in bed with MODIFIED INDEPENDENCE within 7 treatment day(s). (2.)Ms. Carmona will transfer from bed to chair and chair to bed with STAND BY ASSIST using the least restrictive device within 7 treatment day(s). (3.)Ms. Carmona will ambulate with STAND BY ASSIST for 100 feet with the least restrictive device within 7 treatment day(s). (4.)Ms. Carmona will participate in therapeutic activity/exercises x 25 minutes for increased activity tolerance with SpO2 above 90% within 7 treatment days. PHYSICAL THERAPY: Daily Note and PM Treatment Day # 4    Chris Gordon is a 66 y.o. female   PRIMARY DIAGNOSIS: Acute hypoxemic respiratory failure due to COVID-19 [facility])  [facility] [U07.1, J96.01]  SUSANNE (acute kidney injury) ([facility] [N17.9]         ASSESSMENT:     REHAB RECOMMENDATIONS: CURRENT LEVEL OF FUNCTION:  (Most Recently Demonstrated)   Recommendation to date pending progress:  Setting:   Short-term Rehab  Equipment:    To Be Determined Bed Mobility:   Moderate Assistance  Sit to Stand:   Minimal Assistance  Transfers:   Minimal Assistance x 2  Gait/Mobility:   Minimal Assistance x 2     ASSESSMENT:  Ms. Candis Del Valle was supine in bed on airvo (60L; 100%) upon entering the room and agreeable to therapy. Today, pt demonstrates progress toward established goals as she was able to tolerate inc activity and do-so without requiring inc level of (A) from therapy team. This session, pt required Mod (A) for bed mobility, Min (A) for sit-to-stand and Min (A)x2 for transfer to chair/ brief steps to chair. Pt did desat to 84% while perfoming transfer but recovered quickly once sat in chair and began nasal breathing.  Once in chair, pt (A) with hygiene/ self care activities before being positioned to comfort. Overall, slow progress as pt cont to desat with low-level activity but was able to tolerate more therapy today than in previous session. At this time, pt remains a good candidate for skilled PT and will continue to benefit from advancing POC. At end of session, pt was positioned to comfort in chair with all needs in reach. RN was made aware of pt performance.         SUBJECTIVE:   Ms. Sarah Gao states, \"You guys are doing a good job\"    SOCIAL HISTORY/ LIVING ENVIRONMENT: See Eval  Home Environment: Private residence  One/Two Story Residence: One story  Living Alone: No  Support Systems: Spouse/Significant Other  OBJECTIVE:     PAIN: VITAL SIGNS: LINES/DRAINS:   Pre Treatment: Pain Screen  Pain Scale 1: Numeric (0 - 10)  Pain Intensity 1: 0  Post Treatment: 0 Vital Signs  O2 Sat (%): 95 %  O2 Device:  (Airvo)  O2 Flow Rate (L/min): 60 l/min  FIO2 (%): 100 % Continuous Pulse Oximetry and Purewick  O2 Device:  (Airvo)     MOBILITY: I Mod I S SBA CGA Min Mod Max Total  NT x2 Comments:   Bed Mobility    Rolling [] [] [] [] [] [] [] [] [] [x] []    Supine to Sit [] [] [] [] [] [] [x] [] [] [] []    Scooting [] [] [] [] [] [] [x] [] [] [] []    Sit to Supine [] [] [] [] [] [] [] [] [] [x] []    Transfers    Sit to Stand [] [] [] [] [] [x] [] [] [] [] []    Bed to Chair [] [] [] [] [] [x] [] [] [] [] [x]    Stand to Sit [] [] [] [] [] [x] [] [] [] [] []    I=Independent, Mod I=Modified Independent, S=Supervision, SBA=Standby Assistance, CGA=Contact Guard Assistance,   Min=Minimal Assistance, Mod=Moderate Assistance, Max=Maximal Assistance, Total=Total Assistance, NT=Not Tested    BALANCE: Good Fair+ Fair Fair- Poor NT Comments   Sitting Static [x] [] [] [] [] []    Sitting Dynamic [] [x] [] [] [] []              Standing Static [] [x] [] [] [] [x]    Standing Dynamic [] [] [x] [x] [] [x]      GAIT: I Mod I S SBA CGA Min Mod Max Total  NT x2 Comments:   Level of Assistance [] [] [] [] [] [x] [] [] [] [] [x]    Distance 3' to chair    DME GUILLERMO sub-axillary support    Gait Quality Shuffling; unsteady; heavily reliant on (A) from therapy team    Weightbearing  Status N/A     I=Independent, Mod I=Modified Independent, S=Supervision, SBA=Standby Assistance, CGA=Contact Guard Assistance,   Min=Minimal Assistance, Mod=Moderate Assistance, Max=Maximal Assistance, Total=Total Assistance, NT=Not Tested    PLAN:   FREQUENCY/DURATION: PT Plan of Care: 3 times/week for duration of hospital stay or until stated goals are met, whichever comes first.  TREATMENT:     TREATMENT:   ($$ Therapeutic Activity: 38-52 mins    )  Therapeutic Activity (40 Minutes): Therapeutic activity included Supine to Sit, Scooting, Lateral Scooting, Transfer Training, Ambulation on level ground, Sitting balance  and Standing balance to improve functional Mobility, Strength and Activity tolerance.     TREATMENT GRID:   Date:  1/19 Date:   Date:     Activity/Exercise Parameters Parameters Parameters   LAQ x10 GUILLERMO     APs x10 GUILLERMO     Hip Flex x10 GUILLERMO                                   AFTER TREATMENT POSITION/PRECAUTIONS:  Chair, Needs within reach and RN notified    INTERDISCIPLINARY COLLABORATION:  RN/PCT and PT/PTA    TOTAL TREATMENT DURATION:  PT Patient Time In/Time Out  Time In: 1335  Time Out: [id number], PT

## 2022-01-20 NOTE — PROGRESS NOTES
Hospitalist Progress Note   Admit Date:  2022  5:25 AM   Name:  Crystal Carmona   Age:  66 y.o. Sex:  female  :  1950   MRN:  [id number]   Room:      Presenting Complaint: Altered mental status    Reason(s) for Admission: Acute hypoxemic respiratory failure due to COVID-19 ([facility] [address] [U07.1, J96.01]  SUSANNE (acute kidney injury) [facility]) [N17.9]     Hospital Course & Interval History:   69-year-old female past medical history of fibromyalgia, depression/anxiety, GERD, obesity BMI of 33 who presented with cough and shortness of breath and decreased p.o. intake over the last 5 days. When EMS arrived at home patient was short of breath, hypoxic and confused. Patient was placed on CPAP and transported to the ED. Initial oxygen saturation was 81% on nasal cannula. Patient was placed on BiPAP and subsequently transitioned to Airvo. Chest x-ray showed hazy bilateral lung opacities likely secondary to pulmonary edema/pneumonia. COVID test was positive. Patient was admitted for further evaluation of acute hypoxemic respiratory failure secondary to COVID-19 pneumonia, SUSANNE. Respiratory status had worsened and required CPAP. Pulm has been consulted. Patient was started on Decadron and empiric antibiotics for her community-acquired pneumonia. Patient was not a candidate for baricitinib due to her kidney function. Nephrology consulted. Patient has been tolerating diuresis thus far and has been weaned down from CPAP to Airvo. Subjective (22): Patient was seen and examined at the bedside. Patient feeling better this morning. Patient was lying in bed comfortably without any complaints. Patient denied any cardiac chest pain, abdominal pain, fever/chills. Patient still on 60 L satting at 99%.     Assessment & Plan:   Acute hypoxemic respiratory failure due to COVID-19 with superimposed bacterial pneumonia ([address] (2022)  COVID-positive    Chest x-ray with hazy bilateral lung opacities likely secondary to pulmonary edema/pneumonia   Continue Decadron, end of treatment 1/23   Not a candidate for baricitinib due to kidney function   Continue Rocephin and doxycycline, end of treatment 1/22   I-S and albuterol as needed   Continue Symbicort   Prone as tolerated   Continue Lasix IV twice daily   Continue to monitor I's and O's, daily weights   Pulmonary consulted appreciate recommendations   Continue Airvo 60 L / 100%, continue to wean   Patient still critical at this time requiring higher levels of oxygenation to maintain oxygen greater than 90%. Patient high risk for further decline    Acute kidney injury   Creatinine of 3.7 on admission   Renal ultrasound without hydronephrosis, 2.9 cm parapelvic cyst   Avoid nephrotoxic medications   Continue to monitor I's and O's   Holding home hydrochlorothiazide and losartan   Nephrology consulted, appreciate recommendations   Continue IV Lasix twice daily and monitor renal function    Morbid obesity   Adds to complexity of care   Patient not diagnosed with JESSICA but does wear CPAP at home    Essential hypertension   Hydralazine as needed   Holding hydrochlorothiazide and losartan due to acute kidney injury mild    GERD   Continue PPI    Overactive bladder   Continue oxybutynin    Acquired hypothyroidism   Continue home Synthroid    Fibromyalgia   Continue Topamax, Cymbalta    Anxiety/depression   Continue home trazodone, Cymbalta, BuSpar, pregabalin, Lamictal    Type 2 diabetes   Metformin on hold, Farxiga   Sliding scale insulin   Lantus, titrate as appropriate(1/20, changed to Lantus from 26-22, increase prandial from 3 to 6 units.)   Hemoglobin A1c 8.0      Dispo/Discharge Planning:    Dispo pending clinical course. Continue to wean oxygen.   PT/OT recommending short-term rehab    Diet:  ADULT DIET Regular; 3 carb choices (45 gm/meal)  ADULT ORAL NUTRITION SUPPLEMENT Breakfast, Lunch, Dinner; Standard High Calorie/High Protein  DVT PPx: Heparin subcu  Code status: DNR    Hospital Problems as of 1/20/2022 Date Reviewed: 6/10/2021          Codes Class Noted - Resolved POA    SUSANNE (acute kidney injury) ([facility] [address] ICD-10-CM: N17.9  ICD-9-CM: 584.9  1/14/2022 - Present Yes        * (Principal) Acute hypoxemic respiratory failure due to COVID-19 [facility]) ICD-10-CM: U07.1, J96.01  ICD-9-CM: 518.81, 079.89, 799.02  1/14/2022 - Present Yes        Type 2 diabetes mellitus with diabetic neuropathy, without long-term current use of insulin (HCC) (Chronic) ICD-10-CM: E11.40  ICD-9-CM: 250.60, 357.2  1/12/2018 - Present Yes        Fibromyalgia (Chronic) ICD-10-CM: M79.7  ICD-9-CM: 729.1  Unknown - Present Yes    Overview Signed 4/12/2017  9:40 AM by Alexandra JENSEN FIBROMYALGIA             Anxiety and depression (Chronic) ICD-10-CM: F41.9, F32. A  ICD-9-CM: 300.00, 311  Unknown - Present Yes        Essential hypertension (Chronic) ICD-10-CM: I10  ICD-9-CM: 401.9  9/20/2016 - Present Yes        Gastroesophageal reflux disease without esophagitis (Chronic) ICD-10-CM: K21.9  ICD-9-CM: 530.81  9/20/2016 - Present Yes        OAB (overactive bladder) (Chronic) ICD-10-CM: N32.81  ICD-9-CM: 596.51  9/20/2016 - Present Yes        Morbid obesity ([facility] [address] (Chronic) ICD-10-CM: E66.01  ICD-9-CM: 278.01  Unknown - Present Yes        Acquired hypothyroidism (Chronic) ICD-10-CM: E03.9  ICD-9-CM: 244.9  11/14/2012 - Present Yes              Objective:     Patient Vitals for the past 24 hrs:   Temp Pulse Resp BP SpO2   01/20/22 1140     95 %   01/20/22 1057 97.9 °F (36.6 °C) 82 21 135/62 94 %   01/20/22 0814     95 %   01/20/22 0744 97.8 °F (36.6 °C) 68 21 124/64 95 %   01/20/22 0317 98.6 °F (37 °C) 80 21 134/70 99 %   01/19/22 2331 98.1 °F (36.7 °C) 67 20 135/61 97 %   01/19/22 2307     98 %   01/19/22 2035     96 %   01/19/22 2017 97.4 °F (36.3 °C) (!) 58 20 129/63 97 %   01/19/22 1630     94 %   01/19/22 1542 98.1 °F (36.7 °C) 67 22 135/61 96 %     Oxygen Therapy  O2 Sat (%): 95 % (01/20/22 1140)  Pulse via Oximetry: 88 beats per minute (01/20/22 1140)  O2 Device: Heated; Hi flow nasal cannula (01/20/22 1140)  Skin Assessment: Clean, dry, & intact (01/19/22 1933)  Skin Protection for O2 Device: No (01/15/22 0327)  O2 Flow Rate (L/min): 60 l/min (01/20/22 1140)  O2 Temperature: 87.8 °F (31 °C) (01/18/22 2145)  FIO2 (%): 100 % (01/20/22 1140)    Estimated body mass index is 33.66 kg/m² as calculated from the following:    Height as of this encounter: 5' 3\" (1.6 m). Weight as of this encounter: 86.2 kg (190 lb). Intake/Output Summary (Last 24 hours) at 1/20/2022 1310  Last data filed at 1/20/2022 1123  Gross per 24 hour   Intake 200 ml   Output 700 ml   Net -500 ml         Physical Exam:   Blood pressure 135/62, pulse 82, temperature 97.9 °F (36.6 °C), resp. rate 21, height 5' 3\" (1.6 m), weight 86.2 kg (190 lb), SpO2 95 %. General:    Well nourished. No overt distress  Head:  Normocephalic, atraumatic  Eyes:  Sclerae appear normal.  Pupils equally round. ENT:  Nares appear normal, no drainage. Moist oral mucosa  Neck:  No restricted ROM. Trachea midline   CV:   RRR. No m/r/g. No jugular venous distension. Lungs:   Decreased breath sounds. No wheezing, rhonchi, or rales. Respirations even, unlabored  Abdomen: Bowel sounds present. Soft, nontender, nondistended. Extremities: No cyanosis or clubbing. No edema  Skin:     No rashes and normal coloration. Warm and dry. Neuro:  CN II-XII grossly intact. Sensation intact. A&Ox3  Psych:  Normal mood and affect.       I have reviewed ordered lab tests and independently visualized imaging below:    Recent Labs:  Recent Results (from the past 48 hour(s))   GLUCOSE, POC    Collection Time: 01/18/22  4:32 PM   Result Value Ref Range    Glucose (POC) 348 (H) 65 - 100 mg/dL    Performed by Tierra    GLUCOSE, POC    Collection Time: 01/18/22  9:34 PM   Result Value Ref Range    Glucose (POC) 357 (H) 65 - 100 mg/dL    Performed by Bart    CBC W/O DIFF    Collection Time: 01/19/22  4:29 AM   Result Value Ref Range    WBC 8.3 4.3 - 11.1 K/uL    RBC 4.63 4.05 - 5.2 M/uL    HGB 11.8 11.7 - 15.4 g/dL    HCT 39.1 35.8 - 46.3 %    MCV 84.4 79.6 - 97.8 FL    MCH 25.5 (L) 26.1 - 32.9 PG    MCHC 30.2 (L) 31.4 - 35.0 g/dL    RDW 14.6 11.9 - 14.6 %    PLATELET 625 268 - 569 K/uL    MPV 9.9 9.4 - 12.3 FL    ABSOLUTE NRBC 0.00 0.0 - 0.2 K/uL   METABOLIC PANEL, BASIC    Collection Time: 01/19/22  4:29 AM   Result Value Ref Range    Sodium 140 136 - 145 mmol/L    Potassium 3.2 (L) 3.5 - 5.1 mmol/L    Chloride 102 98 - 107 mmol/L    CO2 25 21 - 32 mmol/L    Anion gap 13 7 - 16 mmol/L    Glucose 204 (H) 65 - 100 mg/dL    BUN 94 (H) 8 - 23 MG/DL    Creatinine 2.87 (H) 0.6 - 1.0 MG/DL    GFR est AA 21 (L) >60 ml/min/1.73m2    GFR est non-AA 17 (L) >60 ml/min/1.73m2    Calcium 8.4 8.3 - 10.4 MG/DL   GLUCOSE, POC    Collection Time: 01/19/22  7:45 AM   Result Value Ref Range    Glucose (POC) 234 (H) 65 - 100 mg/dL    Performed by LeonidasFlowPayaP[facility]    GLUCOSE, POC    Collection Time: 01/19/22 12:09 PM   Result Value Ref Range    Glucose (POC) 291 (H) 65 - 100 mg/dL    Performed by [facility]idelHoliday [facility]ane[facility]    GLUCOSE, POC    Collection Time: 01/19/22  5:02 PM   Result Value Ref Range    Glucose (POC) 318 (H) 65 - 100 mg/dL    Performed by FidelHoliday PropaneaPCT    GLUCOSE, POC    Collection Time: 01/19/22  9:23 PM   Result Value Ref Range    Glucose (POC) 330 (H) 65 - 100 mg/dL    Performed by Timmy    CBC W/O DIFF    Collection Time: 01/20/22  3:48 AM   Result Value Ref Range    WBC 12.0 (H) 4.3 - 11.1 K/uL    RBC 4.75 4.05 - 5.2 M/uL    HGB 12.4 11.7 - 15.4 g/dL    HCT 39.7 35.8 - 46.3 %    MCV 83.6 79.6 - 97.8 FL    MCH 26.1 26.1 - 32.9 PG    MCHC 31.2 (L) 31.4 - 35.0 g/dL    RDW 14.5 11.9 - 14.6 %    PLATELET 698 631 - 742 K/uL    MPV 10.4 9.4 - 12.3 FL    ABSOLUTE NRBC 0.00 0.0 - 0.2 K/uL   METABOLIC PANEL, BASIC    Collection Time: 01/20/22  3:48 AM   Result Value Ref Range    Sodium 142 136 - 145 mmol/L    Potassium 2.9 (L) 3.5 - 5.1 mmol/L    Chloride 103 98 - 107 mmol/L    CO2 27 21 - 32 mmol/L    Anion gap 12 7 - 16 mmol/L    Glucose 100 65 - 100 mg/dL     (H) 8 - 23 MG/DL    Creatinine 2.54 (H) 0.6 - 1.0 MG/DL    GFR est AA 24 (L) >60 ml/min/1.73m2    GFR est non-AA 20 (L) >60 ml/min/1.73m2    Calcium 8.9 8.3 - 10.4 MG/DL   GLUCOSE, POC    Collection Time: 01/20/22  7:45 AM   Result Value Ref Range    Glucose (POC) 132 (H) 65 - 100 mg/dL    Performed by OrabrushaPCT    GLUCOSE, POC    Collection Time: 01/20/22 11:52 AM   Result Value Ref Range    Glucose (POC) 329 (H) 65 - 100 mg/dL    Performed by [facility]        All Micro Results     Procedure Component Value Units Date/Time    COVID-19 RAPID TEST [[id number]]  (Abnormal) Collected: 01/14/22 0600    Order Status: Completed Specimen: Nasopharyngeal Updated: 01/14/22 0720     Specimen source NASAL        Comment: RAPID ONLY        COVID-19 rapid test Detected        Comment:      The specimen is POSITIVE for SARS-CoV-2, the novel coronavirus associated with COVID-19. This test has been authorized by the FDA under an Emergency Use Authorization (EUA) for use by authorized laboratories. Fact sheet for Healthcare Providers: ConventionUpdate.co.nz  Fact sheet for Patients: ConventionUpdate.co.nz       Methodology: Isothermal Nucleic Acid Amplification               Other Studies:  No results found.     Current Meds:  Current Facility-Administered Medications   Medication Dose Route Frequency    insulin glargine (LANTUS) injection 26 Units  0.3 Units/kg SubCUTAneous QHS    insulin lispro (HUMALOG) injection 3 Units  3 Units SubCUTAneous TIDAC    lip protectant (BLISTEX) ointment 1 Each  1 Each Topical PRN    amLODIPine (NORVASC) tablet 5 mg  5 mg Oral DAILY    furosemide (LASIX) injection 40 mg  40 mg IntraVENous BID    cefTRIAXone (ROCEPHIN) 1 g in 0.9% sodium chloride (MBP/ADV) 50 mL MBP  1 g IntraVENous Q24H    sodium chloride (NS) flush 5-40 mL  5-40 mL IntraVENous Q8H    sodium chloride (NS) flush 5-40 mL  5-40 mL IntraVENous PRN    acetaminophen (TYLENOL) tablet 650 mg  650 mg Oral Q6H PRN    Or    acetaminophen (TYLENOL) suppository 650 mg  650 mg Rectal Q6H PRN    polyethylene glycol (MIRALAX) packet 17 g  17 g Oral DAILY PRN    ondansetron (ZOFRAN ODT) tablet 4 mg  4 mg Oral Q8H PRN    Or    ondansetron (ZOFRAN) injection 4 mg  4 mg IntraVENous Q6H PRN    [Held by provider] 0.9% sodium chloride infusion  75 mL/hr IntraVENous CONTINUOUS    heparin (porcine) injection 5,000 Units  5,000 Units SubCUTAneous Q8H    guaiFENesin-dextromethorphan (ROBITUSSIN DM) 100-10 mg/5 mL syrup 5 mL  5 mL Oral Q4H PRN    insulin lispro (HUMALOG) injection 0-10 Units  0-10 Units SubCUTAneous AC&HS    albuterol (PROVENTIL HFA, VENTOLIN HFA, PROAIR HFA) inhaler 2 Puff  2 Puff Inhalation QID RT    atorvastatin (LIPITOR) tablet 20 mg  20 mg Oral QHS    budesonide-formoteroL (SYMBICORT) 160-4.5 mcg/actuation HFA inhaler 2 Puff  2 Puff Inhalation BID    busPIRone (BUSPAR) tablet 7.5 mg  7.5 mg Oral BID    cycloSPORINE (RESTASIS) 0.05 % ophthalmic emulsion 1 Drop (Patient Supplied)  1 Drop Both Eyes Q12H    DULoxetine (CYMBALTA) capsule 20 mg  20 mg Oral DAILY    lamoTRIgine (LaMICtal) tablet 150 mg  150 mg Oral QAM    levothyroxine (SYNTHROID) tablet 137 mcg  137 mcg Oral 6am    montelukast (SINGULAIR) tablet 10 mg  10 mg Oral DAILY    tiZANidine (ZANAFLEX) tablet 4 mg  4 mg Oral TID PRN    pregabalin (LYRICA) capsule 50 mg  50 mg Oral BID    topiramate (TOPAMAX) tablet 25 mg  25 mg Oral QHS    traZODone (DESYREL) tablet 50 mg  50 mg Oral QHS    dicyclomine (BENTYL) tablet 20 mg  20 mg Oral AC&HS    pantoprazole (PROTONIX) tablet 40 mg  40 mg Oral ACB&D    dexamethasone (DECADRON) 10 mg/mL injection 10 mg  10 mg IntraVENous Q24H    hydrALAZINE (APRESOLINE) tablet 50 mg  50 mg Oral Q6H PRN    alcohol 62% (NOZIN) nasal  1 Ampule  1 Ampule Topical Q12H       Signed:  Magui Carolina MD    Part of this note may have been written by using a voice dictation software. The note has been proof read but may still contain some grammatical/other typographical errors.

## 2022-01-20 NOTE — PROGRESS NOTES
Nilay Carmona  Admission Date: 1/14/2022         Daily Progress Note: 1/20/2022    The patient's chart is reviewed and the patient is discussed with the staff. Background:     Background: 66year old unvaccinated female was admitted with cough, shortness of breath and decreased appetite. She tested + for COVID on 1/14. She is being treated with Decadron and Tocilizumab. She is also on Doxycycline. CRP is 16.9 and d dimer is 3.2. She is hypoxic and currently requiring bipap with FIO2 100%. She is undecided about intubation but a full code at present. She had SUSANNE on admission. She has been seen by nephrology who suspects hypoperfusion. She was initially on IV fluids but these have been stopped. Her fluid balance is + 100 mls. BNP measured 18,243 on admission. Her initial creatinine was 3.9 and it is now elevated. Nephrology is following and she has gotten some lasix. She has never smoked but there is a history of asthma which is stable. She has been tested for JESSICA (about 2 years ago) but no tx was recommended at that time. Subjective:     Was on CPAP over night but required BIPAP early this morning. Now on Airvo 60L/100% with sat 95%. Net 5.6L out since admit, creatinine 2.54 today.    C/O headache today    Current Facility-Administered Medications   Medication Dose Route Frequency    insulin glargine (LANTUS) injection 26 Units  0.3 Units/kg SubCUTAneous QHS    insulin lispro (HUMALOG) injection 3 Units  3 Units SubCUTAneous TIDAC    lip protectant (BLISTEX) ointment 1 Each  1 Each Topical PRN    amLODIPine (NORVASC) tablet 5 mg  5 mg Oral DAILY    furosemide (LASIX) injection 40 mg  40 mg IntraVENous BID    cefTRIAXone (ROCEPHIN) 1 g in 0.9% sodium chloride (MBP/ADV) 50 mL MBP  1 g IntraVENous Q24H    sodium chloride (NS) flush 5-40 mL  5-40 mL IntraVENous Q8H    sodium chloride (NS) flush 5-40 mL  5-40 mL IntraVENous PRN    acetaminophen (TYLENOL) tablet 650 mg  650 mg Oral Q6H PRN    Or    acetaminophen (TYLENOL) suppository 650 mg  650 mg Rectal Q6H PRN    polyethylene glycol (MIRALAX) packet 17 g  17 g Oral DAILY PRN    ondansetron (ZOFRAN ODT) tablet 4 mg  4 mg Oral Q8H PRN    Or    ondansetron (ZOFRAN) injection 4 mg  4 mg IntraVENous Q6H PRN    [Held by provider] 0.9% sodium chloride infusion  75 mL/hr IntraVENous CONTINUOUS    heparin (porcine) injection 5,000 Units  5,000 Units SubCUTAneous Q8H    guaiFENesin-dextromethorphan (ROBITUSSIN DM) 100-10 mg/5 mL syrup 5 mL  5 mL Oral Q4H PRN    insulin lispro (HUMALOG) injection 0-10 Units  0-10 Units SubCUTAneous AC&HS    albuterol (PROVENTIL HFA, VENTOLIN HFA, PROAIR HFA) inhaler 2 Puff  2 Puff Inhalation QID RT    atorvastatin (LIPITOR) tablet 20 mg  20 mg Oral QHS    budesonide-formoteroL (SYMBICORT) 160-4.5 mcg/actuation HFA inhaler 2 Puff  2 Puff Inhalation BID    busPIRone (BUSPAR) tablet 7.5 mg  7.5 mg Oral BID    cycloSPORINE (RESTASIS) 0.05 % ophthalmic emulsion 1 Drop (Patient Supplied)  1 Drop Both Eyes Q12H    DULoxetine (CYMBALTA) capsule 20 mg  20 mg Oral DAILY    lamoTRIgine (LaMICtal) tablet 150 mg  150 mg Oral QAM    levothyroxine (SYNTHROID) tablet 137 mcg  137 mcg Oral 6am    montelukast (SINGULAIR) tablet 10 mg  10 mg Oral DAILY    tiZANidine (ZANAFLEX) tablet 4 mg  4 mg Oral TID PRN    pregabalin (LYRICA) capsule 50 mg  50 mg Oral BID    topiramate (TOPAMAX) tablet 25 mg  25 mg Oral QHS    traZODone (DESYREL) tablet 50 mg  50 mg Oral QHS    dicyclomine (BENTYL) tablet 20 mg  20 mg Oral AC&HS    pantoprazole (PROTONIX) tablet 40 mg  40 mg Oral ACB&D    dexamethasone (DECADRON) 10 mg/mL injection 10 mg  10 mg IntraVENous Q24H    hydrALAZINE (APRESOLINE) tablet 50 mg  50 mg Oral Q6H PRN    alcohol 62% (NOZIN) nasal  1 Ampule  1 Ampule Topical Q12H     Review of Systems  Constitutional: negative for fever, chills, sweats  Cardiovascular: negative for chest pain, palpitations, syncope, edema  Gastrointestinal:  negative for dysphagia, reflux, vomiting, diarrhea, abdominal pain, or melena  Neurologic:  negative for focal weakness, numbness, headache    Objective:     Vitals:    01/19/22 2331 01/20/22 0317 01/20/22 0744 01/20/22 0814   BP: 135/61 134/70 124/64    Pulse: 67 80 68    Resp: 20 21 21    Temp: 98.1 °F (36.7 °C) 98.6 °F (37 °C) 97.8 °F (36.6 °C)    SpO2: 97% 99% 95% 95%   Weight:       Height:           Intake/Output Summary (Last 24 hours) at 1/20/2022 0946  Last data filed at 1/20/2022 8084  Gross per 24 hour   Intake 280 ml   Output 700 ml   Net -420 ml     Physical Exam:   Constitution:  the patient is overweight and in no acute distress   HEENT:  Sclera clear, pupils equal, oral mucosa moist  Respiratory: Crackles, on Airvo 60L/100%  Cardiovascular:  RRR without M,G,R  Gastrointestinal: soft and non-tender; with positive bowel sounds. Musculoskeletal: warm without cyanosis. There is no lower extremity edema. Skin:  no jaundice or rashes  Neurologic: no gross neuro deficits     Psychiatric:  calm    CXR: None today     1/18:          1/16, 15:        LAB:  Recent Labs     01/20/22 0348 01/19/22  0429 01/18/22  0408   WBC 12.0* 8.3 8.1   HGB 12.4 11.8 10.8*   HCT 39.7 39.1 35.3*    325 332     Recent Labs     01/20/22  0348 01/19/22  0429 01/18/22  0408    140 141   K 2.9* 3.2* 3.5    102 104   CO2 27 25 25    204* 192*   * 94* 89*   CREA 2.54* 2.87* 3.40*   CA 8.9 8.4 8.4     Recent Labs     01/18/22  0408   CRP 2.2*     No results for input(s): PH, PCO2, PO2, HCO3, PHI, PCO2I, PO2I, HCO3I in the last 72 hours. No results for input(s): SDES, CULT in the last 72 hours. Assessment and Plan:  (Medical Decision Making)   Principal Problem:    Acute hypoxemic respiratory failure due to COVID-19 [facility]) (1/14/2022)  --Continues to require AirVo during the day and CPAP at night; required BIPAP at some point last night.  Continue CPAP qHS and wean AirVo as tolerated. Active Problems: Morbid obesity (HCC) ()  --BMI almost 34. Contributes to the complexity of care, mobilize      Essential hypertension (2016)  --Norvasc previously added by nephrology      Type 2 diabetes mellitus with diabetic neuropathy, without long-term current use of insulin ([facility] [address] (2018)  --Per primary team       [facility]acute [facility]) ([facility] (2022)  --[address] Nephrology following, Creatinine down to 2.54 this am with diuresis. BNP 18,243 on 22. K+ 2.9, needs replacement. Patient is a DNR       More than 50% of the time documented was spent in face-to-face contact with the patient and in the care of the patient on the floor/unit where the patient is located. Mainor Montanez NP     I have spoken with and examined the patient. I agree with the above assessment and plan as documented. Gen: alert  Lungs:  Mildly coarse   Heart:  RRR with no Murmur/Rubs/Gallops  Abd: soft  Ext: no edema    She is still on 100% FiO2, diuresing. SUSANNE stable. Using CPAP QHS. Found out today that her  . Continue supportive care.  Will check CXR in Herbert Levin MD

## 2022-01-20 NOTE — PROGRESS NOTES
Patient resting in bed, confused but, cooperative with care. Patient on airvo 60L 100%. Patient resting in bed no visual S/S of pain or distress, safety measures in place, call light within reach.

## 2022-01-20 NOTE — PROGRESS NOTES
Pt resting in bed with eyes closed. Pt awakens when spoken to. Pt complaints of not feeling well when awake. Pt denies pain or distress at this time. Pt on airvo at 60L 100% with sat 96%. Pt encouraged to call for assistance if needed call light in reach, will monitor.

## 2022-01-20 NOTE — PROGRESS NOTES
Pt sitting up in bed. Pt alert oriented to person and place at this time. Pt denies pain or distress at this time. Pt complaints of being uncomfortable in the bed. Pt repositioned. Pt on BIPAP  with sat 100%.  Continuous sat monitor in place.   Pt encouraged to call for assistance if needed call light in reach, will monitor.

## 2022-01-20 NOTE — DIABETES MGMT
Patient admitted with acute respiratory failure due to COVID-19. Blood glucose ranged 204-330 yesterday with patient receiving Lantus 26 units, Humalog 34 units, and Decadron 10mg. Lab blood glucose this morning was 100. Most recent FSBS 329. Reviewed patient current regimen: Lantus 26 units nightly, Humalog 3 units with meals, Humalog correctional insulin, and Decadron 10mg daily. Patient would likely benefit from a reduction in basal insulin to reduce risk of morning hypoglycemia. Creatinine 2.54. GFR 20. Patient would also likely benefit from an increase in prandial insulin to help offset hyperglycemia during the day related to steroid therapy and caloric intake. Provider updated via sezmi regarding recommendations and patient glycemic control.

## 2022-01-21 NOTE — PROGRESS NOTES
[facility] Progress Note      Admission Date: 1/14/2022   Follow up SUSANNE  Subjective:   Pt seen and examined in room, reports she just found out that her  and paster have past away, complaints of nausea, weakness/fatigue, SOB, remains on 100 % AirVo  DW RN for antiemetic    ROS:  General: no fever/chills  CV: no CP  Lung: + SOB, + cough  GI: no N/V/D  : no dysuria  Ext: no edema         Objective:     Physical Exam:      Visit Vitals  /60   Pulse 69   Temp 98.3 °F (36.8 °C)   Resp 22   Ht 5' 3\" (1.6 m)   Wt 86.2 kg (190 lb)   SpO2 98%   BMI 33.66 kg/m²      Gen: alert and oriented, NAD  HEENT: moist membranes  CV: regular rate, S1, S2, no Rub   Lungs: Coarse lung sounds bilaterally  Abd: soft, non tender, + BS  Extem: no edema      Current Facility-Administered Medications   Medication Dose Route Frequency    insulin glargine (LANTUS) injection 22 Units  22 Units SubCUTAneous QHS    insulin lispro (HUMALOG) injection 6 Units  6 Units SubCUTAneous TIDAC    lip protectant (BLISTEX) ointment 1 Each  1 Each Topical PRN    amLODIPine (NORVASC) tablet 5 mg  5 mg Oral DAILY    furosemide (LASIX) injection 40 mg  40 mg IntraVENous BID    cefTRIAXone (ROCEPHIN) 1 g in 0.9% sodium chloride (MBP/ADV) 50 mL MBP  1 g IntraVENous Q24H    sodium chloride (NS) flush 5-40 mL  5-40 mL IntraVENous Q8H    sodium chloride (NS) flush 5-40 mL  5-40 mL IntraVENous PRN    acetaminophen (TYLENOL) tablet 650 mg  650 mg Oral Q6H PRN    Or    acetaminophen (TYLENOL) suppository 650 mg  650 mg Rectal Q6H PRN    polyethylene glycol (MIRALAX) packet 17 g  17 g Oral DAILY PRN    ondansetron (ZOFRAN ODT) tablet 4 mg  4 mg Oral Q8H PRN    Or    ondansetron (ZOFRAN) injection 4 mg  4 mg IntraVENous Q6H PRN    heparin (porcine) injection 5,000 Units  5,000 Units SubCUTAneous Q8H    guaiFENesin-dextromethorphan (ROBITUSSIN DM) 100-10 mg/5 mL syrup 5 mL  5 mL Oral Q4H PRN    insulin lispro (HUMALOG) injection 0-10 Units  0-10 Units SubCUTAneous AC&HS    albuterol (PROVENTIL HFA, VENTOLIN HFA, PROAIR HFA) inhaler 2 Puff  2 Puff Inhalation QID RT    atorvastatin (LIPITOR) tablet 20 mg  20 mg Oral QHS    budesonide-formoteroL (SYMBICORT) 160-4.5 mcg/actuation HFA inhaler 2 Puff  2 Puff Inhalation BID    busPIRone (BUSPAR) tablet 7.5 mg  7.5 mg Oral BID    cycloSPORINE (RESTASIS) 0.05 % ophthalmic emulsion 1 Drop (Patient Supplied)  1 Drop Both Eyes Q12H    DULoxetine (CYMBALTA) capsule 20 mg  20 mg Oral DAILY    lamoTRIgine (LaMICtal) tablet 150 mg  150 mg Oral QAM    levothyroxine (SYNTHROID) tablet 137 mcg  137 mcg Oral 6am    montelukast (SINGULAIR) tablet 10 mg  10 mg Oral DAILY    tiZANidine (ZANAFLEX) tablet 4 mg  4 mg Oral TID PRN    pregabalin (LYRICA) capsule 50 mg  50 mg Oral BID    topiramate (TOPAMAX) tablet 25 mg  25 mg Oral QHS    traZODone (DESYREL) tablet 50 mg  50 mg Oral QHS    dicyclomine (BENTYL) tablet 20 mg  20 mg Oral AC&HS    pantoprazole (PROTONIX) tablet 40 mg  40 mg Oral ACB&D    dexamethasone (DECADRON) 10 mg/mL injection 10 mg  10 mg IntraVENous Q24H    hydrALAZINE (APRESOLINE) tablet 50 mg  50 mg Oral Q6H PRN    alcohol 62% (NOZIN) nasal  1 Ampule  1 Ampule Topical Q12H            Data Review:     LABS:   Recent Results (from the past 12 hour(s))   CBC W/O DIFF    Collection Time: 01/21/22  3:49 AM   Result Value Ref Range    WBC 12.0 (H) 4.3 - 11.1 K/uL    RBC 4.66 4.05 - 5.2 M/uL    HGB 12.2 11.7 - 15.4 g/dL    HCT 39.6 35.8 - 46.3 %    MCV 85.0 79.6 - 97.8 FL    MCH 26.2 26.1 - 32.9 PG    MCHC 30.8 (L) 31.4 - 35.0 g/dL    RDW 14.6 11.9 - 14.6 %    PLATELET 980 419 - 798 K/uL    MPV 10.8 9.4 - 12.3 FL    ABSOLUTE NRBC 0.00 0.0 - 0.2 K/uL   METABOLIC PANEL, COMPREHENSIVE    Collection Time: 01/21/22  3:49 AM   Result Value Ref Range    Sodium 140 136 - 145 mmol/L    Potassium 3.7 3.5 - 5.1 mmol/L    Chloride 103 98 - 107 mmol/L    CO2 30 21 - 32 mmol/L    Anion gap 7 7 - 16 mmol/L    Glucose 108 (H) 65 - 100 mg/dL    BUN 97 (H) 8 - 23 MG/DL    Creatinine 2.14 (H) 0.6 - 1.0 MG/DL    GFR est AA 29 (L) >60 ml/min/1.73m2    GFR est non-AA 24 (L) >60 ml/min/1.73m2    Calcium 9.1 8.3 - 10.4 MG/DL    Bilirubin, total 0.4 0.2 - 1.1 MG/DL    ALT (SGPT) 19 12 - 65 U/L    AST (SGOT) 23 15 - 37 U/L    Alk. phosphatase 76 50 - 136 U/L    Protein, total 7.5 6.3 - 8.2 g/dL    Albumin 3.6 3.2 - 4.6 g/dL    Globulin 3.9 (H) 2.3 - 3.5 g/dL    A-G Ratio 0.9 (L) 1.2 - 3.5     MAGNESIUM    Collection Time: 01/21/22  3:49 AM   Result Value Ref Range    Magnesium 2.0 1.8 - 2.4 mg/dL   GLUCOSE, POC    Collection Time: 01/21/22  8:24 AM   Result Value Ref Range    Glucose (POC) 193 (H) 65 - 100 mg/dL    Performed by Ivan          Plan:     Principal Problem:    Acute hypoxemic respiratory failure due to COVID-19 [facility]) (1/14/2022)    Active Problems: Morbid obesity ([facility] ()      Essential hypertension (9/20/2016)      Gastroesophageal reflux disease without esophagitis (9/20/2016)      OAB (overactive bladder) (9/20/2016)      Acquired hypothyroidism (11/14/2012)      Fibromyalgia ()      Overview: HX FIBROMYALGIA      Anxiety and depression ()      Type 2 diabetes mellitus with diabetic neuropathy, without long-term current use of insulin ([facility] (1/12/2018)      SUSANNE (acute kidney injury) ([facility] (1/14/2022)      Acute kidney injury in the setting of COVID positivity. She is admitted with hypoxemia and COVID-pneumonia, treated with dexamethasone and Tocilizumab.   She has normal kidney baseline   - urine studies point to hypoperfusion- some question of pulmonary edema and got some diuretic.   SUSANNE- improving, Cr continues to trend down 2.14, non oliguric,  COVID 19, acute respiratory failure with hypoxemia on 100% AivVo  tolerating lasix     Anemia stable    Hypertension- on norvasc     Hypokalemia- replete, better

## 2022-01-21 NOTE — PROGRESS NOTES
ACUTE OT GOALS:  (Developed with and agreed upon by patient and/or caregiver.)  1) Patient will complete lower body bathing and dressing with SBA and adaptive equipment as needed. 2) Patient will complete toileting with SBA. 3) Patient will complete functional transfers with SBA and adaptive equipment as needed. 4) Patient will tolerate at least 30 minutes of OT activity with 1-2 rest breaks while maintaining O2 sats >90%. 5) Patient will verbalize at least 3 energy conservation technique to utilize during ADL/IADL. Timeframe: 7 visits     OCCUPATIONAL THERAPY: Daily Note OT Treatment Day # 2    Wilber Benton is a 66 y.o. female   PRIMARY DIAGNOSIS: Acute hypoxemic respiratory failure due to COVID-19 [facility])  Acute hypoxemic respiratory failure due to COVID-19 ([facility] [U07.1, J96.01]  SUSANNE (acute kidney injury) ([facility] [N17.9]       Payor: Sunil Kate / Plan: Marisela Mead / Product Type: [employer] /   ASSESSMENT:     REHAB RECOMMENDATIONS: CURRENT LEVEL OF FUNCTION:  (Most Recently Demonstrated)   Recommendation to date pending progress:  Setting:   Short-term Rehab  Equipment:    To Be Determined Bathing:   Not tested  Dressing:   Not tested  Feeding/Grooming:   Set Up  Toileting:   Not tested  Functional Mobility:   Minimal Assistance     ASSESSMENT:  Ms. Maria L Pacheco is doing fair today. Pt presents supine upon arrival. Pt on AirVo. Able to perform mobility and transfers with CGA/min A today. Pt demonstrates fair standing balance using RW for support. Pt did not desat today. Pt performed grooming tasks while seated in chair. Good session for patient today. Pt left in chair with all needs in reach. Some progress made with goals. Will continue to benefit from skilled OT during stay.      SUBJECTIVE:   Ms. Maria L Pacheco states, \"I have not brushed my teeth yet\"    SOCIAL HISTORY/LIVING ENVIRONMENT: See initial evaluation  Home Environment: Private residence  One/Two Story Residence: One story  Living Alone: No  Support Systems: Spouse/Significant Other    OBJECTIVE:     PAIN: VITAL SIGNS: LINES/DRAINS:   Pre Treatment: Pain Screen  Pain Scale 1: Numeric (0 - 10)  Pain Intensity 1: 0  Post Treatment: 0   Continuous Pulse Oximetry  O2 Device:  (Airvo)     ACTIVITIES OF DAILY LIVING: I Mod I S SBA CGA Min Mod Max Total NT Comments   BASIC ADLs:              Bathing/ Showering [] [] [] [] [] [] [] [] [] [x]    Toileting [] [] [] [] [] [] [] [] [] [x]    Dressing [] [] [] [] [] [] [] [] [] [x]    Feeding [] [] [] [] [] [] [] [] [] [x]    Grooming [] [] [x] [] [] [] [] [] [] []    Personal Device Care [] [] [] [] [] [] [] [] [] [x]    Functional Mobility [] [] [] [] [x] [x] [] [] [] []    I=Independent, Mod I=Modified Independent, S=Supervision, SBA=Standby Assistance, CGA=Contact Guard Assistance,   Min=Minimal Assistance, Mod=Moderate Assistance, Max=Maximal Assistance, Total=Total Assistance, NT=Not Tested    MOBILITY: I Mod I S SBA CGA Min Mod Max Total  NT x2 Comments:   Supine to sit [] [] [] [] [x] [x] [] [] [] [] []    Sit to supine [] [] [] [] [] [] [] [] [] [x] []    Sit to stand [] [] [] [] [] [x] [] [] [] [] []    Bed to chair [] [] [] [] [x] [x] [] [] [] [] []    I=Independent, Mod I=Modified Independent, S=Supervision, SBA=Standby Assistance, CGA=Contact Guard Assistance,   Min=Minimal Assistance, Mod=Moderate Assistance, Max=Maximal Assistance, Total=Total Assistance, NT=Not Tested    BALANCE: Good Fair+ Fair Fair- Poor NT Comments   Sitting Static [] [x] [] [] [] []    Sitting Dynamic [] [x] [] [] [] []              Standing Static [] [] [x] [] [] []    Standing Dynamic [] [] [x] [] [] []      PLAN:   FREQUENCY/DURATION: OT Plan of Care: 3 times/week for duration of hospital stay or until stated goals are met, whichever comes first.    TREATMENT:   TREATMENT:   ($$ Self Care/Home Management: 8-22 mins$$ Neuromuscular Re-Education: 8-22 mins   )  Co-Treatment PT/OT necessary due to patient's decreased overall endurance/tolerance levels, as well as need for high level skilled assistance to complete functional transfers/mobility and functional tasks  Self Care (10 Minutes): Self care including Grooming to increase independence and decrease level of assistance required. Neuromuscular Re-education (15 Minutes): Neuromuscular Re-education included Balance Training, Coordination training, Postural training, Sitting balance training and Standing balance training to improve Balance, Coordination, Functional Mobility and Postural Control.     TREATMENT GRID:  N/A    AFTER TREATMENT POSITION/PRECAUTIONS:  Chair, Needs within reach and RN notified    INTERDISCIPLINARY COLLABORATION:  RN/PCT, PT/PTA and OT/OWEN    TOTAL TREATMENT DURATION:  OT Patient Time In/Time Out  Time In: 1005  Time Out: 1030    ABIGAIL Gunn

## 2022-01-21 NOTE — PROGRESS NOTES
Pt is resting in bed at this time. Pt is on Airvo 60L/100% sating 94%. Pt has no s/sx of acute distress at this time. Pt has no requests at this time. Pt has call light within reach. Will continue to monitor.

## 2022-01-21 NOTE — PROGRESS NOTES
Tosha Carmona  Admission Date: 1/14/2022         Daily Progress Note: 1/21/2022    The patient's chart is reviewed and the patient is discussed with the staff. Background:     Background: 66year old unvaccinated female was admitted with cough, shortness of breath and decreased appetite. She tested + for COVID on 1/14. She is being treated with Decadron and Tocilizumab. She is also on Doxycycline. CRP is 16.9 and d dimer is 3.2. She is hypoxic and currently requiring bipap with FIO2 100%. She is undecided about intubation but a full code at present. She had SUSANNE on admission. She has been seen by nephrology who suspects hypoperfusion. She was initially on IV fluids but these have been stopped. Her fluid balance is + 100 mls. BNP measured 18,243 on admission. Her initial creatinine was 3.9 and it is now elevated. Nephrology is following and she has gotten some lasix. She has never smoked but there is a history of asthma which is stable. She has been tested for JESSICA (about 2 years ago) but no tx was recommended at that time. Subjective:     Was on CPAP over night but required BIPAP early this morning. Now on Airvo 55L/100% with sat 98%.        Current Facility-Administered Medications   Medication Dose Route Frequency    insulin glargine (LANTUS) injection 22 Units  22 Units SubCUTAneous QHS    insulin lispro (HUMALOG) injection 6 Units  6 Units SubCUTAneous TIDAC    lip protectant (BLISTEX) ointment 1 Each  1 Each Topical PRN    amLODIPine (NORVASC) tablet 5 mg  5 mg Oral DAILY    furosemide (LASIX) injection 40 mg  40 mg IntraVENous BID    cefTRIAXone (ROCEPHIN) 1 g in 0.9% sodium chloride (MBP/ADV) 50 mL MBP  1 g IntraVENous Q24H    sodium chloride (NS) flush 5-40 mL  5-40 mL IntraVENous Q8H    sodium chloride (NS) flush 5-40 mL  5-40 mL IntraVENous PRN    acetaminophen (TYLENOL) tablet 650 mg  650 mg Oral Q6H PRN    Or    acetaminophen (TYLENOL) suppository 650 mg  650 mg Rectal Q6H PRN    polyethylene glycol (MIRALAX) packet 17 g  17 g Oral DAILY PRN    ondansetron (ZOFRAN ODT) tablet 4 mg  4 mg Oral Q8H PRN    Or    ondansetron (ZOFRAN) injection 4 mg  4 mg IntraVENous Q6H PRN    heparin (porcine) injection 5,000 Units  5,000 Units SubCUTAneous Q8H    guaiFENesin-dextromethorphan (ROBITUSSIN DM) 100-10 mg/5 mL syrup 5 mL  5 mL Oral Q4H PRN    insulin lispro (HUMALOG) injection 0-10 Units  0-10 Units SubCUTAneous AC&HS    albuterol (PROVENTIL HFA, VENTOLIN HFA, PROAIR HFA) inhaler 2 Puff  2 Puff Inhalation QID RT    atorvastatin (LIPITOR) tablet 20 mg  20 mg Oral QHS    budesonide-formoteroL (SYMBICORT) 160-4.5 mcg/actuation HFA inhaler 2 Puff  2 Puff Inhalation BID    busPIRone (BUSPAR) tablet 7.5 mg  7.5 mg Oral BID    cycloSPORINE (RESTASIS) 0.05 % ophthalmic emulsion 1 Drop (Patient Supplied)  1 Drop Both Eyes Q12H    DULoxetine (CYMBALTA) capsule 20 mg  20 mg Oral DAILY    lamoTRIgine (LaMICtal) tablet 150 mg  150 mg Oral QAM    levothyroxine (SYNTHROID) tablet 137 mcg  137 mcg Oral 6am    montelukast (SINGULAIR) tablet 10 mg  10 mg Oral DAILY    tiZANidine (ZANAFLEX) tablet 4 mg  4 mg Oral TID PRN    pregabalin (LYRICA) capsule 50 mg  50 mg Oral BID    topiramate (TOPAMAX) tablet 25 mg  25 mg Oral QHS    traZODone (DESYREL) tablet 50 mg  50 mg Oral QHS    dicyclomine (BENTYL) tablet 20 mg  20 mg Oral AC&HS    pantoprazole (PROTONIX) tablet 40 mg  40 mg Oral ACB&D    dexamethasone (DECADRON) 10 mg/mL injection 10 mg  10 mg IntraVENous Q24H    hydrALAZINE (APRESOLINE) tablet 50 mg  50 mg Oral Q6H PRN    alcohol 62% (NOZIN) nasal  1 Ampule  1 Ampule Topical Q12H     Review of Systems  Constitutional: negative for fever, chills, sweats  Cardiovascular: negative for chest pain, palpitations, syncope, edema  Gastrointestinal:  negative for dysphagia, reflux, vomiting, diarrhea, abdominal pain, or melena  Neurologic:  negative for focal weakness, numbness, headache    Objective:     Vitals:    01/20/22 2207 01/21/22 0014 01/21/22 0512 01/21/22 0742   BP:  126/71 134/60 133/60   Pulse:  (!) 54 60 69   Resp:  23 20 22   Temp:   97.8 °F (36.6 °C) 98.3 °F (36.8 °C)   SpO2: 96% 100% 98% 98%   Weight:       Height:           Intake/Output Summary (Last 24 hours) at 1/21/2022 7335  Last data filed at 1/20/2022 1426  Gross per 24 hour   Intake 200 ml   Output    Net 200 ml     Physical Exam:   Constitution:  the patient is overweight and in no acute distress   HEENT:  Sclera clear, pupils equal, oral mucosa moist  Respiratory: Crackles, on Airvo 60L/100%  Cardiovascular:  RRR without M,G,R  Gastrointestinal: soft and non-tender; with positive bowel sounds. Musculoskeletal: warm without cyanosis. There is no lower extremity edema. Skin:  no jaundice or rashes  Neurologic: no gross neuro deficits     Psychiatric:  calm    CXR: 1/21/22 1/18:          1/16, 15:        LAB:  Recent Labs     01/21/22  0349 01/20/22  0348 01/19/22  0429   WBC 12.0* 12.0* 8.3   HGB 12.2 12.4 11.8   HCT 39.6 39.7 39.1    389 325     Recent Labs     01/21/22  0349 01/20/22  0348 01/19/22  0429    142 140   K 3.7 2.9* 3.2*    103 102   CO2 30 27 25   * 100 204*   BUN 97* 102* 94*   CREA 2.14* 2.54* 2.87*   MG 2.0  --   --    CA 9.1 8.9 8.4   ALB 3.6  --   --    AST 23  --   --    ALT 19  --   --    AP 76  --   --      No results for input(s): LAC, TROPHS, BNPNT, CRP in the last 72 hours. No lab exists for component: ESR  No results for input(s): PH, PCO2, PO2, HCO3, PHI, PCO2I, PO2I, HCO3I in the last 72 hours. No results for input(s): SDES, CULT in the last 72 hours. Assessment and Plan:  (Medical Decision Making)   Principal Problem:    Acute hypoxemic respiratory failure due to COVID-19 [facility]) (1/14/2022)  --Continues to require AirVo during the day and CPAP at night; required BIPAP at some point last night.  Continue CPAP qHS and wean AirVo as tolerated. Active Problems: Morbid obesity (HCC) ()  --BMI almost 34. Contributes to the complexity of care, mobilize      Essential hypertension (9/20/2016)  --Norvasc previously added by nephrology      Type 2 diabetes mellitus with diabetic neuropathy, without long-term current use of insulin ([facility] (1/12/2018)  --Per primary team       SUSANNE (acute kidney injury) ([facility] (1/14/2022)  --[address] Nephrology following, Creatinine down to 2.14 this am with diuresis. BNP 18,243 on 1/14/22. K+ 3.7,      Patient is a DNR       More than 50% of the time documented was spent in face-to-face contact with the patient and in the care of the patient on the floor/unit where the patient is located. Junior Steiner, YARI     I have spoken with and examined the patient. I agree with the above assessment and plan as documented. Gen: alert, fatigued  Lungs:  Clear, 100% AirVo  Heart:  RRR with no Murmur/Rubs/Gallops  Abd: soft  Ext: no edema    Despite improvement in CXR her O2 requirements remain very high. Concern for VTE and will check Dopplers, V/Q and empirically start heparin gtt. No bleeding concerns at present. Would not pursue CTA given SUSANNE is slowly improving, but significant. Otherwise continue current support. Will see PRN this weekend. Call with concerns.      Miguel Angel Mays MD

## 2022-01-21 NOTE — PROGRESS NOTES
01/20/22 2207   Oxygen Therapy   O2 Sat (%) 96 %   Pulse via Oximetry 63 beats per minute   O2 Device BIPAP   FIO2 (%) 100 %   Respiratory   Respiratory (WDL) X   Respiratory Pattern Regular   Chest/Tracheal Assessment Chest expansion, symmetrical   CPAP/BIPAP   CPAP/BIPAP Start/Stop On   Device Mode CPAP   $$ CPAP Daily Yes   Mask Type and Size Full face   Skin Condition intact   PIP Observed 12 cm H20   EPAP (cm H2O) 12 cm H2O   Vt Spont (ml) 453 ml   Ve Observed (l/min) 8.8 l/min   Total RR (Spontaneous) 19 breaths per minute   Insp Rise Time (sec) 3   Leak (Estimated) 9 L/min   Pt's Home Machine No   Biomedical Check Performed Yes   Settings Verified Yes   Alarm Settings   High Pressure 35   Low Pressure 5   Apnea 20   Low Ve 3   High Rate 50   Low Rate 8   Pulmonary Toilet   Pulmonary Toilet Cough and deep breath;H. O.B elevated

## 2022-01-21 NOTE — PROGRESS NOTES
LTG: Patient will tolerate least restrictive diet without overt signs or symptoms of airway compromise. STG: Patient will tolerate easy to chew diet and thin liquids- no straws- without overt signs or symptoms of airway compromise. STG: Patient will participate in modified barium swallow study as clinically indicated. SPEECH LANGUAGE PATHOLOGY: DYSPHAGIA- Initial Assessment    NAME/AGE/GENDER: Wilber Benton is a 66 y.o. female  DATE: 1/21/2022  PRIMARY DIAGNOSIS: Acute hypoxemic respiratory failure due to COVID-19 ([address] [U07.1, J96.01]  SUSANNE (acute kidney injury) ([address] [N17.9]      ICD-10: Treatment Diagnosis: R13.11 Dysphagia, Oral Phase    RECOMMENDATIONS   DIET:    Easy to Chew   Thin Liquids    MEDICATIONS: Whole in puree     PRECAUTIONS, MODIFICATIONS, AND STRATEGIES  · Slow rate of intake  · Small bites/sips  · Upright at 90 degrees during meal     RECOMMENDATIONS FOR CONTINUED SPEECH THERAPY:   YES: Anticipate need for ongoing speech therapy during this hospitalization. ASSESSMENT   Patient presents with mild oral dysphagia exacerbated by increased work of breathing and increased respiratory needs. Functional oral prep, but required needed occasional rest breaks between bites. Cough observed on 1/2 straw sips, but did not occur across 6/6 cup sips. She also reports increased ease and comfort with cup sips. Recommend downgrade to easy to chew diet due to respiratory status. Cups with liquid only. Medications whole in applesauce. Will follow up for dysphagia management x1. EDUCATION:  · Recommendations discussed with Patient and RN    REHABILITATION POTENTIAL FOR STATED GOALS: Good    PLAN    FREQUENCY/DURATION:   Continue to follow patient 2 times a week for duration of hospital stay to address above goals.  Recommendations for next treatment session: Next treatment session will address diet tolerance    CONTINUATION OF SKILLED SERVICES/MEDICAL NECESSITY:   Patient is expected to demonstrate progress in  swallow strength, swallow timeliness, swallow function, diet tolerance and swallow safety in order to  improve swallow safety, work toward diet advancement and decrease aspiration risk.  Patient continues to require skilled intervention due to mild dysphagia. SUBJECTIVE   \"My   a few days ago\". Oxygen Device: Airvo, 55L, 100%  Pain: Pain Scale 1: Numeric (0 - 10)  Pain Intensity 1: 0    History of Present Injury/Illness: Ms. Candis Del Valle  has a past medical history of Acute lumbar radiculopathy, Allergic rhinitis (12/10/2013), Anxiety and depression, Arthritis, Asthma, Atony of bladder, Bile reflux gastritis (3/2/2020), Chronic kidney disease, Chronic pain, Claustrophobia, Diabetes ([facility] [address] Esophageal spasm (2020), GERD (gastroesophageal reflux disease), Heart disease, Hypercholesteremia, Hypertension, Hypothyroidism (2012), Hypoxemia, IBS (irritable bowel syndrome) (2013), Ill-defined condition, Incomplete bladder emptying, Liver disease, Menopause, Migraine headache, Morbid obesity ([address] Myalgia and myositis, unspecified, Neurogenic bladder, NOS (4/3/2014), Organic hypersomnia, unspecified, JESSICA (obstructive sleep apnea), Other chronic cystitis, Other seizures (2021), Peripheral neuropathy (2013), Sciatica, Symptomatic menopausal or female climacteric states, Thyroid disease, Unspecified convulsions (2021), Unspecified urinary incontinence, and Vitamin D deficiency (2013). . She also  has a past surgical history that includes hx cholecystectomy; hx hysterectomy; hx orthopaedic; hx other surgical; colonoscopy (); hx tonsillectomy; hx other surgical; hx thyroidectomy; hx bunionectomy; hx urological; hx other surgical (2016); hx cataract removal; hx pacemaker; and hx gi (2017).  PRECAUTIONS/ALLERGIES: Amoxicillin, Ciprofloxacin, Codeine, Melon flavor, Morphine, Nut flavor, and Other medication     Problem List: (Impairments causing functional limitations):  1. Mild oropharyngeal dysphagia    Previous Dysphagia: NONE REPORTED  Diet Prior to Evaluation: Regular diet/thin liquids    Orientation:  Person  Place  Time  Situation    OBJECTIVE   Oral Motor:   · Labial: No impairment  · Dentition: Intact  · Oral Hygiene: Adequate  · Lingual: No impairment    Dysphagia evaluation:  Patient presented with thin liquid via cup and straw, puree, mixed, and solid consistencies. Appropriate oral prep with all textures,but required rest breaks between bites. Timely swallow initiation, and single swallows upon palpation. Adequate oral clearing. No overt signs or symptoms of airway compromise observed with thin liquids by cup across 6 trials. Consecutive sips consumed by straw with overt coughing on 1/2 trials. Discussed need for smaller cup sips, especially as it relates to current respiratory status and coordination between swallow/breathing. She expresses increase easy and comfort when drinking from cup. Tool Used: Dysphagia Outcome and Severity Scale (JORGE)    Score Comments   Normal Diet  [] 7 With no strategies or extra time needed   Functional Swallow  [] 6 May have mild oral or pharyngeal delay   Mild Dysphagia  [] 5 Which may require one diet consistency restricted    Mild-Moderate Dysphagia  [] 4 With 1-2 diet consistencies restricted   Moderate Dysphagia  [] 3 With 2 or more diet consistencies restricted   Moderate-Severe Dysphagia  [] 2 With partial PO strategies (trials with ST only)   Severe Dysphagia  [] 1 With inability to tolerate any PO safely      Score:  Initial: 5 Most Recent: x (Date 01/21/22 )   Interpretation of Tool: The Dysphagia Outcome and Severity Scale (JORGE) is a simple, easy-to-use, 7-point scale developed to systematically rate the functional severity of dysphagia based on objective assessment and make recommendations for diet level, independence level, and type of nutrition.        Current Medications: No current facility-administered medications on file prior to encounter. Current Outpatient Medications on File Prior to Encounter   Medication Sig Dispense Refill    topiramate (Topamax) 25 mg tablet Take 1 Tablet by mouth nightly. 90 Tablet 1    dicyclomine (BENTYL) 20 mg tablet TAKE ONE TABLET BY MOUTH DAILY BEFORE MEALS AND ONE EVERY NIGHT AT BEDTIME 360 Tablet 1    busPIRone (BUSPAR) 7.5 mg tablet Take 1 Tablet by mouth two (2) times a day. 180 Tablet 1    dapagliflozin (FARXIGA) 10 mg tab tablet Take 1 Tablet by mouth daily. 90 Tablet 1    DULoxetine (CYMBALTA) 20 mg capsule Take 1 Capsule by mouth daily. 90 Capsule 1    levothyroxine (SYNTHROID) 137 mcg tablet TAKE ONE TABLET BY MOUTH DAILY BEFORE BREAKFAST 90 Tablet 1    metFORMIN (GLUCOPHAGE) 1,000 mg tablet Take 1 Tablet by mouth two (2) times daily (with meals). 180 Tablet 1    traZODone (DESYREL) 50 mg tablet Take 1 Tablet by mouth nightly. 90 Tablet 1    atorvastatin (LIPITOR) 20 mg tablet TAKE ONE TABLET BY MOUTH DAILY 90 Tablet 3    pregabalin (LYRICA) 100 mg capsule Take 100 mg by mouth two (2) times a day.  albuterol (PROVENTIL HFA, VENTOLIN HFA, PROAIR HFA) 90 mcg/actuation inhaler Take 1 Puff by inhalation every six (6) hours as needed for Wheezing. 3 Inhaler 3    oxybutynin chloride XL (DITROPAN XL) 10 mg CR tablet Take 1 Tab by mouth daily. 90 Tab 3    Insulin Needles, Disposable, (Lite Touch Insulin Pen Needles) 31 gauge x 3/16\" ndle Use daily w/ [place] Flextouch pen. Dx: Type 2 diabetes mellitus with hyperglycemia, with long-term current use of insulin (HCC) (E11.65 , Z79.4) 100 Pen Needle 3    lamoTRIgine (LaMICtal) 150 mg tablet Take 1 Tablet by mouth Every morning. 90 Tablet 0    omeprazole (PRILOSEC) 40 mg capsule Take 1 Capsule by mouth two (2) times a day. 180 Capsule 1    insulin degludec Cre[employer]ie [place]oser FlexTouch U-100) 100 unit/mL (3 mL) inpn Give 15 units SQ daily.  1 Adjustable Dose Pre-filled Pen Syringe 1    dimenhyDRINATE (DRAMAMINE) 50 mg tablet TAKE 1 TABLET AS NEEDED. 90 Tablet 1    EPINEPHrine (EpiPen) 0.3 mg/0.3 mL injection inject (0.3MG)  by intramuscular route once as needed for anaphylaxis 1 Each 1    hydrocortisone (CORTAID) 1 % topical cream Apply  to affected area two (2) times a day. use thin layer 30 g 2    montelukast (SINGULAIR) 10 mg tablet TAKE ONE TABLET BY MOUTH ONCE DAILY 90 Tablet 1    ergocalciferol (Vitamin D2) 1,250 mcg (50,000 unit) capsule Take 50,000 Units by mouth.  losartan-hydroCHLOROthiazide (HYZAAR) 50-12.5 mg per tablet Take 1 Tablet by mouth daily. 90 Tablet 1    budesonide-formoteroL (Symbicort) 160-4.5 mcg/actuation HFAA Take 2 Puffs by inhalation two (2) times a day. 3 Each 3    cephALEXin (KEFLEX) 250 mg capsule Take 1 Capsule by mouth daily. 90 Capsule 3    tiZANidine (ZANAFLEX) 4 mg tablet Take 4 mg by mouth three (3) times daily as needed for Muscle Spasm(s).  aspirin-acetaminophen-caffeine (EXCEDRIN ES) 250-250-65 mg per tablet TAKE 1 TABLET AS NEEDED.  cetirizine (ZYRTEC) 10 mg tablet take 1 tablet by oral route  every day      terconazole (TERAZOL 7) 0.4 % vaginal cream Insert 1 Applicator into vagina nightly. 45 g 1    estradiol (ESTRACE) 0.01 % (0.1 mg/gram) vaginal cream 1 gram vaginally 1-2x/week 42.5 g 6    oxyCODONE IR (ROXICODONE) 10 mg tab immediate release tablet Take 10 mg by mouth every eight (8) hours as needed.  diphenhydrAMINE (BENADRYL) 25 mg capsule Take 25 mg by mouth every six (6) hours as needed.  cpap machine kit by Does Not Apply route.  fluticasone (FLONASE) 50 mcg/actuation nasal spray 2 Sprays by Both Nostrils route daily.  Catheter (BARD CLEAN-CATH) misc by Does Not Apply route.  cycloSPORINE (RESTASIS) 0.05 % ophthalmic emulsion Administer 1 Drop to both eyes every twelve (12) hours.       glucose blood VI test strips (ASCENSIA AUTODISC VI, ONE TOUCH ULTRA TEST VI) strip Test 4 times daily 100 Strip 11        INTERDISCIPLINARY COLLABORATION: RN    After treatment position/precautions:  · Upright in bed  · RN at bedside    Total Treatment Duration:   Time In: 7852  Time Out: 0900    AmandaLELA Reed [facility], [facility] RAMONA ROSE, CCC-SLP  Speech Language Pathologist  Acute Rehabilitation Services  Contact: Sushant

## 2022-01-21 NOTE — PROGRESS NOTES
Hospitalist Progress Note   Admit Date:  2022  5:25 AM   Name:  Varun Carmona   Age:  66 y.o. Sex:  female  :  1950   MRN:  [id number]   Room:      Presenting Complaint: Altered mental status    Reason(s) for Admission: Acute hypoxemic respiratory failure due to COVID-19 ([facility] [U07.1, J96.01]  SUSANNE (acute kidney injury) [facility]) [N17.9]     Hospital Course & Interval History:   65-year-old female past medical history of fibromyalgia, depression/anxiety, GERD, obesity BMI of 33 who presented with cough and shortness of breath and decreased p.o. intake over the last 5 days. When EMS arrived at home patient was short of breath, hypoxic and confused. Patient was placed on CPAP and transported to the ED. Initial oxygen saturation was 81% on nasal cannula. Patient was placed on BiPAP and subsequently transitioned to Airvo. Chest x-ray showed hazy bilateral lung opacities likely secondary to pulmonary edema/pneumonia. COVID test was positive. Patient was admitted for further evaluation of acute hypoxemic respiratory failure secondary to COVID-19 pneumonia, SUSANNE. Respiratory status had worsened and required CPAP. Pulm has been consulted. Patient was started on Decadron and empiric antibiotics for her community-acquired pneumonia. Patient was not a candidate for baricitinib due to her kidney function. Nephrology consulted. Patient has been tolerating diuresis thus far and has been weaned down from CPAP to Airvo. Subjective (22): Patient was seen and examined at the bedside. Patient upset this morning when discussing that her  had passed away 2 days likely from [employer]. Patient optimistic that she will continue to get better at this point. Patient denied any cardiac chest pain, abdominal pain, fever/chills. Patient still on 60 L satting at 98%.     Assessment & Plan:   Acute hypoxemic respiratory failure due to COVID-19 with superimposed bacterial pneumonia ([facility] [address] (1/14/2022)  COVID-positive 1/14   Chest x-ray with hazy bilateral lung opacities likely secondary to pulmonary edema/pneumonia   Continue Decadron, end of treatment 1/23   Not a candidate for baricitinib due to kidney function   Continue Rocephin and doxycycline, end of treatment 1/22   I-S and albuterol as needed   Continue Symbicort   Prone as tolerated   Continue Lasix IV twice daily   Continue to monitor I's and O's, daily weights   Pulmonary consulted appreciate recommendations   Continue Airvo 60 L / 100%, continue to wean  01/21/22 Patient still critical at this time requiring higher levels of oxygenation to maintain oxygen greater than 90%. Patient high risk for further decline  Chest x-ray showed overall improved aeration of lungs    Acute kidney injury   Creatinine of 3.7 on admission   Renal ultrasound without hydronephrosis, 2.9 cm parapelvic cyst   Avoid nephrotoxic medications   Continue to monitor I's and O's   Holding home hydrochlorothiazide and losartan   Nephrology consulted, appreciate recommendations   Continue IV Lasix twice daily and monitor renal function    Morbid obesity   Adds to complexity of care   Patient not diagnosed with JESSICA but does wear CPAP at home    Essential hypertension   Hydralazine as needed   Holding hydrochlorothiazide and losartan due to acute kidney injury mild    GERD   Continue PPI    Overactive bladder   Continue oxybutynin    Acquired hypothyroidism   Continue home Synthroid    Fibromyalgia   Continue Topamax, Cymbalta    Anxiety/depression   Continue home trazodone, Cymbalta, BuSpar, pregabalin, Lamictal    Type 2 diabetes   Metformin on hold, Farxiga   Sliding scale insulin   Lantus, titrate as appropriate(1/20, changed to Lantus from 26-22, increase prandial from 3 to 6 units.)   Hemoglobin A1c 8.0      Dispo/Discharge Planning:    Dispo pending clinical course. Continue to wean oxygen.   PT/OT recommending short-term rehab    Diet:  ADULT ORAL NUTRITION SUPPLEMENT Breakfast, Lunch, Dinner; Standard High Calorie/High Protein  ADULT DIET Easy to Chew; 3 carb choices (45 gm/meal)  DVT PPx: Heparin subcu  Code status: DNR    Hospital Problems as of 1/21/2022 Date Reviewed: 6/10/2021          Codes Class Noted - Resolved POA    SUSANNE (acute kidney injury) ([facility] ICD-10-CM: N17.9  ICD-9-CM: 584.9  1/14/2022 - Present Yes        * (Principal) Acute hypoxemic respiratory failure due to COVID-19 [facility]) ICD-10-CM: U07.1, J96.01  ICD-9-CM: 518.81, 079.89, 799.02  1/14/2022 - Present Yes        Type 2 diabetes mellitus with diabetic neuropathy, without long-term current use of insulin (HCC) (Chronic) ICD-10-CM: E11.40  ICD-9-CM: 250.60, 357.2  1/12/2018 - Present Yes        Fibromyalgia (Chronic) ICD-10-CM: M79.7  ICD-9-CM: 729.1  Unknown - Present Yes    Overview Signed 4/12/2017  9:40 AM by Jonah JENSEN FIBROMYALGIA             Anxiety and depression (Chronic) ICD-10-CM: F41.9, F32. A  ICD-9-CM: 300.00, 311  Unknown - Present Yes        Essential hypertension (Chronic) ICD-10-CM: I10  ICD-9-CM: 401.9  9/20/2016 - Present Yes        Gastroesophageal reflux disease without esophagitis (Chronic) ICD-10-CM: K21.9  ICD-9-CM: 530.81  9/20/2016 - Present Yes        OAB (overactive bladder) (Chronic) ICD-10-CM: N32.81  ICD-9-CM: 596.51  9/20/2016 - Present Yes        Morbid obesity (HCC) (Chronic) ICD-10-CM: E66.01  ICD-9-CM: 278.01  Unknown - Present Yes        Acquired hypothyroidism (Chronic) ICD-10-CM: E03.9  ICD-9-CM: 244.9  11/14/2012 - Present Yes              Objective:     Patient Vitals for the past 24 hrs:   Temp Pulse Resp BP SpO2   01/21/22 1135 98.4 °F (36.9 °C) 76 20 (!) 136/55 99 %   01/21/22 1110     94 %   01/21/22 1005     94 %   01/21/22 0742 98.3 °F (36.8 °C) 69 22 133/60 98 %   01/21/22 0512 97.8 °F (36.6 °C) 60 20 134/60 98 %   01/21/22 0014  (!) 54 23 126/71 100 %   01/20/22 2207     96 %   01/20/22 1954     95 %   01/20/22 1942 98.3 °F (36.8 °C) 64 27 139/88 98 %   01/20/22 1604 97.7 °F (36.5 °C) 76 21 (!) 135/58 93 %   01/20/22 1530     94 %     Oxygen Therapy  O2 Sat (%): 99 % (01/21/22 1135)  Pulse via Oximetry: 83 beats per minute (01/21/22 1110)  O2 Device:  (Airvo) (01/21/22 1005)  Skin Assessment: Clean, dry, & intact (01/19/22 1933)  Skin Protection for O2 Device: No (01/15/22 0327)  O2 Flow Rate (L/min): 55 l/min (01/21/22 1110)  O2 Temperature: 87.8 °F (31 °C) (01/21/22 1110)  FIO2 (%): 100 % (01/21/22 1110)    Estimated body mass index is 33.66 kg/m² as calculated from the following:    Height as of this encounter: 5' 3\" (1.6 m). Weight as of this encounter: 86.2 kg (190 lb). Intake/Output Summary (Last 24 hours) at 1/21/2022 1350  Last data filed at 1/21/2022 0900  Gross per 24 hour   Intake 200 ml   Output    Net 200 ml         Physical Exam:   Blood pressure (!) 136/55, pulse 76, temperature 98.4 °F (36.9 °C), resp. rate 20, height 5' 3\" (1.6 m), weight 86.2 kg (190 lb), SpO2 99 %. General:    Well nourished. No overt distress  Head:  Normocephalic, atraumatic  Eyes:  Sclerae appear normal.  Pupils equally round. ENT:  Nares appear normal, no drainage. Moist oral mucosa  Neck:  No restricted ROM. Trachea midline   CV:   RRR. No m/r/g. No jugular venous distension. Lungs:   Decreased breath sounds. No wheezing, rhonchi, or rales. Respirations even, unlabored  Abdomen: Bowel sounds present. Soft, nontender, nondistended. Extremities: No cyanosis or clubbing. No edema  Skin:     No rashes and normal coloration. Warm and dry. Neuro:  CN II-XII grossly intact. Sensation intact. A&Ox3  Psych:  Normal mood and affect.       I have reviewed ordered lab tests and independently visualized imaging below:    Recent Labs:  Recent Results (from the past 48 hour(s))   GLUCOSE, POC    Collection Time: 01/19/22  5:02 PM   Result Value Ref Range    Glucose (POC) 318 (H) 65 - 100 mg/dL Performed by [employer]    GLUCOSE, POC    Collection Time: 01/19/22  9:23 PM   Result Value Ref Range    Glucose (POC) 330 (H) 65 - 100 mg/dL    Performed by [facility]    CBC W/O DIFF    Collection Time: 01/20/22  3:48 AM   Result Value Ref Range    WBC 12.0 (H) 4.3 - 11.1 K/uL    RBC 4.75 4.05 - 5.2 M/uL    HGB 12.4 11.7 - 15.4 g/dL    HCT 39.7 35.8 - 46.3 %    MCV 83.6 79.6 - 97.8 FL    MCH 26.1 26.1 - 32.9 PG    MCHC 31.2 (L) 31.4 - 35.0 g/dL    RDW 14.5 11.9 - 14.6 %    PLATELET 097 585 - 186 K/uL    MPV 10.4 9.4 - 12.3 FL    ABSOLUTE NRBC 0.00 0.0 - 0.2 K/uL   METABOLIC PANEL, BASIC    Collection Time: 01/20/22  3:48 AM   Result Value Ref Range    Sodium 142 136 - 145 mmol/L    Potassium 2.9 (L) 3.5 - 5.1 mmol/L    Chloride 103 98 - 107 mmol/L    CO2 27 21 - 32 mmol/L    Anion gap 12 7 - 16 mmol/L    Glucose 100 65 - 100 mg/dL     (H) 8 - 23 MG/DL    Creatinine 2.54 (H) 0.6 - 1.0 MG/DL    GFR est AA 24 (L) >60 ml/min/1.73m2    GFR est non-AA 20 (L) >60 ml/min/1.73m2    Calcium 8.9 8.3 - 10.4 MG/DL   GLUCOSE, POC    Collection Time: 01/20/22  7:45 AM   Result Value Ref Range    Glucose (POC) 132 (H) 65 - 100 mg/dL    Performed by [facility]    GLUCOSE, POC    Collection Time: 01/20/22 11:52 AM   Result Value Ref Range    Glucose (POC) 329 (H) 65 - 100 mg/dL    Performed by [employer]    GLUCOSE, POC    Collection Time: 01/20/22  4:32 PM   Result Value Ref Range    Glucose (POC) 270 (H) 65 - 100 mg/dL    Performed by [facility]    GLUCOSE, POC    Collection Time: 01/20/22  8:45 PM   Result Value Ref Range    Glucose (POC) 223 (H) 65 - 100 mg/dL    Performed by [facility]    CBC W/O DIFF    Collection Time: 01/21/22  3:49 AM   Result Value Ref Range    WBC 12.0 (H) 4.3 - 11.1 K/uL    RBC 4.66 4.05 - 5.2 M/uL    HGB 12.2 11.7 - 15.4 g/dL    HCT 39.6 35.8 - 46.3 %    MCV 85.0 79.6 - 97.8 FL    MCH 26.2 26.1 - 32.9 PG    MCHC 30.8 (L) 31.4 - 35.0 g/dL    RDW 14.6 11.9 - 14.6 %    PLATELET 045 302 - 588 K/uL    MPV 10.8 9.4 - 12.3 FL    ABSOLUTE NRBC 0.00 0.0 - 0.2 K/uL   METABOLIC PANEL, COMPREHENSIVE    Collection Time: 01/21/22  3:49 AM   Result Value Ref Range    Sodium 140 136 - 145 mmol/L    Potassium 3.7 3.5 - 5.1 mmol/L    Chloride 103 98 - 107 mmol/L    CO2 30 21 - 32 mmol/L    Anion gap 7 7 - 16 mmol/L    Glucose 108 (H) 65 - 100 mg/dL    BUN 97 (H) 8 - 23 MG/DL    Creatinine 2.14 (H) 0.6 - 1.0 MG/DL    GFR est AA 29 (L) >60 ml/min/1.73m2    GFR est non-AA 24 (L) >60 ml/min/1.73m2    Calcium 9.1 8.3 - 10.4 MG/DL    Bilirubin, total 0.4 0.2 - 1.1 MG/DL    ALT (SGPT) 19 12 - 65 U/L    AST (SGOT) 23 15 - 37 U/L    Alk. phosphatase 76 50 - 136 U/L    Protein, total 7.5 6.3 - 8.2 g/dL    Albumin 3.6 3.2 - 4.6 g/dL    Globulin 3.9 (H) 2.3 - 3.5 g/dL    A-G Ratio 0.9 (L) 1.2 - 3.5     MAGNESIUM    Collection Time: 01/21/22  3:49 AM   Result Value Ref Range    Magnesium 2.0 1.8 - 2.4 mg/dL   GLUCOSE, POC    Collection Time: 01/21/22  8:24 AM   Result Value Ref Range    Glucose (POC) 193 (H) 65 - 100 mg/dL    Performed by [employer]    GLUCOSE, POC    Collection Time: 01/21/22 11:36 AM   Result Value Ref Range    Glucose (POC) 245 (H) 65 - 100 mg/dL    Performed by [employer]        All Micro Results     Procedure Component Value Units Date/Time    COVID-19 RAPID TEST [[id number]]  (Abnormal) Collected: 01/14/22 0600    Order Status: Completed Specimen: Nasopharyngeal Updated: 01/14/22 0720     Specimen source NASAL        Comment: RAPID ONLY        COVID-19 rapid test Detected        Comment:      The specimen is POSITIVE for SARS-CoV-2, the novel coronavirus associated with COVID-19. This test has been authorized by the FDA under an Emergency Use Authorization (EUA) for use by authorized laboratories.         Fact sheet for Healthcare Providers: ConventionUpdate.co.nz  Fact sheet for Patients: [email or web address]       Methodology: Isothermal Nucleic Acid Amplification               Other Studies:  XR CHEST SNGL V    Result Date: 1/21/2022   Portable view of the chest COMPARISON: January 22, 2020 CLINICAL HISTORY: Covid, follow-up. FINDINGS: There are bibasilar airspace densities. Groundglass densities in the upper lungs have improved. There is no pneumothorax or large pleural effusion. Cardiomediastinal silhouette and the surrounding bones are stable.      1. Overall improved aeration of the lungs, when compared to prior exam.      Current Meds:  Current Facility-Administered Medications   Medication Dose Route Frequency    insulin glargine (LANTUS) injection 22 Units  22 Units SubCUTAneous QHS    insulin lispro (HUMALOG) injection 6 Units  6 Units SubCUTAneous TIDAC    lip protectant (BLISTEX) ointment 1 Each  1 Each Topical PRN    amLODIPine (NORVASC) tablet 5 mg  5 mg Oral DAILY    furosemide (LASIX) injection 40 mg  40 mg IntraVENous BID    cefTRIAXone (ROCEPHIN) 1 g in 0.9% sodium chloride (MBP/ADV) 50 mL MBP  1 g IntraVENous Q24H    sodium chloride (NS) flush 5-40 mL  5-40 mL IntraVENous Q8H    sodium chloride (NS) flush 5-40 mL  5-40 mL IntraVENous PRN    acetaminophen (TYLENOL) tablet 650 mg  650 mg Oral Q6H PRN    Or    acetaminophen (TYLENOL) suppository 650 mg  650 mg Rectal Q6H PRN    polyethylene glycol (MIRALAX) packet 17 g  17 g Oral DAILY PRN    ondansetron (ZOFRAN ODT) tablet 4 mg  4 mg Oral Q8H PRN    Or    ondansetron (ZOFRAN) injection 4 mg  4 mg IntraVENous Q6H PRN    heparin (porcine) injection 5,000 Units  5,000 Units SubCUTAneous Q8H    guaiFENesin-dextromethorphan (ROBITUSSIN DM) 100-10 mg/5 mL syrup 5 mL  5 mL Oral Q4H PRN    insulin lispro (HUMALOG) injection 0-10 Units  0-10 Units SubCUTAneous AC&HS    albuterol (PROVENTIL HFA, VENTOLIN HFA, PROAIR HFA) inhaler 2 Puff  2 Puff Inhalation QID RT    atorvastatin (LIPITOR) tablet 20 mg  20 mg Oral QHS    budesonide-formoteroL (SYMBICORT) 160-4.5 mcg/actuation HFA inhaler 2 Puff  2 Puff Inhalation BID    busPIRone (BUSPAR) tablet 7.5 mg  7.5 mg Oral BID    cycloSPORINE (RESTASIS) 0.05 % ophthalmic emulsion 1 Drop (Patient Supplied)  1 Drop Both Eyes Q12H    DULoxetine (CYMBALTA) capsule 20 mg  20 mg Oral DAILY    lamoTRIgine (LaMICtal) tablet 150 mg  150 mg Oral QAM    levothyroxine (SYNTHROID) tablet 137 mcg  137 mcg Oral 6am    montelukast (SINGULAIR) tablet 10 mg  10 mg Oral DAILY    tiZANidine (ZANAFLEX) tablet 4 mg  4 mg Oral TID PRN    pregabalin (LYRICA) capsule 50 mg  50 mg Oral BID    topiramate (TOPAMAX) tablet 25 mg  25 mg Oral QHS    traZODone (DESYREL) tablet 50 mg  50 mg Oral QHS    dicyclomine (BENTYL) tablet 20 mg  20 mg Oral AC&HS    pantoprazole (PROTONIX) tablet 40 mg  40 mg Oral ACB&D    dexamethasone (DECADRON) 10 mg/mL injection 10 mg  10 mg IntraVENous Q24H    hydrALAZINE (APRESOLINE) tablet 50 mg  50 mg Oral Q6H PRN    alcohol 62% (NOZIN) nasal  1 Ampule  1 Ampule Topical Q12H       Signed:  Arie Allan MD    Part of this note may have been written by using a voice dictation software. The note has been proof read but may still contain some grammatical/other typographical errors.

## 2022-01-21 NOTE — PROGRESS NOTES
Patient complaining of severe HA.  Pt medicated with tylenol with no relief, Dr Shukri He notified by RN

## 2022-01-21 NOTE — DIABETES MGMT
Patient admitted with acute hypoxemic respiratory failure due to COVID-19. Blood glucose ranged 100-329 yesterday with patient receiving Lantus 22 units, Humalog 30 units and Decadron 10mg. Lab blood glucose this morning was 108. Reviewed patient current regimen: Lantus 22 units nightly, Humalog 6 units with meals, Humalog correctional insulin, and Decadron 10mg IV daily. Noted Creatinine 2.14. GFR 24. Per chart review patient on Airvo. As steroid therapy is weaned patient insulin needs will likely decrease.

## 2022-01-21 NOTE — PROGRESS NOTES
ACUTE PHYSICAL THERAPY GOALS:  (Developed with and agreed upon by patient and/or caregiver. )  LTG:  (1.)Ms. Carmona will move from supine to sit and sit to supine , scoot up and down and roll side to side in bed with MODIFIED INDEPENDENCE within 7 treatment day(s). (2.)Ms. Carmona will transfer from bed to chair and chair to bed with STAND BY ASSIST using the least restrictive device within 7 treatment day(s). (3.)Ms. Carmona will ambulate with STAND BY ASSIST for 100 feet with the least restrictive device within 7 treatment day(s). (4.)Ms. Carmona will participate in therapeutic activity/exercises x 25 minutes for increased activity tolerance with SpO2 above 90% within 7 treatment days. PHYSICAL THERAPY: Daily Note and PM Treatment Day # 5    Wilber Benton is a 66 y.o. female   PRIMARY DIAGNOSIS: Acute hypoxemic respiratory failure due to COVID-19 [facility])  [facility] [U07.1, J96.01]  SUSANNE (acute kidney injury) ([facility] [N17.9]         ASSESSMENT:     REHAB RECOMMENDATIONS: CURRENT LEVEL OF FUNCTION:  (Most Recently Demonstrated)   Recommendation to date pending progress:  Setting:   Short-term Rehab  Equipment:    To Be Determined Bed Mobility:   Minimal Assistance  Sit to Stand:   Minimal Assistance  Transfers:   Contact Guard Assistance  Gait/Mobility:   Contact Guard Assistance     ASSESSMENT:  Ms. Maria L Pacheco was supine in bed on airvo (55L; 100%) upon entering the room and agreeable to therapy. Today, pt demonstrates GOOD PROGRESS toward established goals as she was able to make notable improvements in activity tolerance and did-so with less (A) than previously required from therapy team. This session, pt was Min (A) for bed mobility/ sit-to-stand and CG (A) c RW (once stood) for transfer to chair/ brief steps to chair. Of note, pt remained standing for 5-7 minutes within confines of RW and was able to maintain sats >91% while doing so.  Following inc time in standing, pt was able to ambulate the brief 4-5 steps from EOB to chair and negotiate turn c primarily CG (A) although Min (A) was provided before sitting in order to ensure proper mechanics. Once in chair, pt (A) by PT/OT with hygiene/ self care activities before being positioned to comfort. Overall, GOOD PROGRESS as pt DID NOT DESAT and was able to tolerate more therapy today than in previous session. At this time, pt remains a good candidate for skilled PT and will continue to benefit from advancing POC. At end of session, pt was positioned to comfort in chair with all needs in reach. RN was made aware of pt performance. 11:22- PT Returned to room to txfer pt back to bed per RN request. Consistent performance with earlier session.       SUBJECTIVE:   Ms. Emily Bush states, \"You are the best!\"    SOCIAL HISTORY/ LIVING ENVIRONMENT: See Eval  Home Environment: Private residence  One/Two Story Residence: One story  Living Alone: No  Support Systems: Spouse/Significant Other  OBJECTIVE:     PAIN: VITAL SIGNS: LINES/DRAINS:   Pre Treatment: Pain Screen  Pain Scale 1: Numeric (0 - 10)  Pain Intensity 1: 0  Post Treatment: 0 Vital Signs  O2 Sat (%): 94 %  O2 Device:  (Airvo)  O2 Flow Rate (L/min): 55 l/min  FIO2 (%): 100 % Continuous Pulse Oximetry and Purewick  O2 Device:  (Airvo)     MOBILITY: I Mod I S SBA CGA Min Mod Max Total  NT x2 Comments:   Bed Mobility    Rolling [] [] [] [] [] [] [] [] [] [x] []    Supine to Sit [] [] [] [] [] [x] [] [] [] [] []    Scooting [] [] [] [] [] [x] [] [] [] [] []    Sit to Supine [] [] [] [] [] [x] [] [] [] [] []    Transfers    Sit to Stand [] [] [] [] [] [x] [] [] [] [] []    Bed to Chair [] [] [] [] [x] [] [] [] [] [] []    Stand to Sit [] [] [] [] [] [x] [] [] [] [] []    I=Independent, Mod I=Modified Independent, S=Supervision, SBA=Standby Assistance, CGA=Contact Guard Assistance,   Min=Minimal Assistance, Mod=Moderate Assistance, Max=Maximal Assistance, Total=Total Assistance, NT=Not Tested    BALANCE: Good Fair+ Fair Fair- Poor NT Comments   Sitting Static [x] [] [] [] [] []    Sitting Dynamic [x] [] [] [] [] []              Standing Static [x] [] [] [] [] [] Within RW   Standing Dynamic [] [x] [] [] [] []      GAIT: I Mod I S SBA CGA Min Mod Max Total  NT x2 Comments:   Level of Assistance [] [] [] [] [] [x] [] [] [] [] [x]    Distance 3' to chair    DME Rolling Walker    Gait Quality Shuffling and slow; trouble c turns in small space    Weightbearing  Status N/A     I=Independent, Mod I=Modified Independent, S=Supervision, SBA=Standby Assistance, CGA=Contact Guard Assistance,   Min=Minimal Assistance, Mod=Moderate Assistance, Max=Maximal Assistance, Total=Total Assistance, NT=Not Tested    PLAN:   FREQUENCY/DURATION: PT Plan of Care: 3 times/week for duration of hospital stay or until stated goals are met, whichever comes first.  TREATMENT:     TREATMENT:   ($$ Therapeutic Activity: 38-52 mins    )  Therapeutic Activity (38 Minutes): Therapeutic activity included Supine to Sit, Scooting, Lateral Scooting, Transfer Training, Ambulation on level ground, Sitting balance  and Standing balance to improve functional Mobility, Strength and Activity tolerance.     TREATMENT GRID:   Date:  1/19 Date:   Date:     Activity/Exercise Parameters Parameters Parameters   LAQ x10 GUILLERMO     APs x10 GUILLERMO     Hip Flex x10 GUILLERMO                                   AFTER TREATMENT POSITION/PRECAUTIONS:  Bed, Needs within reach and RN notified    INTERDISCIPLINARY COLLABORATION:  RN/PCT, PT/PTA and OT/OWEN    TOTAL TREATMENT DURATION:  PT Patient Time In/Time Out  Time In: [phone])  Time Out: [id number])    Ayanna Kumar PT

## 2022-01-21 NOTE — PROGRESS NOTES
Pt is resting in bed at this time. Pt is on 60L/ 100%. Pt has no s/sx of acute distress at this time. Pt has call light within reach.  Report given to Tyrell Christian

## 2022-01-21 NOTE — PROGRESS NOTES
MSN, CM:  Patient still requiring 55/100 Airvo. Patient's discharge needs unclear at this time. Case Management will continue to follow.

## 2022-01-21 NOTE — PROGRESS NOTES
patient trialed on NRB to go down for VQ scan but unable to hold O2 sat greater than 84-85% Dr Moss November notified

## 2022-01-22 NOTE — PROGRESS NOTES
Assumed pt care. Pt in bed weak but A/Ox3, expresses general pain. Pt on airvo 55/100 with O2 sat at 98%. Pt denies SOB. No apparent distress noted at this time. Will monitor.

## 2022-01-22 NOTE — PROGRESS NOTES
Problem: Gas Exchange - Impaired  Goal: Absence of hypoxia  Outcome: Progressing Towards Goal  Goal: Promote optimal lung function  Outcome: Progressing Towards Goal     Problem: Airway Clearance - Ineffective  Goal: Achieve or maintain patent airway  Outcome: Progressing Towards Goal     Problem: Breathing Pattern - Ineffective  Goal: Ability to achieve and maintain a regular respiratory rate  Outcome: Progressing Towards Goal     Problem: Isolation Precautions - Risk of Spread of Infection  Goal: Prevent transmission of infectious organism to others  Outcome: Progressing Towards Goal

## 2022-01-22 NOTE — PROGRESS NOTES
Hospitalist Progress Note   Admit Date:  2022  5:25 AM   Name:  Williams Carmona   Age:  66 y.o. Sex:  female  :  1950   MRN:  [id number]   Room:      Presenting Complaint: Altered mental status    Reason(s) for Admission: Acute hypoxemic respiratory failure due to COVID-19 ([facility] [U07.1, J96.01]  SUSANNE (acute kidney injury) [facility]) [N17.9]     Hospital Course & Interval History:   76-year-old female past medical history of fibromyalgia, depression/anxiety, GERD, obesity BMI of 33 who presented with cough and shortness of breath and decreased p.o. intake over the last 5 days. When EMS arrived at home patient was short of breath, hypoxic and confused. Patient was placed on CPAP and transported to the ED. Initial oxygen saturation was 81% on nasal cannula. Patient was placed on BiPAP and subsequently transitioned to Airvo. Chest x-ray showed hazy bilateral lung opacities likely secondary to pulmonary edema/pneumonia. COVID test was positive. Patient was admitted for further evaluation of acute hypoxemic respiratory failure secondary to COVID-19 pneumonia, SUSANNE. Respiratory status had worsened and required CPAP. Pulm has been consulted. Patient was started on Decadron and empiric antibiotics for her community-acquired pneumonia. Patient was not a candidate for baricitinib due to her kidney function. Nephrology consulted. Patient has been tolerating diuresis thus far and has been weaned down from CPAP to Airvo. Subjective (22): Patient was seen and examined at the bedside. Patient did not wear CPAP during the night. Patient slightly frustrated this morning since unsure of when she is going to be able to come off of high flow. Patient had no other major complaints this morning. Patient denied any cardiac chest pain, abdominal pain, fever/chills. Patient still on 55 L satting at 94%.     Assessment & Plan:   Acute hypoxemic respiratory failure due to COVID-19 with superimposed bacterial pneumonia ([facility] (1/14/2022)  COVID-positive 1/14   Chest x-ray with hazy bilateral lung opacities likely secondary to pulmonary edema/pneumonia   Continue Decadron, end of treatment 1/23   Not a candidate for baricitinib due to kidney function   Continue Rocephin and doxycycline, end of treatment 1/22   I-S and albuterol as needed   Continue Symbicort   Prone as tolerated   Continue Lasix IV twice daily   Continue to monitor I's and O's, daily weights   Pulmonary consulted appreciate recommendations   Continue Airvo 60 L / 100%, continue to wean  01/22/22 Patient still critical at this time requiring higher levels of oxygenation to maintain oxygen greater than 90%. Patient high risk for further decline  Chest x-ray showed overall improved aeration of lungs   Patient refused to wear CPAP last night. Educated on importance of wearing CPAP did not tire out during the day.  Pulmonology is started patient on heparin drip for suspicion of pulmonary embolism.   Patient was unable to go down for VQ scan since unable to hold oxygen saturation greater than 84%    Acute kidney injury   Creatinine of 3.7 on admission   Renal ultrasound without hydronephrosis, 2.9 cm parapelvic cyst   Avoid nephrotoxic medications   Continue to monitor I's and O's   Holding home hydrochlorothiazide and losartan   Nephrology consulted, appreciate recommendations   Continue IV Lasix twice daily and monitor renal function   1/22 creatinine continues to improve daily    Morbid obesity   Adds to complexity of care   Patient not diagnosed with JESSICA but does wear CPAP at home    Essential hypertension   Hydralazine as needed   Holding hydrochlorothiazide and losartan due to acute kidney injury mild    GERD   Continue PPI    Overactive bladder   Continue oxybutynin    Acquired hypothyroidism   Continue home Synthroid    Fibromyalgia   Continue Topamax, Cymbalta    Anxiety/depression   Continue home trazodone, Cymbalta, BuSpar, pregabalin, Lamictal    Type 2 diabetes   Metformin on hold, Farxiga   Sliding scale insulin   Lantus, titrate as appropriate(1/20, changed to Lantus from 26-22, increase prandial from 3 to 6 units.)   Hemoglobin A1c 8.0      Dispo/Discharge Planning:    Dispo pending clinical course. Continue to wean oxygen. PT/OT recommending short-term rehab    Diet:  ADULT ORAL NUTRITION SUPPLEMENT Breakfast, Lunch, Dinner; Standard High Calorie/High Protein  ADULT DIET Easy to Chew; 3 carb choices (45 gm/meal)  DVT PPx: Heparin subcu  Code status: DNR    Hospital Problems as of 1/22/2022 Date Reviewed: 6/10/2021          Codes Class Noted - Resolved POA    SUSANNE (acute kidney injury) ([facility] ICD-10-CM: N17.9  ICD-9-CM: 584.9  1/14/2022 - Present Yes        * (Principal) Acute hypoxemic respiratory failure due to COVID-19 [facility]) ICD-10-CM: U07.1, J96.01  ICD-9-CM: 518.81, 079.89, 799.02  1/14/2022 - Present Yes        Type 2 diabetes mellitus with diabetic neuropathy, without long-term current use of insulin (HCC) (Chronic) ICD-10-CM: E11.40  ICD-9-CM: 250.60, 357.2  1/12/2018 - Present Yes        Fibromyalgia (Chronic) ICD-10-CM: M79.7  ICD-9-CM: 729.1  Unknown - Present Yes    Overview Signed 4/12/2017  9:40 AM by Nicolas JENSEN FIBROMYALGIA             Anxiety and depression (Chronic) ICD-10-CM: F41.9, F32. A  ICD-9-CM: 300.00, 311  Unknown - Present Yes        Essential hypertension (Chronic) ICD-10-CM: I10  ICD-9-CM: 401.9  9/20/2016 - Present Yes        Gastroesophageal reflux disease without esophagitis (Chronic) ICD-10-CM: K21.9  ICD-9-CM: 530.81  9/20/2016 - Present Yes        OAB (overactive bladder) (Chronic) ICD-10-CM: N32.81  ICD-9-CM: 596.51  9/20/2016 - Present Yes        Morbid obesity ([facility] (Chronic) ICD-10-CM: E66.01  ICD-9-CM: 278.01  Unknown - Present Yes        Acquired hypothyroidism (Chronic) ICD-10-CM: E03.9  ICD-9-CM: 244.9  11/14/2012 - Present Yes Objective:     Patient Vitals for the past 24 hrs:   Temp Pulse Resp BP SpO2   01/22/22 1210     93 %   01/22/22 1130 98 °F (36.7 °C) 77 18 122/65 92 %   01/22/22 0948     97 %   01/22/22 0757 98.1 °F (36.7 °C) 69 20 114/63 90 %   01/22/22 0731     95 %   01/22/22 0559     94 %   01/22/22 0441     96 %   01/22/22 0308     92 %   01/22/22 0236 97.7 °F (36.5 °C) 64  (!) 130/53 90 %   01/21/22 2243 97.5 °F (36.4 °C) (!) 59 21 (!) 126/53 95 %   01/21/22 2240     92 %   01/21/22 1958 98.2 °F (36.8 °C) 69 20 133/62 95 %   01/21/22 1618     93 %   01/21/22 1558     90 %   01/21/22 1526 98.2 °F (36.8 °C) 74 20 (!) 128/59 92 %     Oxygen Therapy  O2 Sat (%): 93 % (01/22/22 1210)  Pulse via Oximetry: 86 beats per minute (01/22/22 1210)  O2 Device: Heated; Hi flow nasal cannula (01/22/22 1210)  Skin Assessment: Clean, dry, & intact (01/22/22 0757)  Skin Protection for O2 Device: No (01/22/22 0757)  O2 Flow Rate (L/min): 45 l/min (01/22/22 1210)  O2 Temperature: 87.8 °F (31 °C) (01/22/22 1210)  FIO2 (%): 100 % (01/22/22 1210)    Estimated body mass index is 33.66 kg/m² as calculated from the following:    Height as of this encounter: 5' 3\" (1.6 m). Weight as of this encounter: 86.2 kg (190 lb). Intake/Output Summary (Last 24 hours) at 1/22/2022 1429  Last data filed at 1/22/2022 0830  Gross per 24 hour   Intake 370 ml   Output 1200 ml   Net -830 ml         Physical Exam:   Blood pressure 122/65, pulse 77, temperature 98 °F (36.7 °C), resp. rate 18, height 5' 3\" (1.6 m), weight 86.2 kg (190 lb), SpO2 93 %. General:    Well nourished. No overt distress  Head:  Normocephalic, atraumatic  Eyes:  Sclerae appear normal.  Pupils equally round. ENT:  Nares appear normal, no drainage. Moist oral mucosa  Neck:  No restricted ROM. Trachea midline   CV:   RRR. No m/r/g. No jugular venous distension. Lungs:   Decreased breath sounds. No wheezing, rhonchi, or rales.   Respirations even, unlabored  Abdomen: Bowel sounds present. Soft, nontender, nondistended. Extremities: No cyanosis or clubbing. No edema  Skin:     No rashes and normal coloration. Warm and dry. Neuro:  CN II-XII grossly intact. Sensation intact. A&Ox3  Psych:  Normal mood and affect. I have reviewed ordered lab tests and independently visualized imaging below:    Recent Labs:  Recent Results (from the past 48 hour(s))   GLUCOSE, POC    Collection Time: 01/20/22  4:32 PM   Result Value Ref Range    Glucose (POC) 270 (H) 65 - 100 mg/dL    Performed by [facility]    GLUCOSE, POC    Collection Time: 01/20/22  8:45 PM   Result Value Ref Range    Glucose (POC) 223 (H) 65 - 100 mg/dL    Performed by Porsha    CBC W/O DIFF    Collection Time: 01/21/22  3:49 AM   Result Value Ref Range    WBC 12.0 (H) 4.3 - 11.1 K/uL    RBC 4.66 4.05 - 5.2 M/uL    HGB 12.2 11.7 - 15.4 g/dL    HCT 39.6 35.8 - 46.3 %    MCV 85.0 79.6 - 97.8 FL    MCH 26.2 26.1 - 32.9 PG    MCHC 30.8 (L) 31.4 - 35.0 g/dL    RDW 14.6 11.9 - 14.6 %    PLATELET 966 665 - 434 K/uL    MPV 10.8 9.4 - 12.3 FL    ABSOLUTE NRBC 0.00 0.0 - 0.2 K/uL   METABOLIC PANEL, COMPREHENSIVE    Collection Time: 01/21/22  3:49 AM   Result Value Ref Range    Sodium 140 136 - 145 mmol/L    Potassium 3.7 3.5 - 5.1 mmol/L    Chloride 103 98 - 107 mmol/L    CO2 30 21 - 32 mmol/L    Anion gap 7 7 - 16 mmol/L    Glucose 108 (H) 65 - 100 mg/dL    BUN 97 (H) 8 - 23 MG/DL    Creatinine 2.14 (H) 0.6 - 1.0 MG/DL    GFR est AA 29 (L) >60 ml/min/1.73m2    GFR est non-AA 24 (L) >60 ml/min/1.73m2    Calcium 9.1 8.3 - 10.4 MG/DL    Bilirubin, total 0.4 0.2 - 1.1 MG/DL    ALT (SGPT) 19 12 - 65 U/L    AST (SGOT) 23 15 - 37 U/L    Alk.  phosphatase 76 50 - 136 U/L    Protein, total 7.5 6.3 - 8.2 g/dL    Albumin 3.6 3.2 - 4.6 g/dL    Globulin 3.9 (H) 2.3 - 3.5 g/dL    A-G Ratio 0.9 (L) 1.2 - 3.5     MAGNESIUM    Collection Time: 01/21/22  3:49 AM   Result Value Ref Range Magnesium 2.0 1.8 - 2.4 mg/dL   GLUCOSE, POC    Collection Time: 01/21/22  8:24 AM   Result Value Ref Range    Glucose (POC) 193 (H) 65 - 100 mg/dL    Performed by Sarta    GLUCOSE, POC    Collection Time: 01/21/22 11:36 AM   Result Value Ref Range    Glucose (POC) 245 (H) 65 - 100 mg/dL    Performed by Sarta    PTT    Collection Time: 01/21/22  4:07 PM   Result Value Ref Range    aPTT 29.1 24.1 - 35.1 SEC   CBC WITH AUTOMATED DIFF    Collection Time: 01/21/22  4:07 PM   Result Value Ref Range    WBC 15.9 (H) 4.3 - 11.1 K/uL    RBC 4.63 4.05 - 5.2 M/uL    HGB 11.7 11.7 - 15.4 g/dL    HCT 38.1 35.8 - 46.3 %    MCV 82.3 79.6 - 97.8 FL    MCH 25.3 (L) 26.1 - 32.9 PG    MCHC 30.7 (L) 31.4 - 35.0 g/dL    RDW 14.6 11.9 - 14.6 %    PLATELET 781 434 - 764 K/uL    MPV 11.0 9.4 - 12.3 FL    ABSOLUTE NRBC 0.00 0.0 - 0.2 K/uL    DF AUTOMATED      NEUTROPHILS 90 (H) 43 - 78 %    LYMPHOCYTES 4 (L) 13 - 44 %    MONOCYTES 5 4.0 - 12.0 %    EOSINOPHILS 0 (L) 0.5 - 7.8 %    BASOPHILS 0 0.0 - 2.0 %    IMMATURE GRANULOCYTES 1 0.0 - 5.0 %    ABS. NEUTROPHILS 14.3 (H) 1.7 - 8.2 K/UL    ABS. LYMPHOCYTES 0.7 0.5 - 4.6 K/UL    ABS. MONOCYTES 0.8 0.1 - 1.3 K/UL    ABS. EOSINOPHILS 0.0 0.0 - 0.8 K/UL    ABS. BASOPHILS 0.0 0.0 - 0.2 K/UL    ABS. IMM.  GRANS. 0.2 0.0 - 0.5 K/UL   GLUCOSE, POC    Collection Time: 01/21/22  4:17 PM   Result Value Ref Range    Glucose (POC) 307 (H) 65 - 100 mg/dL    Performed by Elena    GLUCOSE, POC    Collection Time: 01/21/22  8:48 PM   Result Value Ref Range    Glucose (POC) 286 (H) 65 - 100 mg/dL    Performed by Ambar    HEPARIN XA UFH    Collection Time: 01/21/22 11:40 PM   Result Value Ref Range    Heparin Xa UFH >1.1 (H) 0.3 - 0.7 IU/mL   CBC WITH AUTOMATED DIFF    Collection Time: 01/22/22  4:16 AM   Result Value Ref Range    WBC 16.7 (H) 4.3 - 11.1 K/uL    RBC 4.86 4.05 - 5.2 M/uL    HGB 12.3 11.7 - 15.4 g/dL    HCT 40.2 35.8 - 46.3 %    MCV 82.7 79.6 - 97.8 FL    MCH 25.3 (L) 26.1 - 32.9 PG    MCHC 30.6 (L) 31.4 - 35.0 g/dL    RDW 14.5 11.9 - 14.6 %    PLATELET 746 237 - 001 K/uL    MPV 10.8 9.4 - 12.3 FL    ABSOLUTE NRBC 0.00 0.0 - 0.2 K/uL    DF AUTOMATED      NEUTROPHILS 81 (H) 43 - 78 %    LYMPHOCYTES 9 (L) 13 - 44 %    MONOCYTES 7 4.0 - 12.0 %    EOSINOPHILS 2 0.5 - 7.8 %    BASOPHILS 0 0.0 - 2.0 %    IMMATURE GRANULOCYTES 1 0.0 - 5.0 %    ABS. NEUTROPHILS 13.5 (H) 1.7 - 8.2 K/UL    ABS. LYMPHOCYTES 1.6 0.5 - 4.6 K/UL    ABS. MONOCYTES 1.2 0.1 - 1.3 K/UL    ABS. EOSINOPHILS 0.3 0.0 - 0.8 K/UL    ABS. BASOPHILS 0.0 0.0 - 0.2 K/UL    ABS. IMM. GRANS. 0.2 0.0 - 0.5 K/UL   METABOLIC PANEL, COMPREHENSIVE    Collection Time: 01/22/22  4:17 AM   Result Value Ref Range    Sodium 139 136 - 145 mmol/L    Potassium 3.1 (L) 3.5 - 5.1 mmol/L    Chloride 99 98 - 107 mmol/L    CO2 30 21 - 32 mmol/L    Anion gap 10 7 - 16 mmol/L    Glucose 78 65 - 100 mg/dL    BUN 80 (H) 8 - 23 MG/DL    Creatinine 1.89 (H) 0.6 - 1.0 MG/DL    GFR est AA 34 (L) >60 ml/min/1.73m2    GFR est non-AA 28 (L) >60 ml/min/1.73m2    Calcium 8.6 8.3 - 10.4 MG/DL    Bilirubin, total 0.4 0.2 - 1.1 MG/DL    ALT (SGPT) 19 12 - 65 U/L    AST (SGOT) 19 15 - 37 U/L    Alk.  phosphatase 82 50 - 136 U/L    Protein, total 7.4 6.3 - 8.2 g/dL    Albumin 3.3 3.2 - 4.6 g/dL    Globulin 4.1 (H) 2.3 - 3.5 g/dL    A-G Ratio 0.8 (L) 1.2 - 3.5     MAGNESIUM    Collection Time: 01/22/22  4:17 AM   Result Value Ref Range    Magnesium 1.7 (L) 1.8 - 2.4 mg/dL   GLUCOSE, POC    Collection Time: 01/22/22  7:48 AM   Result Value Ref Range    Glucose (POC) 110 (H) 65 - 100 mg/dL    Performed by [facility]    HEPARIN XA UFH    Collection Time: 01/22/22 10:49 AM   Result Value Ref Range    Heparin Xa UFH >1.1 (H) 0.3 - 0.7 IU/mL   GLUCOSE, POC    Collection Time: 01/22/22 11:46 AM   Result Value Ref Range    Glucose (POC) 242 (H) 65 - 100 mg/dL    Performed by [facility]        All Micro Results     Procedure Component Value Units Date/Time    COVID-19 RAPID TEST [[id number]]  (Abnormal) Collected: 01/14/22 0600    Order Status: Completed Specimen: Nasopharyngeal Updated: 01/14/22 0720     Specimen source NASAL        Comment: RAPID ONLY        COVID-19 rapid test Detected        Comment:      The specimen is POSITIVE for SARS-CoV-2, the novel coronavirus associated with COVID-19. This test has been authorized by the FDA under an Emergency Use Authorization (EUA) for use by authorized laboratories. Fact sheet for Healthcare Providers: [employer]  Fact sheet for Patients: [employer]       Methodology: Isothermal Nucleic Acid Amplification               Other Studies:  DUPLEX LOWER EXT VENOUS BILAT    Result Date: 1/21/2022  Bilateral lower extremity duplex venous study, 1/21/2022. CLINICAL HISTORY: Severe hypoxemia. Technique: Grayscale, and duplex Doppler images of the bilateral lower extremity venous vascular systems were obtained using an 9 MHz transducer. In addition, compression and augmentation were performed at multiple levels. FINDINGS: No lower extremity DVT is directly visualized in either leg. Normal augmentation is seen at all evaluated levels without findings to suggest more proximal occlusion. A 2.8 cm x 1 cm x 1.4 cm cystic structure is seen in the left popliteal fossa favored to represent a Baker's cyst although incompletely characterized. 1. No evidence for lower extremity DVT in either leg.        Current Meds:  Current Facility-Administered Medications   Medication Dose Route Frequency    heparin 25,000 units in dextrose 500 mL infusion  18-36 Units/kg/hr IntraVENous TITRATE    butalbital-acetaminophen-caffeine (FIORICET, ESGIC) -40 mg per tablet 1 Tablet  1 Tablet Oral Q6H PRN    insulin glargine (LANTUS) injection 22 Units  22 Units SubCUTAneous QHS    insulin lispro (HUMALOG) injection 6 Units  6 Units SubCUTAneous TIDAC    lip protectant (BLISTEX) ointment 1 Each  1 Each Topical PRN    amLODIPine (NORVASC) tablet 5 mg  5 mg Oral DAILY    furosemide (LASIX) injection 40 mg  40 mg IntraVENous BID    sodium chloride (NS) flush 5-40 mL  5-40 mL IntraVENous Q8H    sodium chloride (NS) flush 5-40 mL  5-40 mL IntraVENous PRN    acetaminophen (TYLENOL) tablet 650 mg  650 mg Oral Q6H PRN    Or    acetaminophen (TYLENOL) suppository 650 mg  650 mg Rectal Q6H PRN    polyethylene glycol (MIRALAX) packet 17 g  17 g Oral DAILY PRN    ondansetron (ZOFRAN ODT) tablet 4 mg  4 mg Oral Q8H PRN    Or    ondansetron (ZOFRAN) injection 4 mg  4 mg IntraVENous Q6H PRN    guaiFENesin-dextromethorphan (ROBITUSSIN DM) 100-10 mg/5 mL syrup 5 mL  5 mL Oral Q4H PRN    insulin lispro (HUMALOG) injection 0-10 Units  0-10 Units SubCUTAneous AC&HS    albuterol (PROVENTIL HFA, VENTOLIN HFA, PROAIR HFA) inhaler 2 Puff  2 Puff Inhalation QID RT    atorvastatin (LIPITOR) tablet 20 mg  20 mg Oral QHS    budesonide-formoteroL (SYMBICORT) 160-4.5 mcg/actuation HFA inhaler 2 Puff  2 Puff Inhalation BID    busPIRone (BUSPAR) tablet 7.5 mg  7.5 mg Oral BID    cycloSPORINE (RESTASIS) 0.05 % ophthalmic emulsion 1 Drop  (Patient Supplied)  1 Drop Both Eyes Q12H    DULoxetine (CYMBALTA) capsule 20 mg  20 mg Oral DAILY    lamoTRIgine (LaMICtal) tablet 150 mg  150 mg Oral QAM    levothyroxine (SYNTHROID) tablet 137 mcg  137 mcg Oral 6am    montelukast (SINGULAIR) tablet 10 mg  10 mg Oral DAILY    tiZANidine (ZANAFLEX) tablet 4 mg  4 mg Oral TID PRN    pregabalin (LYRICA) capsule 50 mg  50 mg Oral BID    topiramate (TOPAMAX) tablet 25 mg  25 mg Oral QHS    traZODone (DESYREL) tablet 50 mg  50 mg Oral QHS    dicyclomine (BENTYL) tablet 20 mg  20 mg Oral AC&HS    pantoprazole (PROTONIX) tablet 40 mg  40 mg Oral ACB&D    dexamethasone (DECADRON) 10 mg/mL injection 10 mg  10 mg IntraVENous Q24H    hydrALAZINE (APRESOLINE) tablet 50 mg  50 mg Oral Q6H PRN    alcohol 62% (NOZIN) nasal  1 Ampule  1 Ampule Topical Q12H       Signed:  Jim Black MD    Part of this note may have been written by using a voice dictation software. The note has been proof read but may still contain some grammatical/other typographical errors.

## 2022-01-22 NOTE — PROGRESS NOTES
Pt awake in bed, denies pain or need at this time. Pt is A/O on airvo 55/100, O2 sat 94%. No apparent distress noted at this time. Will continue to monitor. Preparing report to oncoming RN.

## 2022-01-23 NOTE — PROGRESS NOTES
Patient give ativan IV per order for anxiety. Patient continues to play with NRB and airvo which decreases stats to 70's. Patient on 60/100 Airvo plus NRB with stats of 89%-90%. Patient unable to eat at this time r/t oxygen demand. Insulin held r/t patient unable to eat. MD notified.

## 2022-01-23 NOTE — PROGRESS NOTES
Pt in bed resting, awake, alert and oriented. Pt expressing severe HA. Pt on airvo 60/100, O2 sat 98%. No apparent distress noted at this time. Call light in reach. Preparing to report to oncoming RN.

## 2022-01-23 NOTE — PROGRESS NOTES
Hospitalist Progress Note   Admit Date:  2022  5:25 AM   Name:  Get Carmona   Age:  66 y.o. Sex:  female  :  1950   MRN:  [id number]   Room:      Presenting Complaint: Altered mental status    Reason(s) for Admission: Acute hypoxemic respiratory failure due to COVID-19 ([facility] [address] [U07.1, J96.01]  SUSANNE (acute kidney injury) [facility]) [N17.9]     Hospital Course & Interval History:   27-year-old female past medical history of fibromyalgia, depression/anxiety, GERD, obesity BMI of 33 who presented with cough and shortness of breath and decreased p.o. intake over the last 5 days. When EMS arrived at home patient was short of breath, hypoxic and confused. Patient was placed on CPAP and transported to the ED. Initial oxygen saturation was 81% on nasal cannula. Patient was placed on BiPAP and subsequently transitioned to Airvo. Chest x-ray showed hazy bilateral lung opacities likely secondary to pulmonary edema/pneumonia. COVID test was positive. Patient was admitted for further evaluation of acute hypoxemic respiratory failure secondary to COVID-19 pneumonia, SUSANNE. Respiratory status had worsened and required CPAP. Pulm has been consulted. Patient was started on Decadron and empiric antibiotics for her community-acquired pneumonia. Patient was not a candidate for baricitinib due to her kidney function. Nephrology consulted. Patient has been tolerating diuresis thus far and has been weaned down from CPAP to Airvo. Subjective (22): Patient was seen and examined at the bedside. Patient continues to play with Airvo and nonrebreather mask making it difficult for her to continue to maintain high oxygen saturations. Patient denied any cardiac chest pain, abdominal pain, fever/chills. Patient on 60 L satting at 96%.     Assessment & Plan:   Acute hypoxemic respiratory failure due to COVID-19 with superimposed bacterial pneumonia ([facility] [address] (2022)  COVID-positive    Chest x-ray with hazy bilateral lung opacities likely secondary to pulmonary edema/pneumonia   Continue Decadron, end of treatment 1/23   Not a candidate for baricitinib due to kidney function   Continue Rocephin and doxycycline, end of treatment 1/22   I-S and albuterol as needed   Continue Symbicort   Prone as tolerated   Continue Lasix IV twice daily   Continue to monitor I's and O's, daily weights   Pulmonary consulted appreciate recommendations   Continue Airvo 60 L / 100%, continue to wean  Chest x-ray showed overall improved aeration of lungs  Educated on importance of wearing CPAP did not tire out during the day.  Pulmonology started patient on heparin drip for suspicion of pulmonary embolism. Patient was unable to go down for VQ scan since unable to hold oxygen saturation greater than 84%  01/23/22 Patient still critical at this time requiring higher levels of oxygenation to maintain oxygen greater than 90%.   Patient high risk for further decline   Patient continues to remove air Vo and nonrebreather mask patient needed to be placed in restraints;   Chest x-ray with persistent bibasilar lung infiltrates greater on the left side    Acute kidney injury   Creatinine of 3.7 on admission   Renal ultrasound without hydronephrosis, 2.9 cm parapelvic cyst   Avoid nephrotoxic medications   Continue to monitor I's and O's   Holding home hydrochlorothiazide and losartan   Nephrology consulted, appreciate recommendations   Continue IV Lasix twice daily and monitor renal function   1/23 creatinine continues to improve daily    Morbid obesity   Adds to complexity of care   Patient not diagnosed with JESSICA but does wear CPAP at home    Essential hypertension   Hydralazine as needed   Holding hydrochlorothiazide and losartan due to acute kidney injury mild    GERD   Continue PPI    Overactive bladder   Continue oxybutynin    Acquired hypothyroidism   Continue home Synthroid    Fibromyalgia   Continue Topamax, Cymbalta    Anxiety/depression   Continue home trazodone, Cymbalta, BuSpar, pregabalin, Lamictal    Type 2 diabetes   Metformin on hold, Farxiga   Sliding scale insulin   Lantus, titrate as appropriate(1/20, changed to Lantus from 26-22, increase prandial from 3 to 6 units.)   Hemoglobin A1c 8.0      Dispo/Discharge Planning:    Dispo pending clinical course. Continue to wean oxygen. PT/OT recommending short-term rehab    Diet:  ADULT ORAL NUTRITION SUPPLEMENT Breakfast, Lunch, Dinner; Standard High Calorie/High Protein  ADULT DIET Easy to Chew; 3 carb choices (45 gm/meal)  DVT PPx: Heparin subcu  Code status: DNR    Hospital Problems as of 1/23/2022 Date Reviewed: 6/10/2021          Codes Class Noted - Resolved POA    SUSANNE (acute kidney injury) ([facility] [address] ICD-10-CM: N17.9  ICD-9-CM: 584.9  1/14/2022 - Present Yes        * (Principal) Acute hypoxemic respiratory failure due to COVID-19 [facility]) ICD-10-CM: U07.1, J96.01  ICD-9-CM: 518.81, 079.89, 799.02  1/14/2022 - Present Yes        Type 2 diabetes mellitus with diabetic neuropathy, without long-term current use of insulin (HCC) (Chronic) ICD-10-CM: E11.40  ICD-9-CM: 250.60, 357.2  1/12/2018 - Present Yes        Fibromyalgia (Chronic) ICD-10-CM: M79.7  ICD-9-CM: 729.1  Unknown - Present Yes    Overview Signed 4/12/2017  9:40 AM by Maya JENSEN FIBROMYALGIA             Anxiety and depression (Chronic) ICD-10-CM: F41.9, F32. A  ICD-9-CM: 300.00, 311  Unknown - Present Yes        Essential hypertension (Chronic) ICD-10-CM: I10  ICD-9-CM: 401.9  9/20/2016 - Present Yes        Gastroesophageal reflux disease without esophagitis (Chronic) ICD-10-CM: K21.9  ICD-9-CM: 530.81  9/20/2016 - Present Yes        OAB (overactive bladder) (Chronic) ICD-10-CM: N32.81  ICD-9-CM: 596.51  9/20/2016 - Present Yes        Morbid obesity (HCC) (Chronic) ICD-10-CM: E66.01  ICD-9-CM: 278.01  Unknown - Present Yes        Acquired hypothyroidism (Chronic) ICD-10-CM: E03.9  ICD-9-CM: 244.9  11/14/2012 - Present Yes              Objective:     Patient Vitals for the past 24 hrs:   Temp Pulse Resp BP SpO2   01/23/22 1244     92 %   01/23/22 1123 98 °F (36.7 °C) 78 18 110/67 92 %   01/23/22 0914     90 %   01/23/22 0805 97.9 °F (36.6 °C) 68 20 106/63 95 %   01/23/22 0645     96 %   01/23/22 0608     98 %   01/23/22 0355     97 %   01/23/22 0316 97.8 °F (36.6 °C) 63 17 98/73 97 %   01/22/22 2327 97.5 °F (36.4 °C) (!) 58 18 100/65 97 %   01/22/22 2115     95 %   01/22/22 2026 97.5 °F (36.4 °C) 92 18 118/70 94 %   01/22/22 1523     93 %   01/22/22 1506 97.9 °F (36.6 °C) 69 20 119/77 90 %     Oxygen Therapy  O2 Sat (%): 92 % (01/23/22 1244)  Pulse via Oximetry: 75 beats per minute (01/23/22 1244)  O2 Device: Hi flow nasal cannula; Non-rebreather mask (01/23/22 1244)  Skin Assessment: Clean, dry, & intact (01/22/22 2115)  Skin Protection for O2 Device: No (01/22/22 0757)  O2 Flow Rate (L/min): 60 l/min (01/23/22 1244)  O2 Temperature: 87.8 °F (31 °C) (01/22/22 1523)  FIO2 (%): 100 % (01/23/22 1244)    Estimated body mass index is 33.66 kg/m² as calculated from the following:    Height as of this encounter: 5' 3\" (1.6 m). Weight as of this encounter: 86.2 kg (190 lb). Intake/Output Summary (Last 24 hours) at 1/23/2022 1335  Last data filed at 1/23/2022 1247  Gross per 24 hour   Intake 360 ml   Output 1200 ml   Net -840 ml         Physical Exam:   Blood pressure 110/67, pulse 78, temperature 98 °F (36.7 °C), resp. rate 18, height 5' 3\" (1.6 m), weight 86.2 kg (190 lb), SpO2 92 %. General:    Well nourished. No overt distress  Head:  Normocephalic, atraumatic  Eyes:  Sclerae appear normal.  Pupils equally round. ENT:  Nares appear normal, no drainage. Moist oral mucosa  Neck:  No restricted ROM. Trachea midline   CV:   RRR. No m/r/g. No jugular venous distension. Lungs:   Decreased breath sounds. No wheezing, rhonchi, or rales.   Respirations even, unlabored  Abdomen: Bowel sounds present. Soft, nontender, nondistended. Extremities: No cyanosis or clubbing. No edema  Skin:     No rashes and normal coloration. Warm and dry. Neuro:  CN II-XII grossly intact. Sensation intact. A&Ox3  Psych:  Normal mood and affect. I have reviewed ordered lab tests and independently visualized imaging below:    Recent Labs:  Recent Results (from the past 48 hour(s))   PTT    Collection Time: 01/21/22  4:07 PM   Result Value Ref Range    aPTT 29.1 24.1 - 35.1 SEC   CBC WITH AUTOMATED DIFF    Collection Time: 01/21/22  4:07 PM   Result Value Ref Range    WBC 15.9 (H) 4.3 - 11.1 K/uL    RBC 4.63 4.05 - 5.2 M/uL    HGB 11.7 11.7 - 15.4 g/dL    HCT 38.1 35.8 - 46.3 %    MCV 82.3 79.6 - 97.8 FL    MCH 25.3 (L) 26.1 - 32.9 PG    MCHC 30.7 (L) 31.4 - 35.0 g/dL    RDW 14.6 11.9 - 14.6 %    PLATELET 349 112 - 663 K/uL    MPV 11.0 9.4 - 12.3 FL    ABSOLUTE NRBC 0.00 0.0 - 0.2 K/uL    DF AUTOMATED      NEUTROPHILS 90 (H) 43 - 78 %    LYMPHOCYTES 4 (L) 13 - 44 %    MONOCYTES 5 4.0 - 12.0 %    EOSINOPHILS 0 (L) 0.5 - 7.8 %    BASOPHILS 0 0.0 - 2.0 %    IMMATURE GRANULOCYTES 1 0.0 - 5.0 %    ABS. NEUTROPHILS 14.3 (H) 1.7 - 8.2 K/UL    ABS. LYMPHOCYTES 0.7 0.5 - 4.6 K/UL    ABS. MONOCYTES 0.8 0.1 - 1.3 K/UL    ABS. EOSINOPHILS 0.0 0.0 - 0.8 K/UL    ABS. BASOPHILS 0.0 0.0 - 0.2 K/UL    ABS. IMM.  GRANS. 0.2 0.0 - 0.5 K/UL   GLUCOSE, POC    Collection Time: 01/21/22  4:17 PM   Result Value Ref Range    Glucose (POC) 307 (H) 65 - 100 mg/dL    Performed by [facility]    GLUCOSE, POC    Collection Time: 01/21/22  8:48 PM   Result Value Ref Range    Glucose (POC) 286 (H) 65 - 100 mg/dL    Performed by [facility]    HEPARIN XA UFH    Collection Time: 01/21/22 11:40 PM   Result Value Ref Range    Heparin Xa UFH >1.1 (H) 0.3 - 0.7 IU/mL   CBC WITH AUTOMATED DIFF    Collection Time: 01/22/22  4:16 AM   Result Value Ref Range    WBC 16.7 (H) 4.3 - 11.1 K/uL    RBC 4.86 4.05 - 5.2 M/uL    HGB 12.3 11.7 - 15.4 g/dL    HCT 40.2 35.8 - 46.3 %    MCV 82.7 79.6 - 97.8 FL    MCH 25.3 (L) 26.1 - 32.9 PG    MCHC 30.6 (L) 31.4 - 35.0 g/dL    RDW 14.5 11.9 - 14.6 %    PLATELET 439 434 - 636 K/uL    MPV 10.8 9.4 - 12.3 FL    ABSOLUTE NRBC 0.00 0.0 - 0.2 K/uL    DF AUTOMATED      NEUTROPHILS 81 (H) 43 - 78 %    LYMPHOCYTES 9 (L) 13 - 44 %    MONOCYTES 7 4.0 - 12.0 %    EOSINOPHILS 2 0.5 - 7.8 %    BASOPHILS 0 0.0 - 2.0 %    IMMATURE GRANULOCYTES 1 0.0 - 5.0 %    ABS. NEUTROPHILS 13.5 (H) 1.7 - 8.2 K/UL    ABS. LYMPHOCYTES 1.6 0.5 - 4.6 K/UL    ABS. MONOCYTES 1.2 0.1 - 1.3 K/UL    ABS. EOSINOPHILS 0.3 0.0 - 0.8 K/UL    ABS. BASOPHILS 0.0 0.0 - 0.2 K/UL    ABS. IMM. GRANS. 0.2 0.0 - 0.5 K/UL   METABOLIC PANEL, COMPREHENSIVE    Collection Time: 01/22/22  4:17 AM   Result Value Ref Range    Sodium 139 136 - 145 mmol/L    Potassium 3.1 (L) 3.5 - 5.1 mmol/L    Chloride 99 98 - 107 mmol/L    CO2 30 21 - 32 mmol/L    Anion gap 10 7 - 16 mmol/L    Glucose 78 65 - 100 mg/dL    BUN 80 (H) 8 - 23 MG/DL    Creatinine 1.89 (H) 0.6 - 1.0 MG/DL    GFR est AA 34 (L) >60 ml/min/1.73m2    GFR est non-AA 28 (L) >60 ml/min/1.73m2    Calcium 8.6 8.3 - 10.4 MG/DL    Bilirubin, total 0.4 0.2 - 1.1 MG/DL    ALT (SGPT) 19 12 - 65 U/L    AST (SGOT) 19 15 - 37 U/L    Alk.  phosphatase 82 50 - 136 U/L    Protein, total 7.4 6.3 - 8.2 g/dL    Albumin 3.3 3.2 - 4.6 g/dL    Globulin 4.1 (H) 2.3 - 3.5 g/dL    A-G Ratio 0.8 (L) 1.2 - 3.5     MAGNESIUM    Collection Time: 01/22/22  4:17 AM   Result Value Ref Range    Magnesium 1.7 (L) 1.8 - 2.4 mg/dL   GLUCOSE, POC    Collection Time: 01/22/22  7:48 AM   Result Value Ref Range    Glucose (POC) 110 (H) 65 - 100 mg/dL    Performed by [facility]    HEPARIN XA UFH    Collection Time: 01/22/22 10:49 AM   Result Value Ref Range    Heparin Xa UFH >1.1 (H) 0.3 - 0.7 IU/mL   GLUCOSE, POC    Collection Time: 01/22/22 11:46 AM   Result Value Ref Range    Glucose (POC) 242 (H) 65 - 100 mg/dL Performed by [facility]    POC VENOUS BLOOD GAS    Collection Time: 01/22/22  3:13 PM   Result Value Ref Range    Device: High Frequency Oscillator Ventilator      FIO2 (POC) 100 %    pH, venous (POC) 7.45 (H) 7.32 - 7.42      pCO2, venous (POC) 37.9 (L) 41 - 51 MMHG    pO2, venous (POC) 53 mmHg    HCO3, venous (POC) 26.1 23 - 28 MMOL/L    sO2, venous (POC) 88.5 (H) 65 - 88 %    Base excess, venous (POC) 2.0 mmol/L    Site RIGHT FEMORAL      Specimen type (POC) VENOUS BLOOD      Performed by Javed     Critical value read back MG    GLUCOSE, POC    Collection Time: 01/22/22  4:16 PM   Result Value Ref Range    Glucose (POC) 204 (H) 65 - 100 mg/dL    Performed by [facility]    HEPARIN XA UFH    Collection Time: 01/22/22  8:45 PM   Result Value Ref Range    Heparin Xa UFH 0.78 (H) 0.3 - 0.7 IU/mL   GLUCOSE, POC    Collection Time: 01/22/22  8:59 PM   Result Value Ref Range    Glucose (POC) 281 (H) 65 - 100 mg/dL    Performed by [facility]    METABOLIC PANEL, COMPREHENSIVE    Collection Time: 01/23/22  4:19 AM   Result Value Ref Range    Sodium 138 136 - 145 mmol/L    Potassium 3.3 (L) 3.5 - 5.1 mmol/L    Chloride 98 98 - 107 mmol/L    CO2 31 21 - 32 mmol/L    Anion gap 9 7 - 16 mmol/L    Glucose 72 65 - 100 mg/dL    BUN 74 (H) 8 - 23 MG/DL    Creatinine 1.71 (H) 0.6 - 1.0 MG/DL    GFR est AA 38 (L) >60 ml/min/1.73m2    GFR est non-AA 31 (L) >60 ml/min/1.73m2    Calcium 8.5 8.3 - 10.4 MG/DL    Bilirubin, total 0.4 0.2 - 1.1 MG/DL    ALT (SGPT) 19 12 - 65 U/L    AST (SGOT) 23 15 - 37 U/L    Alk.  phosphatase 86 50 - 136 U/L    Protein, total 7.1 6.3 - 8.2 g/dL    Albumin 3.1 (L) 3.2 - 4.6 g/dL    Globulin 4.0 (H) 2.3 - 3.5 g/dL    A-G Ratio 0.8 (L) 1.2 - 3.5     MAGNESIUM    Collection Time: 01/23/22  4:19 AM   Result Value Ref Range    Magnesium 1.6 (L) 1.8 - 2.4 mg/dL   CBC WITH AUTOMATED DIFF    Collection Time: 01/23/22  4:19 AM   Result Value Ref Range    WBC 18.7 (H) 4.3 - 11.1 K/uL    RBC 4.49 4.05 - 5.2 M/uL    HGB 11.8 11.7 - 15.4 g/dL    HCT 38.3 35.8 - 46.3 %    MCV 85.3 79.6 - 97.8 FL    MCH 26.3 26.1 - 32.9 PG    MCHC 30.8 (L) 31.4 - 35.0 g/dL    RDW 14.4 11.9 - 14.6 %    PLATELET 612 963 - 456 K/uL    MPV 11.4 9.4 - 12.3 FL    ABSOLUTE NRBC 0.00 0.0 - 0.2 K/uL    DF AUTOMATED      NEUTROPHILS 83 (H) 43 - 78 %    LYMPHOCYTES 10 (L) 13 - 44 %    MONOCYTES 5 4.0 - 12.0 %    EOSINOPHILS 1 0.5 - 7.8 %    BASOPHILS 0 0.0 - 2.0 %    IMMATURE GRANULOCYTES 1 0.0 - 5.0 %    ABS. NEUTROPHILS 15.5 (H) 1.7 - 8.2 K/UL    ABS. LYMPHOCYTES 1.8 0.5 - 4.6 K/UL    ABS. MONOCYTES 1.0 0.1 - 1.3 K/UL    ABS. EOSINOPHILS 0.2 0.0 - 0.8 K/UL    ABS. BASOPHILS 0.0 0.0 - 0.2 K/UL    ABS. IMM. GRANS. 0.2 0.0 - 0.5 K/UL   HEPARIN XA UFH    Collection Time: 01/23/22  4:19 AM   Result Value Ref Range    Heparin Xa UFH 0.73 (H) 0.3 - 0.7 IU/mL   GLUCOSE, POC    Collection Time: 01/23/22  7:47 AM   Result Value Ref Range    Glucose (POC) 125 (H) 65 - 100 mg/dL    Performed by [facility]    GLUCOSE, POC    Collection Time: 01/23/22 11:16 AM   Result Value Ref Range    Glucose (POC) 257 (H) 65 - 100 mg/dL    Performed by [facility]        All Micro Results     Procedure Component Value Units Date/Time    COVID-19 RAPID TEST [[id number]]  (Abnormal) Collected: 01/14/22 0600    Order Status: Completed Specimen: Nasopharyngeal Updated: 01/14/22 0720     Specimen source NASAL        Comment: RAPID ONLY        COVID-19 rapid test Detected        Comment:      The specimen is POSITIVE for SARS-CoV-2, the novel coronavirus associated with COVID-19. This test has been authorized by the FDA under an Emergency Use Authorization (EUA) for use by authorized laboratories.         Fact sheet for Healthcare Providers: ConventionUpdate.co.nz  Fact sheet for Patients: ConventionUpdate.co.nz       Methodology: Isothermal Nucleic Acid Amplification               Other Studies:  XR CHEST SNGL V    Result Date: 1/23/2022  EXAMINATION: One view chest HISTORY: COVID pneumonia TECHNIQUE: Frontal chest. COMPARISON: 1/21/2022 FINDINGS:  Persistent bibasilar lung infiltrates, greater on the left. There has been interval increase bilaterally. There is no significant pneumothorax. There is no pleural effusion. The heart is unchanged. No other significant interval changes. 1. Findings as described above.        Current Meds:  Current Facility-Administered Medications   Medication Dose Route Frequency    LORazepam (ATIVAN) injection 1 mg  1 mg IntraVENous Q4H PRN    potassium chloride 20 mEq in 100 ml IVPB  20 mEq IntraVENous Q2H    LORazepam (ATIVAN) tablet 0.5 mg  0.5 mg Oral Q12H PRN    heparin 25,000 units in dextrose 500 mL infusion  18-36 Units/kg/hr IntraVENous TITRATE    butalbital-acetaminophen-caffeine (FIORICET, ESGIC) -40 mg per tablet 1 Tablet  1 Tablet Oral Q6H PRN    insulin glargine (LANTUS) injection 22 Units  22 Units SubCUTAneous QHS    insulin lispro (HUMALOG) injection 6 Units  6 Units SubCUTAneous TIDAC    lip protectant (BLISTEX) ointment 1 Each  1 Each Topical PRN    amLODIPine (NORVASC) tablet 5 mg  5 mg Oral DAILY    furosemide (LASIX) injection 40 mg  40 mg IntraVENous BID    sodium chloride (NS) flush 5-40 mL  5-40 mL IntraVENous Q8H    sodium chloride (NS) flush 5-40 mL  5-40 mL IntraVENous PRN    acetaminophen (TYLENOL) tablet 650 mg  650 mg Oral Q6H PRN    Or    acetaminophen (TYLENOL) suppository 650 mg  650 mg Rectal Q6H PRN    polyethylene glycol (MIRALAX) packet 17 g  17 g Oral DAILY PRN    ondansetron (ZOFRAN ODT) tablet 4 mg  4 mg Oral Q8H PRN    Or    ondansetron (ZOFRAN) injection 4 mg  4 mg IntraVENous Q6H PRN    guaiFENesin-dextromethorphan (ROBITUSSIN DM) 100-10 mg/5 mL syrup 5 mL  5 mL Oral Q4H PRN    insulin lispro (HUMALOG) injection 0-10 Units  0-10 Units SubCUTAneous AC&HS    albuterol (PROVENTIL HFA, VENTOLIN HFA, PROAIR HFA) inhaler 2 Puff  2 Puff Inhalation QID RT    atorvastatin (LIPITOR) tablet 20 mg  20 mg Oral QHS    budesonide-formoteroL (SYMBICORT) 160-4.5 mcg/actuation HFA inhaler 2 Puff  2 Puff Inhalation BID    busPIRone (BUSPAR) tablet 7.5 mg  7.5 mg Oral BID    cycloSPORINE (RESTASIS) 0.05 % ophthalmic emulsion 1 Drop  (Patient Supplied)  1 Drop Both Eyes Q12H    DULoxetine (CYMBALTA) capsule 20 mg  20 mg Oral DAILY    lamoTRIgine (LaMICtal) tablet 150 mg  150 mg Oral QAM    levothyroxine (SYNTHROID) tablet 137 mcg  137 mcg Oral 6am    montelukast (SINGULAIR) tablet 10 mg  10 mg Oral DAILY    tiZANidine (ZANAFLEX) tablet 4 mg  4 mg Oral TID PRN    pregabalin (LYRICA) capsule 50 mg  50 mg Oral BID    topiramate (TOPAMAX) tablet 25 mg  25 mg Oral QHS    traZODone (DESYREL) tablet 50 mg  50 mg Oral QHS    dicyclomine (BENTYL) tablet 20 mg  20 mg Oral AC&HS    pantoprazole (PROTONIX) tablet 40 mg  40 mg Oral ACB&D    hydrALAZINE (APRESOLINE) tablet 50 mg  50 mg Oral Q6H PRN    alcohol 62% (NOZIN) nasal  1 Ampule  1 Ampule Topical Q12H       Signed:  Sandi Hansen MD    Part of this note may have been written by using a voice dictation software. The note has been proof read but may still contain some grammatical/other typographical errors.

## 2022-01-23 NOTE — PROGRESS NOTES
Patient resting in bed with 60/100 Airvo plus NRB in place. Patient with bilateral wrist restraints in place. Heparin with rate change and new bag hung. Patient denies any pain and appears comfortable. Spoke with patient's brother Sirisha Harrison and gave update. Call light within reach and patient instructed to call if assistance is needed. Will continue to follow.

## 2022-01-23 NOTE — PROGRESS NOTES
Patient resting in bed with no complaints at this time. Patient is alert with periodic confusion but no distress noted. IV intact and patent with no s/s of infection noted. Respirations even and unlabored with heart rate regular. Patient unable to ambulate independently without assistance; bed rest at this time. Patient requiring 60/100 airvo plus NRB/  Bed in low locked position with call light within reach. Patient instructed to call if assistance is needed. Will continue to monitor.

## 2022-01-23 NOTE — PROGRESS NOTES
Assumed pt care. Pt in bed resting awake alert and oriented x2-3. Pt on airvo 60/100 with NRB PRN. Pt seems anxious and expresses headache. Call light in reach.

## 2022-01-23 NOTE — PROGRESS NOTES
[facility] 78 CRITICAL CARE OUTREACH NURSE PROGRESS REPORT      SUBJECTIVE: Called to assess patient secondary to nurse concern. MEWS Score: 1 (01/22/22 2026)  Vitals:    01/22/22 1210 01/22/22 1506 01/22/22 1523 01/22/22 2026   BP:  119/77  118/70   Pulse:  69  92   Resp:  20  18   Temp:  97.9 °F (36.6 °C)  97.5 °F (36.4 °C)   SpO2: 93% 90% 93% 94%   Weight:       Height:              LAB DATA:    Recent Labs     01/22/22  0417 01/21/22  0349 01/20/22  0348    140 142   K 3.1* 3.7 2.9*   CL 99 103 103   CO2 30 30 27   AGAP 10 7 12   GLU 78 108* 100   BUN 80* 97* 102*   CREA 1.89* 2.14* 2.54*   GFRAA 34* 29* 24*   GFRNA 28* 24* 20*   CA 8.6 9.1 8.9   MG 1.7* 2.0  --    ALB 3.3 3.6  --    TP 7.4 7.5  --    GLOB 4.1* 3.9*  --    AGRAT 0.8* 0.9*  --    ALT 19 19  --         Recent Labs     01/22/22  0416 01/21/22  1607 01/21/22  0349   WBC 16.7* 15.9* 12.0*   HGB 12.3 11.7 12.2   HCT 40.2 38.1 39.6    344 341          OBJECTIVE: On arrival to room, I found patient to be resting in bed with eyes closed, on airvo. Pain Assessment  Pain Intensity 1: 0 (01/22/22 2215)  Pain Location 1: Head  Pain Intervention(s) 1: Medication (see MAR)  Patient Stated Pain Goal: 0                                 ASSESSMENT:  Pt easily aroused, open eyes to voice. On Airvo 60L/100%. Resp rate 22. Sats 96%. States that she is tired. Denies any pain at this time. Denies any difficulty breathing. Discussed with primary RN.     PLAN:  Will continue to monitor per outreach protocol

## 2022-01-24 NOTE — PROGRESS NOTES
Entered room, pt with sat at 76%, oxygen  Off lying in bed. Reapplied with resp present. Compensated quicker than previous . Bilateral soft wrist restraints applied, Dr Jailyn Figueroa notified.  Dr Bautista Thmoas also notified that insulin held due to inability for pt to eat on bipap, will assess at supper meal.

## 2022-01-24 NOTE — PROGRESS NOTES
Placed on aivo 60/100% with nrb, sats in mid 90's, tolerating well. .  Call placed to lab re hep xa, no results noted in computer, states still in process, results obtained states <0.1. Will address.

## 2022-01-24 NOTE — PROGRESS NOTES
Physical Therapy Note:    PT holding session per RN as pt now requiring Bipap with FIO2 100% at rest. Will try back at later time/date pending pt status and availability.      Thank Kaylen Wagoner PT, DPT, CSCS

## 2022-01-24 NOTE — PROGRESS NOTES
Sat at 56 per oxynet, upon entering [facility], pt with oxygen in lap, cyanotic around lips and fingers noted. airvo re applied 60/100 and non re breather. Very slow to compensate. resp therapy  Called to assist. Pt placed on bipap, non secured mittens applied bilaterally.   [facility] to see pt, aware of pt pulling oxygen off, pt more lethargic,

## 2022-01-24 NOTE — PROGRESS NOTES
Date of Outreach Update:  Nayeli Chris was seen and assessed. MEWS Score: 2 (01/24/22 0041)  Vitals:    01/24/22 0041 01/24/22 0131 01/24/22 0133 01/24/22 0232   BP: (!) 120/51 114/68     Pulse: 62 (!) 52 (!) 54    Resp: 21      Temp: 98.1 °F (36.7 °C)      SpO2: 100%   100%   Weight:       Height:             Pain Assessment  Pain Intensity 1: 0 (01/23/22 2015)  Pain Location 1: Head  Pain Intervention(s) 1: Medication (see MAR)  Patient Stated Pain Goal: 0      Previous Outreach assessment has been reviewed. There have been no significant clinical changes since the completion of the last dated Outreach assessment. Pt resting comfortably at this time on CPAP12, 80%. Sats upper 90s. Will continue to follow up per outreach protocol.     Signed By:   Isabelle Jade RN    January 24, 2022 2:40 AM

## 2022-01-24 NOTE — PROGRESS NOTES
Sudheer Radha Tommiecomamol  Admission Date: 1/14/2022         Daily Progress Note: 1/24/2022    The patient's chart is reviewed and the patient is discussed with the staff. Background: 66year old unvaccinated female was admitted with cough, shortness of breath and decreased appetite. She tested + for COVID on 1/14. She is being treated with Decadron and Tocilizumab. She is also on Doxycycline. CRP is 16.9 and d dimer is 3.2. She is hypoxic and currently requiring bipap with FIO2 100%. She is undecided about intubation but a full code at present. She had SUSANNE on admission. She has been seen by nephrology who suspects hypoperfusion. She was initially on IV fluids but these have been stopped. Her fluid balance is + 100 mls. BNP measured 18,243 on admission. Her initial creatinine was 3.9 and it is now elevated. Nephrology is following and she has gotten some lasix. She has never smoked but there is a history of asthma which is stable. She has been tested for JESSICA (about 2 years ago) but no tx was recommended at that time. Pt's spouse passed away last week, found at home. Date of COVID-19 symptom onset: 1/9/22  COVID-19 Meds:  Dexamethasone 6mg daily: 1//14/22  Actemra: 1/14/22    Vaccination:    Results in Past 30 Days  Result Component Current Result Ref Range Previous Result Ref Range   COVID-19 rapid test Detected (AA) (1/14/2022) NOTD   Not in Time Range    Specimen source NASAL (1/14/2022)   Not in Time Range        Subjective:     Afebrile. On airvo and non-rebreather mask. DDimer 3.95 on 1/18/22 and now on heparin gtt. Negative doppler studies of BLE. No CTA due to SUSANNE, unable to do VQ scan due to hypoxemia.       Current Facility-Administered Medications   Medication Dose Route Frequency    insulin glargine (LANTUS) injection 16 Units  16 Units SubCUTAneous QHS    OLANZapine (ZyPREXA) 5 mg in sterile water (preservative free) 1 mL injection  5 mg IntraMUSCular QHS PRN  heparin 25,000 units in dextrose 500 mL infusion  18-36 Units/kg/hr IntraVENous TITRATE    butalbital-acetaminophen-caffeine (FIORICET, ESGIC) -40 mg per tablet 1 Tablet  1 Tablet Oral Q6H PRN    insulin lispro (HUMALOG) injection 6 Units  6 Units SubCUTAneous TIDAC    lip protectant (BLISTEX) ointment 1 Each  1 Each Topical PRN    amLODIPine (NORVASC) tablet 5 mg  5 mg Oral DAILY    furosemide (LASIX) injection 40 mg  40 mg IntraVENous BID    sodium chloride (NS) flush 5-40 mL  5-40 mL IntraVENous Q8H    sodium chloride (NS) flush 5-40 mL  5-40 mL IntraVENous PRN    acetaminophen (TYLENOL) tablet 650 mg  650 mg Oral Q6H PRN    Or    acetaminophen (TYLENOL) suppository 650 mg  650 mg Rectal Q6H PRN    polyethylene glycol (MIRALAX) packet 17 g  17 g Oral DAILY PRN    ondansetron (ZOFRAN ODT) tablet 4 mg  4 mg Oral Q8H PRN    Or    ondansetron (ZOFRAN) injection 4 mg  4 mg IntraVENous Q6H PRN    guaiFENesin-dextromethorphan (ROBITUSSIN DM) 100-10 mg/5 mL syrup 5 mL  5 mL Oral Q4H PRN    insulin lispro (HUMALOG) injection 0-10 Units  0-10 Units SubCUTAneous AC&HS    albuterol (PROVENTIL HFA, VENTOLIN HFA, PROAIR HFA) inhaler 2 Puff  2 Puff Inhalation QID RT    atorvastatin (LIPITOR) tablet 20 mg  20 mg Oral QHS    budesonide-formoteroL (SYMBICORT) 160-4.5 mcg/actuation HFA inhaler 2 Puff  2 Puff Inhalation BID    busPIRone (BUSPAR) tablet 7.5 mg  7.5 mg Oral BID    cycloSPORINE (RESTASIS) 0.05 % ophthalmic emulsion 1 Drop  (Patient Supplied)  1 Drop Both Eyes Q12H    DULoxetine (CYMBALTA) capsule 20 mg  20 mg Oral DAILY    lamoTRIgine (LaMICtal) tablet 150 mg  150 mg Oral QAM    levothyroxine (SYNTHROID) tablet 137 mcg  137 mcg Oral 6am    montelukast (SINGULAIR) tablet 10 mg  10 mg Oral DAILY    tiZANidine (ZANAFLEX) tablet 4 mg  4 mg Oral TID PRN    pregabalin (LYRICA) capsule 50 mg  50 mg Oral BID    topiramate (TOPAMAX) tablet 25 mg  25 mg Oral QHS    traZODone (DESYREL) tablet 50 mg  50 mg Oral QHS    dicyclomine (BENTYL) tablet 20 mg  20 mg Oral AC&HS    pantoprazole (PROTONIX) tablet 40 mg  40 mg Oral ACB&D    hydrALAZINE (APRESOLINE) tablet 50 mg  50 mg Oral Q6H PRN    alcohol 62% (NOZIN) nasal  1 Ampule  1 Ampule Topical Q12H     Review of Systems  Constitutional: negative for fever, chills, sweats  Cardiovascular: negative for chest pain, palpitations, syncope, edema  Gastrointestinal:  negative for dysphagia, reflux, vomiting, diarrhea, abdominal pain, or melena  Neurologic:  negative for focal weakness, numbness, headache    Objective:     Vitals:    01/24/22 0232 01/24/22 0440 01/24/22 0604 01/24/22 0757   BP:  (!) 119/50     Pulse:  62     Resp:  22     Temp:  98 °F (36.7 °C)     SpO2: 100% 91% 93% 92%   Weight:       Height:           Intake/Output Summary (Last 24 hours) at 1/24/2022 0813  Last data filed at 1/24/2022 0603  Gross per 24 hour   Intake 120 ml   Output 1300 ml   Net -1180 ml     Physical Exam:   Constitution:  the patient is overweight and in no acute distress   HEENT:  Sclera clear, pupils equal, oral mucosa moist  Respiratory: Crackles, on CPAP 12, 100%, sats 99%  Cardiovascular:  RRR without M,G,R  Gastrointestinal: soft and non-tender; with positive bowel sounds. Musculoskeletal: warm without cyanosis. There is no lower extremity edema.   Skin:  no jaundice or rashes  Neurologic: drowsy, moves all extremities; oriented x4  Psychiatric:  Calm with mitts on     CXR:   1/23/22--increased infiltrates bilaterally 1/21/22 1/18:          1/16, 15:        LAB:  Recent Labs     01/24/22  0343 01/23/22  0419 01/22/22  0416   WBC 17.8* 18.7* 16.7*   HGB 11.4* 11.8 12.3   HCT 38.2 38.3 40.2    315 358     Recent Labs     01/24/22  0343 01/23/22  0419 01/22/22  0417    138 139   K 3.9 3.3* 3.1*    98 99   CO2 32 31 30   * 72 78   BUN 71* 74* 80*   CREA 1.72* 1.71* 1.89*   MG 2.1 1.6* 1.7*   CA 8.6 8.5 8.6   ALB 3.4 3.1* 3.3 AST 27 23 19   ALT 15 19 19   AP 98 86 82     No results for input(s): LAC, TROPHS, BNPNT, CRP in the last 72 hours. No lab exists for component: ESR  Recent Labs     01/23/22  1659   PHI 7.50*   PCO2I 36.2   PO2I 63*   HCO3I 28.3*     No results for input(s): SDES, CULT in the last 72 hours. Assessment and Plan:  (Medical Decision Making)   Principal Problem:    Acute hypoxemic respiratory failure due to COVID-19 [facility]) (1/14/2022)  --Continues to require AirVo during the day and CPAP at night; required BIPAP at some point last night. Continue CPAP qHS and wean AirVo as tolerated. --completed dexa 10mg for 10 days 1/23, but with going back on cpap, will start lower dose IV to wean steroids, instead of stop. --pt is DNR, so continue CPAP intermittently with airvo as tolerated. --pt did receive IM olanzapine for anxiety and agitation. Limit sedatives as able. --CXR noted with increased infiltrates bilaterally. --continue heparin gtt for now for concern of PE. Unable to confirm at this time. --completed round of rocephin on 1/21/22; WBC elevated for last several days, afebrile; monitor. Active Problems: Morbid obesity (HCC) ()  --BMI almost 34. Contributes to the complexity of care, mobilize      Essential hypertension (9/20/2016)  --Norvasc previously added by nephrology      Type 2 diabetes mellitus with diabetic neuropathy, without long-term current use of insulin ([facility] (1/12/2018)  --Per primary team       SUSANNE (acute kidney injury) ([facility] (1/14/2022)  --[address] Nephrology following, Creatinine continues down to 1.72 this am with diuresis. BNP 18,243 on 1/14/22. K+ 3.9      Patient is a DNR       More than 50% of the time documented was spent in face-to-face contact with the patient and in the care of the patient on the floor/unit where the patient is located. Ariela Garland NP     I have spoken with and examined the patient.  I agree with the above assessment and plan as documented. Shruti Langston on Airvo/NRB  Lungs:  Decreased BS with a few scattered rhonchi  Heart:  RRR with no Murmur/Rubs/Gallops  Abd: soft  Ext: cool    O2 sats wildly difference second to second. Needs new sat probe. Not OOB recently. Mouth dry and begging for water. May benefit from IV morphine to relieve dyspnea but has allergy. Making no progress.      Alfred Garcia MD

## 2022-01-24 NOTE — PROGRESS NOTES
OT Note:    OT holding session per RN as pt now requiring Bipap with FIO2 100% at rest. Will try back at later time/date pending pt status and availability.      Thanks,  Margarette Sanchez

## 2022-01-24 NOTE — PROGRESS NOTES
Heparin drip increased to 14units/kg/hr, 24.1cc/hr via pump. Bolus given 3450 units as ordered. Heparin xa ordered for 1930.

## 2022-01-24 NOTE — PROGRESS NOTES
01/23/22 1953   Oxygen Therapy   O2 Sat (%) 100 %   Pulse via Oximetry 61 beats per minute   O2 Device CPAP mask   FIO2 (%) 80 %  (weaned)   Respiratory   Respiratory (WDL) X   Respiratory Pattern Regular   CPAP/BIPAP   CPAP/BIPAP Start/Stop On   Device Mode CPAP   Mask Type and Size Full face;Small   Skin Condition intact   PIP Observed 13 cm H20   EPAP (cm H2O) 12 cm H2O   Vt Spont (ml) 519 ml   Ve Observed (l/min) 10.7 l/min   Total RR (Spontaneous) 21 breaths per minute   Insp Rise Time (sec) 3   Leak (Estimated) 24 L/min   Pt's Home Machine No   Biomedical Check Performed Yes   Settings Verified Yes   Alarm Settings   High Pressure 35   Low Pressure 5   Apnea 20   Low Ve 3   High Rate 50   Low Rate 8   Pulmonary Toilet   Pulmonary Toilet Cough and deep breath

## 2022-01-24 NOTE — PROGRESS NOTES
Pt resting quietly in bed. Respirations are even and unlabored on Airvo 60L 100% with NRB. No signs of distress noted or needs stated at this time. Call light in reach. Will continue to monitor. SBAR to be given to oncoming nurse.

## 2022-01-24 NOTE — PROGRESS NOTES
Pt resting quietly in bed. Respirations are even and unlabored on CPAP. No signs of distress noted or needs stated at this time. Call light in reach. Bed alarm on. Will continue to monitor.

## 2022-01-24 NOTE — PROGRESS NOTES
attempting to check hep xa results, remains pending. Pt currently remains on bipap, multiple attempts to remove, encouraged to leave on, follows commands at short intervals.

## 2022-01-24 NOTE — PROGRESS NOTES
MSN CM:  Patient requiring BPap today. Patient's discharge plan unclear at this time r/t oxygen demand. Case Management will continue to follow.

## 2022-01-24 NOTE — PROGRESS NOTES
SPEECH PATHOLOGY NOTE:    Speech therapy session held per RN request due to patient's increasing respiratory needs. Will continue to follow for ongoing dysphagia management as appropriate. Please hold all po if patient is unable to maintain sats with airvo (and needs NRB for supplemental O2).      YAZ Perez, CCC-SLP  Speech Language Pathologist  Acute Rehabilitation Services  Contact: Sushant

## 2022-01-24 NOTE — PROGRESS NOTES
In bed with hob elevated oxygen on via airvo at 60l/100% with nrb in place, sat per oxynet fluctuating between 88-94. New probe applied. Hands cool to touch. Alert, follows commands, short attention span. Lung sounds diminished with scattered rhonchi noted. Pauldieudonnee Ramarisela Heart regular. Abdomen obese, soft, bs present. External catheter in place draining yellow clear urine. . Trace edema noted. Left arm iv intact, with heparin drip infusing at 10unit/kg/hr via pump. Iv right arm intact, site healthy. Rails up x3. Ns/s of pain noted.

## 2022-01-24 NOTE — PROGRESS NOTES
Hospitalist Progress Note   Admit Date:  2022  5:25 AM   Name:  Thom Carmona   Age:  66 y.o. Sex:  female  :  1950   MRN:  [id number]   Room:      Presenting Complaint: Altered mental status    Reason(s) for Admission: Acute hypoxemic respiratory failure due to COVID-19 ([facility] [address] [U07.1, J96.01]  SUSANNE (acute kidney injury) [facility]) [N17.9]     Hospital Course & Interval History:   23-year-old female past medical history of fibromyalgia, depression/anxiety, GERD, obesity BMI of 33 who presented with cough and shortness of breath and decreased p.o. intake over the last 5 days. When EMS arrived at home patient was short of breath, hypoxic and confused. Patient was placed on CPAP and transported to the ED. Initial oxygen saturation was 81% on nasal cannula. Patient was placed on BiPAP and subsequently transitioned to Airvo. Chest x-ray showed hazy bilateral lung opacities likely secondary to pulmonary edema/pneumonia. COVID test was positive. Patient was admitted for further evaluation of acute hypoxemic respiratory failure secondary to COVID-19 pneumonia, SUSANNE. Respiratory status had worsened and required CPAP. Pulm has been consulted. Patient was started on Decadron and empiric antibiotics for her community-acquired pneumonia. Patient was not a candidate for baricitinib due to her kidney function. Nephrology consulted. Patient has been tolerating diuresis thus far and has been weaned down from CPAP to Airvo. Subjective (22): Patient was seen and examined at the bedside. Patient looks more tired this morning. Wears CPAP nightly. Patient denied any cardiac chest pain, abdominal pain, fever/chills.       Assessment & Plan:   Acute hypoxemic respiratory failure due to COVID-19 with superimposed bacterial pneumonia ([facility] [address] (2022)  COVID-positive    Continue Decadron, end of treatment    Not a candidate for baricitinib due to kidney function   Continue Rocephin and doxycycline, end of treatment 1/22   I-S and albuterol as needed   Continue Symbicort   Prone as tolerated   Continue Lasix IV twice daily   Continue to monitor I's and O's, daily weights   Pulmonary consulted appreciate recommendations  Educated on importance of wearing CPAP did not tire out during the day.  Pulmonology started patient on heparin drip for suspicion of pulmonary embolism. Patient was unable to go down for VQ scan since unable to hold oxygen saturation greater than 84%   Chest x-ray with persistent bibasilar lung infiltrates greater on the left side  Patient to wear CPAP wean to air Vo as tolerated  Restarted on low-dose steroids after completing course of steroids for evaluation  Per patient's brother, she tolerates olanzapine better than Ativan or other benzos which caused her to have severe confusion  01/24/22 Patient still critical at this time requiring higher levels of oxygenation to maintain oxygen greater than 90%.   Patient high risk for further decline    Acute kidney injury   Creatinine of 3.7 on admission   Renal ultrasound without hydronephrosis, 2.9 cm parapelvic cyst   Avoid nephrotoxic medications   Continue to monitor I's and O's   Holding home hydrochlorothiazide and losartan   Nephrology consulted, appreciate recommendations   Continue IV Lasix twice daily and monitor renal function   1/24 creatinine continues to improve daily    Morbid obesity   Adds to complexity of care   Patient not diagnosed with JESSICA but does wear CPAP at home    Essential hypertension   Hydralazine as needed   Holding hydrochlorothiazide and losartan due to acute kidney injury mild  Continue Norvasc    GERD   Continue PPI    Overactive bladder   Continue oxybutynin    Acquired hypothyroidism   Continue home Synthroid    Fibromyalgia   Continue Topamax, Cymbalta    Anxiety/depression   Continue home trazodone, Cymbalta, BuSpar, pregabalin, Lamictal    Type 2 diabetes   Metformin on hold, Farxiga   Sliding scale insulin   Hemoglobin A1c 8.0  Lantus decreased from 22 to 16 units since patient not eating as much, continue to monitor and titrate accordingly      Dispo/Discharge Planning:    Dispo pending clinical course. Continue to wean oxygen. PT/OT recommending short-term rehab. Patient adamant on not wanting to be intubated. POA is her brother    Diet:  ADULT ORAL NUTRITION SUPPLEMENT Breakfast, Lunch, Dinner; Standard High Calorie/High Protein  ADULT DIET Easy to Surphace; 3 carb choices (45 gm/meal)  DVT PPx: Heparin subcu  Code status: DNR    Hospital Problems as of 1/24/2022 Date Reviewed: 6/10/2021          Codes Class Noted - Resolved POA    SUSANNE (acute kidney injury) ([facility] [address] ICD-10-CM: N17.9  ICD-9-CM: 584.9  1/14/2022 - Present Yes        * (Principal) Acute hypoxemic respiratory failure due to COVID-19 [facility]) ICD-10-CM: U07.1, J96.01  ICD-9-CM: 518.81, 079.89, 799.02  1/14/2022 - Present Yes        Type 2 diabetes mellitus with diabetic neuropathy, without long-term current use of insulin (HCC) (Chronic) ICD-10-CM: E11.40  ICD-9-CM: 250.60, 357.2  1/12/2018 - Present Yes        Fibromyalgia (Chronic) ICD-10-CM: M79.7  ICD-9-CM: 729.1  Unknown - Present Yes    Overview Signed 4/12/2017  9:40 AM by Holden JENSEN FIBROMYALGIA             Anxiety and depression (Chronic) ICD-10-CM: F41.9, F32. A  ICD-9-CM: 300.00, 311  Unknown - Present Yes        Essential hypertension (Chronic) ICD-10-CM: I10  ICD-9-CM: 401.9  9/20/2016 - Present Yes        Gastroesophageal reflux disease without esophagitis (Chronic) ICD-10-CM: K21.9  ICD-9-CM: 530.81  9/20/2016 - Present Yes        OAB (overactive bladder) (Chronic) ICD-10-CM: N32.81  ICD-9-CM: 596.51  9/20/2016 - Present Yes        Morbid obesity ([facility] [address] (Chronic) ICD-10-CM: E66.01  ICD-9-CM: 278.01  Unknown - Present Yes        Acquired hypothyroidism (Chronic) ICD-10-CM: E03.9  ICD-9-CM: 244.9  11/14/2012 - Present Yes Objective:     Patient Vitals for the past 24 hrs:   Temp Pulse Resp BP SpO2   01/24/22 1116 98.1 °F (36.7 °C) 72 25 (!) 127/50 96 %   01/24/22 0856 97.5 °F (36.4 °C) 65  (!) 116/59 96 %   01/24/22 0757     92 %   01/24/22 0604     93 %   01/24/22 0440 98 °F (36.7 °C) 62 22 (!) 119/50 91 %   01/24/22 0232     100 %   01/24/22 0133  (!) 54      01/24/22 0131  (!) 52  114/68    01/24/22 0041 98.1 °F (36.7 °C) 62 21 (!) 120/51 100 %   01/23/22 1956 97.8 °F (36.6 °C) 63 26 (!) 122/51 95 %   01/23/22 1953     100 %   01/23/22 1510 97.9 °F (36.6 °C) 74 26 124/64 94 %   01/23/22 1507     96 %   01/23/22 1244     92 %     Oxygen Therapy  O2 Sat (%): 96 % (01/24/22 1116)  Pulse via Oximetry: 61 beats per minute (01/23/22 1953)  O2 Device: Hi flow nasal cannula; Heated;Non-rebreather mask (01/24/22 0757)  Skin Assessment: Clean, dry, & intact (01/22/22 2115)  Skin Protection for O2 Device: No (01/22/22 0757)  O2 Flow Rate (L/min): 60 l/min (01/24/22 0757)  O2 Temperature: 87.8 °F (31 °C) (01/22/22 1523)  FIO2 (%): 100 % (01/24/22 0757)    Estimated body mass index is 33.66 kg/m² as calculated from the following:    Height as of this encounter: 5' 3\" (1.6 m). Weight as of this encounter: 86.2 kg (190 lb). Intake/Output Summary (Last 24 hours) at 1/24/2022 1225  Last data filed at 1/24/2022 1053  Gross per 24 hour   Intake 0 ml   Output 1300 ml   Net -1300 ml         Physical Exam:   Blood pressure (!) 127/50, pulse 72, temperature 98.1 °F (36.7 °C), resp. rate 25, height 5' 3\" (1.6 m), weight 86.2 kg (190 lb), SpO2 96 %. General:    Ill-appearing. No overt distress  Head:  Normocephalic, atraumatic  Eyes:  Sclerae appear normal.  Pupils equally round. ENT:  Nares appear normal, no drainage. Moist oral mucosa  Neck:  No restricted ROM. Trachea midline   CV:   RRR. No m/r/g. No jugular venous distension. Lungs:   Decreased breath sounds. No wheezing, rhonchi, or rales. Respirations even, unlabored  Abdomen: Bowel sounds present. Soft, nontender, nondistended. Extremities: No cyanosis or clubbing. No edema  Skin:     No rashes and normal coloration. Warm and dry. Neuro:  CN II-XII grossly intact. Sensation intact. A&Ox3  Psych:  Normal mood and affect. I have reviewed ordered lab tests and independently visualized imaging below:    Recent Labs:  Recent Results (from the past 48 hour(s))   POC VENOUS BLOOD GAS    Collection Time: 01/22/22  3:13 PM   Result Value Ref Range    Device: High Frequency Oscillator Ventilator      FIO2 (POC) 100 %    pH, venous (POC) 7.45 (H) 7.32 - 7.42      pCO2, venous (POC) 37.9 (L) 41 - 51 MMHG    pO2, venous (POC) 53 mmHg    HCO3, venous (POC) 26.1 23 - 28 MMOL/L    sO2, venous (POC) 88.5 (H) 65 - 88 %    Base excess, venous (POC) 2.0 mmol/L    Site RIGHT FEMORAL      Specimen type (POC) VENOUS BLOOD      Performed by Javed     Critical value read back MG    GLUCOSE, POC    Collection Time: 01/22/22  4:16 PM   Result Value Ref Range    Glucose (POC) 204 (H) 65 - 100 mg/dL    [facility]    HEPARIN XA UFH    Collection Time: 01/22/22  8:45 PM   Result Value Ref Range    Heparin Xa UFH 0.78 (H) 0.3 - 0.7 IU/mL   GLUCOSE, POC    Collection Time: 01/22/22  8:59 PM   Result Value Ref Range    Glucose (POC) 281 (H) 65 - 100 mg/dL    [facility]formed by [facility]    METABOLIC PANEL, COMPREHENSIVE    Collection Time: 01/23/22  4:19 AM   Result Value Ref Range    Sodium 138 136 - 145 mmol/L    Potassium 3.3 (L) 3.5 - 5.1 mmol/L    Chloride 98 98 - 107 mmol/L    CO2 31 21 - 32 mmol/L    Anion gap 9 7 - 16 mmol/L    Glucose 72 65 - 100 mg/dL    BUN 74 (H) 8 - 23 MG/DL    Creatinine 1.71 (H) 0.6 - 1.0 MG/DL    GFR est AA 38 (L) >60 ml/min/1.73m2    GFR est non-AA 31 (L) >60 ml/min/1.73m2    Calcium 8.5 8.3 - 10.4 MG/DL    Bilirubin, total 0.4 0.2 - 1.1 MG/DL    ALT (SGPT) 19 12 - 65 U/L    AST (SGOT) 23 15 - 37 U/L    Alk. phosphatase 86 50 - 136 U/L    Protein, total 7.1 6.3 - 8.2 g/dL    Albumin 3.1 (L) 3.2 - 4.6 g/dL    Globulin 4.0 (H) 2.3 - 3.5 g/dL    A-G Ratio 0.8 (L) 1.2 - 3.5     MAGNESIUM    Collection Time: 01/23/22  4:19 AM   Result Value Ref Range    Magnesium 1.6 (L) 1.8 - 2.4 mg/dL   CBC WITH AUTOMATED DIFF    Collection Time: 01/23/22  4:19 AM   Result Value Ref Range    WBC 18.7 (H) 4.3 - 11.1 K/uL    RBC 4.49 4.05 - 5.2 M/uL    HGB 11.8 11.7 - 15.4 g/dL    HCT 38.3 35.8 - 46.3 %    MCV 85.3 79.6 - 97.8 FL    MCH 26.3 26.1 - 32.9 PG    MCHC 30.8 (L) 31.4 - 35.0 g/dL    RDW 14.4 11.9 - 14.6 %    PLATELET 812 641 - 332 K/uL    MPV 11.4 9.4 - 12.3 FL    ABSOLUTE NRBC 0.00 0.0 - 0.2 K/uL    DF AUTOMATED      NEUTROPHILS 83 (H) 43 - 78 %    LYMPHOCYTES 10 (L) 13 - 44 %    MONOCYTES 5 4.0 - 12.0 %    EOSINOPHILS 1 0.5 - 7.8 %    BASOPHILS 0 0.0 - 2.0 %    IMMATURE GRANULOCYTES 1 0.0 - 5.0 %    ABS. NEUTROPHILS 15.5 (H) 1.7 - 8.2 K/UL    ABS. LYMPHOCYTES 1.8 0.5 - 4.6 K/UL    ABS. MONOCYTES 1.0 0.1 - 1.3 K/UL    ABS. EOSINOPHILS 0.2 0.0 - 0.8 K/UL    ABS. BASOPHILS 0.0 0.0 - 0.2 K/UL    ABS. IMM.  GRANS. 0.2 0.0 - 0.5 K/UL   HEPARIN XA UFH    Collection Time: 01/23/22  4:19 AM   Result Value Ref Range    Heparin Xa UFH 0.73 (H) 0.3 - 0.7 IU/mL   GLUCOSE, POC    Collection Time: 01/23/22  7:47 AM   Result Value Ref Range    Glucose (POC) 125 (H) 65 - 100 mg/dL    Performed by [facility]    GLUCOSE, POC    Collection Time: 01/23/22 11:16 AM   Result Value Ref Range    Glucose (POC) 257 (H) 65 - 100 mg/dL    Performed by [facility]    HEPARIN XA UFH    Collection Time: 01/23/22 12:27 PM   Result Value Ref Range    Heparin Xa UFH 0.34 0.3 - 0.7 IU/mL   GLUCOSE, POC    Collection Time: 01/23/22  4:29 PM   Result Value Ref Range    Glucose (POC) 271 (H) 65 - 100 mg/dL    Performed by [facility]    BLOOD GAS, ARTERIAL POC    Collection Time: 01/23/22  4:59 PM   Result Value Ref Range    Device: CPAP      FIO2 (POC) 85 %    pH (POC) 7.50 (H) 7.35 - 7.45      pCO2 (POC) 36.2 35 - 45 MMHG    pO2 (POC) 63 (L) 75 - 100 MMHG    HCO3 (POC) 28.3 (H) 22 - 26 MMOL/L    sO2 (POC) 93.8 (L) 95 - 98 %    Base excess (POC) 4.9 mmol/L    Allens test (POC) Positive      Site LEFT RADIAL      Specimen type (POC) ARTERIAL      Performed by Javy     Respiratory comment: [id number]    HEPARIN XA UFH    Collection Time: 01/23/22  9:10 PM   Result Value Ref Range    Heparin Xa UFH <0.10 (L) 0.3 - 0.7 IU/mL   GLUCOSE, POC    Collection Time: 01/23/22  9:11 PM   Result Value Ref Range    Glucose (POC) 247 (H) 65 - 100 mg/dL    Performed by Ambar    CBC WITH AUTOMATED DIFF    Collection Time: 01/24/22  3:43 AM   Result Value Ref Range    WBC 17.8 (H) 4.3 - 11.1 K/uL    RBC 4.53 4.05 - 5.2 M/uL    HGB 11.4 (L) 11.7 - 15.4 g/dL    HCT 38.2 35.8 - 46.3 %    MCV 84.3 79.6 - 97.8 FL    MCH 25.2 (L) 26.1 - 32.9 PG    MCHC 29.8 (L) 31.4 - 35.0 g/dL    RDW 14.6 11.9 - 14.6 %    PLATELET 339 285 - 707 K/uL    MPV 11.4 9.4 - 12.3 FL    ABSOLUTE NRBC 0.00 0.0 - 0.2 K/uL    DF AUTOMATED      NEUTROPHILS 83 (H) 43 - 78 %    LYMPHOCYTES 10 (L) 13 - 44 %    MONOCYTES 6 4.0 - 12.0 %    EOSINOPHILS 1 0.5 - 7.8 %    BASOPHILS 0 0.0 - 2.0 %    IMMATURE GRANULOCYTES 1 0.0 - 5.0 %    ABS. NEUTROPHILS 14.7 (H) 1.7 - 8.2 K/UL    ABS. LYMPHOCYTES 1.7 0.5 - 4.6 K/UL    ABS. MONOCYTES 1.0 0.1 - 1.3 K/UL    ABS. EOSINOPHILS 0.2 0.0 - 0.8 K/UL    ABS. BASOPHILS 0.0 0.0 - 0.2 K/UL    ABS. IMM.  GRANS. 0.2 0.0 - 0.5 K/UL   METABOLIC PANEL, COMPREHENSIVE    Collection Time: 01/24/22  3:43 AM   Result Value Ref Range    Sodium 142 136 - 145 mmol/L    Potassium 3.9 3.5 - 5.1 mmol/L    Chloride 102 98 - 107 mmol/L    CO2 32 21 - 32 mmol/L    Anion gap 8 7 - 16 mmol/L    Glucose 125 (H) 65 - 100 mg/dL    BUN 71 (H) 8 - 23 MG/DL    Creatinine 1.72 (H) 0.6 - 1.0 MG/DL    GFR est AA 37 (L) >60 ml/min/1.73m2    GFR est non-AA 31 (L) >60 ml/min/1.73m2    Calcium 8.6 8.3 - 10.4 MG/DL Bilirubin, total 0.5 0.2 - 1.1 MG/DL    ALT (SGPT) 15 12 - 65 U/L    AST (SGOT) 27 15 - 37 U/L    Alk. phosphatase 98 50 - 136 U/L    Protein, total 7.4 6.3 - 8.2 g/dL    Albumin 3.4 3.2 - 4.6 g/dL    Globulin 4.0 (H) 2.3 - 3.5 g/dL    A-G Ratio 0.9 (L) 1.2 - 3.5     MAGNESIUM    Collection Time: 01/24/22  3:43 AM   Result Value Ref Range    Magnesium 2.1 1.8 - 2.4 mg/dL   HEPARIN XA UFH    Collection Time: 01/24/22  3:43 AM   Result Value Ref Range    Heparin Xa UFH 1.04 (H) 0.3 - 0.7 IU/mL   GLUCOSE, POC    Collection Time: 01/24/22  7:58 AM   Result Value Ref Range    Glucose (POC) 114 (H) 65 - 100 mg/dL    Performed by [facility]    GLUCOSE, POC    Collection Time: 01/24/22 11:52 AM   Result Value Ref Range    Glucose (POC) 170 (H) 65 - 100 mg/dL    Performed by [facility]        All Micro Results     Procedure Component Value Units Date/Time    COVID-19 RAPID TEST [[id number]]  (Abnormal) Collected: 01/14/22 0600    Order Status: Completed Specimen: Nasopharyngeal Updated: 01/14/22 0720     Specimen source NASAL        Comment: RAPID ONLY        COVID-19 rapid test Detected        Comment:      The specimen is POSITIVE for SARS-CoV-2, the novel coronavirus associated with COVID-19. This test has been authorized by the FDA under an Emergency Use Authorization (EUA) for use by authorized laboratories. Fact sheet for Healthcare Providers: ConventionUpdate.co.nz  Fact sheet for Patients: ConventionUpdate.co.nz       Methodology: Isothermal Nucleic Acid Amplification               Other Studies:  No results found.     Current Meds:  Current Facility-Administered Medications   Medication Dose Route Frequency    insulin glargine (LANTUS) injection 16 Units  16 Units SubCUTAneous QHS    dexamethasone (DECADRON) 4 mg/mL injection 3 mg  3 mg IntraVENous DAILY    OLANZapine (ZyPREXA) 5 mg in sterile water (preservative free) 1 mL injection  5 mg IntraMUSCular QHS PRN    heparin 25,000 units in dextrose 500 mL infusion  18-36 Units/kg/hr IntraVENous TITRATE    butalbital-acetaminophen-caffeine (FIORICET, ESGIC) -40 mg per tablet 1 Tablet  1 Tablet Oral Q6H PRN    insulin lispro (HUMALOG) injection 6 Units  6 Units SubCUTAneous TIDAC    lip protectant (BLISTEX) ointment 1 Each  1 Each Topical PRN    amLODIPine (NORVASC) tablet 5 mg  5 mg Oral DAILY    furosemide (LASIX) injection 40 mg  40 mg IntraVENous BID    sodium chloride (NS) flush 5-40 mL  5-40 mL IntraVENous Q8H    sodium chloride (NS) flush 5-40 mL  5-40 mL IntraVENous PRN    acetaminophen (TYLENOL) tablet 650 mg  650 mg Oral Q6H PRN    Or    acetaminophen (TYLENOL) suppository 650 mg  650 mg Rectal Q6H PRN    polyethylene glycol (MIRALAX) packet 17 g  17 g Oral DAILY PRN    ondansetron (ZOFRAN ODT) tablet 4 mg  4 mg Oral Q8H PRN    Or    ondansetron (ZOFRAN) injection 4 mg  4 mg IntraVENous Q6H PRN    guaiFENesin-dextromethorphan (ROBITUSSIN DM) 100-10 mg/5 mL syrup 5 mL  5 mL Oral Q4H PRN    insulin lispro (HUMALOG) injection 0-10 Units  0-10 Units SubCUTAneous AC&HS    albuterol (PROVENTIL HFA, VENTOLIN HFA, PROAIR HFA) inhaler 2 Puff  2 Puff Inhalation QID RT    atorvastatin (LIPITOR) tablet 20 mg  20 mg Oral QHS    budesonide-formoteroL (SYMBICORT) 160-4.5 mcg/actuation HFA inhaler 2 Puff  2 Puff Inhalation BID    busPIRone (BUSPAR) tablet 7.5 mg  7.5 mg Oral BID    cycloSPORINE (RESTASIS) 0.05 % ophthalmic emulsion 1 Drop  (Patient Supplied)  1 Drop Both Eyes Q12H    DULoxetine (CYMBALTA) capsule 20 mg  20 mg Oral DAILY    lamoTRIgine (LaMICtal) tablet 150 mg  150 mg Oral QAM    levothyroxine (SYNTHROID) tablet 137 mcg  137 mcg Oral 6am    montelukast (SINGULAIR) tablet 10 mg  10 mg Oral DAILY    tiZANidine (ZANAFLEX) tablet 4 mg  4 mg Oral TID PRN    pregabalin (LYRICA) capsule 50 mg  50 mg Oral BID    topiramate (TOPAMAX) tablet 25 mg  25 mg Oral QHS    traZODone (DESYREL) tablet 50 mg  50 mg Oral QHS    dicyclomine (BENTYL) tablet 20 mg  20 mg Oral AC&HS    pantoprazole (PROTONIX) tablet 40 mg  40 mg Oral ACB&D    hydrALAZINE (APRESOLINE) tablet 50 mg  50 mg Oral Q6H PRN    alcohol 62% (NOZIN) nasal  1 Ampule  1 Ampule Topical Q12H       Signed:  Jim Black MD    Part of this note may have been written by using a voice dictation software. The note has been proof read but may still contain some grammatical/other typographical errors.

## 2022-01-24 NOTE — DIABETES MGMT
Patient admitted with acute hypoxemic respiratory failure due to COVID-19. Blood glucose ranged  yesterday with patient receiving Lantus 22 units, Humalog 5 units, and Decadron 10mg. Lab blood glucose this morning was 125. Noted patient does not have steroids ordered. Reviewed patient current regimen: Lantus 22 units nightly, Humalog 6 units with meals, and Humalog correctional insulin. If steroid therapy to be stopped would recommend d/c prandial insulin to reduce risk of hypoglycemia. Would also recommend a reduction in basal insulin to reduce risk of morning hypoglycemia given lab glucose levels of 78 and 72 the past two days. Provider updated via [employer] regarding recommendations and patient glycemic control.

## 2022-01-25 NOTE — PROGRESS NOTES
Oxygen sat desat to 79%. On Airvo 60L/100%. Switched to forehead probe. Oxygen sat increased to 91%  Pt remains in Bilateral soft wrist restraints.

## 2022-01-25 NOTE — PROGRESS NOTES
Remains on airvo 60/100 with non re breather, pt  Cont to be able to pull off non re breather  At intervals. Sat 92@ present time, able to tolerte some applesauce and jello with meds. desats after short intervals, nrb re applied.   Tolerating well

## 2022-01-25 NOTE — PROGRESS NOTES
Appears to be more relaxed, lights dimmed, hob up 30  Degrees, resting  Restraints remain on bilaterally.

## 2022-01-25 NOTE — PROGRESS NOTES
SPEECH PATHOLOGY NOTE:    Patient continues with increased O2 demands. RN reports she has been consuming liquids and jello without overt issues. Patient is not wanting solid foods at this time. Ongoing discussion regarding goals of care. Will defer speech today and follow up as medically appropriate.      YAZ Guerrero, CCC-SLP  Speech Language Pathologist  Acute Rehabilitation Services  Contact: Sushant

## 2022-01-25 NOTE — PROGRESS NOTES
Hospitalist Progress Note   Admit Date:  2022  5:25 AM   Name:  Nilay Carmona   Age:  66 y.o. Sex:  female  :  1950   MRN:  [id number]   Room:      Presenting Complaint: Altered mental status    Reason(s) for Admission: Acute hypoxemic respiratory failure due to COVID-19 ([facility] [address] [U07.1, J96.01]  SUSANNE (acute kidney injury) [facility]) [N17.9]     Hospital Course & Interval History:   40-year-old female past medical history of fibromyalgia, depression/anxiety, GERD, obesity BMI of 33 who presented with cough and shortness of breath and decreased p.o. intake over the last 5 days. When EMS arrived at home patient was short of breath, hypoxic and confused. Patient was placed on CPAP and transported to the ED. Initial oxygen saturation was 81% on nasal cannula. Patient was placed on BiPAP and subsequently transitioned to Airvo. Chest x-ray showed hazy bilateral lung opacities likely secondary to pulmonary edema/pneumonia. COVID test was positive. Patient was admitted for further evaluation of acute hypoxemic respiratory failure secondary to COVID-19 pneumonia, SUSANNE. Respiratory status had worsened and required CPAP. Pulm has been consulted. Patient was started on Decadron and empiric antibiotics for her community-acquired pneumonia. Patient was not a candidate for baricitinib due to her kidney function. Nephrology consulted. Patient has been tolerating diuresis thus far and has been weaned down from CPAP to Airvo. Subjective (22): Patient was seen and examined at the bedside. Patient continued to look fatigued this morning. Patient has reiterated that she does not want to be intubated. Spoke with brother in detail. Patient denied any cardiac chest pain, abdominal pain, fever/chills.       Assessment & Plan:   Acute hypoxemic respiratory failure due to COVID-19 with superimposed bacterial pneumonia ([facility] [address] (2022)  COVID-positive    Continue Decadron, end of treatment 1/23   Not a candidate for baricitinib due to kidney function   Continue Rocephin and doxycycline, end of treatment 1/22   I-S and albuterol as needed   Continue Symbicort   Prone as tolerated   Continue Lasix IV twice daily   Continue to monitor I's and O's, daily weights   Pulmonary consulted appreciate recommendations  Educated on importance of wearing CPAP did not tire out during the day.  Pulmonology started patient on heparin drip for suspicion of pulmonary embolism. Patient was unable to go down for VQ scan since unable to hold oxygen saturation greater than 84%   Chest x-ray with persistent bibasilar lung infiltrates greater on the left side  Patient to wear CPAP wean to air Vo as tolerated  Restarted on low-dose steroids after completing course of steroids for evaluation  Per patient's brother, she tolerates olanzapine better than Ativan or other benzos which caused her to have severe confusion  01/25/22 Patient still critical at this time requiring higher levels of oxygenation to maintain oxygen greater than 90%. Patient high risk for further decline  1/25. Had long discussion with patient. Patient currently wants to be DNR and does not want to be intubated if further respiratory compromise. Patient would benefit from morphine however she has a allergy. Started on Dilaudid 0.5 every 2 hours.     Acute kidney injury   Creatinine of 3.7 on admission   Renal ultrasound without hydronephrosis, 2.9 cm parapelvic cyst   Avoid nephrotoxic medications   Continue to monitor I's and O's   Holding home hydrochlorothiazide and losartan   Nephrology consulted, appreciate recommendations   Continue IV Lasix twice daily and monitor renal function   1/24 creatinine continues to improve daily    Morbid obesity   Adds to complexity of care   Patient not diagnosed with JESSICA but does wear CPAP at home    Essential hypertension   Hydralazine as needed   Holding hydrochlorothiazide and losartan due to acute kidney injury mild  Continue Norvasc    GERD   Continue PPI    Overactive bladder   Continue oxybutynin    Acquired hypothyroidism   Continue home Synthroid    Fibromyalgia   Continue Topamax, Cymbalta    Anxiety/depression   Continue home trazodone, Cymbalta, BuSpar, pregabalin, Lamictal    Type 2 diabetes   Metformin on hold, Farxiga   Sliding scale insulin   Hemoglobin A1c 8.0  Lantus decreased from 22 to 16 units since patient not eating as much, continue to monitor and titrate accordingly      Dispo/Discharge Planning:    Dispo pending clinical course. Continue to wean oxygen. PT/OT recommending short-term rehab. Patient adamant on not wanting to be intubated. POA is her brother    Diet:  ADULT ORAL NUTRITION SUPPLEMENT Breakfast, Lunch, Dinner; Standard High Calorie/High Protein  ADULT ORAL NUTRITION SUPPLEMENT Lunch, Dinner; Frozen Supplement  ADULT DIET Easy to Chew; 3 carb choices (45 gm/meal); Please send jello and fruit with all meals. Grits with breakfast.  DVT PPx: Heparin subcu  Code status: DNR    Hospital Problems as of 1/25/2022 Date Reviewed: 6/10/2021          Codes Class Noted - Resolved POA    SUSANNE (acute kidney injury) ([facility] [address] ICD-10-CM: N17.9  ICD-9-CM: 584.9  1/14/2022 - Present Yes        * (Principal) Acute hypoxemic respiratory failure due to COVID-19 [facility]) ICD-10-CM: U07.1, J96.01  ICD-9-CM: 518.81, 079.89, 799.02  1/14/2022 - Present Yes        Type 2 diabetes mellitus with diabetic neuropathy, without long-term current use of insulin (HCC) (Chronic) ICD-10-CM: E11.40  ICD-9-CM: 250.60, 357.2  1/12/2018 - Present Yes        Fibromyalgia (Chronic) ICD-10-CM: M79.7  ICD-9-CM: 729.1  Unknown - Present Yes    Overview Signed 4/12/2017  9:40 AM by Franco JENSEN FIBROMYALGIA             Anxiety and depression (Chronic) ICD-10-CM: F41.9, F32. A  ICD-9-CM: 300.00, 311  Unknown - Present Yes        Essential hypertension (Chronic) ICD-10-CM: I10  ICD-9-CM: 401.9  9/20/2016 - Present Yes        Gastroesophageal reflux disease without esophagitis (Chronic) ICD-10-CM: K21.9  ICD-9-CM: 530.81  9/20/2016 - Present Yes        OAB (overactive bladder) (Chronic) ICD-10-CM: N32.81  ICD-9-CM: 596.51  9/20/2016 - Present Yes        Morbid obesity ([address] (Chronic) ICD-10-CM: E66.01  ICD-9-CM: 278.01  Unknown - Present Yes        Acquired hypothyroidism (Chronic) ICD-10-CM: E03.9  ICD-9-CM: 244.9  11/14/2012 - Present Yes              Objective:     Patient Vitals for the past 24 hrs:   Temp Pulse Resp BP SpO2   01/25/22 1521 97.8 °F (36.6 °C) 76 18 (!) 128/55 93 %   01/25/22 1505     95 %   01/25/22 1500     93 %   01/25/22 1311     100 %   01/25/22 1146 98 °F (36.7 °C) 78 20 (!) 128/54 94 %   01/25/22 0750     92 %   01/25/22 0722 97.5 °F (36.4 °C) 69 22 (!) 127/51 93 %   01/25/22 0537   24  91 %   01/25/22 0513   24  95 %   01/25/22 0415 98.5 °F (36.9 °C) 78 (!) 32 130/62 90 %   01/25/22 0009 98.6 °F (37 °C) 71 26 (!) 117/54 95 %   01/24/22 2011   20  93 %   01/24/22 2010 97.9 °F (36.6 °C) 74 26 (!) 116/52 (!) 88 %   01/24/22 1935     92 %     Oxygen Therapy  O2 Sat (%): 93 % (01/25/22 1521)  Pulse via Oximetry: 81 beats per minute (01/25/22 1500)  O2 Device: Heated; Hi flow nasal cannula; Non-rebreather mask (01/25/22 1505)  Skin Assessment: Clean, dry, & intact (01/22/22 2115)  Skin Protection for O2 Device: No (01/22/22 0757)  O2 Flow Rate (L/min): 60 l/min (01/25/22 1505)  O2 Temperature: 87.8 °F (31 °C) (01/24/22 1935)  FIO2 (%): 100 % (01/25/22 1505)    Estimated body mass index is 30.38 kg/m² as calculated from the following:    Height as of this encounter: 5' 3\" (1.6 m). Weight as of this encounter: 77.8 kg (171 lb 8 oz).     Intake/Output Summary (Last 24 hours) at 1/25/2022 1821  Last data filed at 1/25/2022 1815  Gross per 24 hour   Intake 340 ml   Output 600 ml   Net -260 ml         Physical Exam:   Blood pressure (!) 128/55, pulse 76, temperature 97.8 °F (36.6 °C), resp. rate 18, height 5' 3\" (1.6 m), weight 77.8 kg (171 lb 8 oz), SpO2 93 %. General:    Ill-appearing. No overt distress  Head:  Normocephalic, atraumatic  Eyes:  Sclerae appear normal.  Pupils equally round. ENT:  Nares appear normal, no drainage. Moist oral mucosa  Neck:  No restricted ROM. Trachea midline   CV:   RRR. No m/r/g. No jugular venous distension. Lungs:   Decreased breath sounds. No wheezing, rhonchi, or rales. Respirations even, unlabored  Abdomen: Bowel sounds present. Soft, nontender, nondistended. Extremities: No cyanosis or clubbing. No edema  Skin:     No rashes and normal coloration. Warm and dry. Neuro:  CN II-XII grossly intact. Sensation intact. A&Ox3  Psych:  Normal mood and affect. I have reviewed ordered lab tests and independently visualized imaging below:    Recent Labs:  Recent Results (from the past 48 hour(s))   HEPARIN XA UFH    Collection Time: 01/23/22  9:10 PM   Result Value Ref Range    Heparin Xa UFH <0.10 (L) 0.3 - 0.7 IU/mL   GLUCOSE, POC    Collection Time: 01/23/22  9:11 PM   Result Value Ref Range    Glucose (POC) 247 (H) 65 - 100 mg/dL    Performed by Ambar    CBC WITH AUTOMATED DIFF    Collection Time: 01/24/22  3:43 AM   Result Value Ref Range    WBC 17.8 (H) 4.3 - 11.1 K/uL    RBC 4.53 4.05 - 5.2 M/uL    HGB 11.4 (L) 11.7 - 15.4 g/dL    HCT 38.2 35.8 - 46.3 %    MCV 84.3 79.6 - 97.8 FL    MCH 25.2 (L) 26.1 - 32.9 PG    MCHC 29.8 (L) 31.4 - 35.0 g/dL    RDW 14.6 11.9 - 14.6 %    PLATELET 364 921 - 666 K/uL    MPV 11.4 9.4 - 12.3 FL    ABSOLUTE NRBC 0.00 0.0 - 0.2 K/uL    DF AUTOMATED      NEUTROPHILS 83 (H) 43 - 78 %    LYMPHOCYTES 10 (L) 13 - 44 %    MONOCYTES 6 4.0 - 12.0 %    EOSINOPHILS 1 0.5 - 7.8 %    BASOPHILS 0 0.0 - 2.0 %    IMMATURE GRANULOCYTES 1 0.0 - 5.0 %    ABS. NEUTROPHILS 14.7 (H) 1.7 - 8.2 K/UL    ABS. LYMPHOCYTES 1.7 0.5 - 4.6 K/UL    ABS.  MONOCYTES 1.0 0.1 - 1.3 K/UL    ABS. EOSINOPHILS 0.2 0.0 - 0.8 K/UL    ABS. BASOPHILS 0.0 0.0 - 0.2 K/UL    ABS. IMM. GRANS. 0.2 0.0 - 0.5 K/UL   METABOLIC PANEL, COMPREHENSIVE    Collection Time: 01/24/22  3:43 AM   Result Value Ref Range    Sodium 142 136 - 145 mmol/L    Potassium 3.9 3.5 - 5.1 mmol/L    Chloride 102 98 - 107 mmol/L    CO2 32 21 - 32 mmol/L    Anion gap 8 7 - 16 mmol/L    Glucose 125 (H) 65 - 100 mg/dL    BUN 71 (H) 8 - 23 MG/DL    Creatinine 1.72 (H) 0.6 - 1.0 MG/DL    GFR est AA 37 (L) >60 ml/min/1.73m2    GFR est non-AA 31 (L) >60 ml/min/1.73m2    Calcium 8.6 8.3 - 10.4 MG/DL    Bilirubin, total 0.5 0.2 - 1.1 MG/DL    ALT (SGPT) 15 12 - 65 U/L    AST (SGOT) 27 15 - 37 U/L    Alk.  phosphatase 98 50 - 136 U/L    Protein, total 7.4 6.3 - 8.2 g/dL    Albumin 3.4 3.2 - 4.6 g/dL    Globulin 4.0 (H) 2.3 - 3.5 g/dL    A-G Ratio 0.9 (L) 1.2 - 3.5     MAGNESIUM    Collection Time: 01/24/22  3:43 AM   Result Value Ref Range    Magnesium 2.1 1.8 - 2.4 mg/dL   HEPARIN XA UFH    Collection Time: 01/24/22  3:43 AM   Result Value Ref Range    Heparin Xa UFH 1.04 (H) 0.3 - 0.7 IU/mL   GLUCOSE, POC    Collection Time: 01/24/22  7:58 AM   Result Value Ref Range    Glucose (POC) 114 (H) 65 - 100 mg/dL    Performed by Ivan    HEPARIN XA UFH    Collection Time: 01/24/22 11:02 AM   Result Value Ref Range    Heparin Xa UFH <0.10 (L) 0.3 - 0.7 IU/mL   GLUCOSE, POC    Collection Time: 01/24/22 11:52 AM   Result Value Ref Range    Glucose (POC) 170 (H) 65 - 100 mg/dL    Performed by Ivan    GLUCOSE, POC    Collection Time: 01/24/22  4:32 PM   Result Value Ref Range    Glucose (POC) 171 (H) 65 - 100 mg/dL    Performed by Ivan    HEPARIN XA UFH    Collection Time: 01/24/22  7:58 PM   Result Value Ref Range    Heparin Xa UFH 0.26 (L) 0.3 - 0.7 IU/mL   GLUCOSE, POC    Collection Time: 01/24/22  9:16 PM   Result Value Ref Range    Glucose (POC) 311 (H) 65 - 100 mg/dL    Performed by Zoltan Moore HEPARIN XA UFH    Collection Time: 01/25/22  4:48 AM   Result Value Ref Range    Heparin Xa UFH 0.92 (H) 0.3 - 0.7 IU/mL   CBC WITH AUTOMATED DIFF    Collection Time: 01/25/22  5:45 AM   Result Value Ref Range    WBC 18.1 (H) 4.3 - 11.1 K/uL    RBC 4.58 4.05 - 5.2 M/uL    HGB 11.8 11.7 - 15.4 g/dL    HCT 39.2 35.8 - 46.3 %    MCV 85.6 79.6 - 97.8 FL    MCH 25.8 (L) 26.1 - 32.9 PG    MCHC 30.1 (L) 31.4 - 35.0 g/dL    RDW 14.8 (H) 11.9 - 14.6 %    PLATELET 504 987 - 957 K/uL    MPV 11.5 9.4 - 12.3 FL    ABSOLUTE NRBC 0.00 0.0 - 0.2 K/uL    DF AUTOMATED      NEUTROPHILS 84 (H) 43 - 78 %    LYMPHOCYTES 8 (L) 13 - 44 %    MONOCYTES 5 4.0 - 12.0 %    EOSINOPHILS 1 0.5 - 7.8 %    BASOPHILS 0 0.0 - 2.0 %    IMMATURE GRANULOCYTES 1 0.0 - 5.0 %    ABS. NEUTROPHILS 15.3 (H) 1.7 - 8.2 K/UL    ABS. LYMPHOCYTES 1.5 0.5 - 4.6 K/UL    ABS. MONOCYTES 0.8 0.1 - 1.3 K/UL    ABS. EOSINOPHILS 0.3 0.0 - 0.8 K/UL    ABS. BASOPHILS 0.1 0.0 - 0.2 K/UL    ABS. IMM. GRANS. 0.2 0.0 - 0.5 K/UL   MAGNESIUM    Collection Time: 01/25/22  5:45 AM   Result Value Ref Range    Magnesium 1.8 1.8 - 2.4 mg/dL   METABOLIC PANEL, COMPREHENSIVE    Collection Time: 01/25/22  5:45 AM   Result Value Ref Range    Sodium 139 136 - 145 mmol/L    Potassium 3.5 3.5 - 5.1 mmol/L    Chloride 99 98 - 107 mmol/L    CO2 28 21 - 32 mmol/L    Anion gap 12 7 - 16 mmol/L    Glucose 156 (H) 65 - 100 mg/dL    BUN 57 (H) 8 - 23 MG/DL    Creatinine 1.42 (H) 0.6 - 1.0 MG/DL    GFR est AA 47 (L) >60 ml/min/1.73m2    GFR est non-AA 39 (L) >60 ml/min/1.73m2    Calcium 8.9 8.3 - 10.4 MG/DL    Bilirubin, total 0.5 0.2 - 1.1 MG/DL    ALT (SGPT) 19 12 - 65 U/L    AST (SGOT) 30 15 - 37 U/L    Alk.  phosphatase 111 50 - 136 U/L    Protein, total 7.4 6.3 - 8.2 g/dL    Albumin 3.1 (L) 3.2 - 4.6 g/dL    Globulin 4.3 (H) 2.3 - 3.5 g/dL    A-G Ratio 0.7 (L) 1.2 - 3.5     GLUCOSE, POC    Collection Time: 01/25/22  8:18 AM   Result Value Ref Range    Glucose (POC) 175 (H) 65 - 100 mg/dL Performed by Britton Gutierrez POC    Collection Time: 01/25/22 12:14 PM   Result Value Ref Range    Glucose (POC) 250 (H) 65 - 100 mg/dL    Performed by Miguel Angel    HEPARIN XA UFH    Collection Time: 01/25/22 12:40 PM   Result Value Ref Range    Heparin Xa UFH 0.63 0.3 - 0.7 IU/mL   GLUCOSE, POC    Collection Time: 01/25/22  4:14 PM   Result Value Ref Range    Glucose (POC) 188 (H) 65 - 100 mg/dL    Performed by Miguel Angel        All Micro Results     Procedure Component Value Units Date/Time    COVID-19 RAPID TEST [[id number]]  (Abnormal) Collected: 01/14/22 0600    Order Status: Completed Specimen: Nasopharyngeal Updated: 01/14/22 0720     Specimen source NASAL        Comment: RAPID ONLY        COVID-19 rapid test Detected        Comment:      The specimen is POSITIVE for SARS-CoV-2, the novel coronavirus associated with COVID-19. This test has been authorized by the FDA under an Emergency Use Authorization (EUA) for use by authorized laboratories. Fact sheet for Healthcare Providers: ConventionUpdate.co.nz  Fact sheet for Patients: ConventionUpdate.co.nz       Methodology: Isothermal Nucleic Acid Amplification               Other Studies:  No results found.     Current Meds:  Current Facility-Administered Medications   Medication Dose Route Frequency    insulin glargine (LANTUS) injection 18 Units  18 Units SubCUTAneous QHS    loperamide (IMODIUM) capsule 2 mg  2 mg Oral PRN    HYDROmorphone (DILAUDID) injection 0.5 mg  0.5 mg IntraVENous Q3H PRN    glycopyrrolate (ROBINUL) injection 0.1 mg  0.1 mg IntraVENous Q8H PRN    busPIRone (BUSPAR) tablet 7.5 mg  7.5 mg Oral TID    dexamethasone (DECADRON) 4 mg/mL injection 3 mg  3 mg IntraVENous DAILY    OLANZapine (ZyPREXA) 5 mg in sterile water (preservative free) 1 mL injection  5 mg IntraMUSCular QHS PRN    heparin 25,000 units in dextrose 500 mL infusion  18-36 Units/kg/hr IntraVENous TITRATE    butalbital-acetaminophen-caffeine (FIORICET, ESGIC) -40 mg per tablet 1 Tablet  1 Tablet Oral Q6H PRN    insulin lispro (HUMALOG) injection 6 Units  6 Units SubCUTAneous TIDAC    lip protectant (BLISTEX) ointment 1 Each  1 Each Topical PRN    amLODIPine (NORVASC) tablet 5 mg  5 mg Oral DAILY    furosemide (LASIX) injection 40 mg  40 mg IntraVENous BID    sodium chloride (NS) flush 5-40 mL  5-40 mL IntraVENous Q8H    sodium chloride (NS) flush 5-40 mL  5-40 mL IntraVENous PRN    acetaminophen (TYLENOL) tablet 650 mg  650 mg Oral Q6H PRN    Or    acetaminophen (TYLENOL) suppository 650 mg  650 mg Rectal Q6H PRN    polyethylene glycol (MIRALAX) packet 17 g  17 g Oral DAILY PRN    ondansetron (ZOFRAN ODT) tablet 4 mg  4 mg Oral Q8H PRN    Or    ondansetron (ZOFRAN) injection 4 mg  4 mg IntraVENous Q6H PRN    guaiFENesin-dextromethorphan (ROBITUSSIN DM) 100-10 mg/5 mL syrup 5 mL  5 mL Oral Q4H PRN    insulin lispro (HUMALOG) injection 0-10 Units  0-10 Units SubCUTAneous AC&HS    albuterol (PROVENTIL HFA, VENTOLIN HFA, PROAIR HFA) inhaler 2 Puff  2 Puff Inhalation QID RT    atorvastatin (LIPITOR) tablet 20 mg  20 mg Oral QHS    budesonide-formoteroL (SYMBICORT) 160-4.5 mcg/actuation HFA inhaler 2 Puff  2 Puff Inhalation BID    cycloSPORINE (RESTASIS) 0.05 % ophthalmic emulsion 1 Drop  (Patient Supplied)  1 Drop Both Eyes Q12H    DULoxetine (CYMBALTA) capsule 20 mg  20 mg Oral DAILY    lamoTRIgine (LaMICtal) tablet 150 mg  150 mg Oral QAM    levothyroxine (SYNTHROID) tablet 137 mcg  137 mcg Oral 6am    montelukast (SINGULAIR) tablet 10 mg  10 mg Oral DAILY    tiZANidine (ZANAFLEX) tablet 4 mg  4 mg Oral TID PRN    pregabalin (LYRICA) capsule 50 mg  50 mg Oral BID    topiramate (TOPAMAX) tablet 25 mg  25 mg Oral QHS    traZODone (DESYREL) tablet 50 mg  50 mg Oral QHS    dicyclomine (BENTYL) tablet 20 mg  20 mg Oral AC&HS    pantoprazole (PROTONIX) tablet 40 mg  40 mg Oral ACB&D  hydrALAZINE (APRESOLINE) tablet 50 mg  50 mg Oral Q6H PRN    alcohol 62% (NOZIN) nasal  1 Ampule  1 Ampule Topical Q12H       Signed:  Valerie Garay MD    Part of this note may have been written by using a voice dictation software. The note has been proof read but may still contain some grammatical/other typographical errors.

## 2022-01-25 NOTE — PROGRESS NOTES
Pt complains of back spasms. Zanaflex 4 mg po given with sips of water. Pt  Alert and oriented x 2 at present. Reorient to time and situation. Pt uncomfortable. Often saying she want to go home. Continuous pulse ox ongoing with Airvo 60L/100%. Oxygen sat 91% at present.

## 2022-01-25 NOTE — DIABETES MGMT
Patient admitted with acute hypoxemic respiratory failure due to COVID-19. Blood glucose ranged 114-311 yesterday with patient receiving Lantus 16 units, Humalog 10 units, and Decadron 3mg. Blood glucose this morning was 175. Reviewed patient current regimen: Lantus 18 units nightly, Decadron 3mg daily, and Humalog correctional insulin. Patient will likely benefit from low dose prandial insulin to help offset hyperglycemia during the day pending glycemic control, but noted patient appetite poor. Will follow along.

## 2022-01-25 NOTE — CONSULTS
Palliative Care    Patient: Skyla Casper MRN: [id number]  SSN: [id number]    YOB: 1950  Age: 66 y.o. Sex: female       Date of Request: 22  Date of Consult:  2022  Reason for Consult:  goals of care  Requesting Physician: Dr Joseph Singh     Assessment/Plan:     Principal Diagnosis:    Dyspnea  R06.00    Additional Diagnoses:   · Advance Care Planning Counseling Z71.89  · Anxiety  F41.1  · Diarrhea  R19.7  · Encounter for Palliative Care  Z51.5  · Intermittent confusion    Palliative Performance Scale (PPS):       Medical Decision Making:   Reviewed and summarized notes from admission to present. Discussed case with appropriate providers: JEANNETTE Ziegler supervisor Lamberto, Dr Imelda Harding  Reviewed laboratory and x-ray data from admission to present. Pt resting in bed, no visitors present. The pt was on Airvo. Per JEANNETTE Escudero, the pt sometimes needs a non-rebreather in addition. The pt's   last week. , apparently of COVID. Ms Carmona endorsed occasional N/V, but denied pain. Although she was oriented, she demonstrated occasional confusion, asking 3 times \"What is an Airvo? \". The pt confirmed that she does not wish to be intubated. She expressed anxiety that she might go to sleep and not wake up. Morphine is listed as causing an anaphylactic reaction. Both PharmD resident [facility] and Dr Imelda Harding agree on trying Dilaudid IV 0.5 mg q 3 hrs PRN for dyspnea. Have also increased BuSpar 7.5 mg from 2 to 3 doses daily for dypsnea. The pt endorsed diarrhea; added Loperamide. Olanzapine is being given for anxiety at HS as, according to the pt's brother, she has had severe reactions to benzos. At JEANNETTE Madrigal's request, added Robinul for secretions, as pt seems to be losing the strength to cough up phlegm. Discussed pt with nursing supervisor Lamberto.   We agreed that Ms Andrei Colorado is best served by staying on this unit on Airvo until she either improves or declines further. Ms Lyn Jones said that her brother, Joseph Kerr, is assisting her in making medical decisions. I have consulted Spiritual Care to assist Ms Mathew to complete a HCPOA naming her brother. The pt has children, but they are grieving the very recent loss of their father. Will discuss findings with members of the interdisciplinary team.      Thank you for this referral.          .   In addition to the E&M described above, a 35--minute ACP meeting was spent discussing the pt's wishes concerning end of life care. Subjective:     History obtained from:  Patient, Care Provider and Chart    Chief Complaint: Dyspnea    History of Present Illness:  Pamella Maldonado is a 66 y.o. female with medical history of anxiety, depression, fibromyalgia, GERD, obesity with BMI of 33, who presented to the ER because of cough and shortness of breath. EMS was called patient was very confused and short of breath. She tested positive for COVID. Advance Directive: No       Code Status:  DNR            Health Care Power of : No - Patient does not have a [facility].     Past Medical History:   Diagnosis Date    Acute lumbar radiculopathy     Allergic rhinitis 12/10/2013    Anxiety and depression     Arthritis     Asthma     Atony of bladder     Bile reflux gastritis 3/2/2020    Chronic kidney disease     STONES    Chronic pain     HX FIBROMYALGIA    Claustrophobia     Diabetes ([address]     BORDRLINE DIET CONTROLLED    Esophageal spasm 4/30/2020    GERD (gastroesophageal reflux disease)     Heart disease     Hypercholesteremia     Hypertension     Hypothyroidism 11/14/2012    Hypoxemia     IBS (irritable bowel syndrome) 4/8/2013    Ill-defined condition     MRSA    Incomplete bladder emptying     Liver disease     elevated liver enzymes    Menopause     Migraine headache     Morbid obesity (HCC)     Myalgia and myositis, unspecified     Neurogenic bladder, NOS 4/3/2014  Organic hypersomnia, unspecified     JESSICA (obstructive sleep apnea)     Other chronic cystitis     Other seizures 11/11/2021    Peripheral neuropathy 4/8/2013    Sciatica     Symptomatic menopausal or female climacteric states     Thyroid disease     Unspecified convulsions 11/11/2021    Unspecified urinary incontinence     Vitamin D deficiency 4/8/2013      Past Surgical History:   Procedure Laterality Date    COLONOSCOPY  2020    due for repeat 2025    HX BUNIONECTOMY      HX CATARACT REMOVAL      HX CHOLECYSTECTOMY      HX GI  09/14/2017    small bowel obstruction/lysis of adhesions    HX HYSTERECTOMY      HX ORTHOPAEDIC      right 5th digit x 2    HX OTHER SURGICAL      bladder lift x 2; rectocele    HX OTHER SURGICAL      pelvis floor    HX OTHER SURGICAL  09/2016    [facility] [place]    HX PACEMAKER      HX THYROIDECTOMY      HX TONSILLECTOMY      HX UROLOGICAL      Bladder sx; rectocele     Family History   Problem Relation Age of Onset    Heart Failure Mother 54        CHF    Breast Cancer Mother 76    Heart Disease Mother     Hypertension Mother    [facility] Asthma Mother     Cancer Mother     Diabetes Mother     Thyroid Disease Mother     Psychiatric Disorder Mother         Depression    Heart Disease Father     Heart Attack Father 47        mi    Hypertension Father     Cancer Father     Stroke Father     Hypertension Brother     Diabetes Brother     Depression Sister     Anxiety Sister     OCD Sister     Hypertension Brother     Diabetes Brother     Depression Brother     No Known Problems Brother       Social History     Tobacco Use    Smoking status: Never Smoker    Smokeless tobacco: Never Used   Substance Use Topics    Alcohol use: No     Alcohol/week: 0.0 standard drinks     Comment: rarely     Prior to Admission medications    Medication Sig Start Date End Date Taking? Authorizing Provider   topiramate (Topamax) 25 mg tablet Take 1 Tablet by mouth nightly. 12/3/21  Yes Destiny Romero MD   dicyclomine (BENTYL) 20 mg tablet TAKE ONE TABLET BY MOUTH DAILY BEFORE MEALS AND ONE EVERY NIGHT AT BEDTIME 11/24/21  Yes Shobha Almaraz NP   busPIRone (BUSPAR) 7.5 mg tablet Take 1 Tablet by mouth two (2) times a day. 11/11/21  Yes Gasper Henriquez NP   dapagliflozin (FARXIGA) 10 mg tab tablet Take 1 Tablet by mouth daily. 11/11/21  Yes Shobha Almaraz NP   DULoxetine (CYMBALTA) 20 mg capsule Take 1 Capsule by mouth daily. 11/11/21  Yes Shobha Almaraz NP   levothyroxine (SYNTHROID) 137 mcg tablet TAKE ONE TABLET BY MOUTH DAILY BEFORE BREAKFAST 11/11/21  Yes Gasper Henriquez NP   metFORMIN (GLUCOPHAGE) 1,000 mg tablet Take 1 Tablet by mouth two (2) times daily (with meals). 11/11/21  Yes Gasper Henriquez NP   traZODone (DESYREL) 50 mg tablet Take 1 Tablet by mouth nightly. 11/11/21  Yes Shobha Almaraz NP   atorvastatin (LIPITOR) 20 mg tablet TAKE ONE TABLET BY MOUTH DAILY 6/10/21  Yes Destiny Romero MD   pregabalin (LYRICA) 100 mg capsule Take 100 mg by mouth two (2) times a day. Yes Provider, Historical   albuterol (PROVENTIL HFA, VENTOLIN HFA, PROAIR HFA) 90 mcg/actuation inhaler Take 1 Puff by inhalation every six (6) hours as needed for Wheezing. 1/29/20  Yes Margarito Mercado MD   oxybutynin chloride XL (DITROPAN XL) 10 mg CR tablet Take 1 Tab by mouth daily. 12/8/16  Yes Patricia Nunez NP   Insulin Needles, Disposable, (Lite Touch Insulin Pen Needles) 31 gauge x 3/16\" ndle Use daily w/ [place] Flextouch pen. Dx: Type 2 diabetes mellitus with hyperglycemia, with long-term current use of insulin ([facility] (E11.65 , Z79.4) 12/8/21   Destiny Romero MD   lamoTRIgine (LaMICtal) 150 mg tablet Take 1 Tablet by mouth Every morning. 12/3/21   Destiny Romero MD   omeprazole (PRILOSEC) 40 mg capsule Take 1 Capsule by mouth two (2) times a day. 11/24/21   Gasper Henriquez NP   insulin degludec [employer] FlexTouch U-100) 100 unit/mL (3 mL) inpn Give 15 units SQ daily. 11/24/21   Olegario Bower NP   dimenhyDRINATE (DRAMAMINE) 50 mg tablet TAKE 1 TABLET AS NEEDED. 11/11/21   Olegario Bower NP   EPINEPHrine (EpiPen) 0.3 mg/0.3 mL injection inject (0.3MG)  by intramuscular route once as needed for anaphylaxis 11/11/21   Olegario Bower NP   hydrocortisone (CORTAID) 1 % topical cream Apply  to affected area two (2) times a day. use thin layer 11/11/21   Shobha Almaraz NP   montelukast (SINGULAIR) 10 mg tablet TAKE ONE TABLET BY MOUTH ONCE DAILY 11/11/21   Olegario Bower NP   ergocalciferol (Vitamin D2) 1,250 mcg (50,000 unit) capsule Take 50,000 Units by mouth. Provider, Historical   losartan-hydroCHLOROthiazide (HYZAAR) 50-12.5 mg per tablet Take 1 Tablet by mouth daily. 10/28/21   Olegario Bower NP   budesonide-formoteroL (Symbicort) 160-4.5 mcg/actuation HFAA Take 2 Puffs by inhalation two (2) times a day. 10/18/21   Daysi Hernandez MD   cephALEXin (KEFLEX) 250 mg capsule Take 1 Capsule by mouth daily. 10/1/21   Daysi Hernandez MD   tiZANidine (ZANAFLEX) 4 mg tablet Take 4 mg by mouth three (3) times daily as needed for Muscle Spasm(s). Provider, Historical   aspirin-acetaminophen-caffeine (EXCEDRIN ES) 250-250-65 mg per tablet TAKE 1 TABLET AS NEEDED. Provider, Historical   cetirizine (ZYRTEC) 10 mg tablet take 1 tablet by oral route  every day    Provider, Historical   terconazole (TERAZOL 7) 0.4 % vaginal cream Insert 1 Applicator into vagina nightly. 4/15/19   Paulie Silverman MD   estradiol (ESTRACE) 0.01 % (0.1 mg/gram) vaginal cream 1 gram vaginally 1-2x/week 6/8/18   Baltazar Jacob MD   oxyCODONE IR (ROXICODONE) 10 mg tab immediate release tablet Take 10 mg by mouth every eight (8) hours as needed. Provider, Historical   diphenhydrAMINE (BENADRYL) 25 mg capsule Take 25 mg by mouth every six (6) hours as needed. Provider, Historical   cpap machine kit by Does Not Apply route.     Provider, Historical   fluticasone (FLONASE) 50 mcg/actuation nasal spray 2 Sprays by Both Nostrils route daily. Provider, Historical   Catheter (BARD CLEAN-CATH) misc by Does Not Apply route. Provider, Historical   cycloSPORINE (RESTASIS) 0.05 % ophthalmic emulsion Administer 1 Drop to both eyes every twelve (12) hours. Provider, Historical   glucose blood VI test strips (ASCENSIA AUTODISC VI, ONE TOUCH ULTRA TEST VI) strip Test 4 times daily 2/26/14   Kang Ramirez MD       Allergies   Allergen Reactions    Amoxicillin Itching    Ciprofloxacin Unknown (comments)    Codeine Other (comments) and Unknown (comments)     Pt reports stomach pain    Melon Flavor Itching     MELON CAUSE MOUTH ITCHING      Morphine Anaphylaxis, Shortness of Breath and Itching    Nut Flavor Other (comments)     GAS/ BLOATING    Other Medication Other (comments)     BEANS CAUSES GAS/BLOATING AND ACHES/PAINS ALL OVER                Review of Systems:  A comprehensive review of systems was negative except as noted above. Objective:     Visit Vitals  BP (!) 127/51 (BP 1 Location: Right upper arm, BP Patient Position: At rest)   Pulse 69   Temp 97.5 °F (36.4 °C)   Resp 22   Ht 5' 3\" (1.6 m)   Wt 171 lb 8 oz (77.8 kg)   SpO2 93%   BMI 30.38 kg/m²        Physical Exam:    General:  Cooperative. No acute distress. Eyes:  Conjunctivae/corneas clear    Nose: Nares normal. Septum midline. Neck: Supple, symmetrical, trachea midline, no JVD   Lungs:   Clear to auscultation bilaterally, unlabored   Heart:  Regular rate and rhythm, no murmur    Abdomen:   Soft, non-tender, non-distended   Extremities: Normal, atraumatic, no cyanosis or edema   Skin: Skin color, texture, turgor normal. No rash or lesions.    Neurologic: Occasional confusion   Psych: Alert and oriented      Assessment:     Hospital Problems  Date Reviewed: 6/10/2021          Codes Class Noted POA    SUSANNE (acute kidney injury) ([facility] ICD-10-CM: N17.9  ICD-9-CM: 584.9  1/14/2022 Yes        * (Principal) Acute hypoxemic respiratory failure due to COVID-19 [facility]) ICD-10-CM: U07.1, J96.01  ICD-9-CM: 518.81, 079.89, 799.02  1/14/2022 Yes        Type 2 diabetes mellitus with diabetic neuropathy, without long-term current use of insulin (HCC) (Chronic) ICD-10-CM: E11.40  ICD-9-CM: 250.60, 357.2  1/12/2018 Yes        Fibromyalgia (Chronic) ICD-10-CM: M79.7  ICD-9-CM: 729.1  Unknown Yes    Overview Signed 4/12/2017  9:40 AM by Jesús JENSEN FIBROMYALGIA             Anxiety and depression (Chronic) ICD-10-CM: F41.9, F32. A  ICD-9-CM: 300.00, 311  Unknown Yes        Essential hypertension (Chronic) ICD-10-CM: I10  ICD-9-CM: 401.9  9/20/2016 Yes        Gastroesophageal reflux disease without esophagitis (Chronic) ICD-10-CM: K21.9  ICD-9-CM: 530.81  9/20/2016 Yes        OAB (overactive bladder) (Chronic) ICD-10-CM: N32.81  ICD-9-CM: 596.51  9/20/2016 Yes        Morbid obesity ([address] (Chronic) ICD-10-CM: E66.01  ICD-9-CM: 278.01  Unknown Yes        Acquired hypothyroidism (Chronic) ICD-10-CM: E03.9  ICD-9-CM: 244.9  11/14/2012 Yes              Signed By: Mario Alberto Walter NP     January 25, 2022

## 2022-01-25 NOTE — PROGRESS NOTES
Pt agitated and confused. Not able to reorient to time, situation. Pt alert to self and place. Pharmacy notified of need for Zyprexa.

## 2022-01-25 NOTE — PROGRESS NOTES
Bedside report receive from Jose Donovan RN. No distress at present. PT resting quietly in bed. Sr up x 3, bed low locked. Sharrhondane Lord in place draining yellow urine. Bilateral wrist restraints in place, with soft mittens. Airvo 60L/100% with NRB on at present with oxygen sat 92% via continuous pulse ox. HOB elevated slightly. Pt alert responds to questions. Heparin drip ongoing at 14 units/kg/hr via IV. Will monitor.

## 2022-01-25 NOTE — PROGRESS NOTES
Damien Carmona  Admission Date: 1/14/2022         Daily Progress Note: 1/25/2022 Length of stay: 11    The patient's chart is reviewed and the patient is discussed with the staff. Background: 66year old unvaccinated female was admitted with cough, shortness of breath and decreased appetite. She tested + for COVID on 1/14. She is being treated with Decadron and Tocilizumab. She is also on Doxycycline. CRP is 16.9 and d dimer is 3.2. She is hypoxic and currently requiring bipap with FIO2 100%. She is undecided about intubation but a full code at present. She had SUSANNE on admission. She has been seen by nephrology who suspects hypoperfusion. She was initially on IV fluids but these have been stopped. Her fluid balance is + 100 mls. BNP measured 18,243 on admission. Her initial creatinine was 3.9 and it is now elevated. Nephrology is following and she has gotten some lasix. She has never smoked but there is a history of asthma which is stable. She has been tested for JESSICA (about 2 years ago) but no tx was recommended at that time. Pt's spouse passed away last week, found at home. DDimer 3.95 on 1/18/22 and now on heparin gtt. Negative doppler studies of BLE. No CTA due to SUSANNE, unable to do VQ scan due to hypoxemia. Date of COVID-19 symptom onset: 1/9/22    COVID-19 Meds:   Dexamethasone 6mg daily: 1//14/22  Actemra: 1/14/22    Vaccination:      Results in Past 30 Days  Result Component Current Result Ref Range Previous Result Ref Range   COVID-19 rapid test Detected (AA) (1/14/2022) NOTD   Not in Time Range    Specimen source NASAL (1/14/2022)   Not in Time Range        Subjective:     On 100% FiO2 via airvo. PC  Visiting.  Looks comfortable    Current Facility-Administered Medications   Medication Dose Route Frequency    insulin glargine (LANTUS) injection 18 Units  18 Units SubCUTAneous QHS    potassium chloride 20 mEq in 100 ml IVPB  20 mEq IntraVENous Q2H    dexamethasone (DECADRON) 4 mg/mL injection 3 mg  3 mg IntraVENous DAILY    OLANZapine (ZyPREXA) 5 mg in sterile water (preservative free) 1 mL injection  5 mg IntraMUSCular QHS PRN    heparin 25,000 units in dextrose 500 mL infusion  18-36 Units/kg/hr IntraVENous TITRATE    butalbital-acetaminophen-caffeine (FIORICET, ESGIC) -40 mg per tablet 1 Tablet  1 Tablet Oral Q6H PRN    insulin lispro (HUMALOG) injection 6 Units  6 Units SubCUTAneous TIDAC    lip protectant (BLISTEX) ointment 1 Each  1 Each Topical PRN    amLODIPine (NORVASC) tablet 5 mg  5 mg Oral DAILY    furosemide (LASIX) injection 40 mg  40 mg IntraVENous BID    sodium chloride (NS) flush 5-40 mL  5-40 mL IntraVENous Q8H    sodium chloride (NS) flush 5-40 mL  5-40 mL IntraVENous PRN    acetaminophen (TYLENOL) tablet 650 mg  650 mg Oral Q6H PRN    Or    acetaminophen (TYLENOL) suppository 650 mg  650 mg Rectal Q6H PRN    polyethylene glycol (MIRALAX) packet 17 g  17 g Oral DAILY PRN    ondansetron (ZOFRAN ODT) tablet 4 mg  4 mg Oral Q8H PRN    Or    ondansetron (ZOFRAN) injection 4 mg  4 mg IntraVENous Q6H PRN    guaiFENesin-dextromethorphan (ROBITUSSIN DM) 100-10 mg/5 mL syrup 5 mL  5 mL Oral Q4H PRN    insulin lispro (HUMALOG) injection 0-10 Units  0-10 Units SubCUTAneous AC&HS    albuterol (PROVENTIL HFA, VENTOLIN HFA, PROAIR HFA) inhaler 2 Puff  2 Puff Inhalation QID RT    atorvastatin (LIPITOR) tablet 20 mg  20 mg Oral QHS    budesonide-formoteroL (SYMBICORT) 160-4.5 mcg/actuation HFA inhaler 2 Puff  2 Puff Inhalation BID    busPIRone (BUSPAR) tablet 7.5 mg  7.5 mg Oral BID    cycloSPORINE (RESTASIS) 0.05 % ophthalmic emulsion 1 Drop  (Patient Supplied)  1 Drop Both Eyes Q12H    DULoxetine (CYMBALTA) capsule 20 mg  20 mg Oral DAILY    lamoTRIgine (LaMICtal) tablet 150 mg  150 mg Oral QAM    levothyroxine (SYNTHROID) tablet 137 mcg  137 mcg Oral 6am    montelukast (SINGULAIR) tablet 10 mg  10 mg Oral DAILY    tiZANidine (ZANAFLEX) tablet 4 mg  4 mg Oral TID PRN    pregabalin (LYRICA) capsule 50 mg  50 mg Oral BID    topiramate (TOPAMAX) tablet 25 mg  25 mg Oral QHS    traZODone (DESYREL) tablet 50 mg  50 mg Oral QHS    dicyclomine (BENTYL) tablet 20 mg  20 mg Oral AC&HS    pantoprazole (PROTONIX) tablet 40 mg  40 mg Oral ACB&D    hydrALAZINE (APRESOLINE) tablet 50 mg  50 mg Oral Q6H PRN    alcohol 62% (NOZIN) nasal  1 Ampule  1 Ampule Topical Q12H     Review of Systems  Constitutional: negative for fever, chills, sweats  Cardiovascular: negative for chest pain, palpitations, syncope, edema  Gastrointestinal:  negative for dysphagia, reflux, vomiting, diarrhea, abdominal pain, or melena  Neurologic:  negative for focal weakness, numbness, headache    Objective:     Vitals:    01/25/22 0415 01/25/22 0513 01/25/22 0537 01/25/22 0722   BP: 130/62      Pulse: 78      Resp: (!) 32 24 24    Temp: 98.5 °F (36.9 °C)      SpO2: 90% 95% 91%    Weight:    171 lb 8 oz (77.8 kg)   Height:           Intake/Output Summary (Last 24 hours) at 1/25/2022 0759  Last data filed at 1/25/2022 0645  Gross per 24 hour   Intake 0 ml   Output 600 ml   Net -600 ml     Physical Exam:   Constitution:  the patient is overweight and in no acute distress   HEENT:  Sclera clear, pupils equal, oral mucosa moist  Respiratory:   Cardiovascular:  RRR without M,G,R  Gastrointestinal: soft and non-tender; with positive bowel sounds. Musculoskeletal: warm without cyanosis. There is no lower extremity edema.   Skin:  no jaundice or rashes  Neurologic: drowsy, moves all extremities; oriented x4  Psychiatric:  Calm    CXR:   1/23/22--increased infiltrates bilaterally 1/21/22 1/18:          1/16, 15:        LAB:  Recent Labs     01/25/22  0545 01/24/22  0343 01/23/22  0419   WBC 18.1* 17.8* 18.7*   HGB 11.8 11.4* 11.8   HCT 39.2 38.2 38.3    319 315     Recent Labs     01/25/22  0545 01/24/22  0343 01/23/22  0419    142 138   K 3.5 3.9 3.3* CL 99 102 98   CO2 28 32 31   * 125* 72   BUN 57* 71* 74*   CREA 1.42* 1.72* 1.71*   MG 1.8 2.1 1.6*   CA 8.9 8.6 8.5   ALB 3.1* 3.4 3.1*   AST 30 27 23   ALT 19 15 19    98 86     No results for input(s): LAC, TROPHS, BNPNT, CRP in the last 72 hours. No lab exists for component: ESR  Recent Labs     01/23/22  1659   PHI 7.50*   PCO2I 36.2   PO2I 63*   HCO3I 28.3*     No results for input(s): SDES, CULT in the last 72 hours. Assessment and Plan:  (Medical Decision Making)   Principal Problem:    Acute hypoxemic respiratory failure due to COVID-19 [facility]) (1/14/2022)  --Continues to require AirVo during the day and CPAP at night;Continue CPAP qHS and wean AirVo as tolerated. --completed dexa 10mg for 10 days 1/23, but with going back on cpap, started 3 mg dose IV EOT 1/26. --pt is DNR, so continue CPAP intermittently with airvo as tolerated. --pt did receive IM olanzapine for anxiety and agitation. Limit sedatives as able. --CXR noted with increased infiltrates bilaterally. --on lasix 40 mg BID, no intake recorded. Inaccurate I.O  --continue heparin gtt for now for concern of PE. Unable to confirm at this time. --completed round of rocephin on 1/21/22; WBC elevated for last several days, afebrile; monitor. Morbid obesity (HCC) ()   --BMI almost 34. Contributes to the complexity of care, mobilize      Essential hypertension (9/20/2016)  --Norvasc previously added by nephrology       Type 2 diabetes mellitus with diabetic neuropathy, without long-term current use of insulin ([facility] [address] (1/12/2018)  --Per primary team       [facility] (1/14/2022)  --[address] Nephrology following, Creatinine continues down to 1.42 this am with diuresis. BNP 18,243 on 1/14/22. K+ 3.9      Patient is a DNR. Remains on 100%, little to no progress unfortunately.        More than 50% of the time documented was spent in face-to-face contact with the patient and in the care of the patient on the floor/unit where the patient is located. Bogdan James NP     I have spoken with and examined the patient. I agree with the above assessment and plan as documented. Lungs:  CTA  Heart:  RRR with no Murmur/Rubs/Gallops  Abd: soft and non-tender; with positive bowel sounds. Ext:  There is no lower extremity edema. Neurologic: drowsy, moves all extremities; oriented x4    Cont. Steroids, diuretics and wean O2.  Other Rx per primary    Caro Cruz MD

## 2022-01-25 NOTE — PROGRESS NOTES
Comprehensive Nutrition Assessment    Type and Reason for Visit: Initial,RD nutrition re-screen/LOS    Nutrition Recommendations/Plan:    Continue current diet w/ feeding assistance as needed. Food preferences added per d/w RN.  Continue ensure enlive TID with meals - provides 350 kcal and 20 gm PRO per bottle.  Start magic cup BID with lunch and dinner daily - provide 290 kcal and 9 gm PRO per bottle.  If c/w goals of care, and if PO intake remains inadequate, consider placement of NGT for TF. Please consult nutrition services for management of TF as warranted. Malnutrition Assessment:  Malnutrition Status: At risk for malnutrition (specify) (average PO intake of 0-25% and potential wt loss since admit)    Nutrition Assessment:   Nutrition History: Nutrition history unable to be obtained d/t current medical condition - confused, in restraints. Nutrition Background: Pt admitted with SOB, AMS. Covid 19 +. PMH notable for HTN, DM, MO, GERD, COPD. Daily Update:  Pt d/w RN. Pt is on airvo and restraints this AM. She received assistance with breakfast. Pt consistently taking in applesauce and fruit (medications in applesauce) and sips of ensure and water. RN reports pt has not eaten any kind of \"solid food/meal\" in days. Last 3 Recorded Weights in this Encounter    01/14/22 0525 01/25/22 0722   Weight: 86.2 kg (190 lb) 77.8 kg (171 lb 8 oz)     Potential for a 19 lb, 10% weight loss since admission. Source of weight from 1/14 is not specified. Weight loss can not be verified. Nutrition Related Findings:   NFPE deferred d/t isolation precautions. Regular diet 1/14-1/21. SLP downgraded diet to easy to chew 1/21-present. Ensure enive TID with meals added 1/18.       Current Nutrition Therapies:  ADULT ORAL NUTRITION SUPPLEMENT Breakfast, Lunch, Dinner; Standard High Calorie/High Protein  ADULT DIET Easy to Chew; 3 carb choices (45 gm/meal)    Current Intake:   Average Meal Intake: 1-25% Average Supplement Intake: Unable to assess (sips per d/w RN)      Anthropometric Measures:  Height: 5' 3\" (160 cm)  Current Body Wt: 77.8 kg (171 lb 8.3 oz) (1/25), Weight source: Bed scale  BMI: 30.4, Obese class 1 (BMI 30.0-34. 9)  Admission Body Weight: 189 lb 15.9 oz (1/14; wt source not specified)  Ideal Body Weight (lbs) (Calculated): 115 lbs (52 kg), 149.1 %  Usual Body Wt:  (wt ranges from 193 to current BW of 171 lbs x 1 yr)   Edema: No data recorded     Estimated Daily Nutrient Needs:  Energy (kcal/day): 0676-6780 (Kcal/kg (15-20), Weight Used: Current (77.8 kg))  Protein (g/day): 58-78 (20% kcal) Weight Used: (Current)  Fluid (ml/day): 5017-4955 (1 ml/kcal (or per MD))    Nutrition Diagnosis:   · Inadequate protein-energy intake related to cognitive or neurological impairment,impaired respiratory function as evidenced by intake 0-25% (intermittent confusion requiring restraints)    Nutrition Interventions:   Food and/or Nutrient Delivery: Continue current diet,Modify oral nutrition supplement     Coordination of Nutrition Care: Continue to monitor while inpatient  Plan of Care discussed with Arslan Landry RN; Matthew Pérez RN; Dr. Bautista Thomas; IDT rounds    Goals: Active Goal: Meet >75% of estimated needs at time of nutrition follow up. Nutrition Monitoring and Evaluation:      Food/Nutrient Intake Outcomes: Food and nutrient intake,Supplement intake  Physical Signs/Symptoms Outcomes: Hemodynamic status,Meal time behavior,Weight    Discharge Planning:     Too soon to determine    Paul Tapia [address], [address], [place], [address]      Disaster Mode Active

## 2022-01-25 NOTE — PROGRESS NOTES
Patients respiratory status continues to decline. Therapy will discharge from our services. Please re order if and when therapy is appropriate.     Ivy Pedraza PTA

## 2022-01-25 NOTE — PROGRESS NOTES
OT Note:    OT attempted to see patient today for therapy. Patients respiratory status continues to decline. OT will discharge from our services. Please re order if and when therapy is appropriate.     Thanks,  Margarette Lobo

## 2022-01-26 NOTE — PROGRESS NOTES
MSN, CM:  Patient has been made comfort care by her own choice this AM.  Case Management will continue to follow.

## 2022-01-26 NOTE — PROGRESS NOTES
Patient resting in bed on Airvo 60L/100% with NRB mask. A&Ox4. Patient has periods of panic attacks. Respirations even and slightly labored. Patient denies pain and is in no acute distress at this time. Bilateral wrist restraints in place. Needs addressed. Call light within reach, will continue to monitor.

## 2022-01-26 NOTE — PROGRESS NOTES
SPEECH PATHOLOGY NOTE:    Patient remains on Bipap this morning. She remains unsafe to participate in po trials. Will follow loosely pending progress and goals.     YAZ Leyva, CCC-SLP  Speech Language Pathologist  [facility]  Contact: Sushant

## 2022-01-26 NOTE — PROGRESS NOTES
Radha Carmona  Admission Date: 1/14/2022         Daily Progress Note: 1/26/2022 Length of stay: 12    The patient's chart is reviewed and the patient is discussed with the staff. Background: 66year old unvaccinated female was admitted with cough, shortness of breath and decreased appetite. She tested + for COVID on 1/14. She is being treated with Decadron and Tocilizumab. She is also on Doxycycline. CRP is 16.9 and d dimer is 3.2. She is hypoxic and currently requiring bipap with FIO2 100%. She is undecided about intubation but a full code at present. She had SUSANNE on admission. She has been seen by nephrology who suspects hypoperfusion. She was initially on IV fluids but these have been stopped. Her fluid balance is + 100 mls. BNP measured 18,243 on admission. Her initial creatinine was 3.9 and it is now elevated. Nephrology is following and she has gotten some lasix. She has never smoked but there is a history of asthma which is stable. She has been tested for JESSICA (about 2 years ago) but no tx was recommended at that time. Pt's spouse passed away last week, found at home. DDimer 3.95 on 1/18/22 and now on heparin gtt. Negative doppler studies of BLE. No CTA due to SUSANNE, unable to do VQ scan due to hypoxemia. Date of COVID-19 symptom onset: 1/9/22    COVID-19 Meds:   Dexamethasone 6mg daily: 1//14/22  Actemra: 1/14/22    Vaccination:      Results in Past 30 Days  Result Component Current Result Ref Range Previous Result Ref Range   COVID-19 rapid test Detected (AA) (1/14/2022) NOTD   Not in Time Range    Specimen source NASAL (1/14/2022)   Not in Time Range        Subjective:     On 100% FiO2 via airvo. On BiPAP 100% overnight. PC saw her yesterday.   Pulling her mask off, says she would like to be with her   sats are marginal at 91-92%    Current Facility-Administered Medications   Medication Dose Route Frequency    insulin glargine (LANTUS) injection 18 Units  18 Units SubCUTAneous QHS    loperamide (IMODIUM) capsule 2 mg  2 mg Oral PRN    HYDROmorphone (DILAUDID) injection 0.5 mg  0.5 mg IntraVENous Q3H PRN    glycopyrrolate (ROBINUL) injection 0.1 mg  0.1 mg IntraVENous Q8H PRN    busPIRone (BUSPAR) tablet 7.5 mg  7.5 mg Oral TID    dexamethasone (DECADRON) 4 mg/mL injection 3 mg  3 mg IntraVENous DAILY    OLANZapine (ZyPREXA) 5 mg in sterile water (preservative free) 1 mL injection  5 mg IntraMUSCular QHS PRN    heparin 25,000 units in dextrose 500 mL infusion  18-36 Units/kg/hr IntraVENous TITRATE    butalbital-acetaminophen-caffeine (FIORICET, ESGIC) -40 mg per tablet 1 Tablet  1 Tablet Oral Q6H PRN    insulin lispro (HUMALOG) injection 6 Units  6 Units SubCUTAneous TIDAC    lip protectant (BLISTEX) ointment 1 Each  1 Each Topical PRN    amLODIPine (NORVASC) tablet 5 mg  5 mg Oral DAILY    furosemide (LASIX) injection 40 mg  40 mg IntraVENous BID    sodium chloride (NS) flush 5-40 mL  5-40 mL IntraVENous Q8H    sodium chloride (NS) flush 5-40 mL  5-40 mL IntraVENous PRN    acetaminophen (TYLENOL) tablet 650 mg  650 mg Oral Q6H PRN    Or    acetaminophen (TYLENOL) suppository 650 mg  650 mg Rectal Q6H PRN    polyethylene glycol (MIRALAX) packet 17 g  17 g Oral DAILY PRN    ondansetron (ZOFRAN ODT) tablet 4 mg  4 mg Oral Q8H PRN    Or    ondansetron (ZOFRAN) injection 4 mg  4 mg IntraVENous Q6H PRN    guaiFENesin-dextromethorphan (ROBITUSSIN DM) 100-10 mg/5 mL syrup 5 mL  5 mL Oral Q4H PRN    insulin lispro (HUMALOG) injection 0-10 Units  0-10 Units SubCUTAneous AC&HS    albuterol (PROVENTIL HFA, VENTOLIN HFA, PROAIR HFA) inhaler 2 Puff  2 Puff Inhalation QID RT    atorvastatin (LIPITOR) tablet 20 mg  20 mg Oral QHS    budesonide-formoteroL (SYMBICORT) 160-4.5 mcg/actuation HFA inhaler 2 Puff  2 Puff Inhalation BID    cycloSPORINE (RESTASIS) 0.05 % ophthalmic emulsion 1 Drop  (Patient Supplied)  1 Drop Both Eyes Q12H    DULoxetine (CYMBALTA) capsule 20 mg  20 mg Oral DAILY    lamoTRIgine (LaMICtal) tablet 150 mg  150 mg Oral QAM    levothyroxine (SYNTHROID) tablet 137 mcg  137 mcg Oral 6am    montelukast (SINGULAIR) tablet 10 mg  10 mg Oral DAILY    tiZANidine (ZANAFLEX) tablet 4 mg  4 mg Oral TID PRN    pregabalin (LYRICA) capsule 50 mg  50 mg Oral BID    topiramate (TOPAMAX) tablet 25 mg  25 mg Oral QHS    traZODone (DESYREL) tablet 50 mg  50 mg Oral QHS    dicyclomine (BENTYL) tablet 20 mg  20 mg Oral AC&HS    pantoprazole (PROTONIX) tablet 40 mg  40 mg Oral ACB&D    hydrALAZINE (APRESOLINE) tablet 50 mg  50 mg Oral Q6H PRN    alcohol 62% (NOZIN) nasal  1 Ampule  1 Ampule Topical Q12H     Review of Systems  Constitutional: negative for fever, chills, sweats  Cardiovascular: negative for chest pain, palpitations, syncope, edema  Gastrointestinal:  negative for dysphagia, reflux, vomiting, diarrhea, abdominal pain, or melena  Neurologic:  negative for focal weakness, numbness, headache    Objective:     Vitals:    01/26/22 0058 01/26/22 0423 01/26/22 0534 01/26/22 0736   BP:  139/68  (!) 153/66   Pulse:  78  91   Resp:  23  20   Temp:  98.6 °F (37 °C)     SpO2: 91% 96%  92%   Weight:   174 lb 6.4 oz (79.1 kg)    Height:           Intake/Output Summary (Last 24 hours) at 1/26/2022 0741  Last data filed at 1/26/2022 0145  Gross per 24 hour   Intake 340 ml   Output 300 ml   Net 40 ml     Physical Exam:   Constitution:  the patient is overweight and in no acute distress   HEENT:  Sclera clear, pupils equal, oral mucosa moist  Respiratory: CTA B  Cardiovascular:  RRR without M,G,R  Gastrointestinal: soft and non-tender; with positive bowel sounds. Musculoskeletal: warm without cyanosis. There is no lower extremity edema.   Skin:  no jaundice or rashes  Neurologic: drowsy, moves all extremities; oriented x4  Psychiatric:  Calm    CXR:   1/23/22--increased infiltrates bilaterally 1/21/22 1/18:          1/16, 15:        LAB:  Recent Labs     01/26/22  0306 01/25/22  0545 01/24/22  0343   WBC 24.4* 18.1* 17.8*   HGB 12.4 11.8 11.4*   HCT 40.6 39.2 38.2    351 319     Recent Labs     01/26/22  0306 01/25/22  0545 01/24/22  0343    139 142   K 3.7 3.5 3.9    99 102   CO2 32 28 32   * 156* 125*   BUN 32* 57* 71*   CREA 1.43* 1.42* 1.72*   MG 2.0 1.8 2.1   CA 9.0 8.9 8.6   ALB 3.4 3.1* 3.4   AST 42* 30 27   ALT 22 19 15    111 98     No results for input(s): LAC, TROPHS, BNPNT, CRP in the last 72 hours. No lab exists for component: ESR  Recent Labs     01/23/22  1659   PHI 7.50*   PCO2I 36.2   PO2I 63*   HCO3I 28.3*     No results for input(s): SDES, CULT in the last 72 hours. Assessment and Plan:  (Medical Decision Making)   Principal Problem:    Acute hypoxemic respiratory failure due to COVID-19 [facility]) (1/14/2022)  --Continues to require AirVo during the day and BiPAP at night;Continue BiPAP qHS and wean AirVo as tolerated. --completed dexa 10mg for 10 days 1/23, but with going back on cpap, started 3 mg dose IV EOT 1/26. --pt is DNR, so continue CPAP intermittently with airvo as tolerated. --pt did receive IM olanzapine for anxiety and agitation. Limit sedatives as able. --CXR noted with increased infiltrates bilaterally. --on lasix 40 mg BID, no intake recorded. Positive 40 cc over the past 24 hours per I/O  --continue heparin gtt for now for concern of PE. Unable to confirm at this time. --completed round of rocephin on 1/21/22; WBC elevated for last several days, afebrile; monitor. Morbid obesity (HCC) ()   --BMI almost 34.  Contributes to the complexity of care, mobilize      Essential hypertension (9/20/2016)  --Norvasc previously added by nephrology       Type 2 diabetes mellitus with diabetic neuropathy, without long-term current use of insulin ([facility] (1/12/2018)  --Per primary team       SUSANNE (acute kidney injury) ([facility] (1/14/2022)  --[address] Nephrology following, Creatinine continues down to 1.43 this am with diuresis. BNP 18,243 on 1/14/22. K+ 3.9      Patient is a DNR. Remains on 100% airvo/BiPAP for days, little to no progress unfortunately. PC has seen. Family grieving her  at this time. More than 50% of the time documented was spent in face-to-face contact with the patient and in the care of the patient on the floor/unit where the patient is located. Rita Matias, YARI     Lungs :coarse  Heart S1 and S2 audible, no murmers or rubs appreciated  Other     Patient does not like BIPAP and awake and responsive. Aware she is dying and does not want BIPAP  palliative care and nurse in room. She agree to comfort meds and wants to stop. She is ok with us telling her brother and wants the matilda to see her. She tried but now is tired. I have spoken with and examined the patient. I have reviewed the history, examination, assessment, and plan and agree with the above. Malka Ordaz MD      This note was signed electronically. Errors are unfortunately her likely due to dictation software.

## 2022-01-26 NOTE — DISCHARGE SUMMARY
Hospitalist Discharge Summary for  Patient   Admit Date:  2022  5:25 AM   DC Note date: 2022  Name:  Rosalba Carmona   Age:  66 y.o. Sex:  female  :  1950   MRN:  [id number]   Room:    PCP:  Rosi Andrade MD    Problem List for this Hospitalization:  Hospital Problems as of 2022 Date Reviewed: 6/10/2021          Codes Class Noted - Resolved POA    SUSANNE (acute kidney injury) ([facility].) ICD-10-CM: N17.9  ICD-9-CM: 584.9  2022 - Present Yes        * (Principal) Acute hypoxemic respiratory failure due to COVID-19 [facility]) ICD-10-CM: U07.1, J96.01  ICD-9-CM: 518.81, 079.89, 799.02  2022 - Present Yes        Type 2 diabetes mellitus with diabetic neuropathy, without long-term current use of insulin (HCC) (Chronic) ICD-10-CM: E11.40  ICD-9-CM: 250.60, 357.2  2018 - Present Yes        Fibromyalgia (Chronic) ICD-10-CM: M79.7  ICD-9-CM: 729.1  Unknown - Present Yes    Overview Signed 2017  9:40 AM by Maya JENSEN FIBROMYALGIA             Anxiety and depression (Chronic) ICD-10-CM: F41.9, F32. A  ICD-9-CM: 300.00, 311  Unknown - Present Yes        Essential hypertension (Chronic) ICD-10-CM: I10  ICD-9-CM: 401.9  2016 - Present Yes        Gastroesophageal reflux disease without esophagitis (Chronic) ICD-10-CM: K21.9  ICD-9-CM: 530.81  2016 - Present Yes        OAB (overactive bladder) (Chronic) ICD-10-CM: N32.81  ICD-9-CM: 596.51  2016 - Present Yes        Morbid obesity ([facility] (Chronic) ICD-10-CM: E66.01  ICD-9-CM: 278.01  Unknown - Present Yes        Acquired hypothyroidism (Chronic) ICD-10-CM: E03.9  ICD-9-CM: 244.9  2012 - Present Yes            Did Patient have Sepsis (YES OR NO): No    Hospital Course:  71-year-old female past medical history of fibromyalgia, depression/anxiety, GERD, obesity BMI of 33 who presented with cough and shortness of breath and decreased p.o. intake over the last 5 days.   When EMS arrived at home patient was short of breath, hypoxic and confused. Patient was placed on CPAP and transported to the ED. Initial oxygen saturation was 81% on nasal cannula. Patient was placed on BiPAP and subsequently transitioned to Airvo. Chest x-ray showed hazy bilateral lung opacities likely secondary to pulmonary edema/pneumonia. COVID test was positive. Patient was admitted for further evaluation of acute hypoxemic respiratory failure secondary to COVID-19 pneumonia, SUSANNE. Respiratory status had worsened and required CPAP. Pulm has been consulted. Patient was started on Decadron and empiric antibiotics for her community-acquired pneumonia. Patient was not a candidate for baricitinib due to her kidney function. Nephrology consulted. Patient spouse passed away last week fell at home. Patient was started on heparin drip due to concern for PE with elevated D-dimer. Patient was unable to get a CTA due to SUSANNE and unable to do VQ scan due to hypoxemia. Patient was started on heparin per pulmonology. Patient struggled with keeping air Vo/BiPAP on face constantly pulling off. Patient reiterated that she did not want to be intubated if need be. Family discussion was made with palliative care and patient decided on comfort measures. 1/26 decision was made to take off supplemental oxygen and patient passed away peacefully. Time of Death:   1443    Cause of Death:   Acute hypoxic respiratory failure secondary to COVID-19 pneumonia    Time spent in patient discharge work and death certification 45 minutes. Procedures done this admission:  * No surgery found *    Consults this admission:  IP CONSULT TO NEPHROLOGY  IP CONSULT TO PALLIATIVE CARE - PROVIDER  IP CONSULT TO PULMONOLOGY    Echocardiogram/EKG results:  No results found for this or any previous visit.        EKG Results     Procedure 720 Value Units Date/Time    EKG, 12 LEAD, INITIAL [[id number]] Collected: 01/14/22 0536    Order Status: Completed Updated: 01/14/22 1557     Ventricular Rate 84 BPM      Atrial Rate 83 BPM      P-R Interval 151 ms      QRS Duration 105 ms      Q-T Interval 364 ms      QTC Calculation (Bezet) 431 ms      Calculated P Axis 73 degrees      Calculated R Axis -21 degrees      Calculated T Axis 90 degrees      Diagnosis --     Sinus rhythm  Ventricular premature complex  Borderline left axis deviation  Borderline T abnormalities, lateral leads    Confirmed by Maria Del Carmen Hdz ([id number]) on 1/14/2022 3:56:54 PM            Diagnostic Imaging/Tests:   XR CHEST SNGL V    Result Date: 1/15/2022  PORTABLE CHEST, January 15, 2022 at 1325 hours CLINICAL HISTORY:  Hypoxemia with Covid . COMPARISON:  January 14, 2022. FINDINGS:  AP erect image demonstrates significant interval worsening of the bilateral inhomogeneous infiltrates with areas of consolidation at the left base and in the right upper lobe. No definite pleural fluid. The heart size is within normal limits without definite pneumothorax. There are overlying radiopaque support devices. SIGNIFICANT INTERVAL WORSENING OF BILATERAL INFILTRATES. US RETROPERITONEUM COMP    Result Date: 1/14/2022  RENAL ULTRASOUND 1/14/2022 4:18 PM HISTORY: ; SUSANNE TECHNIQUE: Sonographic imaging of the kidneys, bladder and retroperitoneal vessels was performed. COMPARISON: None FINDINGS: The right kidney is 11.6 cm in length. The left kidney is 10.9 cm in length. There is a 2.9 cm parapelvic cyst in the left kidney. There is no hydronephrosis. There are no visible shadowing renal calculi. The bladder is well-distended and there is minimal dependent debris within the bladder. The IVC is patent. The abdominal aorta is 1.7 cm in diameter. 1. No hydronephrosis. 2. 2.9 cm left parapelvic cyst. 3. Minimal layering debris within the bladder. XR CHEST PORT    Result Date: 1/14/2022  EXAM: Chest x-ray. INDICATION: Mental status changes. COMPARISON: Prior chest x-ray on January 10, 2020.  TECHNIQUE: Frontal view chest x-ray. FINDINGS: The heart is enlarged and there are hazy opacities throughout both lungs, which could relate to early pulmonary edema or pneumonia. No pneumothorax or pleural effusion is seen. Again noted is elevation of the right diaphragm and surgical clips in the lower neck. 1. Hazy bilateral lung opacities may relate to early pulmonary edema or pneumonia. 2. Cardiomegaly. All Micro Results     Procedure Component Value Units Date/Time    COVID-19 RAPID TEST [[id number]]  (Abnormal) Collected: 01/14/22 0600    Order Status: Completed Specimen: Nasopharyngeal Updated: 01/14/22 0720     Specimen source NASAL        Comment: RAPID ONLY        COVID-19 rapid test Detected        Comment:      The specimen is POSITIVE for SARS-CoV-2, the novel coronavirus associated with COVID-19. This test has been authorized by the FDA under an Emergency Use Authorization (EUA) for use by authorized laboratories.         Fact sheet for Healthcare Providers: ConventionUpdate.co.nz  Fact sheet for Patients: ConventionUpdate.co.nz       Methodology: Isothermal Nucleic Acid Amplification               Labs: Results:       BMP, Mg, Phos Recent Labs     01/26/22  0306 01/25/22  0545 01/24/22  0343    139 142   K 3.7 3.5 3.9    99 102   CO2 32 28 32   AGAP 7 12 8   BUN 32* 57* 71*   CREA 1.43* 1.42* 1.72*   CA 9.0 8.9 8.6   * 156* 125*   MG 2.0 1.8 2.1      CBC Recent Labs     01/26/22  0306 01/25/22  0545 01/24/22  0343   WBC 24.4* 18.1* 17.8*   RBC 4.75 4.58 4.53   HGB 12.4 11.8 11.4*   HCT 40.6 39.2 38.2    351 319   GRANS 91* 84* 83*   LYMPH 4* 8* 10*   EOS 1 1 1   MONOS 3* 5 6   BASOS 0 0 0   IG 1 1 1   ANEU 22.1* 15.3* 14.7*   ABL 1.0 1.5 1.7   NAHOMY 0.2 0.3 0.2   ABM 0.8 0.8 1.0   ABB 0.1 0.1 0.0   AIG 0.2 0.2 0.2      LFT Recent Labs     01/26/22  0306 01/25/22  0545 01/24/22  0343   ALT 22 19 15    111 98   TP 7.5 7.4 7.4   ALB 3.4 3.1* 3.4 GLOB 4.1* 4.3* 4.0*   AGRAT 0.8* 0.7* 0.9*      Cardiac Testing Lab Results   Component Value Date/Time    CK 70 12/17/2014 10:58 AM      Coagulation Tests Lab Results   Component Value Date/Time    Prothrombin time 11.2 01/10/2020 11:39 AM    INR 1.1 01/10/2020 11:39 AM    aPTT 29.1 01/21/2022 04:07 PM    aPTT 26 01/10/2020 11:36 AM      A1c Lab Results   Component Value Date/Time    Hemoglobin A1c 8.0 (H) 01/16/2022 03:46 AM    Hemoglobin A1c 8.4 (H) 11/11/2021 12:03 PM    Hemoglobin A1c 8.6 (H) 06/03/2021 09:05 AM      Lipid Panel Lab Results   Component Value Date/Time    Cholesterol, total 121 06/03/2021 09:05 AM    HDL Cholesterol 52 06/03/2021 09:05 AM    LDL, calculated 44 06/03/2021 09:05 AM    LDL, calculated 90 05/27/2020 10:22 AM    VLDL, calculated 25 06/03/2021 09:05 AM    VLDL, calculated 35 05/27/2020 10:22 AM    Triglyceride 151 (H) 06/03/2021 09:05 AM    CHOL/HDL Ratio 2.6 03/09/2016 09:52 AM      Thyroid Panel Lab Results   Component Value Date/Time    TSH 0.329 (L) 01/14/2022 05:30 AM    TSH 1.460 10/28/2021 02:32 PM    T4, Free 1.37 06/03/2021 09:05 AM    T4, Free 1.59 11/30/2020 10:38 AM        Most Recent UA Lab Results   Component Value Date/Time    Color YELLOW 01/14/2022 11:12 AM    Appearance CLOUDY 01/14/2022 11:12 AM    pH (UA) 5.0 01/14/2022 11:12 AM    Protein 100 (A) 01/14/2022 11:12 AM    Glucose 500 01/14/2022 11:12 AM    Ketone TRACE (A) 01/14/2022 11:12 AM    Bilirubin MODERATE (A) 01/14/2022 11:12 AM    Blood MODERATE (A) 01/14/2022 11:12 AM    Urobilinogen 0.2 01/14/2022 11:12 AM    Nitrites Negative 01/14/2022 11:12 AM    Leukocyte Esterase Negative 01/14/2022 11:12 AM    WBC 10-20 01/14/2022 11:12 AM    RBC 0-3 01/14/2022 11:12 AM    Epithelial cells 0-3 01/14/2022 11:12 AM    Bacteria TRACE 01/14/2022 11:12 AM    Casts 0-3 09/11/2017 09:46 PM    Crystals, urine 0 10/04/2015 02:00 PM    Mucus 2+ (H) 10/04/2015 02:00 PM    Other observations RESULTS VERIFIED MANUALLY 01/14/2022 11:12 AM          All Labs from Last 24 Hrs:  Recent Results (from the past 24 hour(s))   HEPARIN XA UFH    Collection Time: 01/25/22  8:33 PM   Result Value Ref Range    Heparin Xa UFH 0.49 0.3 - 0.7 IU/mL   GLUCOSE, POC    Collection Time: 01/25/22  8:48 PM   Result Value Ref Range    Glucose (POC) 305 (H) 65 - 100 mg/dL    Performed by [facility]    CBC WITH AUTOMATED DIFF    Collection Time: 01/26/22  3:06 AM   Result Value Ref Range    WBC 24.4 (H) 4.3 - 11.1 K/uL    RBC 4.75 4.05 - 5.2 M/uL    HGB 12.4 11.7 - 15.4 g/dL    HCT 40.6 35.8 - 46.3 %    MCV 85.5 79.6 - 97.8 FL    MCH 26.1 26.1 - 32.9 PG    MCHC 30.5 (L) 31.4 - 35.0 g/dL    RDW 14.7 (H) 11.9 - 14.6 %    PLATELET 391 671 - 678 K/uL    MPV 11.5 9.4 - 12.3 FL    ABSOLUTE NRBC 0.00 0.0 - 0.2 K/uL    DF AUTOMATED      NEUTROPHILS 91 (H) 43 - 78 %    LYMPHOCYTES 4 (L) 13 - 44 %    MONOCYTES 3 (L) 4.0 - 12.0 %    EOSINOPHILS 1 0.5 - 7.8 %    BASOPHILS 0 0.0 - 2.0 %    IMMATURE GRANULOCYTES 1 0.0 - 5.0 %    ABS. NEUTROPHILS 22.1 (H) 1.7 - 8.2 K/UL    ABS. LYMPHOCYTES 1.0 0.5 - 4.6 K/UL    ABS. MONOCYTES 0.8 0.1 - 1.3 K/UL    ABS. EOSINOPHILS 0.2 0.0 - 0.8 K/UL    ABS. BASOPHILS 0.1 0.0 - 0.2 K/UL    ABS. IMM. GRANS. 0.2 0.0 - 0.5 K/UL   METABOLIC PANEL, COMPREHENSIVE    Collection Time: 01/26/22  3:06 AM   Result Value Ref Range    Sodium 139 136 - 145 mmol/L    Potassium 3.7 3.5 - 5.1 mmol/L    Chloride 100 98 - 107 mmol/L    CO2 32 21 - 32 mmol/L    Anion gap 7 7 - 16 mmol/L    Glucose 133 (H) 65 - 100 mg/dL    BUN 32 (H) 8 - 23 MG/DL    Creatinine 1.43 (H) 0.6 - 1.0 MG/DL    GFR est AA 46 (L) >60 ml/min/1.73m2    GFR est non-AA 38 (L) >60 ml/min/1.73m2    Calcium 9.0 8.3 - 10.4 MG/DL    Bilirubin, total 0.6 0.2 - 1.1 MG/DL    ALT (SGPT) 22 12 - 65 U/L    AST (SGOT) 42 (H) 15 - 37 U/L    Alk.  phosphatase 131 50 - 136 U/L    Protein, total 7.5 6.3 - 8.2 g/dL    Albumin 3.4 3.2 - 4.6 g/dL    Globulin 4.1 (H) 2.3 - 3.5 g/dL    A-G Ratio 0.8 (L) 1.2 - 3.5     MAGNESIUM    Collection Time: 01/26/22  3:06 AM   Result Value Ref Range    Magnesium 2.0 1.8 - 2.4 mg/dL   HEPARIN XA UFH    Collection Time: 01/26/22  3:06 AM   Result Value Ref Range    Heparin Xa UFH 0.46 0.3 - 0.7 IU/mL   GLUCOSE, POC    Collection Time: 01/26/22  7:48 AM   Result Value Ref Range    Glucose (POC) 206 (H) 65 - 100 mg/dL    Performed by Bharati)        Current Med List in Hospital:   Current Facility-Administered Medications   Medication Dose Route Frequency    LORazepam (ATIVAN) injection 1 mg  1 mg IntraVENous Q2H PRN    HYDROmorphone (DILAUDID) injection 0.5 mg  0.5 mg IntraVENous Q1H PRN    insulin lispro (HUMALOG) injection 8 Units  8 Units SubCUTAneous TIDAC    insulin glargine (LANTUS) injection 18 Units  18 Units SubCUTAneous QHS    loperamide (IMODIUM) capsule 2 mg  2 mg Oral PRN    glycopyrrolate (ROBINUL) injection 0.1 mg  0.1 mg IntraVENous Q8H PRN    busPIRone (BUSPAR) tablet 7.5 mg  7.5 mg Oral TID    OLANZapine (ZyPREXA) 5 mg in sterile water (preservative free) 1 mL injection  5 mg IntraMUSCular QHS PRN    butalbital-acetaminophen-caffeine (FIORICET, ESGIC) -40 mg per tablet 1 Tablet  1 Tablet Oral Q6H PRN    lip protectant (BLISTEX) ointment 1 Each  1 Each Topical PRN    furosemide (LASIX) injection 40 mg  40 mg IntraVENous BID    sodium chloride (NS) flush 5-40 mL  5-40 mL IntraVENous Q8H    sodium chloride (NS) flush 5-40 mL  5-40 mL IntraVENous PRN    acetaminophen (TYLENOL) tablet 650 mg  650 mg Oral Q6H PRN    Or    acetaminophen (TYLENOL) suppository 650 mg  650 mg Rectal Q6H PRN    polyethylene glycol (MIRALAX) packet 17 g  17 g Oral DAILY PRN    ondansetron (ZOFRAN ODT) tablet 4 mg  4 mg Oral Q8H PRN    Or    ondansetron (ZOFRAN) injection 4 mg  4 mg IntraVENous Q6H PRN    guaiFENesin-dextromethorphan (ROBITUSSIN DM) 100-10 mg/5 mL syrup 5 mL  5 mL Oral Q4H PRN    insulin lispro (HUMALOG) injection 0-10 Units  0-10 Units SubCUTAneous AC&HS    albuterol (PROVENTIL HFA, VENTOLIN HFA, PROAIR HFA) inhaler 2 Puff  2 Puff Inhalation QID RT    budesonide-formoteroL (SYMBICORT) 160-4.5 mcg/actuation HFA inhaler 2 Puff  2 Puff Inhalation BID    cycloSPORINE (RESTASIS) 0.05 % ophthalmic emulsion 1 Drop  (Patient Supplied)  1 Drop Both Eyes Q12H    DULoxetine (CYMBALTA) capsule 20 mg  20 mg Oral DAILY    lamoTRIgine (LaMICtal) tablet 150 mg  150 mg Oral QAM    levothyroxine (SYNTHROID) tablet 137 mcg  137 mcg Oral 6am    montelukast (SINGULAIR) tablet 10 mg  10 mg Oral DAILY    tiZANidine (ZANAFLEX) tablet 4 mg  4 mg Oral TID PRN    pregabalin (LYRICA) capsule 50 mg  50 mg Oral BID    topiramate (TOPAMAX) tablet 25 mg  25 mg Oral QHS    traZODone (DESYREL) tablet 50 mg  50 mg Oral QHS    dicyclomine (BENTYL) tablet 20 mg  20 mg Oral AC&HS    pantoprazole (PROTONIX) tablet 40 mg  40 mg Oral ACB&D    hydrALAZINE (APRESOLINE) tablet 50 mg  50 mg Oral Q6H PRN    alcohol 62% (NOZIN) nasal  1 Ampule  1 Ampule Topical Q12H       Allergies   Allergen Reactions    Amoxicillin Itching    Ciprofloxacin Unknown (comments)    Codeine Other (comments) and Unknown (comments)     Pt reports stomach pain    Melon Flavor Itching     MELON CAUSE MOUTH ITCHING      Morphine Anaphylaxis, Shortness of Breath and Itching    Nut Flavor Other (comments)     GAS/ BLOATING    Other Medication Other (comments)     BEANS CAUSES GAS/BLOATING AND ACHES/PAINS ALL OVER             Immunization History   Administered Date(s) Administered    Influenza High Dose Vaccine PF 09/01/2016, 09/27/2018, 09/08/2019, 09/28/2020, 10/22/2021    Influenza Vaccine 10/03/2013, 09/10/2014, 12/01/2014    Influenza Vaccine (Quad) PF (>6 Mo Flulaval, Fluarix, and >3 Yrs Afluria, Fluzone 66721) 09/23/2017    Influenza Vaccine PF 09/02/2015    Pneumococcal Conjugate (PCV-13) 09/02/2015    Pneumococcal Polysaccharide (PPSV-23) 01/01/2012, 10/03/2013, 01/07/2019    Tdap 07/14/2017       Recent Vital Data:  Patient Vitals for the past 24 hrs:   Temp Pulse Resp BP SpO2   01/26/22 1108 97.4 °F (36.3 °C) (!) 102 20 (!) 122/110 (!) 85 %   01/26/22 0739     93 %   01/26/22 0736 97.3 °F (36.3 °C) 91 20 (!) 153/66 92 %   01/26/22 0423 98.6 °F (37 °C) 78 23 139/68 96 %   01/26/22 0058     91 %   01/26/22 0008 98.9 °F (37.2 °C) 93 21 (!) 141/58 93 %   01/25/22 2001     90 %   01/1950   26 121/66    01/25/22 1938 97.6 °F (36.4 °C) 89 22 (!) 136/36 96 %     Oxygen Therapy  O2 Sat (%): (!) 85 % (01/26/22 1108)  Pulse via Oximetry: 104 beats per minute (01/26/22 1112)  O2 Device: Heated; Hi flow nasal cannula (01/26/22 1112)  Skin Assessment: Clean, dry, & intact (01/26/22 0008)  Skin Protection for O2 Device: No (01/22/22 0757)  O2 Flow Rate (L/min): 60 l/min (01/26/22 1112)  O2 Temperature: 87.8 °F (31 °C) (01/24/22 1935)  FIO2 (%): 100 % (01/26/22 1112)    Estimated body mass index is 30.89 kg/m² as calculated from the following:    Height as of this encounter: 5' 3\" (1.6 m). Weight as of this encounter: 79.1 kg (174 lb 6.4 oz). Intake/Output Summary (Last 24 hours) at 1/26/2022 1618  Last data filed at 1/26/2022 0145  Gross per 24 hour   Intake 50 ml   Output 300 ml   Net -250 ml         Discharge Exam:  General: Lifeless  Eyes: Pupils fixed/nonreactive  Lungs: No breath sounds  Heart:  No heart sounds  Neurologic: unresponsive    Signed:  Rafita Rodriguez MD    Part of this note may have been written by using a voice dictation software. The note has been proof read but may still contain some grammatical/other typographical errors.

## 2022-01-26 NOTE — DIABETES MGMT
Patient admitted with acute hypoxemic respiratory failure due to COVID-19. Blood glucose ranged 156-305 yesterday with patient receiving Lantus 18 units, Humalog 36 units, and Decadron 3mg. Lab blood glucose this morning was 133. Reviewed patient current regimen: Lantus 18 units nightly, Humalog 6 units with meals, Humalog correctional insulin, and Decadron 3mg daily. Patient would likely benefit from an increase in prandial insulin to help offset hyperglycemia during the day related to steroid therapy and caloric intake if stricter glycemic control is desired. Provider updated via Jebbit regarding recommendations and patient glycemic control.

## 2022-01-26 NOTE — PROGRESS NOTES
This RN found patient with bipap mask off. O2 sat 72 %. This RN placed patient back on bipap mask. RT called. Heavenly Bonds, RT, assessed patient. O2 sat increased to 80 %. This RN notified Dr. Gama Cummings and requested MD to assess patient. O2 sat increased to 90 %. MD assessed patient. Orders received.

## 2022-01-26 NOTE — PROGRESS NOTES
01/26/22 0058   Oxygen Therapy   O2 Sat (%) 91 %   Pulse via Oximetry 100 beats per minute   O2 Device BIPAP   FIO2 (%) 100 %   Respiratory   Respiratory (WDL) X   Respiratory Pattern Dyspnea at rest;Tachypneic   Chest/Tracheal Assessment Chest expansion, symmetrical   CPAP/BIPAP   CPAP/BIPAP Start/Stop On   Device Mode BIPAP   $$ Bipap Daily Yes   Mask Type and Size Full face   Skin Condition intact   PIP Observed 16 cm H20   IPAP (cm H2O) 14 cm H2O   EPAP (cm H2O) 10 cm H2O   Inspiratory Time (sec) 1 seconds   Vt Spont (ml) 503 ml   Ve Observed (l/min) 15.3 l/min   Backup Rate 14   Total RR (Spontaneous) 31 breaths per minute   Insp Rise Time (sec) 4   Leak (Estimated) 0 L/min   Pt's Home Machine No   Biomedical Check Performed Yes   Settings Verified Yes   Alarm Settings   High Pressure 35   Low Pressure 5   Apnea 20   Low Ve 3   High Rate 50   Low Rate 8   Pulmonary Toilet   Pulmonary Toilet Cough and deep breath

## 2022-01-26 NOTE — PROGRESS NOTES
Spiritual Consult for HCPOA. [address] [place] [address] brought paperwork to floor and attempted to complete HCPOA. [address] [place] [address] consulted with RN. [address] [place] [address] unable to complete HCPOA at this time. CH let RN know [address] [place] [address] will follow-up. [address] [place] [address] prayed for PT as chart states PT is [address] Rupa Weinstein M.Div.

## 2022-01-26 NOTE — PROGRESS NOTES
Patient resting in bed on BiPAP 18/16 at 100%. O2 will drop to 80-85% without patient removing mask. A&Ox4. Respirations even and slightly labored. Patient denies pain and is in no acute distress at this time. No needs expressed. Call light within reach, will continue to monitor. Preparing to give report to oncoming RN.

## 2022-01-26 NOTE — PROGRESS NOTES
Patient continues to try to pull off BiPAP mask. Patient restless and agitated. Zyprexa 5mg IM given. Will monitor.

## 2022-01-26 NOTE — PROGRESS NOTES
Received report from [facility]. Patient awake resting in bed. Respirations present. On bipap at this time. AxO x3 with periodic confusion. Patient attempts to remove bipap mask. This RN educated patient on importance of keeping bipap mask on. Dyspnea at rest.  S1 and S2 noted. Radial and pedal pulses palpable. Bed low and locked. Call light within reach. Will continue to monitor.

## 2022-01-26 NOTE — PROGRESS NOTES
Palliative Care Progress Note    Patient: Kings Ferrari MRN: [id number]  SSN: [id number]    YOB: 1950  Age: 66 y.o. Sex: female       Assessment/Plan:     Chief Complaint/Interval History: pt pulling bipap mask off and saying she no longer wishes to pursue treatments    Principal Diagnosis:    · Dyspnea  R06.00    Additional Diagnoses:   · Acute Respiratory Failure, Unspecified  J96.00  · Frailty  R54  · Counseling, Encounter for Medical Advice  Z71.9  · Encounter for Palliative Care  Z51.5    Palliative Performance Scale (PPS)       Medical Decision Making:   Reviewed and summarized labs and imaging over past 24 hours. Dr. Marcella Flor and myself met with pt at bedside. Pt has removed bipap and states she no longer wishes to continue treatments. We explained that without oxygen she will pass. Pt expressed her understanding and states she is ready to die. She wishes to pursue comfort measures. DNR is in place  Ativan 1 mg iv q 2 hr prn anxiety  Dilaudid 0.5 mg iv q 1 hr prn pain, dyspnea  Stop labs, unnecessary meds  Spoke with pt brother and updated on plans. He would like to visit to be with his sister as she passes    Will discuss findings with members of the interdisciplinary team.      More than 50% of this 25 minute visit was spent counseling and coordination of care as outlined above. Subjective:     Comprehensive review of Systems completed and negative with the exception of as noted above     Objective:     Visit Vitals  BP (!) 153/66 (BP 1 Location: Left upper arm, BP Patient Position: Lying;Semi fowlers)   Pulse 91   Temp 97.3 °F (36.3 °C)   Resp 20   Ht 5' 3\" (1.6 m)   Wt 174 lb 6.4 oz (79.1 kg)   SpO2 93%   BMI 30.89 kg/m²       Physical Exam:    General:  Cooperative. No acute distress. Eyes:  Conjunctivae/corneas clear    Nose: Nares normal. Septum midline.    Neck: Supple, symmetrical, trachea midline, no JVD   Lungs:   Coarse bilateral bs   Heart:  Regular rate and rhythm, no murmur    Abdomen:   Soft, non-tender, non-distended   Extremities: Normal, atraumatic, no cyanosis or edema   Skin: Skin color, texture, turgor normal. No rash or lesions.    Neurologic: Nonfocal   Psych: Alert and oriented     Signed By: Brissa Barboza MD     January 26, 2022

## 2022-03-23 NOTE — PROGRESS NOTES
01/19/22 2307   Oxygen Therapy   O2 Sat (%) 98 %   Pulse via Oximetry 68 beats per minute   O2 Device CPAP mask   FIO2 (%) 100 %   Respiratory   Respiratory (WDL) X   Chest/Tracheal Assessment Chest expansion, symmetrical   CPAP/BIPAP   CPAP/BIPAP Start/Stop On   Device Mode CPAP   $$ CPAP Daily Yes   Mask Type and Size Full face   Skin Condition intact   EPAP (cm H2O) 12 cm H2O   Vt Spont (ml) 513 ml   Ve Observed (l/min) 11.4 l/min   Total RR (Spontaneous) 22 breaths per minute   Insp Rise Time (sec) 3   Leak (Estimated) 17 L/min   Pt's Home Machine No   Biomedical Check Performed Yes   Settings Verified Yes   Alarm Settings   High Pressure 35   Low Pressure 5   Apnea 20   Low Ve 3   High Rate 50   Low Rate 8   Pulmonary Toilet   Pulmonary Toilet Cough and deep breath;H. O.B elevated fall

## 2023-08-23 NOTE — PROGRESS NOTES
Patient finally resting well with respirations even and unlabored. Oxygen sats at 95% on CPaP. Patient appears comfortable. K+ continues to infuse with no issues.   Report to be given to oncoming RN 7p-7a n/a

## (undated) DEVICE — SUTURE PERMAHAND SZ 3-0 L30IN NONABSORBABLE BLK SILK BRAID A304H

## (undated) DEVICE — SHEET, T, LAPAROTOMY, STERILE: Brand: MEDLINE

## (undated) DEVICE — INTENDED FOR TISSUE SEPARATION, AND OTHER PROCEDURES THAT REQUIRE A SHARP SURGICAL BLADE TO PUNCTURE OR CUT.: Brand: BARD-PARKER SAFETY BLADES SIZE 15, STERILE

## (undated) DEVICE — TRAY CATH 16F URIN MTR LTX -- CONVERT TO ITEM 363111

## (undated) DEVICE — 2000CC GUARDIAN II: Brand: GUARDIAN

## (undated) DEVICE — SOLUTION IV 1000ML 0.9% SOD CHL

## (undated) DEVICE — DUAL LUMEN STOMACH TUBE: Brand: SALEM SUMP

## (undated) DEVICE — BLADE ELECTRODE: Brand: EDGE

## (undated) DEVICE — SURGICAL PROCEDURE PACK TISS 3X5 IN ABSORBABLE SEPRAFILM

## (undated) DEVICE — SUTURE VCRL SZ 1 L27IN ABSRB UD CT-1 L36MM 1/2 CIR J261H

## (undated) DEVICE — SUTURE PDS II SZ 1 L96IN ABSRB VLT TP-1 L65MM 1/2 CIR Z880G

## (undated) DEVICE — SUTURE PERMAHAND SZ 2-0 L30IN NONABSORBABLE BLK SILK W/O A305H

## (undated) DEVICE — SUTURE PERMAHAND SZ 3-0 L18IN NONABSORBABLE BLK L26MM SH C013D

## (undated) DEVICE — (D)PREP SKN CHLRAPRP APPL 26ML -- CONVERT TO ITEM 371833

## (undated) DEVICE — SPONGE LAP 18X18IN STRL -- 5/PK

## (undated) DEVICE — DRAPE TWL SURG 16X26IN BLU ORB04] ALLCARE INC]

## (undated) DEVICE — SURGICAL PROCEDURE PACK BASIC ST FRANCIS

## (undated) DEVICE — BASIC SINGLE BASIN-LF: Brand: MEDLINE INDUSTRIES, INC.

## (undated) DEVICE — KENDALL SCD EXPRESS SLEEVES, KNEE LENGTH, MEDIUM: Brand: KENDALL SCD

## (undated) DEVICE — SUTURE PERMAHAND SZ 2-0 L12X18IN NONABSORBABLE BLK SILK A185H

## (undated) DEVICE — SLIM BODY SKIN STAPLER: Brand: APPOSE ULC